# Patient Record
Sex: FEMALE | Race: WHITE | Employment: OTHER | URBAN - METROPOLITAN AREA
[De-identification: names, ages, dates, MRNs, and addresses within clinical notes are randomized per-mention and may not be internally consistent; named-entity substitution may affect disease eponyms.]

---

## 2017-03-02 ENCOUNTER — HOSPITAL ENCOUNTER (OUTPATIENT)
Dept: GENERAL RADIOLOGY | Age: 77
Discharge: HOME | End: 2017-03-02

## 2017-03-02 DIAGNOSIS — R10.9 ABDOMINAL PAIN, UNSPECIFIED SITE: ICD-10-CM

## 2017-08-11 ENCOUNTER — HOSPITAL ENCOUNTER (OUTPATIENT)
Dept: GENERAL RADIOLOGY | Age: 77
Discharge: HOME | End: 2017-08-11

## 2017-08-11 DIAGNOSIS — M54.5 LOW BACK PAIN, UNSPECIFIED BACK PAIN LATERALITY, UNSPECIFIED CHRONICITY, WITH SCIATICA PRESENCE UNSPECIFIED: ICD-10-CM

## 2018-05-21 ENCOUNTER — HOSPITAL ENCOUNTER (OUTPATIENT)
Dept: MAMMOGRAPHY | Age: 78
Discharge: HOME | End: 2018-05-21
Admitting: INTERNAL MEDICINE

## 2018-05-21 DIAGNOSIS — Z12.31 ENCOUNTER FOR SCREENING MAMMOGRAM FOR BREAST CANCER: ICD-10-CM

## 2018-06-13 ENCOUNTER — HOSPITAL ENCOUNTER (OUTPATIENT)
Dept: NON INVASIVE DIAGNOSTICS | Age: 78
Discharge: HOME | End: 2018-06-13
Attending: INTERNAL MEDICINE | Admitting: INTERNAL MEDICINE

## 2018-06-13 DIAGNOSIS — I35.0 NONRHEUMATIC AORTIC VALVE STENOSIS: ICD-10-CM

## 2018-06-13 LAB
LV EF: 60 %
LVEF MODALITY: NORMAL

## 2019-04-15 ENCOUNTER — HOSPITAL ENCOUNTER (OUTPATIENT)
Dept: GENERAL RADIOLOGY | Age: 79
Discharge: HOME | End: 2019-04-15
Payer: MEDICARE

## 2019-04-15 DIAGNOSIS — M54.32 BILATERAL SCIATICA: ICD-10-CM

## 2019-04-15 DIAGNOSIS — M54.31 BILATERAL SCIATICA: ICD-10-CM

## 2019-04-15 PROCEDURE — 72100 X-RAY EXAM L-S SPINE 2/3 VWS: CPT

## 2019-05-09 ENCOUNTER — HOSPITAL ENCOUNTER (OUTPATIENT)
Age: 79
Discharge: HOME | End: 2019-05-09
Payer: MEDICARE

## 2019-05-09 ENCOUNTER — HOSPITAL ENCOUNTER (OUTPATIENT)
Dept: CT IMAGING | Age: 79
Discharge: HOME | End: 2019-05-09
Payer: MEDICARE

## 2019-05-09 DIAGNOSIS — R68.89 ABNORMAL WEIGHT: ICD-10-CM

## 2019-05-09 DIAGNOSIS — M54.30 CHRONIC SCIATICA, UNSPECIFIED LATERALITY: ICD-10-CM

## 2019-05-09 DIAGNOSIS — M15.9 GENERALIZED OSTEOARTHRITIS: ICD-10-CM

## 2019-05-09 DIAGNOSIS — I10 ESSENTIAL HYPERTENSION, BENIGN: ICD-10-CM

## 2019-05-09 PROCEDURE — 74176 CT ABD & PELVIS W/O CONTRAST: CPT

## 2019-05-09 PROCEDURE — 6360000004 HC RX CONTRAST MEDICATION: Performed by: INTERNAL MEDICINE

## 2019-05-09 RX ADMIN — IOHEXOL 50 ML: 240 INJECTION, SOLUTION INTRATHECAL; INTRAVASCULAR; INTRAVENOUS; ORAL at 06:36

## 2019-06-12 ENCOUNTER — HOSPITAL ENCOUNTER (OUTPATIENT)
Dept: NON INVASIVE DIAGNOSTICS | Age: 79
Discharge: HOME | End: 2019-06-12
Payer: MEDICARE

## 2019-06-12 LAB
LV EF: 58 %
LVEF MODALITY: NORMAL

## 2019-06-12 PROCEDURE — 93306 TTE W/DOPPLER COMPLETE: CPT

## 2019-06-13 ENCOUNTER — TELEPHONE (OUTPATIENT)
Dept: CARDIOLOGY CLINIC | Age: 79
End: 2019-06-13

## 2019-06-18 LAB
ALBUMIN SERPL-MCNC: 3.6 G/DL
ALP BLD-CCNC: 78 U/L
ALT SERPL-CCNC: 9 U/L
ANION GAP SERPL CALCULATED.3IONS-SCNC: 1.3 MMOL/L
AST SERPL-CCNC: 15 U/L
BASOPHILS ABSOLUTE: 0.1 /ΜL
BASOPHILS RELATIVE PERCENT: 1 %
BILIRUB SERPL-MCNC: 0.2 MG/DL (ref 0.1–1.4)
BUN BLDV-MCNC: 11 MG/DL
CALCIUM SERPL-MCNC: 9.1 MG/DL
CHLORIDE BLD-SCNC: 105 MMOL/L
CO2: 23 MMOL/L
CREAT SERPL-MCNC: 1.16 MG/DL
EOSINOPHILS ABSOLUTE: 0.3 /ΜL
EOSINOPHILS RELATIVE PERCENT: 5 %
GFR CALCULATED: 45
GLUCOSE BLD-MCNC: 102 MG/DL
HCT VFR BLD CALC: 32.7 % (ref 36–46)
HEMOGLOBIN: 10.3 G/DL (ref 12–16)
LYMPHOCYTES ABSOLUTE: 1.1 /ΜL
LYMPHOCYTES RELATIVE PERCENT: 19 %
MCH RBC QN AUTO: 29.2 PG
MCHC RBC AUTO-ENTMCNC: 31.5 G/DL
MCV RBC AUTO: 93 FL
MONOCYTES ABSOLUTE: 0.6 /ΜL
MONOCYTES RELATIVE PERCENT: 11 %
NEUTROPHILS ABSOLUTE: 4 /ΜL
NEUTROPHILS RELATIVE PERCENT: 64 %
PLATELET # BLD: 390 K/ΜL
PMV BLD AUTO: ABNORMAL FL
POTASSIUM SERPL-SCNC: 4.5 MMOL/L
RBC # BLD: 3.53 10^6/ΜL
SODIUM BLD-SCNC: 141 MMOL/L
TOTAL PROTEIN: 6.3
WBC # BLD: 6.1 10^3/ML

## 2019-06-21 ENCOUNTER — TELEPHONE (OUTPATIENT)
Dept: CARDIOLOGY CLINIC | Age: 79
End: 2019-06-21

## 2019-06-25 ENCOUNTER — OFFICE VISIT (OUTPATIENT)
Dept: CARDIOLOGY CLINIC | Age: 79
End: 2019-06-25
Payer: MEDICARE

## 2019-06-25 VITALS
DIASTOLIC BLOOD PRESSURE: 76 MMHG | WEIGHT: 165 LBS | SYSTOLIC BLOOD PRESSURE: 138 MMHG | OXYGEN SATURATION: 97 % | BODY MASS INDEX: 29.23 KG/M2 | HEART RATE: 70 BPM | HEIGHT: 63 IN

## 2019-06-25 DIAGNOSIS — I10 ESSENTIAL HYPERTENSION: ICD-10-CM

## 2019-06-25 DIAGNOSIS — E78.49 OTHER HYPERLIPIDEMIA: ICD-10-CM

## 2019-06-25 DIAGNOSIS — I35.0 NONRHEUMATIC AORTIC VALVE STENOSIS: ICD-10-CM

## 2019-06-25 PROCEDURE — 93000 ELECTROCARDIOGRAM COMPLETE: CPT | Performed by: INTERNAL MEDICINE

## 2019-06-25 PROCEDURE — 99204 OFFICE O/P NEW MOD 45 MIN: CPT | Performed by: INTERNAL MEDICINE

## 2019-06-25 NOTE — PATIENT INSTRUCTIONS
[facility] Office Note  6/25/2019     Subjective:  Ms. Gena Portillo is a new patient to me here in referral for aortic stenosis by Dr. Tiff Alexander:   Today she reports SOB with exertion. She feels tired.  says patient sometimes asks for nitro patches from him and with that she feels improved. There is intermittent swelling of legs. Patient is accompanied by daughter and . Patient seems to minimize her symptoms. No syncope. PMH   Aortic stenosis, HLD, HTN       Review of Systems:         12 point ROS negative in all areas as listed below except as in [place]  Eating poorly. Constitutional, EENT, Cardiovascular, pulmonary, GI, , Musculoskeletal, skin, neurological, hematological, endocrine, Psychiatric    Reviewed past medical history, social, and family history. Lives in [place], , 4 children 3 living. 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   Quit smoking after 25 years of smoking       Past Medical History:   Diagnosis Date    Arthritis     Diabetes mellitus ([address]     Hyperlipidemia     Hypertension     Thyroid disease      Past Surgical History:   Procedure Laterality Date    EYE SURGERY Left 12/11/15    PHACO EMULSIFICATION OF CATARACT WITH LENS IMPLANT LEFT EYE    KNEE ARTHROSCOPY      OTHER SURGICAL HISTORY  10/09/2015    phacoemulsification of cataract with intraocular implant right eye    THYROIDECTOMY, PARTIAL      TONSILLECTOMY      TUBAL LIGATION         Objective:   /76   Pulse 70   Ht 5' 3\" (1.6 m)   Wt 165 lb (74.8 kg)   SpO2 97%   Breastfeeding? No   BMI 29.23 kg/m²      Wt Readings from Last 3 Encounters:   06/25/19 165 lb (74.8 kg)   10/11/16 211 lb (95.7 kg)   12/11/15 214 lb (97.1 kg)       Physical Exam:  General: No Respiratory distress, appears well developed and well nourished.    Eyes:  Sclera nonicteric  Nose/Sinuses:  negative findings: nose shows no deformity, asymmetry, or inflammation, nasal mucosa normal, septum midline with 25 MG tablet Take 25 mg by mouth as needed for Itching.  [DISCONTINUED] Potassium Chloride Karo CR (KLOR-CON M10 PO) Take 10 mEq by mouth.  [DISCONTINUED] metFORMIN (GLUCOPHAGE) 850 MG tablet Take 850 mg by mouth 3 times daily        No facility-administered encounter medications on file as of 6/25/2019. Lab Data:  CBC: No results for input(s): WBC, HGB, HCT, MCV, PLT in the last 72 hours. BMP: No results for input(s): NA, K, CL, CO2, PHOS, BUN, CREATININE in the last 72 hours. Invalid input(s): CA  LIVER PROFILE: No results for input(s): AST, ALT, LIPASE, BILIDIR, BILITOT, ALKPHOS in the last 72 hours. Invalid input(s): AMYLASE,  ALB  LIPID: No results found for: CHOL  No results found for: TRIG  No results found for: HDL  No results found for: LDLCHOLESTEROL, LDLCALC  No results found for: LABVLDL, VLDL  No results found for: CHOLHDLRATIO  PT/INR: No results for input(s): PROTIME, INR in the last 72 hours. A1C: No results found for: LABA1C  BNP:  No results for input(s): BNP in the last 72 hours. IMAGING:   EKG 6/25/19  normal sinus 69 bpm poor R wave progression    CT Chest, abdomen, pelvis 5/9/19  Multilevel degenerative disc disease of the thoracolumbar spine. Bilateral L5 pars defects. Indeterminate sclerotic lesion is seen at T11 vertebral body, potentially bone island. If there is strong clinical suspicion for metastatic disease, MR could be considered for further assessment. A generalized pattern of skeletal sclerosis is not otherwise seen however. A few small 2-3 mm pulmonary nodules are seen, for which follow-up recommendations are below.      ECHO 6/12/19  Summary   Normal left ventricular systolic function with an estimated ejection   fraction of 55-60%.   No regional wall motion abnormalities are seen.   There is mild concentric left ventricular hypertrophy.   Grade I diastolic dysfunction with normal filling pressure.   The left atrium is mildly dilated.  [place] posterior mitral annular calcification is present.   Mild anterior mitral annular calcification is present.   Mild mitral regurgitation.   Moderate to severe aortic stenosis is present.   Moderate tricuspid regurgitation.   Systolic pulmonary artery pressure (SPAP) estimated at 50 mmHg (RA pressure   3 mmHg), consistent with moderate pulmonary hypertension.   Moderate pulmonic regurgitation present. ECHO 6/13/18  Summary   Normal LV EF with an estimated ejection fraction of 60%.   No regional wall motion abnormalities are seen.   Mild concentric left ventricular hypertrophy.   Grade I diastolic dysfunction with normal filling pressure.   The aortic valve is thickened/calcified with decreased leaflet mobility.   Moderate aortic stenosis.   Mild tricuspid and pulmonic regurgitation.   Systolic pulmonary artery pressure (SPAP) is normal and estimated at 30mmHg   (RA pressure of 3 mmHg). ECHO 4/16/15  Summary   Normal left ventricular global systolic function with an estimated ejection   fraction of 60-65%.   Left ventricle size is normal.   Left ventricular wall thickness is normal.   Grade I left ventricular diastolic dysfunction pattern noted, valsalva   maneuver performed.   Mild mitral valve annular calcification is present, both anterior and   inferolateral/posterior annuluses.   Trace mitral valve regurgitation noted.   The aortic valve is thickened/calcified with decreased leaflet mobility   consistent with aortic stenosis. It is trileaflet in nature.   The aortic valve area is calculated at 1.0 cm2 with a maximum pressure   gradient of 50 mmHg and a mean pressure gradient of 27 mmHg.  This is c/w   moderate aortic stenosis.   The tricuspid valve is structurally and functionally normal.   There is trace tricuspid valve regurgitation noted with an estimated SPAP of   36 mmHg.   The pulmonic valve is not well visualized.   Mild pulmonic valve regurgitation is present.  Ra Cerise is a small localized near left ventricle pericardial effusion noted.  IVC size is normal (<2.1 cm) and collapses < 50% with respiration consistent   with elevated RA pressure (10 mmHg).   The left atrium is mildly dilated. Assessment:  1. Aortic valve stenosis, etiology of cardiac valve disease unspecified    2. Other hyperlipidemia    3. Hypertension, unspecified type         Plan:  1. Meds reviewed Patient is symptomatic from aortic stenosis. She has SOB and fatigue  2. Check our current med list when you go home. Call my office with any discrepancies. 3. Referral to Dr. Alison Cordero / Dr Irma Kaye for possible aortic valve replacement   She will obviously need to assess coronary arteries   I explained every thing about aortic stenosis on a heart model to patient all questions answered. This note was scribed in the presence of Abelardo Bey MD by Caroline Dowell RN      QUALITY MEASURES  1. Tobacco Cessation Counseling: NA  2. Retake of BP if >140/90:   NA  3. Documentation to PCP/referring for new patient:  Sent to PCP at close of office visit  4. CAD patient on anti-platelet: NA   5. CAD patient on STATIN therapy:  HLD- lipitor   6. Patient with CHF and aFib on anticoagulation:  NA     I, Dr. Galina Quiroz, personally performed the services described in this documentation, as scribed by the above signed scribe in my presence. It is both accurate and complete to my knowledge. I agree with the details independently gathered by the clinical support staff, while the remaining scribed note accurately describes my personal service to the patient.       [address] [place], MD 6/25/2019 2:44 PM

## 2019-06-25 NOTE — PROGRESS NOTES
perforation or bleeding  Back:  no pain to palpation  Joint:  no active joint inflammation  Musculoskeletal:  negative  Skin:  Warm and dry  Neck:  Negative for JVD and Carotid Bruits. Chest:  Clear to auscultation, respiration easy  Cardiovascular:  RRR, S1S2 normal, Loud systolic aortic stenosis  murmur, Did not hear any diastolic murmur,. no rub or thrill. Abdomen:  Soft normal liver and spleen  Extremities:   1+ BLE edema, clubbing, cyanosis,  Pulses: pedal pulses are normal.  Neuro: intact    Medications:   Outpatient Encounter Medications as of 6/25/2019   Medication Sig Dispense Refill    lisinopril (PRINIVIL;ZESTRIL) 5 MG tablet Take 5 mg by mouth daily      HYDROcodone-acetaminophen (NORCO) 5-325 MG per tablet Take 1 tablet by mouth every 6 hours as needed for Pain      metaxalone (SKELAXIN) 800 MG tablet Take 800 mg by mouth 3 times daily      metoprolol tartrate (LOPRESSOR) 25 MG tablet Take 25 mg by mouth 2 times daily      Butalbital-Acetaminophen  MG TABS Take 1 tablet by mouth      bisoprolol-hydrochlorothiazide (ZIAC) 5-6.25 MG per tablet Take 1 tablet by mouth daily.  atorvastatin (LIPITOR) 40 MG tablet Take 40 mg by mouth daily.  spironolactone-hydrochlorothiazide (ALDACTAZIDE) 25-25 MG per tablet Take 1 tablet by mouth daily.  hydrocodone-acetaminophen (VICODIN) 5-500 MG per tablet Take 1 tablet by mouth every 6 hours as needed.  Levothyroxine Sodium (SYNTHROID PO) Take 175 mcg by mouth daily.  GARLIC by Does not apply route.  FISH OIL by Does not apply route.  Flaxseed, Linseed, (FLAX SEED OIL PO) Take  by mouth.         [DISCONTINUED] diazepam (VALIUM) 5 MG tablet Take 5 mg by mouth every 6 hours as needed for Anxiety      [DISCONTINUED] baclofen (LIORESAL) 10 MG tablet Take 10 mg by mouth 3 times daily      [DISCONTINUED] methocarbamol (ROBAXIN) 750 MG tablet Take 750 mg by mouth 4 times daily      [DISCONTINUED] hydrOXYzine (ATARAX) 25 MG tablet Take 25 mg by mouth as needed for Itching.  [DISCONTINUED] Potassium Chloride Karo CR (KLOR-CON M10 PO) Take 10 mEq by mouth.  [DISCONTINUED] metFORMIN (GLUCOPHAGE) 850 MG tablet Take 850 mg by mouth 3 times daily        No facility-administered encounter medications on file as of 6/25/2019. Lab Data:  CBC: No results for input(s): WBC, HGB, HCT, MCV, PLT in the last 72 hours. BMP: No results for input(s): NA, K, CL, CO2, PHOS, BUN, CREATININE in the last 72 hours. Invalid input(s): CA  LIVER PROFILE: No results for input(s): AST, ALT, LIPASE, BILIDIR, BILITOT, ALKPHOS in the last 72 hours. Invalid input(s): AMYLASE,  ALB  LIPID: No results found for: CHOL  No results found for: TRIG  No results found for: HDL  No results found for: LDLCHOLESTEROL, LDLCALC  No results found for: LABVLDL, VLDL  No results found for: CHOLHDLRATIO  PT/INR: No results for input(s): PROTIME, INR in the last 72 hours. A1C: No results found for: LABA1C  BNP:  No results for input(s): BNP in the last 72 hours. IMAGING:   EKG 6/25/19  normal sinus 69 bpm poor R wave progression    CT Chest, abdomen, pelvis 5/9/19  Multilevel degenerative disc disease of the thoracolumbar spine. Bilateral L5 pars defects. Indeterminate sclerotic lesion is seen at T11 vertebral body, potentially bone island. If there is strong clinical suspicion for metastatic disease, MR could be considered for further assessment. A generalized pattern of skeletal sclerosis is not otherwise seen however. A few small 2-3 mm pulmonary nodules are seen, for which follow-up recommendations are below.      ECHO 6/12/19  Summary   Normal left ventricular systolic function with an estimated ejection   fraction of 55-60%.   No regional wall motion abnormalities are seen.   There is mild concentric left ventricular hypertrophy.   Grade I diastolic dysfunction with normal filling pressure.   The left atrium is mildly dilated.  Jennifer Shultz ventricle pericardial effusion noted.  IVC size is normal (<2.1 cm) and collapses < 50% with respiration consistent   with elevated RA pressure (10 mmHg).   The left atrium is mildly dilated. Assessment:  1. Aortic valve stenosis, etiology of cardiac valve disease unspecified    2. Other hyperlipidemia    3. Hypertension, unspecified type         Plan:  1. Meds reviewed Patient is symptomatic from aortic stenosis. She has SOB and fatigue  2. Check our current med list when you go home. Call my office with any discrepancies. 3. Referral to Dr. Melvi Griffin / Dr Moreno Chiang for possible aortic valve replacement   She will obviously need to assess coronary arteries   I explained every thing about aortic stenosis on a heart model to patient all questions answered. This note was scribed in the presence of Brooks Olivares MD by Crystal Govea RN      QUALITY MEASURES  1. Tobacco Cessation Counseling: NA  2. Retake of BP if >140/90:   NA  3. Documentation to PCP/referring for new patient:  Sent to PCP at close of office visit  4. CAD patient on anti-platelet: NA   5. CAD patient on STATIN therapy:  HLD- lipitor   6. Patient with CHF and aFib on anticoagulation:  NA     I, Dr. Marieta Brittle, personally performed the services described in this documentation, as scribed by the above signed scribe in my presence. It is both accurate and complete to my knowledge. I agree with the details independently gathered by the clinical support staff, while the remaining scribed note accurately describes my personal service to the patient.       [address] [place], MD 6/25/2019 2:44 PM

## 2019-07-09 ENCOUNTER — OFFICE VISIT (OUTPATIENT)
Dept: CARDIOLOGY CLINIC | Age: 79
End: 2019-07-09
Payer: MEDICARE

## 2019-07-09 VITALS
SYSTOLIC BLOOD PRESSURE: 130 MMHG | WEIGHT: 165 LBS | DIASTOLIC BLOOD PRESSURE: 60 MMHG | OXYGEN SATURATION: 99 % | BODY MASS INDEX: 29.23 KG/M2 | HEART RATE: 51 BPM | HEIGHT: 63 IN

## 2019-07-09 DIAGNOSIS — I35.0 NONRHEUMATIC AORTIC VALVE STENOSIS: Primary | ICD-10-CM

## 2019-07-09 DIAGNOSIS — R06.09 DOE (DYSPNEA ON EXERTION): ICD-10-CM

## 2019-07-09 DIAGNOSIS — I10 ESSENTIAL HYPERTENSION: ICD-10-CM

## 2019-07-09 DIAGNOSIS — E78.49 OTHER HYPERLIPIDEMIA: ICD-10-CM

## 2019-07-09 PROCEDURE — 99214 OFFICE O/P EST MOD 30 MIN: CPT | Performed by: INTERNAL MEDICINE

## 2019-07-09 NOTE — PROGRESS NOTES
muscular weakness/soreness. Neurological: No dizziness or headaches. No numbness/tingling, speech problems or weakness. No history of a stroke or TIA. Psychological: No anxiety or depression  Hematological and Lymphatic: No abnormal bleeding or bruising, blood clots, jaundice. Endocrine: No malaise/lethargy, palpitations, polydipsia/polyuria, temperature intolerance or unexpected weight changes. Skin: No rashes or non-healing ulcers. PE:  Blood pressure 130/60, pulse 51, height 5' 3\" (1.6 m), weight 165 lb (74.8 kg), SpO2 99 %, not currently breastfeeding. General (appearance): Well devel. No distress. Eyes: Anicteric. EOMI. Neck: Supple. No JVD  Ears/Nose/Mouth/Thorat: No cyanosis  CV: RRR. 2/6 CHRISTO. + (?diminished) S2.  Respiratory:  Clear B, normal respiratory effort  GI: Abd s/nt/nd. No peritoneal signs  Skin: Warm, dry. No rashes  Neuro/Psych: Alert and oriented x 3. Appropriate behavior  Ext:  No c/c. No pitting edema  Pulses:  2+ carotid    Lab Results   Component Value Date    WBC 6.1 06/18/2019    HGB 10.3 (A) 06/18/2019    HCT 32.7 (A) 06/18/2019    MCV 93 06/18/2019     06/18/2019     Lab Results   Component Value Date     06/18/2019    K 4.5 06/18/2019     06/18/2019    CO2 23 06/18/2019    BUN 11 06/18/2019    CREATININE 1.16 06/18/2019    GLUCOSE 102 06/18/2019    CALCIUM 9.1 06/18/2019    PROT 7.0 03/18/2014    LABALBU 3.6 06/18/2019    BILITOT 0.2 06/18/2019    ALKPHOS 78 06/18/2019    AST 15 06/18/2019    ALT 9 06/18/2019    LABGLOM 45 06/18/2019    GFRAA >60 03/18/2014    AGRATIO 1.5 03/18/2014    GLOB 2.8 03/18/2014     Lab Results   Component Value Date    INR 0.93 03/18/2014    PROTIME 10.4 03/18/2014     No results found for: CHOL  No results found for: TRIG  No results found for: HDL  No results found for: LDLCHOLESTEROL, LDLCALC  No results found for: LABVLDL, VLDL  No results found for: CHOLHDLRATIO    6/12/2019 TTE: EF normal, PV 4.14 m/s.  Mild MR, moderate

## 2019-07-10 DIAGNOSIS — I35.0 NONRHEUMATIC AORTIC VALVE STENOSIS: Primary | ICD-10-CM

## 2019-07-10 DIAGNOSIS — R06.09 DOE (DYSPNEA ON EXERTION): ICD-10-CM

## 2019-07-10 DIAGNOSIS — R09.89 OTHER SPECIFIED SYMPTOMS AND SIGNS INVOLVING THE CIRCULATORY AND RESPIRATORY SYSTEMS: ICD-10-CM

## 2019-07-10 RX ORDER — DIPHENHYDRAMINE HCL 50 MG
CAPSULE ORAL
Qty: 1 CAPSULE | Refills: 0 | Status: ON HOLD | OUTPATIENT
Start: 2019-07-10 | End: 2019-10-16 | Stop reason: HOSPADM

## 2019-07-10 RX ORDER — PREDNISONE 50 MG/1
TABLET ORAL
Qty: 3 TABLET | Refills: 0 | Status: ON HOLD | OUTPATIENT
Start: 2019-07-10 | End: 2019-10-16 | Stop reason: HOSPADM

## 2019-07-16 ENCOUNTER — TELEPHONE (OUTPATIENT)
Dept: CARDIOLOGY CLINIC | Age: 79
End: 2019-07-16

## 2019-07-17 NOTE — TELEPHONE ENCOUNTER
Spoke to pts daughter, [facility], to let her know about pre medication for CTA. She will get it at the local pharmacy. Assured her that pt will not need meds for anxiety prior to CTA as she will not be in a \"tube\". Let her know that she will get light sedation for her cath. Instructed to call me with any questions/concerns.  Maritza BLOUNT

## 2019-08-02 ENCOUNTER — INITIAL CONSULT (OUTPATIENT)
Dept: GASTROENTEROLOGY | Age: 79
End: 2019-08-02
Payer: MEDICARE

## 2019-08-02 VITALS
DIASTOLIC BLOOD PRESSURE: 62 MMHG | HEIGHT: 63 IN | WEIGHT: 161 LBS | SYSTOLIC BLOOD PRESSURE: 120 MMHG | BODY MASS INDEX: 28.53 KG/M2

## 2019-08-02 DIAGNOSIS — R63.4 WEIGHT LOSS: Primary | ICD-10-CM

## 2019-08-02 DIAGNOSIS — R10.13 DYSPEPSIA: ICD-10-CM

## 2019-08-02 DIAGNOSIS — D63.8 ANEMIA, CHRONIC DISEASE: ICD-10-CM

## 2019-08-02 DIAGNOSIS — R19.5 POSITIVE COLORECTAL CANCER SCREENING USING COLOGUARD TEST: ICD-10-CM

## 2019-08-02 DIAGNOSIS — Z80.0 FAMILY HISTORY OF COLON CANCER IN MOTHER: ICD-10-CM

## 2019-08-02 PROCEDURE — 99204 OFFICE O/P NEW MOD 45 MIN: CPT | Performed by: INTERNAL MEDICINE

## 2019-08-02 RX ORDER — OMEPRAZOLE 20 MG/1
20 CAPSULE, DELAYED RELEASE ORAL DAILY
Status: ON HOLD | COMMUNITY
End: 2019-10-16 | Stop reason: HOSPADM

## 2019-08-02 RX ORDER — POLYETHYLENE GLYCOL 3350 17 G/17G
238 POWDER ORAL DAILY
Qty: 255 G | Refills: 0 | Status: ON HOLD | OUTPATIENT
Start: 2019-08-02 | End: 2019-09-25

## 2019-08-02 RX ORDER — FOLIC ACID 1 MG/1
1 TABLET ORAL
COMMUNITY
End: 2019-11-14

## 2019-08-02 NOTE — PROGRESS NOTES
History Narrative    Not on file     SURGICAL HISTORY     Past Surgical History:   Procedure Laterality Date    EYE SURGERY Left 12/11/15    PHACO EMULSIFICATION OF CATARACT WITH LENS IMPLANT LEFT EYE    KNEE ARTHROSCOPY      OTHER SURGICAL HISTORY  10/09/2015    phacoemulsification of cataract with intraocular implant right eye    THYROIDECTOMY, PARTIAL      TONSILLECTOMY      TUBAL LIGATION       CURRENT MEDICATIONS   (This list may include medications prescribed during this encounter as epic can not insert only the list prior to this encounter.)  Current Outpatient Rx   Medication Sig Dispense Refill    omeprazole (PRILOSEC) 20 MG delayed release capsule Take 20 mg by mouth daily      folic acid (FOLVITE) 1 MG tablet Take 1 tablet by mouth      bisacodyl (DULCOLAX) 5 MG EC tablet Take 4 tablets by mouth once for 1 dose Take as directed for colonoscopy. 4 tablet 0    polyethylene glycol (MIRALAX) POWD powder Take 238 g by mouth daily Take as directed for colonoscopy 255 g 0    predniSONE (DELTASONE) 50 MG tablet Take 1 tablet 13 hours prior to CTA scan, 1 tablet 7 hours prior to CTA scan and 1 tablet 1 hour prior to CTA scan 3 tablet 0    diphenhydrAMINE (BENADRYL) 50 MG capsule Take 1 capsule 1 hour prior to CTA scan 1 capsule 0    lisinopril (PRINIVIL;ZESTRIL) 5 MG tablet Take 5 mg by mouth daily      HYDROcodone-acetaminophen (NORCO) 5-325 MG per tablet Take 1 tablet by mouth every 6 hours as needed for Pain      metaxalone (SKELAXIN) 800 MG tablet Take 800 mg by mouth 3 times daily      metoprolol tartrate (LOPRESSOR) 25 MG tablet Take 25 mg by mouth 2 times daily      Butalbital-Acetaminophen  MG TABS Take 1 tablet by mouth      bisoprolol-hydrochlorothiazide (ZIAC) 5-6.25 MG per tablet Take 1 tablet by mouth daily.  atorvastatin (LIPITOR) 40 MG tablet Take 40 mg by mouth daily.       spironolactone-hydrochlorothiazide (ALDACTAZIDE) 25-25 MG per tablet Take 1 tablet by mouth this visit:    Weight loss  -     EGD  -     COLONOSCOPY W/ OR W/O BIOPSY  -     bisacodyl (DULCOLAX) 5 MG EC tablet; Take 4 tablets by mouth once for 1 dose Take as directed for colonoscopy. -     polyethylene glycol (MIRALAX) POWD powder; Take 238 g by mouth daily Take as directed for colonoscopy    Dyspepsia  -     EGD    Positive colorectal cancer screening using Cologuard test  -     COLONOSCOPY W/ OR W/O BIOPSY  -     bisacodyl (DULCOLAX) 5 MG EC tablet; Take 4 tablets by mouth once for 1 dose Take as directed for colonoscopy. -     polyethylene glycol (MIRALAX) POWD powder; Take 238 g by mouth daily Take as directed for colonoscopy    Anemia, chronic disease  -     EGD  -     COLONOSCOPY W/ OR W/O BIOPSY  -     bisacodyl (DULCOLAX) 5 MG EC tablet; Take 4 tablets by mouth once for 1 dose Take as directed for colonoscopy. -     polyethylene glycol (MIRALAX) POWD powder; Take 238 g by mouth daily Take as directed for colonoscopy    Family history of colon cancer in mother  -     COLONOSCOPY W/ OR W/O BIOPSY  -     bisacodyl (DULCOLAX) 5 MG EC tablet; Take 4 tablets by mouth once for 1 dose Take as directed for colonoscopy. -     polyethylene glycol (MIRALAX) POWD powder; Take 238 g by mouth daily Take as directed for colonoscopy        ORDERED FUTURE/PENDING TESTS     Lab Frequency Next Occurrence   CTA CHEST W CONTRAST Once 07/17/2019   CTA ABDOMEN PELVIS W CONTRAST Once 07/17/2019   Full PFT Study With Bronchodilator Once 07/17/2019   VL DUP CAROTID BILATERAL Once 07/17/2019   CORONARY ANGIOGRAPHY Once 09/10/2019   BUN Once 10/10/2019   CREATININE, SERUM Once 10/10/2019       FOLLOWUP   Return for EGD & Colonoscopy.           Rigoberto 40 8/2/19 2:47 PM    CC:  Norberto Meier MD

## 2019-08-02 NOTE — LETTER
[address] ,  [address] [facility]  Phone: [phone] [facility],  [address] [facility], [address] [place] [address]  Phone: [phone]   DINORA:[phone]    08/02/19    Patient:Shanita Jose  MR NUDVHW:[id number]  YOB: 1940  Date of Visit:8/2/19    Dear Dr. Ny Miller MD    Thank you for the request for consultation for Alyce Ness to me for the evaluation of   Chief Complaint   Patient presents with   [address]     NP- positive FIT   . Below are the relevant portions of my assessment and plan of care. FINAL DIAGNOSIS/Assessment   Diagnosis Orders   1. Weight loss  EGD    COLONOSCOPY W/ OR W/O BIOPSY    bisacodyl (DULCOLAX) 5 MG EC tablet    polyethylene glycol (MIRALAX) POWD powder   2. Dyspepsia  EGD   3. Positive colorectal cancer screening using Cologuard test  COLONOSCOPY W/ OR W/O BIOPSY    bisacodyl (DULCOLAX) 5 MG EC tablet    polyethylene glycol (MIRALAX) POWD powder   4. Anemia, chronic disease  EGD    COLONOSCOPY W/ OR W/O BIOPSY    bisacodyl (DULCOLAX) 5 MG EC tablet    polyethylene glycol (MIRALAX) POWD powder   5. Family history of colon cancer in mother  COLONOSCOPY W/ OR W/O BIOPSY    bisacodyl (DULCOLAX) 5 MG EC tablet    polyethylene glycol (MIRALAX) POWD powder       VISIT ORDERS/Plan  Orders Placed This Encounter   Procedures    COLONOSCOPY W/ OR W/O BIOPSY     Scheduling Instructions:      Schedule with anesthesia provided diprivan. Please provide prep of choice instructions and prescription. General guidelines for holding blood thinners/anticoagulants around endoscopic procedure are but patients are encouraged to check with their prescribing physician. The patient may hold Plavix, Effient, Brilinta 5 days prior to the procedure unless:       A drug eluting stent has been placed within past 12 months. A nondrug eluting stent has been placed within past 1 month. Coumadin may be held 4 days prior to the procedure unless:        Mechanical mitral valve replacement (requires heparin bridge while Coumadin held and is managed by pharmacy)      Pradaxa, Xarelto, Eliquis may be held 2-3 days prior to procedure. According to pharmacokinetics of the drug, package insert, cardiology practice patterns, and T1/2 of theses drugs (12 hrs), Eliquis and Xarelto are held 48hrs prior to any procedure, including major surgical procedures w/o       increased bleeding.  That is usually the standard of care, as coagulation would/should be normalized at 48hrs. Every attempt should be made to maintain ASA 81mg per day throughout the catracho-operative period in patients with diagnosis of ASHD. These recommendations may need to be modified by the provider/ based on risk /benefit analysis of the procedure and the patients history. If anticoagulation can not be held because recent cardiac stent, elective endoscopic procedures should be delayed until they have received the minimum duration of recommended antiplatlet therapy and it can safely be held. Again if unsure, patient should discuss with prescribing physician/service. If anticoagulation can not be stopped, endoscopic procedures can still be performed either diagnostically at a somewhat higher risk. Understand that any therapeutic procedure where anything beyond looking is performed, carries higher risks. For this reason without overt bleeding other testing       such as cologuard may be more appropriate.               High risk endoscopic procedures that require stopping antiplatelet and anticoagulation therapy include polypectomy, biliary or pancreatic sphincterotomy, pneumatic or bougie dilation, PEG placement, therapeutic balloon-assisted enteroscopy, EUS and FNA, tumor ablation by any technique,

## 2019-08-02 NOTE — PATIENT INSTRUCTIONS
small intestine)  The physician who performs the procedures, known as an endoscopist, has special training in using an endoscope to examine the upper GI system, looking for inflammation (redness, irritation), bleeding, ulcers, or tumors. REASONS FOR UPPER ENDOSCOPY  The most common reasons for upper endoscopy include:  Unexplained discomfort in the upper abdomen   GERD or gastroesophageal reflux disease, (often called heartburn)   Persistent nausea and vomiting   Upper GI bleeding (vomiting blood or blood found in the stool that originated from the upper part of the gastrointestinal tract). Bleeding can be treated during the endoscopy. Difficulty swallowing; food/liquids getting stuck in the esophagus during swallowing. This may be caused by a narrowing (stricture) or tumor. The stricture may be dilated with special balloons or dilation tubes during the endoscopy. Abnormal or unclear findings on an upper GI x-ray, CT scan or MRI. Removal of a foreign body (a swallowed object). To check healing or progress on previously found polyps (growths), tumors, or ulcers. ENDOSCOPY PREPARATION  You will be given specific instructions regarding how to prepare for the examination before the procedure. These instructions are designed to maximize your safety during and after the examination and to minimize possible complications. It is important to read the instructions ahead of time and follow them carefully. Do not hesitate to call the physician's office or the endoscopy unit if there are questions. Nothing to eat after midnight the day before the test. You may be asked not to eat or drink anything for up to eight hours before the test. It is important for your stomach to be empty to allow the endoscopist to visualize the entire area and to decrease the possibility of food or fluid being vomited into the lungs while under sedation (called aspiration).   You may be asked to adjust the dose of your medications or to doctor when it is safe to restart aspirin and other blood-thinning medications. COLONOSCOPY COMPLICATIONS  Colonoscopy is a safe procedure, and complications are rare but can occur:        Bleeding can occur from biopsies or the removal of polyps, but it is usually minimal and can be controlled. The colonoscope can cause a tear or hole (perforation) in the colon. This is a serious problem, but it does not happen commonly. It is possible to have side effects from the sedative medicines. Although colonoscopy is the best test to examine the colon, it is possible for even the most skilled doctors to miss or overlook an abnormal area in the colon. You should call your doctor immediately if you have any of the following:        Severe abdominal pain (not just gas cramps)      A firm, bloated abdomen      Vomiting      Fever      Rectal bleeding (greater than a few tablespoons)    AFTER COLONOSCOPY  Although many people worry about being uncomfortable during a colonoscopy, most people tolerate it very well and feel fine afterward. It is normal to feel tired afterward. Plan to take it easy and relax the rest of the day. Your doctor can describe the results of the colonoscopy as soon as it is over. If s/he took biopsies or polyps, you should call for results within one to two weeks. We will make every attempt to get you your results by phone, mychart or sometimes a follow up visit, however, It is your responsibility to obtain your test results. If you do not get your results within 2 weeks, please call the office. Not all test results are available during your visit. If your test results are not back during the visit and you are still having symptoms, make an appointment with your caregiver to find out the results and any next steps. Do not assume everything is normal if you have not heard from your caregiver or the medical facility. It is important for you to follow up on all of your test results.

## 2019-08-09 ENCOUNTER — TELEPHONE (OUTPATIENT)
Dept: GASTROENTEROLOGY | Age: 79
End: 2019-08-09

## 2019-08-09 NOTE — TELEPHONE ENCOUNTER
LUPE with Dee Savage to call back - pt ok'd, had cancellation for 8/13 for colonoscopy, pt wanted in earlier than 8/27

## 2019-08-12 ENCOUNTER — TELEPHONE (OUTPATIENT)
Dept: CARDIOLOGY CLINIC | Age: 79
End: 2019-08-12

## 2019-08-13 ENCOUNTER — ANESTHESIA (OUTPATIENT)
Dept: ENDOSCOPY | Age: 79
End: 2019-08-13
Payer: MEDICARE

## 2019-08-13 ENCOUNTER — HOSPITAL ENCOUNTER (OUTPATIENT)
Age: 79
Setting detail: OUTPATIENT SURGERY
Discharge: HOME | End: 2019-08-13
Attending: INTERNAL MEDICINE | Admitting: INTERNAL MEDICINE
Payer: MEDICARE

## 2019-08-13 ENCOUNTER — ANESTHESIA EVENT (OUTPATIENT)
Dept: ENDOSCOPY | Age: 79
End: 2019-08-13
Payer: MEDICARE

## 2019-08-13 VITALS
OXYGEN SATURATION: 98 % | DIASTOLIC BLOOD PRESSURE: 70 MMHG | SYSTOLIC BLOOD PRESSURE: 164 MMHG | RESPIRATION RATE: 22 BRPM

## 2019-08-13 VITALS
TEMPERATURE: 97 F | HEIGHT: 64 IN | SYSTOLIC BLOOD PRESSURE: 137 MMHG | BODY MASS INDEX: 28.34 KG/M2 | HEART RATE: 70 BPM | OXYGEN SATURATION: 96 % | WEIGHT: 166 LBS | DIASTOLIC BLOOD PRESSURE: 67 MMHG | RESPIRATION RATE: 16 BRPM

## 2019-08-13 LAB
GLUCOSE BLD-MCNC: 107 MG/DL (ref 70–99)
PERFORMED ON: ABNORMAL

## 2019-08-13 PROCEDURE — 43450 DILATE ESOPHAGUS 1/MULT PASS: CPT | Performed by: INTERNAL MEDICINE

## 2019-08-13 PROCEDURE — 3700000001 HC ADD 15 MINUTES (ANESTHESIA): Performed by: INTERNAL MEDICINE

## 2019-08-13 PROCEDURE — 6360000002 HC RX W HCPCS: Performed by: NURSE ANESTHETIST, CERTIFIED REGISTERED

## 2019-08-13 PROCEDURE — 3609017100 HC EGD: Performed by: INTERNAL MEDICINE

## 2019-08-13 PROCEDURE — 3609027000 HC COLONOSCOPY: Performed by: INTERNAL MEDICINE

## 2019-08-13 PROCEDURE — 3609015300 HC ESOPHAGEAL DILATION MALONEY: Performed by: INTERNAL MEDICINE

## 2019-08-13 PROCEDURE — 7100000010 HC PHASE II RECOVERY - FIRST 15 MIN: Performed by: INTERNAL MEDICINE

## 2019-08-13 PROCEDURE — 45378 DIAGNOSTIC COLONOSCOPY: CPT | Performed by: INTERNAL MEDICINE

## 2019-08-13 PROCEDURE — 43235 EGD DIAGNOSTIC BRUSH WASH: CPT | Performed by: INTERNAL MEDICINE

## 2019-08-13 PROCEDURE — 3700000000 HC ANESTHESIA ATTENDED CARE: Performed by: INTERNAL MEDICINE

## 2019-08-13 PROCEDURE — 7100000011 HC PHASE II RECOVERY - ADDTL 15 MIN: Performed by: INTERNAL MEDICINE

## 2019-08-13 PROCEDURE — 2709999900 HC NON-CHARGEABLE SUPPLY: Performed by: INTERNAL MEDICINE

## 2019-08-13 RX ORDER — PROPOFOL 10 MG/ML
INJECTION, EMULSION INTRAVENOUS PRN
Status: DISCONTINUED | OUTPATIENT
Start: 2019-08-13 | End: 2019-08-13 | Stop reason: SDUPTHER

## 2019-08-13 RX ORDER — SODIUM CHLORIDE, SODIUM LACTATE, POTASSIUM CHLORIDE, CALCIUM CHLORIDE 600; 310; 30; 20 MG/100ML; MG/100ML; MG/100ML; MG/100ML
INJECTION, SOLUTION INTRAVENOUS CONTINUOUS
Status: DISCONTINUED | OUTPATIENT
Start: 2019-08-13 | End: 2019-08-13 | Stop reason: HOSPADM

## 2019-08-13 RX ADMIN — PROPOFOL 400 MG: 10 INJECTION, EMULSION INTRAVENOUS at 10:35

## 2019-08-13 ASSESSMENT — PAIN - FUNCTIONAL ASSESSMENT: PAIN_FUNCTIONAL_ASSESSMENT: 0-10

## 2019-08-13 NOTE — H&P
[address] ,  [address] [facility], [facility]  Phone: [phone]     CHIEF COMPLAINT           Chief Complaint   Patient presents with   [address]       NP- positive FIT         HPI      Thank you Adria Pryor MD for asking me to see Vinay Malloy in consultation. She is a  [2] White [1] 78 y.o. Cely Belch female seen with  (whom I recently scoped) and daughter who presents with the following GI complaints:  . Vinay Malloy  Presents with unexplained weight loss. States she quit eating and lost appetite after loss of a daughter. Had a positive Cologuard without overt bleeding. Denies constipation, diarrhea, rectal pain or swelling, aspirin or nsaid use. No pertinent GI family history including no colon polyps or colorectal cancer. Indicates she has been on prilosec for as long as she can remember but because history of ulcer 31yo not because heartburn and has not tried to stop it. She does have belching at times. Has a chronic normocytic anemia. Recent CT with a question of a lesion at T11. Indicates her mother had colon cancer.     HPI elements: location, severity, timing, modifying factors, quality, duration, context and associated signs/symptoms.     Last Encounter Reviewed:   Pertinent PMH, FH, SH is reviewed below. Last EGD: 31yo, NA  Last Colonoscopy: 31yo, NA     Review of available records reveals:       Wt Readings from Last 50 Encounters:   08/02/19 161 lb (73 kg)   07/09/19 165 lb (74.8 kg)   06/25/19 165 lb (74.8 kg)   10/11/16 211 lb (95.7 kg)   12/11/15 214 lb (97.1 kg)   10/09/15 214 lb (97.1 kg)         No components found for: HGBA1C      BP Readings from Last 3 Encounters:   08/02/19 120/62   07/09/19 130/60   06/25/19 138/76           Health Maintenance   Topic Date Due    DTaP/Tdap/Td vaccine (1 - Tdap) 01/13/1959    Shingles Vaccine (1 of 2) 01/13/1990    Annual Wellness Visit (AWV)  01/13/2003    require stopping antiplatelet and anticoagulation therapy include polypectomy, biliary or pancreatic sphincterotomy, pneumatic or bougie dilation, PEG placement, therapeutic balloon-assisted enteroscopy, EUS and FNA, tumor ablation by any technique,          cystogastrostomy,and treatment of varices.       Order Specific Question:   Screening or Diagnostic?       Answer:   Diagnostic      Roslyn Van was seen today for establish care.     Diagnoses and all orders for this visit:     Weight loss  -     EGD  -     COLONOSCOPY W/ OR W/O BIOPSY  -     bisacodyl (DULCOLAX) 5 MG EC tablet; Take 4 tablets by mouth once for 1 dose Take as directed for colonoscopy. -     polyethylene glycol (MIRALAX) POWD powder; Take 238 g by mouth daily Take as directed for colonoscopy     Dyspepsia  -     EGD     Positive colorectal cancer screening using Cologuard test  -     COLONOSCOPY W/ OR W/O BIOPSY  -     bisacodyl (DULCOLAX) 5 MG EC tablet; Take 4 tablets by mouth once for 1 dose Take as directed for colonoscopy. -     polyethylene glycol (MIRALAX) POWD powder; Take 238 g by mouth daily Take as directed for colonoscopy     Anemia, chronic disease  -     EGD  -     COLONOSCOPY W/ OR W/O BIOPSY  -     bisacodyl (DULCOLAX) 5 MG EC tablet; Take 4 tablets by mouth once for 1 dose Take as directed for colonoscopy. -     polyethylene glycol (MIRALAX) POWD powder; Take 238 g by mouth daily Take as directed for colonoscopy     Family history of colon cancer in mother  -     COLONOSCOPY W/ OR W/O BIOPSY  -     bisacodyl (DULCOLAX) 5 MG EC tablet; Take 4 tablets by mouth once for 1 dose Take as directed for colonoscopy. -     polyethylene glycol (MIRALAX) POWD powder;  Take 238 g by mouth daily Take as directed for colonoscopy           ORDERED FUTURE/PENDING TESTS      Lab Frequency Next Occurrence   CTA CHEST W CONTRAST Once 07/17/2019   CTA ABDOMEN PELVIS W CONTRAST Once 07/17/2019   Full PFT Study With Bronchodilator Once 07/17/2019   VL DUP CAROTID BILATERAL Once 07/17/2019   CORONARY ANGIOGRAPHY Once 09/10/2019   BUN Once 10/10/2019   CREATININE, SERUM Once 10/10/2019         FOLLOWUP   Return for EGD & Colonoscopy.       Rigoberto 40 8/13/19 10:15 AM

## 2019-08-14 ENCOUNTER — TELEPHONE (OUTPATIENT)
Dept: GASTROENTEROLOGY | Age: 79
End: 2019-08-14

## 2019-08-15 NOTE — TELEPHONE ENCOUNTER
Yes, drink plenty of fluid and throat lozenge/cough drop as needed. We dilated/stretched his esophagus because a narrowing.

## 2019-08-19 ENCOUNTER — TELEPHONE (OUTPATIENT)
Dept: CARDIOLOGY CLINIC | Age: 79
End: 2019-08-19

## 2019-08-19 NOTE — TELEPHONE ENCOUNTER
Patient is rescheduled for LHC (TAVR) with [facility] on 9/25/19 @ 9am, arrival of 7:30am to the Cath Lab, 7:15am to the main entrance of A.

## 2019-09-19 ENCOUNTER — HOSPITAL ENCOUNTER (OUTPATIENT)
Dept: VASCULAR LAB | Age: 79
Discharge: HOME | End: 2019-09-19
Payer: MEDICARE

## 2019-09-19 ENCOUNTER — HOSPITAL ENCOUNTER (EMERGENCY)
Age: 79
Discharge: HOME | End: 2019-09-19
Attending: EMERGENCY MEDICINE
Payer: MEDICARE

## 2019-09-19 ENCOUNTER — TELEPHONE (OUTPATIENT)
Dept: CARDIOLOGY CLINIC | Age: 79
End: 2019-09-19

## 2019-09-19 ENCOUNTER — HOSPITAL ENCOUNTER (OUTPATIENT)
Dept: PULMONOLOGY | Age: 79
Discharge: HOME | End: 2019-09-19
Payer: MEDICARE

## 2019-09-19 ENCOUNTER — HOSPITAL ENCOUNTER (OUTPATIENT)
Age: 79
Discharge: HOME | End: 2019-09-19
Payer: MEDICARE

## 2019-09-19 ENCOUNTER — HOSPITAL ENCOUNTER (OUTPATIENT)
Dept: CT IMAGING | Age: 79
Discharge: HOME | End: 2019-09-19
Payer: MEDICARE

## 2019-09-19 VITALS
HEART RATE: 67 BPM | WEIGHT: 167.19 LBS | SYSTOLIC BLOOD PRESSURE: 157 MMHG | BODY MASS INDEX: 28.54 KG/M2 | OXYGEN SATURATION: 94 % | TEMPERATURE: 98.1 F | DIASTOLIC BLOOD PRESSURE: 67 MMHG | RESPIRATION RATE: 14 BRPM | HEIGHT: 64 IN

## 2019-09-19 DIAGNOSIS — I35.0 NONRHEUMATIC AORTIC VALVE STENOSIS: ICD-10-CM

## 2019-09-19 DIAGNOSIS — R09.89 OTHER SPECIFIED SYMPTOMS AND SIGNS INVOLVING THE CIRCULATORY AND RESPIRATORY SYSTEMS: ICD-10-CM

## 2019-09-19 DIAGNOSIS — R06.09 DOE (DYSPNEA ON EXERTION): ICD-10-CM

## 2019-09-19 DIAGNOSIS — I10 HYPERTENSION, UNSPECIFIED TYPE: Primary | ICD-10-CM

## 2019-09-19 LAB
A/G RATIO: 1.2 (ref 1.1–2.2)
ALBUMIN SERPL-MCNC: 4 G/DL (ref 3.4–5)
ALP BLD-CCNC: 59 U/L (ref 40–129)
ALT SERPL-CCNC: 16 U/L (ref 10–40)
ANION GAP SERPL CALCULATED.3IONS-SCNC: 11 MMOL/L (ref 3–16)
AST SERPL-CCNC: 18 U/L (ref 15–37)
BASOPHILS ABSOLUTE: 0 K/UL (ref 0–0.2)
BASOPHILS RELATIVE PERCENT: 0.4 %
BILIRUB SERPL-MCNC: 0.3 MG/DL (ref 0–1)
BUN BLDV-MCNC: 15 MG/DL (ref 7–20)
BUN BLDV-MCNC: 16 MG/DL (ref 7–20)
CALCIUM SERPL-MCNC: 9 MG/DL (ref 8.3–10.6)
CHLORIDE BLD-SCNC: 91 MMOL/L (ref 99–110)
CO2: 28 MMOL/L (ref 21–32)
CREAT SERPL-MCNC: 1 MG/DL (ref 0.6–1.2)
CREAT SERPL-MCNC: 1.1 MG/DL (ref 0.6–1.2)
EKG ATRIAL RATE: 69 BPM
EKG DIAGNOSIS: NORMAL
EKG P AXIS: 58 DEGREES
EKG P-R INTERVAL: 184 MS
EKG Q-T INTERVAL: 412 MS
EKG QRS DURATION: 78 MS
EKG QTC CALCULATION (BAZETT): 441 MS
EKG R AXIS: -10 DEGREES
EKG T AXIS: 31 DEGREES
EKG VENTRICULAR RATE: 69 BPM
EOSINOPHILS ABSOLUTE: 0 K/UL (ref 0–0.6)
EOSINOPHILS RELATIVE PERCENT: 0 %
GFR AFRICAN AMERICAN: 58
GFR AFRICAN AMERICAN: >60
GFR NON-AFRICAN AMERICAN: 48
GFR NON-AFRICAN AMERICAN: 53
GLOBULIN: 3.3 G/DL
GLUCOSE BLD-MCNC: 159 MG/DL (ref 70–99)
HCT VFR BLD CALC: 35.3 % (ref 36–48)
HEMOGLOBIN: 11.5 G/DL (ref 12–16)
INR BLD: 0.98 (ref 0.86–1.14)
LYMPHOCYTES ABSOLUTE: 0.5 K/UL (ref 1–5.1)
LYMPHOCYTES RELATIVE PERCENT: 5.2 %
MCH RBC QN AUTO: 30.3 PG (ref 26–34)
MCHC RBC AUTO-ENTMCNC: 32.6 G/DL (ref 31–36)
MCV RBC AUTO: 92.9 FL (ref 80–100)
MONOCYTES ABSOLUTE: 0.2 K/UL (ref 0–1.3)
MONOCYTES RELATIVE PERCENT: 1.5 %
NEUTROPHILS ABSOLUTE: 9.2 K/UL (ref 1.7–7.7)
NEUTROPHILS RELATIVE PERCENT: 92.9 %
PDW BLD-RTO: 16.1 % (ref 12.4–15.4)
PLATELET # BLD: 270 K/UL (ref 135–450)
PMV BLD AUTO: 8.4 FL (ref 5–10.5)
POTASSIUM SERPL-SCNC: 4.4 MMOL/L (ref 3.5–5.1)
PROTHROMBIN TIME: 11.2 SEC (ref 9.8–13)
RBC # BLD: 3.8 M/UL (ref 4–5.2)
SODIUM BLD-SCNC: 130 MMOL/L (ref 136–145)
TOTAL PROTEIN: 7.3 G/DL (ref 6.4–8.2)
TROPONIN: <0.01 NG/ML
WBC # BLD: 9.9 K/UL (ref 4–11)

## 2019-09-19 PROCEDURE — 94726 PLETHYSMOGRAPHY LUNG VOLUMES: CPT

## 2019-09-19 PROCEDURE — 94760 N-INVAS EAR/PLS OXIMETRY 1: CPT

## 2019-09-19 PROCEDURE — 99283 EMERGENCY DEPT VISIT LOW MDM: CPT

## 2019-09-19 PROCEDURE — 94060 EVALUATION OF WHEEZING: CPT

## 2019-09-19 PROCEDURE — 93010 ELECTROCARDIOGRAM REPORT: CPT | Performed by: INTERNAL MEDICINE

## 2019-09-19 PROCEDURE — 6370000000 HC RX 637 (ALT 250 FOR IP): Performed by: INTERNAL MEDICINE

## 2019-09-19 PROCEDURE — 85025 COMPLETE CBC W/AUTO DIFF WBC: CPT

## 2019-09-19 PROCEDURE — 36415 COLL VENOUS BLD VENIPUNCTURE: CPT

## 2019-09-19 PROCEDURE — 82565 ASSAY OF CREATININE: CPT

## 2019-09-19 PROCEDURE — 84520 ASSAY OF UREA NITROGEN: CPT

## 2019-09-19 PROCEDURE — 74174 CTA ABD&PLVS W/CONTRAST: CPT

## 2019-09-19 PROCEDURE — 93005 ELECTROCARDIOGRAM TRACING: CPT | Performed by: EMERGENCY MEDICINE

## 2019-09-19 PROCEDURE — 6360000004 HC RX CONTRAST MEDICATION: Performed by: INTERNAL MEDICINE

## 2019-09-19 PROCEDURE — 80053 COMPREHEN METABOLIC PANEL: CPT

## 2019-09-19 PROCEDURE — 94729 DIFFUSING CAPACITY: CPT

## 2019-09-19 PROCEDURE — 85610 PROTHROMBIN TIME: CPT

## 2019-09-19 PROCEDURE — 84484 ASSAY OF TROPONIN QUANT: CPT

## 2019-09-19 PROCEDURE — 93880 EXTRACRANIAL BILAT STUDY: CPT

## 2019-09-19 RX ORDER — ALBUTEROL SULFATE 90 UG/1
4 AEROSOL, METERED RESPIRATORY (INHALATION) ONCE
Status: COMPLETED | OUTPATIENT
Start: 2019-09-19 | End: 2019-09-19

## 2019-09-19 RX ADMIN — Medication 4 PUFF: at 10:35

## 2019-09-19 RX ADMIN — IOPAMIDOL 150 ML: 755 INJECTION, SOLUTION INTRAVENOUS at 09:19

## 2019-09-19 NOTE — ED PROVIDER NOTES
Reassurance, discharged home. Outpatient f/u with PCP. For further details of [facility] emergency department encounter, please see Luci Aggarwal NP's documentation. This chart was generated in part by using Dragon Dictation system and may contain errors related to that system including errors in grammar, punctuation, and spelling, as well as words and phrases that may be inappropriate. If there are any questions or concerns please feel free to contact the dictating provider for clarification.           Jose Rubio MD  09/19/19 [id number]

## 2019-09-19 NOTE — ED NOTES
Pt discharged to home. Pt verbalized understanding of follow up and return precautions. Pt ambulatory out through triage with a steady gait.         Jakub Zavala RN  09/19/19 [id number]

## 2019-09-20 LAB
DLCO %PRED: 79 %
DLCO PRED: NORMAL ML/MIN/MMHG
DLCO/VA %PRED: NORMAL %
DLCO/VA PRED: NORMAL ML/MIN/MMHG
DLCO/VA: NORMAL ML/MIN/MMHG
DLCO: NORMAL ML/MIN/MMHG
EXPIRATORY TIME-POST: NORMAL SEC
EXPIRATORY TIME: NORMAL SEC
FEF 25-75% %CHNG: NORMAL
FEF 25-75% %PRED-POST: NORMAL %
FEF 25-75% %PRED-PRE: NORMAL L/SEC
FEF 25-75% PRED: NORMAL L/SEC
FEF 25-75%-POST: NORMAL L/SEC
FEF 25-75%-PRE: NORMAL L/SEC
FEV1 %PRED-POST: 64 %
FEV1 %PRED-PRE: 63 %
FEV1 PRED: NORMAL L
FEV1-POST: NORMAL L
FEV1-PRE: NORMAL L
FEV1/FVC %PRED-POST: NORMAL %
FEV1/FVC %PRED-PRE: NORMAL %
FEV1/FVC PRED: NORMAL %
FEV1/FVC-POST: 73 %
FEV1/FVC-PRE: 72 %
FVC %PRED-POST: NORMAL L
FVC %PRED-PRE: NORMAL %
FVC PRED: NORMAL L
FVC-POST: NORMAL L
FVC-PRE: NORMAL L
GAW %PRED: NORMAL %
GAW PRED: NORMAL L/S/CMH2O
GAW: NORMAL L/S/CMH2O
IC %PRED: NORMAL %
IC PRED: NORMAL L
IC: NORMAL L
MEP: NORMAL
MIP: NORMAL
MVV %PRED-PRE: NORMAL %
MVV PRED: NORMAL L/MIN
MVV-PRE: NORMAL L/MIN
PEF %PRED-POST: NORMAL %
PEF %PRED-PRE: NORMAL L/SEC
PEF PRED: NORMAL L/SEC
PEF%CHNG: NORMAL
PEF-POST: NORMAL L/SEC
PEF-PRE: NORMAL L/SEC
RAW %PRED: NORMAL %
RAW PRED: NORMAL CMH2O/L/S
RAW: NORMAL CMH2O/L/S
RV %PRED: NORMAL %
RV PRED: NORMAL L
RV: NORMAL L
SVC %PRED: NORMAL %
SVC PRED: NORMAL L
SVC: NORMAL L
TLC %PRED: 114 %
TLC PRED: NORMAL L
TLC: NORMAL L
VA %PRED: NORMAL %
VA PRED: NORMAL L
VA: NORMAL L
VTG %PRED: NORMAL %
VTG PRED: NORMAL L
VTG: NORMAL L

## 2019-09-20 ASSESSMENT — PULMONARY FUNCTION TESTS
FEV1/FVC_POST: 73
FEV1/FVC_PRE: 72
FEV1_PERCENT_PREDICTED_POST: 64
FEV1_PERCENT_PREDICTED_PRE: 63

## 2019-09-20 NOTE — PROCEDURES
[address] [address]                               PULMONARY FUNCTION    PATIENT NAME: Deja Bernard                  :        1940  MED REC NO:   [id number]                          ROOM:  ACCOUNT NO:   [de-identified]                           ADMIT DATE: 2019  PROVIDER:     Mary Ann Meier MD    DATE OF PROCEDURE:  2019    PFT INTERPRETATION    The patient is a 78-year-old male who underwent a PFT for dyspnea on  exertion. Spirometry shows FVC to be 65%, FEV1 to be 63%, FEV1 to FVC  ratio was 97%, FEF 25-75% was 56%. The patient did not have any  significant postbronchodilator improvement on the study. Lung volume  shows the total lung capacity to be normal.  The patient has air  trapping and hyperinflation. The patient also has decrease in ERV which  may be because of body habitus. The patient's diffusion capacity when  adjusted for volume was normal.  The patient's flow volume loop was  normal.  On the basis of this PFT, the patient has moderate obstructive  airway disease. Please correlate clinically.         Koki Green MD    D: 2019 15:44:10       T: 2019 2:20:14     [email or web address]  Job#: [id number]     Doc#: [id number]    CC:

## 2019-09-25 ENCOUNTER — HOSPITAL ENCOUNTER (OUTPATIENT)
Dept: CARDIAC CATH/INVASIVE PROCEDURES | Age: 79
Discharge: HOME | End: 2019-09-25
Attending: INTERNAL MEDICINE | Admitting: INTERNAL MEDICINE
Payer: MEDICARE

## 2019-09-25 VITALS
SYSTOLIC BLOOD PRESSURE: 181 MMHG | HEIGHT: 66 IN | DIASTOLIC BLOOD PRESSURE: 72 MMHG | BODY MASS INDEX: 26.84 KG/M2 | WEIGHT: 167 LBS

## 2019-09-25 LAB
ANION GAP SERPL CALCULATED.3IONS-SCNC: 11 MMOL/L (ref 3–16)
BUN BLDV-MCNC: 19 MG/DL (ref 7–20)
CALCIUM SERPL-MCNC: 9.1 MG/DL (ref 8.3–10.6)
CHLORIDE BLD-SCNC: 90 MMOL/L (ref 99–110)
CHOLESTEROL, TOTAL: 211 MG/DL (ref 0–199)
CO2: 26 MMOL/L (ref 21–32)
CREAT SERPL-MCNC: 1.2 MG/DL (ref 0.6–1.2)
GFR AFRICAN AMERICAN: 52
GFR NON-AFRICAN AMERICAN: 43
GLUCOSE BLD-MCNC: 144 MG/DL (ref 70–99)
HCT VFR BLD CALC: 35.1 % (ref 36–48)
HDLC SERPL-MCNC: 100 MG/DL (ref 40–60)
HEMOGLOBIN: 11.4 G/DL (ref 12–16)
INR BLD: 0.96 (ref 0.86–1.14)
LDL CHOLESTEROL CALCULATED: 101 MG/DL
MCH RBC QN AUTO: 30.4 PG (ref 26–34)
MCHC RBC AUTO-ENTMCNC: 32.5 G/DL (ref 31–36)
MCV RBC AUTO: 93.6 FL (ref 80–100)
PDW BLD-RTO: 15.8 % (ref 12.4–15.4)
PLATELET # BLD: 260 K/UL (ref 135–450)
PMV BLD AUTO: 7.9 FL (ref 5–10.5)
POTASSIUM SERPL-SCNC: 5 MMOL/L (ref 3.5–5.1)
PROTHROMBIN TIME: 10.9 SEC (ref 9.8–13)
RBC # BLD: 3.75 M/UL (ref 4–5.2)
SODIUM BLD-SCNC: 127 MMOL/L (ref 136–145)
TRIGL SERPL-MCNC: 52 MG/DL (ref 0–150)
VLDLC SERPL CALC-MCNC: 10 MG/DL
WBC # BLD: 5.6 K/UL (ref 4–11)

## 2019-09-25 PROCEDURE — 93454 CORONARY ARTERY ANGIO S&I: CPT | Performed by: INTERNAL MEDICINE

## 2019-09-25 PROCEDURE — 99152 MOD SED SAME PHYS/QHP 5/>YRS: CPT | Performed by: INTERNAL MEDICINE

## 2019-09-25 PROCEDURE — 93454 CORONARY ARTERY ANGIO S&I: CPT

## 2019-09-25 PROCEDURE — 6370000000 HC RX 637 (ALT 250 FOR IP): Performed by: INTERNAL MEDICINE

## 2019-09-25 PROCEDURE — 99152 MOD SED SAME PHYS/QHP 5/>YRS: CPT

## 2019-09-25 PROCEDURE — C1769 GUIDE WIRE: HCPCS

## 2019-09-25 PROCEDURE — 6360000004 HC RX CONTRAST MEDICATION

## 2019-09-25 PROCEDURE — 2580000003 HC RX 258

## 2019-09-25 PROCEDURE — 2709999900 HC NON-CHARGEABLE SUPPLY

## 2019-09-25 PROCEDURE — 85610 PROTHROMBIN TIME: CPT

## 2019-09-25 PROCEDURE — 80061 LIPID PANEL: CPT

## 2019-09-25 PROCEDURE — 85027 COMPLETE CBC AUTOMATED: CPT

## 2019-09-25 PROCEDURE — 6360000002 HC RX W HCPCS

## 2019-09-25 PROCEDURE — 80048 BASIC METABOLIC PNL TOTAL CA: CPT

## 2019-09-25 PROCEDURE — C1894 INTRO/SHEATH, NON-LASER: HCPCS

## 2019-09-25 PROCEDURE — 93005 ELECTROCARDIOGRAM TRACING: CPT | Performed by: INTERNAL MEDICINE

## 2019-09-25 PROCEDURE — 6370000000 HC RX 637 (ALT 250 FOR IP)

## 2019-09-25 PROCEDURE — 2500000003 HC RX 250 WO HCPCS

## 2019-09-25 RX ORDER — FENTANYL CITRATE 50 UG/ML
INJECTION, SOLUTION INTRAMUSCULAR; INTRAVENOUS
Status: COMPLETED | OUTPATIENT
Start: 2019-09-25 | End: 2019-09-25

## 2019-09-25 RX ORDER — ASPIRIN 81 MG/1
81 TABLET ORAL DAILY
Qty: 90 TABLET | Refills: 1 | COMMUNITY
Start: 2019-09-25 | End: 2021-10-07 | Stop reason: ALTCHOICE

## 2019-09-25 RX ORDER — ACETAMINOPHEN 325 MG/1
650 TABLET ORAL EVERY 4 HOURS PRN
Status: DISCONTINUED | OUTPATIENT
Start: 2019-09-25 | End: 2019-09-25 | Stop reason: HOSPADM

## 2019-09-25 RX ORDER — SODIUM CHLORIDE 9 MG/ML
INJECTION, SOLUTION INTRAVENOUS CONTINUOUS
Status: DISCONTINUED | OUTPATIENT
Start: 2019-09-25 | End: 2019-09-25 | Stop reason: HOSPADM

## 2019-09-25 RX ORDER — ASPIRIN 325 MG
325 TABLET ORAL ONCE
Status: COMPLETED | OUTPATIENT
Start: 2019-09-25 | End: 2019-09-25

## 2019-09-25 RX ORDER — MIDAZOLAM HYDROCHLORIDE 5 MG/ML
INJECTION INTRAMUSCULAR; INTRAVENOUS
Status: COMPLETED | OUTPATIENT
Start: 2019-09-25 | End: 2019-09-25

## 2019-09-25 RX ORDER — LISINOPRIL 10 MG/1
5 TABLET ORAL DAILY
Status: COMPLETED | OUTPATIENT
Start: 2019-09-25 | End: 2019-09-25

## 2019-09-25 RX ORDER — ONDANSETRON 2 MG/ML
4 INJECTION INTRAMUSCULAR; INTRAVENOUS EVERY 6 HOURS PRN
Status: DISCONTINUED | OUTPATIENT
Start: 2019-09-25 | End: 2019-09-25 | Stop reason: HOSPADM

## 2019-09-25 RX ORDER — SODIUM CHLORIDE 0.9 % (FLUSH) 0.9 %
10 SYRINGE (ML) INJECTION EVERY 12 HOURS SCHEDULED
Status: DISCONTINUED | OUTPATIENT
Start: 2019-09-25 | End: 2019-09-25 | Stop reason: HOSPADM

## 2019-09-25 RX ORDER — SODIUM CHLORIDE 0.9 % (FLUSH) 0.9 %
10 SYRINGE (ML) INJECTION PRN
Status: DISCONTINUED | OUTPATIENT
Start: 2019-09-25 | End: 2019-09-25 | Stop reason: HOSPADM

## 2019-09-25 RX ADMIN — MIDAZOLAM HYDROCHLORIDE 1 MG: 5 INJECTION INTRAMUSCULAR; INTRAVENOUS at 09:25

## 2019-09-25 RX ADMIN — SODIUM CHLORIDE: 9 INJECTION, SOLUTION INTRAVENOUS at 07:51

## 2019-09-25 RX ADMIN — FENTANYL CITRATE 50 MCG: 50 INJECTION, SOLUTION INTRAMUSCULAR; INTRAVENOUS at 09:24

## 2019-09-25 RX ADMIN — MIDAZOLAM HYDROCHLORIDE 1 MG: 5 INJECTION INTRAMUSCULAR; INTRAVENOUS at 09:24

## 2019-09-25 RX ADMIN — LISINOPRIL 5 MG: 10 TABLET ORAL at 09:01

## 2019-09-25 RX ADMIN — Medication 325 MG: at 07:51

## 2019-09-25 RX ADMIN — PREDNISONE 50 MG: 20 TABLET ORAL at 08:13

## 2019-09-25 NOTE — H&P
Karlee Arce MD   Physician   Interventional Cardiology   Progress Notes   Signed   Encounter Date:  2019               Signed        Expand All Collapse All      78 y.o. here for severe AS. Mrs. Jovany Cabrera was seen today as a new patient for severe aortic stenosis. She admits to SOB w 1 flight of steps. Family notices a decline in her breathing. She used a nitro patch of her husbands and noted to have relief in her symptoms of SOB. She limits her activity due to back pain as well as SOB. She denies syncope, dizziness, LH, edema, chest pain. Denies blood in urine, stool. Former smoker 2/3 PPD, quit 20 years ago.      Past Medical History        Past Medical History:   Diagnosis Date    Arthritis      Diabetes mellitus ([facility] [address]      Hyperlipidemia      Hypertension      Thyroid disease           Past Surgical History         Past Surgical History:   Procedure Laterality Date    EYE SURGERY Left 12/11/15     PHACO EMULSIFICATION OF CATARACT WITH LENS IMPLANT LEFT EYE    KNEE ARTHROSCOPY        OTHER SURGICAL HISTORY   10/09/2015     phacoemulsification of cataract with intraocular implant right eye    THYROIDECTOMY, PARTIAL        TONSILLECTOMY        TUBAL LIGATION             Social History            Tobacco Use    Smoking status: Former Smoker       Packs/day: 1.00       Years: 20.00       Pack years: 20.00       Types: Cigarettes       Last attempt to quit: 1983       Years since quittin.5    Smokeless tobacco: Never Used   Substance Use Topics    Alcohol use: No    Drug use: Never      Allergies   Allergen Reactions    Shellfish-Derived Products         Unknown what happens    Sulfa Antibiotics        Family History   History reviewed. No pertinent family history.        Review of Systems   General: No fevers, chills, fatigue, or night sweats. HEENT: No blurry or decreased vision. No changes in hearing, nasal discharge or sore throat. Cardiovascular: See HPI.  No cramping in

## 2019-09-25 NOTE — PRE SEDATION
Brief Pre-Op Note/Sedation Assessment      Tono Davis  1940  Cath Pool Rm/NONE      [id number]  9:19 AM    Planned Procedure: Cardiac Catheterization Procedure    Post Procedure Plan: Return to same level of care    Consent: I have discussed with the patient and/or the patient representative the indication, alternatives, and the possible risks and/or complications of the planned procedure and the anesthesia methods. The patient and/or patient representative appear to understand and agree to proceed. Chief Complaint: Dyspnea on Exertion      Indications for Cath Procedure: Worsening Angina, Suspected CAD, Valvular Disease - Aortic Stenosis; Stenosis Severity: Severe and Pre-Operative Evaluation - Surgery Type: Cardiac Surgery,   Anginal Classification within 2 weeks:  CCS III - Symptoms with everyday living activities, i.e., moderate limitation  NYHA Heart Failure Class within 2 weeks: No symptoms  Is Cath Lab Visit Valve-related?: Aortic Stenosis; Stenosis Severity: Severe      Anti- Anginal Meds within 2 weeks:   Yes: Beta Blockers    Stress or Imaging Studies Performed:  None     Vital Signs:  BP (!) 181/72   Ht 5' 6\" (1.676 m)   Wt 167 lb (75.8 kg)   BMI 26.95 kg/m²     Allergies: Allergies   Allergen Reactions    Iodides     Shellfish-Derived Products      Unknown what happens    Sulfa Antibiotics        Past Medical History:  Past Medical History:   Diagnosis Date    Arthritis     Diabetes mellitus ([address]     Hyperlipidemia     Hypertension     Thyroid disease          Surgical History:  Past Surgical History:   Procedure Laterality Date    COLONOSCOPY N/A 8/13/2019    COLON W/ANES.  performed by Gary Michelle MD at [address]  8/13/2019    ESOPHAGEAL DILATION Pamula Peek performed by Gary Michelle MD at [address] [facility] Left 12/11/15    PHACO EMULSIFICATION OF CATARACT WITH LENS IMPLANT LEFT EYE    KNEE

## 2019-09-26 LAB
EKG ATRIAL RATE: 71 BPM
EKG DIAGNOSIS: NORMAL
EKG P AXIS: 56 DEGREES
EKG P-R INTERVAL: 208 MS
EKG Q-T INTERVAL: 406 MS
EKG QRS DURATION: 72 MS
EKG QTC CALCULATION (BAZETT): 441 MS
EKG R AXIS: -7 DEGREES
EKG T AXIS: 40 DEGREES
EKG VENTRICULAR RATE: 71 BPM

## 2019-09-26 PROCEDURE — 93010 ELECTROCARDIOGRAM REPORT: CPT | Performed by: INTERNAL MEDICINE

## 2019-10-02 ENCOUNTER — OFFICE VISIT (OUTPATIENT)
Dept: CARDIOTHORACIC SURGERY | Age: 79
End: 2019-10-02
Payer: MEDICARE

## 2019-10-02 VITALS
SYSTOLIC BLOOD PRESSURE: 152 MMHG | BODY MASS INDEX: 30.19 KG/M2 | OXYGEN SATURATION: 93 % | DIASTOLIC BLOOD PRESSURE: 62 MMHG | WEIGHT: 170.4 LBS | HEART RATE: 58 BPM | TEMPERATURE: 97.7 F | HEIGHT: 63 IN

## 2019-10-02 DIAGNOSIS — M54.50 LOW BACK PAIN ASSOCIATED WITH A SPINAL DISORDER OTHER THAN RADICULOPATHY OR SPINAL STENOSIS: ICD-10-CM

## 2019-10-02 DIAGNOSIS — I35.0 NONRHEUMATIC AORTIC VALVE STENOSIS: Primary | ICD-10-CM

## 2019-10-02 PROCEDURE — 99204 OFFICE O/P NEW MOD 45 MIN: CPT | Performed by: THORACIC SURGERY (CARDIOTHORACIC VASCULAR SURGERY)

## 2019-10-02 RX ORDER — CHOLECALCIFEROL (VITAMIN D3) 125 MCG
5 CAPSULE ORAL NIGHTLY
COMMUNITY

## 2019-10-02 RX ORDER — GABAPENTIN 100 MG/1
100 CAPSULE ORAL 3 TIMES DAILY PRN
COMMUNITY
End: 2019-11-14

## 2019-10-02 RX ORDER — DULOXETIN HYDROCHLORIDE 20 MG/1
30 CAPSULE, DELAYED RELEASE ORAL DAILY
COMMUNITY
End: 2020-09-22

## 2019-10-02 ASSESSMENT — ENCOUNTER SYMPTOMS
ABDOMINAL DISTENTION: 0
BACK PAIN: 1
CHEST TIGHTNESS: 0
NAUSEA: 0
ABDOMINAL PAIN: 0
SHORTNESS OF BREATH: 1
EYE REDNESS: 0
EYE PAIN: 0
COUGH: 1
BLOOD IN STOOL: 0
TROUBLE SWALLOWING: 1
CONSTIPATION: 1
DIARRHEA: 0

## 2019-10-04 ENCOUNTER — TELEPHONE (OUTPATIENT)
Dept: CARDIOLOGY CLINIC | Age: 79
End: 2019-10-04

## 2019-10-07 PROBLEM — E78.00 HYPERCHOLESTEREMIA: Status: ACTIVE | Noted: 2019-06-25

## 2019-10-07 RX ORDER — PREDNISONE 20 MG/1
TABLET ORAL
Qty: 6 TABLET | Refills: 0 | Status: ON HOLD | OUTPATIENT
Start: 2019-10-07 | End: 2019-10-16 | Stop reason: HOSPADM

## 2019-10-10 ENCOUNTER — OFFICE VISIT (OUTPATIENT)
Dept: CARDIOLOGY CLINIC | Age: 79
End: 2019-10-10
Payer: MEDICARE

## 2019-10-10 ENCOUNTER — HOSPITAL ENCOUNTER (OUTPATIENT)
Dept: PREADMISSION TESTING | Age: 79
Discharge: HOME | End: 2019-10-14
Payer: MEDICARE

## 2019-10-10 ENCOUNTER — HOSPITAL ENCOUNTER (OUTPATIENT)
Dept: GENERAL RADIOLOGY | Age: 79
Discharge: HOME | End: 2019-10-10
Payer: MEDICARE

## 2019-10-10 ENCOUNTER — ANESTHESIA EVENT (OUTPATIENT)
Dept: OPERATING ROOM | Age: 79
DRG: 267 | End: 2019-10-10
Payer: MEDICARE

## 2019-10-10 VITALS
HEIGHT: 63 IN | HEART RATE: 64 BPM | SYSTOLIC BLOOD PRESSURE: 152 MMHG | BODY MASS INDEX: 29.23 KG/M2 | WEIGHT: 165 LBS | DIASTOLIC BLOOD PRESSURE: 60 MMHG

## 2019-10-10 VITALS
RESPIRATION RATE: 16 BRPM | HEART RATE: 65 BPM | OXYGEN SATURATION: 93 % | HEIGHT: 63 IN | DIASTOLIC BLOOD PRESSURE: 67 MMHG | BODY MASS INDEX: 29.23 KG/M2 | TEMPERATURE: 98 F | WEIGHT: 165 LBS | SYSTOLIC BLOOD PRESSURE: 163 MMHG

## 2019-10-10 DIAGNOSIS — E78.00 HYPERCHOLESTEREMIA: ICD-10-CM

## 2019-10-10 DIAGNOSIS — I35.0 NONRHEUMATIC AORTIC VALVE STENOSIS: Primary | ICD-10-CM

## 2019-10-10 DIAGNOSIS — I10 ESSENTIAL HYPERTENSION: ICD-10-CM

## 2019-10-10 LAB
ABO/RH: NORMAL
ALBUMIN SERPL-MCNC: 3.7 G/DL (ref 3.4–5)
ALP BLD-CCNC: 60 U/L (ref 40–129)
ALT SERPL-CCNC: 14 U/L (ref 10–40)
ANION GAP SERPL CALCULATED.3IONS-SCNC: 8 MMOL/L (ref 3–16)
ANTIBODY SCREEN: NORMAL
AST SERPL-CCNC: 17 U/L (ref 15–37)
BASOPHILS ABSOLUTE: 0.1 K/UL (ref 0–0.2)
BASOPHILS RELATIVE PERCENT: 1 %
BILIRUB SERPL-MCNC: <0.2 MG/DL (ref 0–1)
BILIRUBIN DIRECT: <0.2 MG/DL (ref 0–0.3)
BILIRUBIN, INDIRECT: NORMAL MG/DL (ref 0–1)
BUN BLDV-MCNC: 14 MG/DL (ref 7–20)
CALCIUM SERPL-MCNC: 9 MG/DL (ref 8.3–10.6)
CHLORIDE BLD-SCNC: 96 MMOL/L (ref 99–110)
CO2: 27 MMOL/L (ref 21–32)
CREAT SERPL-MCNC: 1.2 MG/DL (ref 0.6–1.2)
EOSINOPHILS ABSOLUTE: 0.2 K/UL (ref 0–0.6)
EOSINOPHILS RELATIVE PERCENT: 3.1 %
GFR AFRICAN AMERICAN: 52
GFR NON-AFRICAN AMERICAN: 43
GLUCOSE BLD-MCNC: 102 MG/DL (ref 70–99)
HCT VFR BLD CALC: 35.2 % (ref 36–48)
HEMOGLOBIN: 11.5 G/DL (ref 12–16)
INR BLD: 0.94 (ref 0.86–1.14)
LYMPHOCYTES ABSOLUTE: 0.9 K/UL (ref 1–5.1)
LYMPHOCYTES RELATIVE PERCENT: 14.6 %
MAGNESIUM: 1.8 MG/DL (ref 1.8–2.4)
MCH RBC QN AUTO: 30.7 PG (ref 26–34)
MCHC RBC AUTO-ENTMCNC: 32.7 G/DL (ref 31–36)
MCV RBC AUTO: 94 FL (ref 80–100)
MONOCYTES ABSOLUTE: 0.6 K/UL (ref 0–1.3)
MONOCYTES RELATIVE PERCENT: 9.8 %
NEUTROPHILS ABSOLUTE: 4.5 K/UL (ref 1.7–7.7)
NEUTROPHILS RELATIVE PERCENT: 71.5 %
PDW BLD-RTO: 16.1 % (ref 12.4–15.4)
PLATELET # BLD: 297 K/UL (ref 135–450)
PMV BLD AUTO: 8.6 FL (ref 5–10.5)
POTASSIUM SERPL-SCNC: 4.3 MMOL/L (ref 3.5–5.1)
PROTHROMBIN TIME: 10.7 SEC (ref 9.8–13)
RBC # BLD: 3.75 M/UL (ref 4–5.2)
SODIUM BLD-SCNC: 131 MMOL/L (ref 136–145)
TOTAL PROTEIN: 6.7 G/DL (ref 6.4–8.2)
WBC # BLD: 6.2 K/UL (ref 4–11)

## 2019-10-10 PROCEDURE — 99214 OFFICE O/P EST MOD 30 MIN: CPT | Performed by: INTERNAL MEDICINE

## 2019-10-10 PROCEDURE — 86900 BLOOD TYPING SEROLOGIC ABO: CPT

## 2019-10-10 PROCEDURE — 85610 PROTHROMBIN TIME: CPT

## 2019-10-10 PROCEDURE — 80048 BASIC METABOLIC PNL TOTAL CA: CPT

## 2019-10-10 PROCEDURE — 86901 BLOOD TYPING SEROLOGIC RH(D): CPT

## 2019-10-10 PROCEDURE — 71046 X-RAY EXAM CHEST 2 VIEWS: CPT

## 2019-10-10 PROCEDURE — 36415 COLL VENOUS BLD VENIPUNCTURE: CPT

## 2019-10-10 PROCEDURE — 80076 HEPATIC FUNCTION PANEL: CPT

## 2019-10-10 PROCEDURE — 86850 RBC ANTIBODY SCREEN: CPT

## 2019-10-10 PROCEDURE — 85025 COMPLETE CBC W/AUTO DIFF WBC: CPT

## 2019-10-10 PROCEDURE — 83735 ASSAY OF MAGNESIUM: CPT

## 2019-10-15 ENCOUNTER — HOSPITAL ENCOUNTER (OUTPATIENT)
Dept: CARDIAC CATH/INVASIVE PROCEDURES | Age: 79
Discharge: HOME | DRG: 267 | End: 2019-10-15
Payer: MEDICARE

## 2019-10-15 ENCOUNTER — ANESTHESIA (OUTPATIENT)
Dept: OPERATING ROOM | Age: 79
DRG: 267 | End: 2019-10-15
Payer: MEDICARE

## 2019-10-15 ENCOUNTER — HOSPITAL ENCOUNTER (INPATIENT)
Age: 79
LOS: 1 days | Discharge: HOME | DRG: 267 | End: 2019-10-16
Attending: INTERNAL MEDICINE | Admitting: INTERNAL MEDICINE
Payer: MEDICARE

## 2019-10-15 VITALS
OXYGEN SATURATION: 94 % | SYSTOLIC BLOOD PRESSURE: 121 MMHG | DIASTOLIC BLOOD PRESSURE: 50 MMHG | RESPIRATION RATE: 5 BRPM

## 2019-10-15 PROBLEM — I35.0 NONRHEUMATIC AORTIC VALVE STENOSIS: Status: ACTIVE | Noted: 2019-10-15

## 2019-10-15 LAB
ABO/RH: NORMAL
ANTIBODY SCREEN: NORMAL
BASE EXCESS ARTERIAL: 0 (ref -3–3)
BASE EXCESS ARTERIAL: 1 (ref -3–3)
BASE EXCESS ARTERIAL: 1 (ref -3–3)
CALCIUM IONIZED: 1.17 MMOL/L (ref 1.12–1.32)
CALCIUM IONIZED: 1.2 MMOL/L (ref 1.12–1.32)
CALCIUM IONIZED: 1.2 MMOL/L (ref 1.12–1.32)
GLUCOSE BLD-MCNC: 181 MG/DL (ref 70–99)
GLUCOSE BLD-MCNC: 189 MG/DL (ref 70–99)
GLUCOSE BLD-MCNC: 193 MG/DL (ref 70–99)
HCO3 ARTERIAL: 27.2 MMOL/L (ref 21–29)
HCO3 ARTERIAL: 27.9 MMOL/L (ref 21–29)
HCO3 ARTERIAL: 28 MMOL/L (ref 21–29)
HEMOGLOBIN: 10.7 GM/DL (ref 12–16)
HEMOGLOBIN: 10.7 GM/DL (ref 12–16)
HEMOGLOBIN: 11.5 GM/DL (ref 12–16)
LACTATE: 0.78 MMOL/L (ref 0.4–2)
LACTATE: 0.88 MMOL/L (ref 0.4–2)
LACTATE: 1.03 MMOL/L (ref 0.4–2)
O2 SAT, ARTERIAL: 100 % (ref 93–100)
PCO2 ARTERIAL: 57.8 MM HG (ref 35–45)
PCO2 ARTERIAL: 58.3 MM HG (ref 35–45)
PCO2 ARTERIAL: 63.3 MM HG (ref 35–45)
PERFORMED ON: ABNORMAL
PH ARTERIAL: 7.25 (ref 7.35–7.45)
PH ARTERIAL: 7.28 (ref 7.35–7.45)
PH ARTERIAL: 7.29 (ref 7.35–7.45)
PO2 ARTERIAL: 287.4 MM HG (ref 75–108)
PO2 ARTERIAL: 374.6 MM HG (ref 75–108)
PO2 ARTERIAL: 375 MM HG (ref 75–108)
POC HEMATOCRIT: 31 % (ref 36–48)
POC HEMATOCRIT: 31 % (ref 36–48)
POC HEMATOCRIT: 34 % (ref 36–48)
POC POTASSIUM: 3.7 MMOL/L (ref 3.5–5.1)
POC POTASSIUM: 3.8 MMOL/L (ref 3.5–5.1)
POC POTASSIUM: 3.9 MMOL/L (ref 3.5–5.1)
POC SAMPLE TYPE: ABNORMAL
POC SODIUM: 128 MMOL/L (ref 136–145)
POC SODIUM: 129 MMOL/L (ref 136–145)
POC SODIUM: 130 MMOL/L (ref 136–145)
PRO-BNP: 3992 PG/ML (ref 0–449)
TCO2 ARTERIAL: 29 MMOL/L
TCO2 ARTERIAL: 30 MMOL/L
TCO2 ARTERIAL: 30 MMOL/L
TROPONIN: <0.01 NG/ML

## 2019-10-15 PROCEDURE — 6360000002 HC RX W HCPCS

## 2019-10-15 PROCEDURE — 84132 ASSAY OF SERUM POTASSIUM: CPT

## 2019-10-15 PROCEDURE — 85347 COAGULATION TIME ACTIVATED: CPT

## 2019-10-15 PROCEDURE — 2500000003 HC RX 250 WO HCPCS

## 2019-10-15 PROCEDURE — 85014 HEMATOCRIT: CPT

## 2019-10-15 PROCEDURE — 36415 COLL VENOUS BLD VENIPUNCTURE: CPT

## 2019-10-15 PROCEDURE — 2780000006 HC MISC HEART VALVE

## 2019-10-15 PROCEDURE — 2580000003 HC RX 258: Performed by: INTERNAL MEDICINE

## 2019-10-15 PROCEDURE — 86923 COMPATIBILITY TEST ELECTRIC: CPT

## 2019-10-15 PROCEDURE — 6370000000 HC RX 637 (ALT 250 FOR IP): Performed by: INTERNAL MEDICINE

## 2019-10-15 PROCEDURE — 3700000000 HC ANESTHESIA ATTENDED CARE: Performed by: THORACIC SURGERY (CARDIOTHORACIC VASCULAR SURGERY)

## 2019-10-15 PROCEDURE — 82803 BLOOD GASES ANY COMBINATION: CPT

## 2019-10-15 PROCEDURE — 84484 ASSAY OF TROPONIN QUANT: CPT

## 2019-10-15 PROCEDURE — 6360000002 HC RX W HCPCS: Performed by: THORACIC SURGERY (CARDIOTHORACIC VASCULAR SURGERY)

## 2019-10-15 PROCEDURE — 86850 RBC ANTIBODY SCREEN: CPT

## 2019-10-15 PROCEDURE — 83605 ASSAY OF LACTIC ACID: CPT

## 2019-10-15 PROCEDURE — 2700000000 HC OXYGEN THERAPY PER DAY

## 2019-10-15 PROCEDURE — 6360000002 HC RX W HCPCS: Performed by: ANESTHESIOLOGY

## 2019-10-15 PROCEDURE — C1769 GUIDE WIRE: HCPCS

## 2019-10-15 PROCEDURE — 33361 REPLACE AORTIC VALVE PERQ: CPT | Performed by: THORACIC SURGERY (CARDIOTHORACIC VASCULAR SURGERY)

## 2019-10-15 PROCEDURE — 2709999900 HC NON-CHARGEABLE SUPPLY

## 2019-10-15 PROCEDURE — 2500000003 HC RX 250 WO HCPCS: Performed by: ANESTHESIOLOGY

## 2019-10-15 PROCEDURE — 6360000004 HC RX CONTRAST MEDICATION

## 2019-10-15 PROCEDURE — 94761 N-INVAS EAR/PLS OXIMETRY MLT: CPT

## 2019-10-15 PROCEDURE — 82947 ASSAY GLUCOSE BLOOD QUANT: CPT

## 2019-10-15 PROCEDURE — 33361 REPLACE AORTIC VALVE PERQ: CPT | Performed by: INTERNAL MEDICINE

## 2019-10-15 PROCEDURE — 83880 ASSAY OF NATRIURETIC PEPTIDE: CPT

## 2019-10-15 PROCEDURE — 6360000004 HC RX CONTRAST MEDICATION: Performed by: INTERNAL MEDICINE

## 2019-10-15 PROCEDURE — 2709999900 HC NON-CHARGEABLE SUPPLY: Performed by: THORACIC SURGERY (CARDIOTHORACIC VASCULAR SURGERY)

## 2019-10-15 PROCEDURE — C1894 INTRO/SHEATH, NON-LASER: HCPCS

## 2019-10-15 PROCEDURE — 2500000003 HC RX 250 WO HCPCS: Performed by: THORACIC SURGERY (CARDIOTHORACIC VASCULAR SURGERY)

## 2019-10-15 PROCEDURE — 82330 ASSAY OF CALCIUM: CPT

## 2019-10-15 PROCEDURE — 2140000000 HC CCU INTERMEDIATE R&B

## 2019-10-15 PROCEDURE — 86900 BLOOD TYPING SEROLOGIC ABO: CPT

## 2019-10-15 PROCEDURE — 3700000001 HC ADD 15 MINUTES (ANESTHESIA): Performed by: THORACIC SURGERY (CARDIOTHORACIC VASCULAR SURGERY)

## 2019-10-15 PROCEDURE — C1725 CATH, TRANSLUMIN NON-LASER: HCPCS

## 2019-10-15 PROCEDURE — 2580000003 HC RX 258: Performed by: ANESTHESIOLOGY

## 2019-10-15 PROCEDURE — 86901 BLOOD TYPING SEROLOGIC RH(D): CPT

## 2019-10-15 PROCEDURE — 84295 ASSAY OF SERUM SODIUM: CPT

## 2019-10-15 PROCEDURE — 94150 VITAL CAPACITY TEST: CPT

## 2019-10-15 PROCEDURE — 2580000003 HC RX 258: Performed by: THORACIC SURGERY (CARDIOTHORACIC VASCULAR SURGERY)

## 2019-10-15 PROCEDURE — 93005 ELECTROCARDIOGRAM TRACING: CPT | Performed by: INTERNAL MEDICINE

## 2019-10-15 RX ORDER — FOLIC ACID 1 MG/1
1000 TABLET ORAL DAILY
Status: DISCONTINUED | OUTPATIENT
Start: 2019-10-15 | End: 2019-10-16 | Stop reason: HOSPADM

## 2019-10-15 RX ORDER — DIPHENHYDRAMINE HYDROCHLORIDE 50 MG/ML
INJECTION INTRAMUSCULAR; INTRAVENOUS PRN
Status: DISCONTINUED | OUTPATIENT
Start: 2019-10-15 | End: 2019-10-15 | Stop reason: SDUPTHER

## 2019-10-15 RX ORDER — HYDRALAZINE HYDROCHLORIDE 20 MG/ML
10 INJECTION INTRAMUSCULAR; INTRAVENOUS EVERY 6 HOURS PRN
Status: DISCONTINUED | OUTPATIENT
Start: 2019-10-15 | End: 2019-10-16 | Stop reason: HOSPADM

## 2019-10-15 RX ORDER — PANTOPRAZOLE SODIUM 40 MG/1
40 TABLET, DELAYED RELEASE ORAL
Status: DISCONTINUED | OUTPATIENT
Start: 2019-10-16 | End: 2019-10-16 | Stop reason: HOSPADM

## 2019-10-15 RX ORDER — ATROPINE SULFATE 0.4 MG/ML
0.5 AMPUL (ML) INJECTION
Status: ACTIVE | OUTPATIENT
Start: 2019-10-15 | End: 2019-10-15

## 2019-10-15 RX ORDER — NITROGLYCERIN 20 MG/100ML
5 INJECTION INTRAVENOUS CONTINUOUS PRN
Status: DISCONTINUED | OUTPATIENT
Start: 2019-10-15 | End: 2019-10-16 | Stop reason: HOSPADM

## 2019-10-15 RX ORDER — NITROGLYCERIN 20 MG/100ML
INJECTION INTRAVENOUS CONTINUOUS PRN
Status: DISCONTINUED | OUTPATIENT
Start: 2019-10-15 | End: 2019-10-15 | Stop reason: SDUPTHER

## 2019-10-15 RX ORDER — SODIUM CHLORIDE 0.9 % (FLUSH) 0.9 %
10 SYRINGE (ML) INJECTION PRN
Status: DISCONTINUED | OUTPATIENT
Start: 2019-10-15 | End: 2019-10-16 | Stop reason: HOSPADM

## 2019-10-15 RX ORDER — ATORVASTATIN CALCIUM 40 MG/1
40 TABLET, FILM COATED ORAL DAILY
Status: DISCONTINUED | OUTPATIENT
Start: 2019-10-15 | End: 2019-10-16 | Stop reason: HOSPADM

## 2019-10-15 RX ORDER — OMEPRAZOLE 20 MG/1
20 CAPSULE, DELAYED RELEASE ORAL DAILY
Status: DISCONTINUED | OUTPATIENT
Start: 2019-10-15 | End: 2019-10-15 | Stop reason: CLARIF

## 2019-10-15 RX ORDER — PROPOFOL 10 MG/ML
INJECTION, EMULSION INTRAVENOUS CONTINUOUS PRN
Status: DISCONTINUED | OUTPATIENT
Start: 2019-10-15 | End: 2019-10-15 | Stop reason: SDUPTHER

## 2019-10-15 RX ORDER — CEFAZOLIN SODIUM 1 G/3ML
INJECTION, POWDER, FOR SOLUTION INTRAMUSCULAR; INTRAVENOUS PRN
Status: DISCONTINUED | OUTPATIENT
Start: 2019-10-15 | End: 2019-10-15 | Stop reason: SDUPTHER

## 2019-10-15 RX ORDER — SODIUM CHLORIDE, SODIUM LACTATE, POTASSIUM CHLORIDE, CALCIUM CHLORIDE 600; 310; 30; 20 MG/100ML; MG/100ML; MG/100ML; MG/100ML
INJECTION, SOLUTION INTRAVENOUS ONCE
Status: COMPLETED | OUTPATIENT
Start: 2019-10-15 | End: 2019-10-15

## 2019-10-15 RX ORDER — 0.9 % SODIUM CHLORIDE 0.9 %
500 INTRAVENOUS SOLUTION INTRAVENOUS PRN
Status: DISCONTINUED | OUTPATIENT
Start: 2019-10-15 | End: 2019-10-16 | Stop reason: HOSPADM

## 2019-10-15 RX ORDER — PROPOFOL 10 MG/ML
INJECTION, EMULSION INTRAVENOUS PRN
Status: DISCONTINUED | OUTPATIENT
Start: 2019-10-15 | End: 2019-10-15 | Stop reason: SDUPTHER

## 2019-10-15 RX ORDER — FENTANYL CITRATE 50 UG/ML
INJECTION, SOLUTION INTRAMUSCULAR; INTRAVENOUS PRN
Status: DISCONTINUED | OUTPATIENT
Start: 2019-10-15 | End: 2019-10-15 | Stop reason: SDUPTHER

## 2019-10-15 RX ORDER — HYDROCODONE BITARTRATE AND ACETAMINOPHEN 5; 325 MG/1; MG/1
1 TABLET ORAL EVERY 6 HOURS PRN
Status: DISCONTINUED | OUTPATIENT
Start: 2019-10-15 | End: 2019-10-16 | Stop reason: HOSPADM

## 2019-10-15 RX ORDER — LIDOCAINE 4 G/G
1 PATCH TOPICAL ONCE
Status: COMPLETED | OUTPATIENT
Start: 2019-10-15 | End: 2019-10-15

## 2019-10-15 RX ORDER — LISINOPRIL 10 MG/1
5 TABLET ORAL DAILY
Status: DISCONTINUED | OUTPATIENT
Start: 2019-10-15 | End: 2019-10-16 | Stop reason: HOSPADM

## 2019-10-15 RX ORDER — CARVEDILOL 3.12 MG/1
3.12 TABLET ORAL 2 TIMES DAILY WITH MEALS
Status: DISCONTINUED | OUTPATIENT
Start: 2019-10-15 | End: 2019-10-16 | Stop reason: HOSPADM

## 2019-10-15 RX ORDER — SODIUM CHLORIDE, SODIUM LACTATE, POTASSIUM CHLORIDE, CALCIUM CHLORIDE 600; 310; 30; 20 MG/100ML; MG/100ML; MG/100ML; MG/100ML
INJECTION, SOLUTION INTRAVENOUS CONTINUOUS PRN
Status: DISCONTINUED | OUTPATIENT
Start: 2019-10-15 | End: 2019-10-15 | Stop reason: SDUPTHER

## 2019-10-15 RX ORDER — SODIUM CHLORIDE 0.9 % (FLUSH) 0.9 %
10 SYRINGE (ML) INJECTION EVERY 12 HOURS SCHEDULED
Status: DISCONTINUED | OUTPATIENT
Start: 2019-10-15 | End: 2019-10-16 | Stop reason: HOSPADM

## 2019-10-15 RX ORDER — CLOPIDOGREL BISULFATE 75 MG/1
75 TABLET ORAL DAILY
Status: DISCONTINUED | OUTPATIENT
Start: 2019-10-15 | End: 2019-10-16 | Stop reason: HOSPADM

## 2019-10-15 RX ORDER — ONDANSETRON 2 MG/ML
INJECTION INTRAMUSCULAR; INTRAVENOUS PRN
Status: DISCONTINUED | OUTPATIENT
Start: 2019-10-15 | End: 2019-10-15 | Stop reason: SDUPTHER

## 2019-10-15 RX ORDER — GABAPENTIN 100 MG/1
100 CAPSULE ORAL 3 TIMES DAILY PRN
Status: DISCONTINUED | OUTPATIENT
Start: 2019-10-15 | End: 2019-10-16 | Stop reason: HOSPADM

## 2019-10-15 RX ORDER — NITROGLYCERIN 20 MG/100ML
5 INJECTION INTRAVENOUS CONTINUOUS
Status: DISCONTINUED | OUTPATIENT
Start: 2019-10-15 | End: 2019-10-15

## 2019-10-15 RX ORDER — LIDOCAINE HYDROCHLORIDE 20 MG/ML
INJECTION, SOLUTION INFILTRATION; PERINEURAL PRN
Status: DISCONTINUED | OUTPATIENT
Start: 2019-10-15 | End: 2019-10-15 | Stop reason: SDUPTHER

## 2019-10-15 RX ORDER — NITROGLYCERIN 5 MG/ML
INJECTION, SOLUTION INTRAVENOUS PRN
Status: DISCONTINUED | OUTPATIENT
Start: 2019-10-15 | End: 2019-10-15 | Stop reason: SDUPTHER

## 2019-10-15 RX ORDER — CEFAZOLIN SODIUM 2 G/100ML
2 INJECTION, SOLUTION INTRAVENOUS ONCE
Status: CANCELLED | OUTPATIENT
Start: 2019-10-15

## 2019-10-15 RX ORDER — ACETAMINOPHEN 325 MG/1
650 TABLET ORAL EVERY 4 HOURS PRN
Status: DISCONTINUED | OUTPATIENT
Start: 2019-10-15 | End: 2019-10-16 | Stop reason: HOSPADM

## 2019-10-15 RX ORDER — MIDAZOLAM HYDROCHLORIDE 1 MG/ML
INJECTION INTRAMUSCULAR; INTRAVENOUS PRN
Status: DISCONTINUED | OUTPATIENT
Start: 2019-10-15 | End: 2019-10-15 | Stop reason: SDUPTHER

## 2019-10-15 RX ORDER — DULOXETIN HYDROCHLORIDE 20 MG/1
20 CAPSULE, DELAYED RELEASE ORAL DAILY
Status: DISCONTINUED | OUTPATIENT
Start: 2019-10-15 | End: 2019-10-16 | Stop reason: HOSPADM

## 2019-10-15 RX ORDER — ASPIRIN 81 MG/1
81 TABLET ORAL DAILY
Status: DISCONTINUED | OUTPATIENT
Start: 2019-10-15 | End: 2019-10-16 | Stop reason: HOSPADM

## 2019-10-15 RX ORDER — LABETALOL HYDROCHLORIDE 5 MG/ML
INJECTION, SOLUTION INTRAVENOUS PRN
Status: DISCONTINUED | OUTPATIENT
Start: 2019-10-15 | End: 2019-10-15 | Stop reason: SDUPTHER

## 2019-10-15 RX ORDER — CHOLECALCIFEROL (VITAMIN D3) 125 MCG
5 CAPSULE ORAL NIGHTLY
Status: DISCONTINUED | OUTPATIENT
Start: 2019-10-15 | End: 2019-10-15 | Stop reason: CLARIF

## 2019-10-15 RX ORDER — LANOLIN ALCOHOL/MO/W.PET/CERES
3 CREAM (GRAM) TOPICAL NIGHTLY
Status: DISCONTINUED | OUTPATIENT
Start: 2019-10-15 | End: 2019-10-16 | Stop reason: HOSPADM

## 2019-10-15 RX ORDER — ONDANSETRON 2 MG/ML
4 INJECTION INTRAMUSCULAR; INTRAVENOUS EVERY 6 HOURS PRN
Status: DISCONTINUED | OUTPATIENT
Start: 2019-10-15 | End: 2019-10-16 | Stop reason: HOSPADM

## 2019-10-15 RX ADMIN — SODIUM CHLORIDE, POTASSIUM CHLORIDE, SODIUM LACTATE AND CALCIUM CHLORIDE 50 ML/HR: 600; 310; 30; 20 INJECTION, SOLUTION INTRAVENOUS at 09:45

## 2019-10-15 RX ADMIN — MIDAZOLAM HYDROCHLORIDE 1 MG: 1 INJECTION, SOLUTION INTRAMUSCULAR; INTRAVENOUS at 11:54

## 2019-10-15 RX ADMIN — DESMOPRESSIN ACETATE 40 MG: 0.2 TABLET ORAL at 18:19

## 2019-10-15 RX ADMIN — CEFAZOLIN 2000 MG: 1 INJECTION, POWDER, FOR SOLUTION INTRAVENOUS at 11:13

## 2019-10-15 RX ADMIN — LABETALOL HYDROCHLORIDE 10 MG: 5 INJECTION, SOLUTION INTRAVENOUS at 12:22

## 2019-10-15 RX ADMIN — MELATONIN TAB 3 MG 3 MG: 3 TAB at 22:29

## 2019-10-15 RX ADMIN — SODIUM CHLORIDE, POTASSIUM CHLORIDE, SODIUM LACTATE AND CALCIUM CHLORIDE 50 ML/HR: 600; 310; 30; 20 INJECTION, SOLUTION INTRAVENOUS at 09:55

## 2019-10-15 RX ADMIN — PHENYLEPHRINE HYDROCHLORIDE 50 MCG: 10 INJECTION INTRAVENOUS at 12:32

## 2019-10-15 RX ADMIN — LABETALOL HYDROCHLORIDE 5 MG: 5 INJECTION, SOLUTION INTRAVENOUS at 12:10

## 2019-10-15 RX ADMIN — SODIUM CHLORIDE, POTASSIUM CHLORIDE, SODIUM LACTATE AND CALCIUM CHLORIDE: 600; 310; 30; 20 INJECTION, SOLUTION INTRAVENOUS at 11:13

## 2019-10-15 RX ADMIN — LABETALOL HYDROCHLORIDE 5 MG: 5 INJECTION, SOLUTION INTRAVENOUS at 11:59

## 2019-10-15 RX ADMIN — IOPAMIDOL 75 ML: 755 INJECTION, SOLUTION INTRAVENOUS at 12:51

## 2019-10-15 RX ADMIN — LABETALOL HYDROCHLORIDE 5 MG: 5 INJECTION, SOLUTION INTRAVENOUS at 11:27

## 2019-10-15 RX ADMIN — LABETALOL HYDROCHLORIDE 5 MG: 5 INJECTION, SOLUTION INTRAVENOUS at 11:30

## 2019-10-15 RX ADMIN — FENTANYL CITRATE 25 MCG: 50 INJECTION, SOLUTION INTRAMUSCULAR; INTRAVENOUS at 11:29

## 2019-10-15 RX ADMIN — ACETAMINOPHEN 650 MG: 325 TABLET, FILM COATED ORAL at 22:29

## 2019-10-15 RX ADMIN — MIDAZOLAM HYDROCHLORIDE 1 MG: 1 INJECTION, SOLUTION INTRAMUSCULAR; INTRAVENOUS at 12:02

## 2019-10-15 RX ADMIN — DIPHENHYDRAMINE HYDROCHLORIDE 25 MG: 50 INJECTION, SOLUTION INTRAMUSCULAR; INTRAVENOUS at 11:43

## 2019-10-15 RX ADMIN — FENTANYL CITRATE 50 MCG: 50 INJECTION, SOLUTION INTRAMUSCULAR; INTRAVENOUS at 12:50

## 2019-10-15 RX ADMIN — LABETALOL HYDROCHLORIDE 5 MG: 5 INJECTION, SOLUTION INTRAVENOUS at 11:32

## 2019-10-15 RX ADMIN — CLOPIDOGREL 75 MG: 75 TABLET, FILM COATED ORAL at 18:19

## 2019-10-15 RX ADMIN — PROPOFOL 20 MG: 10 INJECTION, EMULSION INTRAVENOUS at 11:43

## 2019-10-15 RX ADMIN — PROPOFOL 20 MG: 10 INJECTION, EMULSION INTRAVENOUS at 11:39

## 2019-10-15 RX ADMIN — Medication 10 ML: at 22:30

## 2019-10-15 RX ADMIN — LIDOCAINE HYDROCHLORIDE 40 MG: 20 INJECTION, SOLUTION INFILTRATION; PERINEURAL at 11:29

## 2019-10-15 RX ADMIN — PROPOFOL 20 MCG/KG/MIN: 10 INJECTION, EMULSION INTRAVENOUS at 11:29

## 2019-10-15 RX ADMIN — NITROGLYCERIN 20 MCG/MIN: 20 INJECTION INTRAVENOUS at 11:26

## 2019-10-15 RX ADMIN — NITROGLYCERIN 200 MCG: 5 INJECTION, SOLUTION INTRAVENOUS at 12:35

## 2019-10-15 RX ADMIN — PROPOFOL 20 MG: 10 INJECTION, EMULSION INTRAVENOUS at 11:31

## 2019-10-15 RX ADMIN — LABETALOL HYDROCHLORIDE 5 MG: 5 INJECTION, SOLUTION INTRAVENOUS at 11:39

## 2019-10-15 RX ADMIN — ONDANSETRON 4 MG: 2 INJECTION INTRAMUSCULAR; INTRAVENOUS at 11:43

## 2019-10-15 RX ADMIN — ASPIRIN 81 MG: 81 TABLET, COATED ORAL at 18:19

## 2019-10-15 RX ADMIN — NITROGLYCERIN 100 MCG: 5 INJECTION, SOLUTION INTRAVENOUS at 11:29

## 2019-10-15 ASSESSMENT — PULMONARY FUNCTION TESTS
PIF_VALUE: 6
PIF_VALUE: 4
PIF_VALUE: 3
PIF_VALUE: 1
PIF_VALUE: 2
PIF_VALUE: 3
PIF_VALUE: 3
PIF_VALUE: 0
PIF_VALUE: 2
PIF_VALUE: 2
PIF_VALUE: 1
PIF_VALUE: 5
PIF_VALUE: 5
PIF_VALUE: 6
PIF_VALUE: 3
PIF_VALUE: 1
PIF_VALUE: 4
PIF_VALUE: 1
PIF_VALUE: 4
PIF_VALUE: 3
PIF_VALUE: 1
PIF_VALUE: 0
PIF_VALUE: 3
PIF_VALUE: 5
PIF_VALUE: 3
PIF_VALUE: 3
PIF_VALUE: 1
PIF_VALUE: 5
PIF_VALUE: 3
PIF_VALUE: 5
PIF_VALUE: 4
PIF_VALUE: 6
PIF_VALUE: 4
PIF_VALUE: 1
PIF_VALUE: 4
PIF_VALUE: 1
PIF_VALUE: 3
PIF_VALUE: 2
PIF_VALUE: 0
PIF_VALUE: 1
PIF_VALUE: 2
PIF_VALUE: 5
PIF_VALUE: 6
PIF_VALUE: 5
PIF_VALUE: 4
PIF_VALUE: 2
PIF_VALUE: 2
PIF_VALUE: 0
PIF_VALUE: 2
PIF_VALUE: 4
PIF_VALUE: 3
PIF_VALUE: 4
PIF_VALUE: 1
PIF_VALUE: 1
PIF_VALUE: 3
PIF_VALUE: 0
PIF_VALUE: 3
PIF_VALUE: 3
PIF_VALUE: 2
PIF_VALUE: 4
PIF_VALUE: 4
PIF_VALUE: 3
PIF_VALUE: 5
PIF_VALUE: 3
PIF_VALUE: 0
PIF_VALUE: 0
PIF_VALUE: 1
PIF_VALUE: 5
PIF_VALUE: 5
PIF_VALUE: 1
PIF_VALUE: 4
PIF_VALUE: 2
PIF_VALUE: 6
PIF_VALUE: 3
PIF_VALUE: 1
PIF_VALUE: 2
PIF_VALUE: 1
PIF_VALUE: 3
PIF_VALUE: 4
PIF_VALUE: 6
PIF_VALUE: 2
PIF_VALUE: 4
PIF_VALUE: 6
PIF_VALUE: 1
PIF_VALUE: 2
PIF_VALUE: 3

## 2019-10-15 ASSESSMENT — PAIN DESCRIPTION - PAIN TYPE: TYPE: ACUTE PAIN

## 2019-10-15 ASSESSMENT — PAIN SCALES - GENERAL
PAINLEVEL_OUTOF10: 0
PAINLEVEL_OUTOF10: 0
PAINLEVEL_OUTOF10: 4

## 2019-10-15 ASSESSMENT — PAIN DESCRIPTION - LOCATION: LOCATION: HEAD

## 2019-10-15 ASSESSMENT — PAIN DESCRIPTION - DESCRIPTORS: DESCRIPTORS: HEADACHE

## 2019-10-16 VITALS
TEMPERATURE: 96.8 F | WEIGHT: 174.6 LBS | DIASTOLIC BLOOD PRESSURE: 41 MMHG | HEIGHT: 63 IN | HEART RATE: 64 BPM | RESPIRATION RATE: 18 BRPM | OXYGEN SATURATION: 94 % | SYSTOLIC BLOOD PRESSURE: 132 MMHG | BODY MASS INDEX: 30.94 KG/M2

## 2019-10-16 LAB
ACTIVATED CLOTTING TIME: 214 SEC (ref 99–130)
ACTIVATED CLOTTING TIME: 90 SEC (ref 99–130)
ACTIVATED CLOTTING TIME: 96 SEC (ref 99–130)
ANION GAP SERPL CALCULATED.3IONS-SCNC: 10 MMOL/L (ref 3–16)
BILIRUBIN URINE: NEGATIVE
BLOOD, URINE: ABNORMAL
BUN BLDV-MCNC: 14 MG/DL (ref 7–20)
CALCIUM SERPL-MCNC: 8.5 MG/DL (ref 8.3–10.6)
CHLORIDE BLD-SCNC: 90 MMOL/L (ref 99–110)
CLARITY: CLEAR
CO2: 28 MMOL/L (ref 21–32)
COLOR: YELLOW
CREAT SERPL-MCNC: 1.1 MG/DL (ref 0.6–1.2)
EKG ATRIAL RATE: 78 BPM
EKG DIAGNOSIS: NORMAL
EKG P AXIS: 73 DEGREES
EKG P-R INTERVAL: 206 MS
EKG Q-T INTERVAL: 392 MS
EKG QRS DURATION: 80 MS
EKG QTC CALCULATION (BAZETT): 446 MS
EKG R AXIS: 1 DEGREES
EKG T AXIS: 57 DEGREES
EKG VENTRICULAR RATE: 78 BPM
EPITHELIAL CELLS, UA: 0 /HPF (ref 0–5)
GFR AFRICAN AMERICAN: 58
GFR NON-AFRICAN AMERICAN: 48
GLUCOSE BLD-MCNC: 100 MG/DL (ref 70–99)
GLUCOSE URINE: NEGATIVE MG/DL
HCT VFR BLD CALC: 29.3 % (ref 36–48)
HEMOGLOBIN: 9.6 G/DL (ref 12–16)
HYALINE CASTS: 1 /LPF (ref 0–8)
KETONES, URINE: NEGATIVE MG/DL
LEFT VENTRICULAR EJECTION FRACTION MODE: NORMAL
LEUKOCYTE ESTERASE, URINE: NEGATIVE
LV EF: 65 %
LV EF: 65 %
LVEF MODALITY: NORMAL
MCH RBC QN AUTO: 30.7 PG (ref 26–34)
MCHC RBC AUTO-ENTMCNC: 32.8 G/DL (ref 31–36)
MCV RBC AUTO: 93.6 FL (ref 80–100)
MICROSCOPIC EXAMINATION: YES
NITRITE, URINE: NEGATIVE
PDW BLD-RTO: 15.9 % (ref 12.4–15.4)
PH UA: 5.5 (ref 5–8)
PLATELET # BLD: 222 K/UL (ref 135–450)
PMV BLD AUTO: 8.1 FL (ref 5–10.5)
POTASSIUM REFLEX MAGNESIUM: 4.2 MMOL/L (ref 3.5–5.1)
PROTEIN UA: NEGATIVE MG/DL
RBC # BLD: 3.13 M/UL (ref 4–5.2)
RBC UA: 13 /HPF (ref 0–4)
SODIUM BLD-SCNC: 128 MMOL/L (ref 136–145)
SPECIFIC GRAVITY UA: 1.02 (ref 1–1.03)
URINE REFLEX TO CULTURE: ABNORMAL
URINE TYPE: ABNORMAL
UROBILINOGEN, URINE: 0.2 E.U./DL
WBC # BLD: 9.2 K/UL (ref 4–11)
WBC UA: 1 /HPF (ref 0–5)

## 2019-10-16 PROCEDURE — 3600000007 HC SURGERY HYBRID BASE

## 2019-10-16 PROCEDURE — 85027 COMPLETE CBC AUTOMATED: CPT

## 2019-10-16 PROCEDURE — G0008 ADMIN INFLUENZA VIRUS VAC: HCPCS

## 2019-10-16 PROCEDURE — 6360000002 HC RX W HCPCS: Performed by: INTERNAL MEDICINE

## 2019-10-16 PROCEDURE — 90686 IIV4 VACC NO PRSV 0.5 ML IM: CPT

## 2019-10-16 PROCEDURE — 99239 HOSP IP/OBS DSCHRG MGMT >30: CPT | Performed by: INTERNAL MEDICINE

## 2019-10-16 PROCEDURE — 7100000010 HC PHASE II RECOVERY - FIRST 15 MIN

## 2019-10-16 PROCEDURE — 93010 ELECTROCARDIOGRAM REPORT: CPT | Performed by: INTERNAL MEDICINE

## 2019-10-16 PROCEDURE — 7100000011 HC PHASE II RECOVERY - ADDTL 15 MIN

## 2019-10-16 PROCEDURE — 6360000002 HC RX W HCPCS

## 2019-10-16 PROCEDURE — 80048 BASIC METABOLIC PNL TOTAL CA: CPT

## 2019-10-16 PROCEDURE — 94760 N-INVAS EAR/PLS OXIMETRY 1: CPT

## 2019-10-16 PROCEDURE — B41F1ZZ FLUOROSCOPY OF RIGHT LOWER EXTREMITY ARTERIES USING LOW OSMOLAR CONTRAST: ICD-10-PCS | Performed by: THORACIC SURGERY (CARDIOTHORACIC VASCULAR SURGERY)

## 2019-10-16 PROCEDURE — 81001 URINALYSIS AUTO W/SCOPE: CPT

## 2019-10-16 PROCEDURE — 51798 US URINE CAPACITY MEASURE: CPT

## 2019-10-16 PROCEDURE — 2580000003 HC RX 258: Performed by: INTERNAL MEDICINE

## 2019-10-16 PROCEDURE — 6370000000 HC RX 637 (ALT 250 FOR IP): Performed by: INTERNAL MEDICINE

## 2019-10-16 PROCEDURE — 02RF38Z REPLACEMENT OF AORTIC VALVE WITH ZOOPLASTIC TISSUE, PERCUTANEOUS APPROACH: ICD-10-PCS | Performed by: THORACIC SURGERY (CARDIOTHORACIC VASCULAR SURGERY)

## 2019-10-16 PROCEDURE — 3600000017 HC SURGERY HYBRID ADDL 15MIN

## 2019-10-16 PROCEDURE — 93306 TTE W/DOPPLER COMPLETE: CPT

## 2019-10-16 RX ORDER — CLOPIDOGREL BISULFATE 75 MG/1
75 TABLET ORAL DAILY
Qty: 30 TABLET | Refills: 5 | Status: SHIPPED | OUTPATIENT
Start: 2019-10-16 | End: 2019-11-01

## 2019-10-16 RX ORDER — FUROSEMIDE 10 MG/ML
40 INJECTION INTRAMUSCULAR; INTRAVENOUS ONCE
Status: COMPLETED | OUTPATIENT
Start: 2019-10-16 | End: 2019-10-16

## 2019-10-16 RX ORDER — BUTALBITAL, ACETAMINOPHEN AND CAFFEINE 50; 325; 40 MG/1; MG/1; MG/1
1 TABLET ORAL EVERY 6 HOURS PRN
Status: DISCONTINUED | OUTPATIENT
Start: 2019-10-16 | End: 2019-10-16 | Stop reason: HOSPADM

## 2019-10-16 RX ORDER — PANTOPRAZOLE SODIUM 40 MG/1
40 TABLET, DELAYED RELEASE ORAL
Qty: 30 TABLET | Refills: 3 | Status: SHIPPED | OUTPATIENT
Start: 2019-10-17 | End: 2019-10-22

## 2019-10-16 RX ORDER — FUROSEMIDE 20 MG/1
20 TABLET ORAL DAILY PRN
Qty: 180 TABLET | Refills: 1 | Status: SHIPPED | OUTPATIENT
Start: 2019-10-16 | End: 2019-10-22

## 2019-10-16 RX ADMIN — CLOPIDOGREL 75 MG: 75 TABLET, FILM COATED ORAL at 09:38

## 2019-10-16 RX ADMIN — FOLIC ACID 1000 MCG: 1 TABLET ORAL at 09:38

## 2019-10-16 RX ADMIN — LEVOTHYROXINE SODIUM 175 MCG: 150 TABLET ORAL at 06:04

## 2019-10-16 RX ADMIN — Medication 10 ML: at 09:38

## 2019-10-16 RX ADMIN — FUROSEMIDE 40 MG: 10 INJECTION, SOLUTION INTRAMUSCULAR; INTRAVENOUS at 09:38

## 2019-10-16 RX ADMIN — PANTOPRAZOLE SODIUM 40 MG: 40 TABLET, DELAYED RELEASE ORAL at 06:04

## 2019-10-16 RX ADMIN — DESMOPRESSIN ACETATE 40 MG: 0.2 TABLET ORAL at 09:38

## 2019-10-16 RX ADMIN — GABAPENTIN 100 MG: 100 CAPSULE ORAL at 06:08

## 2019-10-16 RX ADMIN — BUTALBITAL, ACETAMINOPHEN AND CAFFEINE 1 TABLET: 50; 325; 40 TABLET ORAL at 09:37

## 2019-10-16 RX ADMIN — DULOXETINE HYDROCHLORIDE 20 MG: 20 CAPSULE, DELAYED RELEASE ORAL at 09:37

## 2019-10-16 RX ADMIN — ASPIRIN 81 MG: 81 TABLET, COATED ORAL at 09:38

## 2019-10-16 RX ADMIN — HYDROCODONE BITARTRATE AND ACETAMINOPHEN 1 TABLET: 5; 325 TABLET ORAL at 00:02

## 2019-10-16 RX ADMIN — LISINOPRIL 5 MG: 10 TABLET ORAL at 09:37

## 2019-10-16 RX ADMIN — CARVEDILOL 3.12 MG: 3.12 TABLET, FILM COATED ORAL at 09:38

## 2019-10-16 RX ADMIN — INFLUENZA A VIRUS A/BRISBANE/02/2018 IVR-190 (H1N1) ANTIGEN (PROPIOLACTONE INACTIVATED), INFLUENZA A VIRUS A/KANSAS/14/2017 X-327 (H3N2) ANTIGEN (PROPIOLACTONE INACTIVATED), INFLUENZA B VIRUS B/MARYLAND/15/2016 ANTIGEN (PROPIOLACTONE INACTIVATED), INFLUENZA B VIRUS B/PHUKET/3073/2013 BVR-1B ANTIGEN (PROPIOLACTONE INACTIVATED) 0.5 ML: 15; 15; 15; 15 INJECTION, SUSPENSION INTRAMUSCULAR at 12:50

## 2019-10-16 RX ADMIN — HYDROCODONE BITARTRATE AND ACETAMINOPHEN 1 TABLET: 5; 325 TABLET ORAL at 06:04

## 2019-10-16 ASSESSMENT — PAIN DESCRIPTION - DESCRIPTORS
DESCRIPTORS: HEADACHE
DESCRIPTORS: HEADACHE

## 2019-10-16 ASSESSMENT — PAIN SCALES - GENERAL
PAINLEVEL_OUTOF10: 5
PAINLEVEL_OUTOF10: 0
PAINLEVEL_OUTOF10: 5
PAINLEVEL_OUTOF10: 0
PAINLEVEL_OUTOF10: 7
PAINLEVEL_OUTOF10: 7

## 2019-10-16 ASSESSMENT — PAIN DESCRIPTION - PAIN TYPE
TYPE: ACUTE PAIN
TYPE: CHRONIC PAIN

## 2019-10-16 ASSESSMENT — PAIN DESCRIPTION - LOCATION
LOCATION: HEAD
LOCATION: NECK

## 2019-10-19 LAB
BLOOD BANK DISPENSE STATUS: NORMAL
BLOOD BANK PRODUCT CODE: NORMAL
BPU ID: NORMAL
DESCRIPTION BLOOD BANK: NORMAL

## 2019-10-22 ENCOUNTER — OFFICE VISIT (OUTPATIENT)
Dept: CARDIOLOGY CLINIC | Age: 79
End: 2019-10-22
Payer: MEDICARE

## 2019-10-22 VITALS
SYSTOLIC BLOOD PRESSURE: 114 MMHG | BODY MASS INDEX: 29.59 KG/M2 | WEIGHT: 167 LBS | HEIGHT: 63 IN | DIASTOLIC BLOOD PRESSURE: 60 MMHG | HEART RATE: 62 BPM

## 2019-10-22 DIAGNOSIS — I35.0 NONRHEUMATIC AORTIC VALVE STENOSIS: ICD-10-CM

## 2019-10-22 DIAGNOSIS — R06.09 DOE (DYSPNEA ON EXERTION): ICD-10-CM

## 2019-10-22 DIAGNOSIS — E78.00 HYPERCHOLESTEREMIA: ICD-10-CM

## 2019-10-22 DIAGNOSIS — I10 ESSENTIAL HYPERTENSION: ICD-10-CM

## 2019-10-22 DIAGNOSIS — Z95.2 S/P TAVR (TRANSCATHETER AORTIC VALVE REPLACEMENT): Primary | ICD-10-CM

## 2019-10-22 PROCEDURE — 93000 ELECTROCARDIOGRAM COMPLETE: CPT | Performed by: INTERNAL MEDICINE

## 2019-10-22 PROCEDURE — 99214 OFFICE O/P EST MOD 30 MIN: CPT | Performed by: INTERNAL MEDICINE

## 2019-10-22 RX ORDER — FUROSEMIDE 20 MG/1
20 TABLET ORAL DAILY
Qty: 180 TABLET | Refills: 1 | Status: ON HOLD | OUTPATIENT
Start: 2019-10-22 | End: 2019-11-05 | Stop reason: HOSPADM

## 2019-10-28 ENCOUNTER — OFFICE VISIT (OUTPATIENT)
Dept: CARDIOLOGY CLINIC | Age: 79
End: 2019-10-28
Payer: MEDICARE

## 2019-10-28 VITALS
SYSTOLIC BLOOD PRESSURE: 100 MMHG | WEIGHT: 163.4 LBS | HEIGHT: 63 IN | DIASTOLIC BLOOD PRESSURE: 58 MMHG | HEART RATE: 100 BPM | BODY MASS INDEX: 28.95 KG/M2

## 2019-10-28 DIAGNOSIS — I10 ESSENTIAL HYPERTENSION: ICD-10-CM

## 2019-10-28 DIAGNOSIS — I48.0 PAF (PAROXYSMAL ATRIAL FIBRILLATION) (HCC): ICD-10-CM

## 2019-10-28 DIAGNOSIS — Z95.2 S/P TAVR (TRANSCATHETER AORTIC VALVE REPLACEMENT): Primary | ICD-10-CM

## 2019-10-28 DIAGNOSIS — Z95.2 S/P TAVR (TRANSCATHETER AORTIC VALVE REPLACEMENT): ICD-10-CM

## 2019-10-28 DIAGNOSIS — E78.49 OTHER HYPERLIPIDEMIA: ICD-10-CM

## 2019-10-28 LAB
ANION GAP SERPL CALCULATED.3IONS-SCNC: 19 MMOL/L (ref 3–16)
BUN BLDV-MCNC: 16 MG/DL (ref 7–20)
CALCIUM SERPL-MCNC: 9.1 MG/DL (ref 8.3–10.6)
CHLORIDE BLD-SCNC: 89 MMOL/L (ref 99–110)
CO2: 25 MMOL/L (ref 21–32)
CREAT SERPL-MCNC: 1.4 MG/DL (ref 0.6–1.2)
GFR AFRICAN AMERICAN: 44
GFR NON-AFRICAN AMERICAN: 36
GLUCOSE BLD-MCNC: 124 MG/DL (ref 70–99)
POTASSIUM SERPL-SCNC: 3.4 MMOL/L (ref 3.5–5.1)
PRO-BNP: 3578 PG/ML (ref 0–449)
SODIUM BLD-SCNC: 133 MMOL/L (ref 136–145)

## 2019-10-28 PROCEDURE — 93000 ELECTROCARDIOGRAM COMPLETE: CPT | Performed by: NURSE PRACTITIONER

## 2019-10-28 PROCEDURE — 99214 OFFICE O/P EST MOD 30 MIN: CPT | Performed by: NURSE PRACTITIONER

## 2019-10-28 RX ORDER — DIGOXIN 125 MCG
125 TABLET ORAL DAILY
Qty: 90 TABLET | Refills: 3 | Status: SHIPPED | OUTPATIENT
Start: 2019-10-28 | End: 2019-11-01

## 2019-10-28 RX ORDER — PANTOPRAZOLE SODIUM 20 MG/1
20 TABLET, DELAYED RELEASE ORAL DAILY
COMMUNITY
Start: 2019-10-28 | End: 2021-10-07 | Stop reason: ALTCHOICE

## 2019-10-28 RX ORDER — FERROUS SULFATE 325(65) MG
325 TABLET ORAL
COMMUNITY
Start: 2019-10-28 | End: 2020-02-24 | Stop reason: SDUPTHER

## 2019-11-01 ENCOUNTER — OFFICE VISIT (OUTPATIENT)
Dept: CARDIOLOGY CLINIC | Age: 79
End: 2019-11-01
Payer: MEDICARE

## 2019-11-01 ENCOUNTER — HOSPITAL ENCOUNTER (INPATIENT)
Age: 79
LOS: 3 days | Discharge: HOME | DRG: 918 | End: 2019-11-05
Attending: EMERGENCY MEDICINE | Admitting: INTERNAL MEDICINE
Payer: MEDICARE

## 2019-11-01 VITALS
BODY MASS INDEX: 28.56 KG/M2 | DIASTOLIC BLOOD PRESSURE: 70 MMHG | HEART RATE: 60 BPM | SYSTOLIC BLOOD PRESSURE: 138 MMHG | WEIGHT: 161.2 LBS

## 2019-11-01 DIAGNOSIS — E78.00 HYPERCHOLESTEREMIA: ICD-10-CM

## 2019-11-01 DIAGNOSIS — Z95.2 S/P TAVR (TRANSCATHETER AORTIC VALVE REPLACEMENT): Primary | ICD-10-CM

## 2019-11-01 DIAGNOSIS — I10 ESSENTIAL HYPERTENSION: ICD-10-CM

## 2019-11-01 DIAGNOSIS — I48.0 PAF (PAROXYSMAL ATRIAL FIBRILLATION) (HCC): ICD-10-CM

## 2019-11-01 DIAGNOSIS — T46.0X1A: Primary | ICD-10-CM

## 2019-11-01 DIAGNOSIS — R06.09 DOE (DYSPNEA ON EXERTION): ICD-10-CM

## 2019-11-01 LAB
ALBUMIN SERPL-MCNC: 3.5 G/DL (ref 3.4–5)
ALP BLD-CCNC: 81 U/L (ref 40–129)
ALT SERPL-CCNC: 18 U/L (ref 10–40)
ANION GAP SERPL CALCULATED.3IONS-SCNC: 10 MMOL/L (ref 3–16)
ANION GAP SERPL CALCULATED.3IONS-SCNC: 10 MMOL/L (ref 3–16)
ANION GAP SERPL CALCULATED.3IONS-SCNC: 9 MMOL/L (ref 3–16)
AST SERPL-CCNC: 34 U/L (ref 15–37)
BASOPHILS ABSOLUTE: 0 K/UL (ref 0–0.2)
BASOPHILS RELATIVE PERCENT: 0.4 %
BILIRUB SERPL-MCNC: <0.2 MG/DL (ref 0–1)
BILIRUBIN DIRECT: <0.2 MG/DL (ref 0–0.3)
BILIRUBIN, INDIRECT: NORMAL MG/DL (ref 0–1)
BUN BLDV-MCNC: 19 MG/DL (ref 7–20)
CALCIUM SERPL-MCNC: 8.8 MG/DL (ref 8.3–10.6)
CALCIUM SERPL-MCNC: 9.1 MG/DL (ref 8.3–10.6)
CALCIUM SERPL-MCNC: 9.1 MG/DL (ref 8.3–10.6)
CHLORIDE BLD-SCNC: 93 MMOL/L (ref 99–110)
CHLORIDE BLD-SCNC: 94 MMOL/L (ref 99–110)
CHLORIDE BLD-SCNC: 95 MMOL/L (ref 99–110)
CO2: 25 MMOL/L (ref 21–32)
CO2: 26 MMOL/L (ref 21–32)
CO2: 30 MMOL/L (ref 21–32)
CREAT SERPL-MCNC: 1 MG/DL (ref 0.6–1.2)
CREAT SERPL-MCNC: 1.1 MG/DL (ref 0.6–1.2)
CREAT SERPL-MCNC: 1.1 MG/DL (ref 0.6–1.2)
DIGOXIN LEVEL: 3.4 NG/ML (ref 0.8–2)
DIGOXIN LEVEL: 3.5 NG/ML (ref 0.8–2)
EOSINOPHILS ABSOLUTE: 0.2 K/UL (ref 0–0.6)
EOSINOPHILS RELATIVE PERCENT: 3.1 %
GFR AFRICAN AMERICAN: 58
GFR AFRICAN AMERICAN: 58
GFR AFRICAN AMERICAN: >60
GFR NON-AFRICAN AMERICAN: 48
GFR NON-AFRICAN AMERICAN: 48
GFR NON-AFRICAN AMERICAN: 53
GLUCOSE BLD-MCNC: 113 MG/DL (ref 70–99)
GLUCOSE BLD-MCNC: 118 MG/DL (ref 70–99)
GLUCOSE BLD-MCNC: 133 MG/DL (ref 70–99)
HCT VFR BLD CALC: 34.7 % (ref 36–48)
HEMOGLOBIN: 11.2 G/DL (ref 12–16)
LYMPHOCYTES ABSOLUTE: 0.7 K/UL (ref 1–5.1)
LYMPHOCYTES RELATIVE PERCENT: 12.2 %
MCH RBC QN AUTO: 30.9 PG (ref 26–34)
MCHC RBC AUTO-ENTMCNC: 32.3 G/DL (ref 31–36)
MCV RBC AUTO: 95.5 FL (ref 80–100)
MONOCYTES ABSOLUTE: 0.6 K/UL (ref 0–1.3)
MONOCYTES RELATIVE PERCENT: 9.9 %
NEUTROPHILS ABSOLUTE: 4.5 K/UL (ref 1.7–7.7)
NEUTROPHILS RELATIVE PERCENT: 74.4 %
PDW BLD-RTO: 15.1 % (ref 12.4–15.4)
PLATELET # BLD: 322 K/UL (ref 135–450)
PMV BLD AUTO: 7.9 FL (ref 5–10.5)
POTASSIUM REFLEX MAGNESIUM: 5.7 MMOL/L (ref 3.5–5.1)
POTASSIUM SERPL-SCNC: 5.1 MMOL/L (ref 3.5–5.1)
POTASSIUM SERPL-SCNC: 7.5 MMOL/L (ref 3.5–5.1)
RBC # BLD: 3.64 M/UL (ref 4–5.2)
SODIUM BLD-SCNC: 128 MMOL/L (ref 136–145)
SODIUM BLD-SCNC: 130 MMOL/L (ref 136–145)
SODIUM BLD-SCNC: 134 MMOL/L (ref 136–145)
TOTAL PROTEIN: 7 G/DL (ref 6.4–8.2)
WBC # BLD: 6.1 K/UL (ref 4–11)

## 2019-11-01 PROCEDURE — 93005 ELECTROCARDIOGRAM TRACING: CPT | Performed by: INTERNAL MEDICINE

## 2019-11-01 PROCEDURE — 6370000000 HC RX 637 (ALT 250 FOR IP): Performed by: INTERNAL MEDICINE

## 2019-11-01 PROCEDURE — 80048 BASIC METABOLIC PNL TOTAL CA: CPT

## 2019-11-01 PROCEDURE — G0378 HOSPITAL OBSERVATION PER HR: HCPCS

## 2019-11-01 PROCEDURE — 96360 HYDRATION IV INFUSION INIT: CPT

## 2019-11-01 PROCEDURE — 80076 HEPATIC FUNCTION PANEL: CPT

## 2019-11-01 PROCEDURE — 83036 HEMOGLOBIN GLYCOSYLATED A1C: CPT

## 2019-11-01 PROCEDURE — 99284 EMERGENCY DEPT VISIT MOD MDM: CPT

## 2019-11-01 PROCEDURE — 85025 COMPLETE CBC W/AUTO DIFF WBC: CPT

## 2019-11-01 PROCEDURE — 80162 ASSAY OF DIGOXIN TOTAL: CPT

## 2019-11-01 PROCEDURE — 93005 ELECTROCARDIOGRAM TRACING: CPT | Performed by: PHYSICIAN ASSISTANT

## 2019-11-01 PROCEDURE — 2580000003 HC RX 258: Performed by: INTERNAL MEDICINE

## 2019-11-01 PROCEDURE — 99214 OFFICE O/P EST MOD 30 MIN: CPT | Performed by: INTERNAL MEDICINE

## 2019-11-01 PROCEDURE — 36415 COLL VENOUS BLD VENIPUNCTURE: CPT

## 2019-11-01 RX ORDER — ASPIRIN 81 MG/1
81 TABLET ORAL DAILY
Status: DISCONTINUED | OUTPATIENT
Start: 2019-11-02 | End: 2019-11-05 | Stop reason: HOSPADM

## 2019-11-01 RX ORDER — DIGOXIN 0.25 MG/ML
125 INJECTION INTRAMUSCULAR; INTRAVENOUS ONCE
Status: DISCONTINUED | OUTPATIENT
Start: 2019-11-01 | End: 2019-11-01

## 2019-11-01 RX ORDER — ONDANSETRON 2 MG/ML
4 INJECTION INTRAMUSCULAR; INTRAVENOUS EVERY 6 HOURS PRN
Status: DISCONTINUED | OUTPATIENT
Start: 2019-11-01 | End: 2019-11-05 | Stop reason: HOSPADM

## 2019-11-01 RX ORDER — ATORVASTATIN CALCIUM 40 MG/1
40 TABLET, FILM COATED ORAL DAILY
Status: DISCONTINUED | OUTPATIENT
Start: 2019-11-02 | End: 2019-11-05 | Stop reason: HOSPADM

## 2019-11-01 RX ORDER — 0.9 % SODIUM CHLORIDE 0.9 %
500 INTRAVENOUS SOLUTION INTRAVENOUS ONCE
Status: COMPLETED | OUTPATIENT
Start: 2019-11-01 | End: 2019-11-01

## 2019-11-01 RX ORDER — DEXTROSE MONOHYDRATE 50 MG/ML
100 INJECTION, SOLUTION INTRAVENOUS PRN
Status: DISCONTINUED | OUTPATIENT
Start: 2019-11-01 | End: 2019-11-05 | Stop reason: HOSPADM

## 2019-11-01 RX ORDER — FUROSEMIDE 40 MG/1
20 TABLET ORAL DAILY
Status: DISCONTINUED | OUTPATIENT
Start: 2019-11-02 | End: 2019-11-04

## 2019-11-01 RX ORDER — PANTOPRAZOLE SODIUM 20 MG/1
20 TABLET, DELAYED RELEASE ORAL DAILY
Status: DISCONTINUED | OUTPATIENT
Start: 2019-11-02 | End: 2019-11-05 | Stop reason: HOSPADM

## 2019-11-01 RX ORDER — DEXTROSE MONOHYDRATE 25 G/50ML
12.5 INJECTION, SOLUTION INTRAVENOUS PRN
Status: DISCONTINUED | OUTPATIENT
Start: 2019-11-01 | End: 2019-11-05 | Stop reason: HOSPADM

## 2019-11-01 RX ORDER — SODIUM CHLORIDE 0.9 % (FLUSH) 0.9 %
10 SYRINGE (ML) INJECTION PRN
Status: DISCONTINUED | OUTPATIENT
Start: 2019-11-01 | End: 2019-11-05 | Stop reason: HOSPADM

## 2019-11-01 RX ORDER — LEVOTHYROXINE SODIUM 175 UG/1
175 TABLET ORAL
Status: DISCONTINUED | OUTPATIENT
Start: 2019-11-02 | End: 2019-11-05 | Stop reason: HOSPADM

## 2019-11-01 RX ORDER — FERROUS SULFATE 325(65) MG
325 TABLET ORAL
Status: DISCONTINUED | OUTPATIENT
Start: 2019-11-02 | End: 2019-11-05 | Stop reason: HOSPADM

## 2019-11-01 RX ORDER — SODIUM CHLORIDE 0.9 % (FLUSH) 0.9 %
10 SYRINGE (ML) INJECTION EVERY 12 HOURS SCHEDULED
Status: DISCONTINUED | OUTPATIENT
Start: 2019-11-01 | End: 2019-11-05 | Stop reason: HOSPADM

## 2019-11-01 RX ORDER — LISINOPRIL 10 MG/1
5 TABLET ORAL DAILY
Status: DISCONTINUED | OUTPATIENT
Start: 2019-11-02 | End: 2019-11-04

## 2019-11-01 RX ORDER — INSULIN LISPRO 100 [IU]/ML
0-6 INJECTION, SOLUTION INTRAVENOUS; SUBCUTANEOUS
Status: DISCONTINUED | OUTPATIENT
Start: 2019-11-02 | End: 2019-11-05 | Stop reason: HOSPADM

## 2019-11-01 RX ORDER — ACETAMINOPHEN 325 MG/1
650 TABLET ORAL EVERY 4 HOURS PRN
Status: DISCONTINUED | OUTPATIENT
Start: 2019-11-01 | End: 2019-11-05 | Stop reason: HOSPADM

## 2019-11-01 RX ORDER — NICOTINE POLACRILEX 4 MG
15 LOZENGE BUCCAL PRN
Status: DISCONTINUED | OUTPATIENT
Start: 2019-11-01 | End: 2019-11-05 | Stop reason: HOSPADM

## 2019-11-01 RX ORDER — INSULIN LISPRO 100 [IU]/ML
0-3 INJECTION, SOLUTION INTRAVENOUS; SUBCUTANEOUS NIGHTLY
Status: DISCONTINUED | OUTPATIENT
Start: 2019-11-01 | End: 2019-11-05 | Stop reason: HOSPADM

## 2019-11-01 RX ORDER — UREA 10 %
5 LOTION (ML) TOPICAL NIGHTLY
Status: DISCONTINUED | OUTPATIENT
Start: 2019-11-01 | End: 2019-11-05 | Stop reason: HOSPADM

## 2019-11-01 RX ADMIN — ACETAMINOPHEN 650 MG: 325 TABLET ORAL at 22:44

## 2019-11-01 RX ADMIN — Medication 5 MG: at 22:43

## 2019-11-01 RX ADMIN — SODIUM CHLORIDE 500 ML: 9 INJECTION, SOLUTION INTRAVENOUS at 22:37

## 2019-11-01 RX ADMIN — Medication 10 ML: at 22:37

## 2019-11-01 ASSESSMENT — ENCOUNTER SYMPTOMS
ABDOMINAL PAIN: 0
SHORTNESS OF BREATH: 0
NAUSEA: 0
CHEST TIGHTNESS: 0
VOMITING: 0

## 2019-11-01 ASSESSMENT — PAIN SCALES - GENERAL
PAINLEVEL_OUTOF10: 6
PAINLEVEL_OUTOF10: 5

## 2019-11-01 ASSESSMENT — PAIN - FUNCTIONAL ASSESSMENT: PAIN_FUNCTIONAL_ASSESSMENT: ACTIVITIES ARE NOT PREVENTED

## 2019-11-01 ASSESSMENT — PAIN DESCRIPTION - DESCRIPTORS: DESCRIPTORS: ACHING

## 2019-11-01 ASSESSMENT — PAIN DESCRIPTION - PAIN TYPE: TYPE: CHRONIC PAIN

## 2019-11-01 ASSESSMENT — PAIN DESCRIPTION - ONSET: ONSET: ON-GOING

## 2019-11-01 ASSESSMENT — PAIN DESCRIPTION - LOCATION: LOCATION: BACK

## 2019-11-01 ASSESSMENT — PAIN DESCRIPTION - FREQUENCY: FREQUENCY: CONTINUOUS

## 2019-11-01 ASSESSMENT — PAIN DESCRIPTION - ORIENTATION: ORIENTATION: LOWER

## 2019-11-01 ASSESSMENT — PAIN DESCRIPTION - PROGRESSION: CLINICAL_PROGRESSION: NOT CHANGED

## 2019-11-02 PROBLEM — Z95.2 S/P TAVR (TRANSCATHETER AORTIC VALVE REPLACEMENT): Status: ACTIVE | Noted: 2019-11-02

## 2019-11-02 PROBLEM — I48.0 PAROXYSMAL ATRIAL FIBRILLATION (HCC): Status: ACTIVE | Noted: 2019-11-02

## 2019-11-02 LAB
ANION GAP SERPL CALCULATED.3IONS-SCNC: 12 MMOL/L (ref 3–16)
BUN BLDV-MCNC: 16 MG/DL (ref 7–20)
CALCIUM SERPL-MCNC: 8.9 MG/DL (ref 8.3–10.6)
CHLORIDE BLD-SCNC: 97 MMOL/L (ref 99–110)
CO2: 24 MMOL/L (ref 21–32)
CREAT SERPL-MCNC: 0.9 MG/DL (ref 0.6–1.2)
DIGOXIN LEVEL: 2.6 NG/ML (ref 0.8–2)
DIGOXIN LEVEL: 3 NG/ML (ref 0.8–2)
DIGOXIN LEVEL: 3.3 NG/ML (ref 0.8–2)
EKG ATRIAL RATE: 53 BPM
EKG DIAGNOSIS: NORMAL
EKG P AXIS: 45 DEGREES
EKG P-R INTERVAL: 182 MS
EKG Q-T INTERVAL: 394 MS
EKG QRS DURATION: 70 MS
EKG QTC CALCULATION (BAZETT): 369 MS
EKG R AXIS: -19 DEGREES
EKG T AXIS: -12 DEGREES
EKG VENTRICULAR RATE: 53 BPM
ESTIMATED AVERAGE GLUCOSE: 134.1 MG/DL
GFR AFRICAN AMERICAN: >60
GFR NON-AFRICAN AMERICAN: >60
GLUCOSE BLD-MCNC: 110 MG/DL (ref 70–99)
GLUCOSE BLD-MCNC: 110 MG/DL (ref 70–99)
GLUCOSE BLD-MCNC: 120 MG/DL (ref 70–99)
GLUCOSE BLD-MCNC: 140 MG/DL (ref 70–99)
GLUCOSE BLD-MCNC: 147 MG/DL (ref 70–99)
GLUCOSE BLD-MCNC: 152 MG/DL (ref 70–99)
HBA1C MFR BLD: 6.3 %
PERFORMED ON: ABNORMAL
POTASSIUM REFLEX MAGNESIUM: 4.2 MMOL/L (ref 3.5–5.1)
SODIUM BLD-SCNC: 133 MMOL/L (ref 136–145)

## 2019-11-02 PROCEDURE — 93005 ELECTROCARDIOGRAM TRACING: CPT | Performed by: INTERNAL MEDICINE

## 2019-11-02 PROCEDURE — 80162 ASSAY OF DIGOXIN TOTAL: CPT

## 2019-11-02 PROCEDURE — 6360000002 HC RX W HCPCS: Performed by: INTERNAL MEDICINE

## 2019-11-02 PROCEDURE — 2060000000 HC ICU INTERMEDIATE R&B

## 2019-11-02 PROCEDURE — 6370000000 HC RX 637 (ALT 250 FOR IP): Performed by: INTERNAL MEDICINE

## 2019-11-02 PROCEDURE — 36415 COLL VENOUS BLD VENIPUNCTURE: CPT

## 2019-11-02 PROCEDURE — 2580000003 HC RX 258: Performed by: INTERNAL MEDICINE

## 2019-11-02 PROCEDURE — 80048 BASIC METABOLIC PNL TOTAL CA: CPT

## 2019-11-02 PROCEDURE — 99223 1ST HOSP IP/OBS HIGH 75: CPT | Performed by: INTERNAL MEDICINE

## 2019-11-02 RX ORDER — LISINOPRIL 5 MG/1
5 TABLET ORAL ONCE
Status: COMPLETED | OUTPATIENT
Start: 2019-11-02 | End: 2019-11-02

## 2019-11-02 RX ORDER — HYDROCODONE BITARTRATE AND ACETAMINOPHEN 5; 325 MG/1; MG/1
1 TABLET ORAL EVERY 6 HOURS PRN
Status: DISCONTINUED | OUTPATIENT
Start: 2019-11-02 | End: 2019-11-05 | Stop reason: HOSPADM

## 2019-11-02 RX ORDER — HYDRALAZINE HYDROCHLORIDE 20 MG/ML
5 INJECTION INTRAMUSCULAR; INTRAVENOUS EVERY 6 HOURS PRN
Status: DISCONTINUED | OUTPATIENT
Start: 2019-11-02 | End: 2019-11-05 | Stop reason: HOSPADM

## 2019-11-02 RX ADMIN — ATORVASTATIN CALCIUM 40 MG: 40 TABLET, FILM COATED ORAL at 09:13

## 2019-11-02 RX ADMIN — LISINOPRIL 5 MG: 10 TABLET ORAL at 09:14

## 2019-11-02 RX ADMIN — FUROSEMIDE 20 MG: 40 TABLET ORAL at 09:13

## 2019-11-02 RX ADMIN — HYDROCODONE BITARTRATE AND ACETAMINOPHEN 1 TABLET: 5; 325 TABLET ORAL at 00:16

## 2019-11-02 RX ADMIN — PANTOPRAZOLE SODIUM 20 MG: 20 TABLET, DELAYED RELEASE ORAL at 09:13

## 2019-11-02 RX ADMIN — ASPIRIN 81 MG: 81 TABLET, COATED ORAL at 09:13

## 2019-11-02 RX ADMIN — Medication 10 ML: at 09:14

## 2019-11-02 RX ADMIN — FERROUS SULFATE TAB 325 MG (65 MG ELEMENTAL FE) 325 MG: 325 (65 FE) TAB at 09:13

## 2019-11-02 RX ADMIN — Medication 10 ML: at 21:40

## 2019-11-02 RX ADMIN — HYDRALAZINE HYDROCHLORIDE 5 MG: 20 INJECTION INTRAMUSCULAR; INTRAVENOUS at 21:41

## 2019-11-02 RX ADMIN — RIVAROXABAN 15 MG: 15 TABLET, FILM COATED ORAL at 17:20

## 2019-11-02 RX ADMIN — LISINOPRIL 5 MG: 5 TABLET ORAL at 17:20

## 2019-11-02 RX ADMIN — LEVOTHYROXINE SODIUM 175 MCG: 175 TABLET ORAL at 05:49

## 2019-11-02 RX ADMIN — Medication 5 MG: at 21:38

## 2019-11-02 ASSESSMENT — PAIN DESCRIPTION - ONSET
ONSET: ON-GOING
ONSET: ON-GOING

## 2019-11-02 ASSESSMENT — PAIN DESCRIPTION - ORIENTATION
ORIENTATION: LOWER
ORIENTATION: LOWER

## 2019-11-02 ASSESSMENT — PAIN DESCRIPTION - PAIN TYPE
TYPE: CHRONIC PAIN
TYPE: CHRONIC PAIN

## 2019-11-02 ASSESSMENT — PAIN DESCRIPTION - LOCATION
LOCATION: BACK
LOCATION: BACK

## 2019-11-02 ASSESSMENT — PAIN DESCRIPTION - DESCRIPTORS
DESCRIPTORS: ACHING
DESCRIPTORS: ACHING

## 2019-11-02 ASSESSMENT — PAIN SCALES - GENERAL
PAINLEVEL_OUTOF10: 0
PAINLEVEL_OUTOF10: 3
PAINLEVEL_OUTOF10: 0
PAINLEVEL_OUTOF10: 4

## 2019-11-02 ASSESSMENT — PAIN DESCRIPTION - PROGRESSION
CLINICAL_PROGRESSION: RESOLVED
CLINICAL_PROGRESSION: NOT CHANGED

## 2019-11-02 ASSESSMENT — PAIN DESCRIPTION - FREQUENCY
FREQUENCY: CONTINUOUS
FREQUENCY: CONTINUOUS

## 2019-11-03 LAB
ALBUMIN SERPL-MCNC: 3.7 G/DL (ref 3.4–5)
ANION GAP SERPL CALCULATED.3IONS-SCNC: 15 MMOL/L (ref 3–16)
BUN BLDV-MCNC: 13 MG/DL (ref 7–20)
CALCIUM SERPL-MCNC: 9.6 MG/DL (ref 8.3–10.6)
CHLORIDE BLD-SCNC: 93 MMOL/L (ref 99–110)
CO2: 25 MMOL/L (ref 21–32)
CREAT SERPL-MCNC: 0.9 MG/DL (ref 0.6–1.2)
DIGOXIN LEVEL: 2.1 NG/ML (ref 0.8–2)
DIGOXIN LEVEL: 2.5 NG/ML (ref 0.8–2)
DIGOXIN LEVEL: 2.9 NG/ML (ref 0.8–2)
EKG ATRIAL RATE: 114 BPM
EKG ATRIAL RATE: 74 BPM
EKG DIAGNOSIS: NORMAL
EKG DIAGNOSIS: NORMAL
EKG P AXIS: 50 DEGREES
EKG P AXIS: 66 DEGREES
EKG P-R INTERVAL: 176 MS
EKG P-R INTERVAL: 180 MS
EKG Q-T INTERVAL: 312 MS
EKG Q-T INTERVAL: 354 MS
EKG QRS DURATION: 162 MS
EKG QRS DURATION: 72 MS
EKG QTC CALCULATION (BAZETT): 392 MS
EKG QTC CALCULATION (BAZETT): 430 MS
EKG R AXIS: -17 DEGREES
EKG R AXIS: -28 DEGREES
EKG T AXIS: 11 DEGREES
EKG T AXIS: 49 DEGREES
EKG VENTRICULAR RATE: 114 BPM
EKG VENTRICULAR RATE: 74 BPM
GFR AFRICAN AMERICAN: >60
GFR NON-AFRICAN AMERICAN: >60
GLUCOSE BLD-MCNC: 130 MG/DL (ref 70–99)
GLUCOSE BLD-MCNC: 141 MG/DL (ref 70–99)
GLUCOSE BLD-MCNC: 144 MG/DL (ref 70–99)
GLUCOSE BLD-MCNC: 146 MG/DL (ref 70–99)
GLUCOSE BLD-MCNC: 155 MG/DL (ref 70–99)
GLUCOSE BLD-MCNC: 182 MG/DL (ref 70–99)
GLUCOSE BLD-MCNC: 187 MG/DL (ref 70–99)
MAGNESIUM: 1.2 MG/DL (ref 1.8–2.4)
MAGNESIUM: 2.8 MG/DL (ref 1.8–2.4)
PERFORMED ON: ABNORMAL
PHOSPHORUS: 3.6 MG/DL (ref 2.5–4.9)
POTASSIUM SERPL-SCNC: 3.6 MMOL/L (ref 3.5–5.1)
REASON FOR REJECTION: NORMAL
REJECTED TEST: NORMAL
SODIUM BLD-SCNC: 133 MMOL/L (ref 136–145)
TSH REFLEX: 1.28 UIU/ML (ref 0.27–4.2)

## 2019-11-03 PROCEDURE — 94761 N-INVAS EAR/PLS OXIMETRY MLT: CPT

## 2019-11-03 PROCEDURE — 36415 COLL VENOUS BLD VENIPUNCTURE: CPT

## 2019-11-03 PROCEDURE — 2700000000 HC OXYGEN THERAPY PER DAY

## 2019-11-03 PROCEDURE — 2580000003 HC RX 258: Performed by: INTERNAL MEDICINE

## 2019-11-03 PROCEDURE — 6370000000 HC RX 637 (ALT 250 FOR IP): Performed by: INTERNAL MEDICINE

## 2019-11-03 PROCEDURE — 80069 RENAL FUNCTION PANEL: CPT

## 2019-11-03 PROCEDURE — 93010 ELECTROCARDIOGRAM REPORT: CPT | Performed by: INTERNAL MEDICINE

## 2019-11-03 PROCEDURE — 6360000002 HC RX W HCPCS: Performed by: INTERNAL MEDICINE

## 2019-11-03 PROCEDURE — 80162 ASSAY OF DIGOXIN TOTAL: CPT

## 2019-11-03 PROCEDURE — 83735 ASSAY OF MAGNESIUM: CPT

## 2019-11-03 PROCEDURE — 99233 SBSQ HOSP IP/OBS HIGH 50: CPT | Performed by: INTERNAL MEDICINE

## 2019-11-03 PROCEDURE — 2060000000 HC ICU INTERMEDIATE R&B

## 2019-11-03 PROCEDURE — 93005 ELECTROCARDIOGRAM TRACING: CPT | Performed by: INTERNAL MEDICINE

## 2019-11-03 PROCEDURE — 84443 ASSAY THYROID STIM HORMONE: CPT

## 2019-11-03 PROCEDURE — 2500000003 HC RX 250 WO HCPCS: Performed by: INTERNAL MEDICINE

## 2019-11-03 RX ORDER — DILTIAZEM HYDROCHLORIDE 5 MG/ML
10 INJECTION INTRAVENOUS ONCE
Status: COMPLETED | OUTPATIENT
Start: 2019-11-03 | End: 2019-11-03

## 2019-11-03 RX ORDER — POTASSIUM CHLORIDE 20 MEQ/1
40 TABLET, EXTENDED RELEASE ORAL ONCE
Status: COMPLETED | OUTPATIENT
Start: 2019-11-03 | End: 2019-11-03

## 2019-11-03 RX ORDER — LORAZEPAM 0.5 MG/1
0.25 TABLET ORAL ONCE
Status: COMPLETED | OUTPATIENT
Start: 2019-11-03 | End: 2019-11-03

## 2019-11-03 RX ORDER — DILTIAZEM HYDROCHLORIDE 180 MG/1
180 CAPSULE, COATED, EXTENDED RELEASE ORAL DAILY
Status: DISCONTINUED | OUTPATIENT
Start: 2019-11-03 | End: 2019-11-05 | Stop reason: HOSPADM

## 2019-11-03 RX ORDER — LORAZEPAM 0.5 MG/1
0.25 TABLET ORAL
Status: COMPLETED | OUTPATIENT
Start: 2019-11-03 | End: 2019-11-03

## 2019-11-03 RX ORDER — MAGNESIUM SULFATE IN WATER 40 MG/ML
4 INJECTION, SOLUTION INTRAVENOUS ONCE
Status: COMPLETED | OUTPATIENT
Start: 2019-11-03 | End: 2019-11-03

## 2019-11-03 RX ADMIN — Medication 10 ML: at 20:55

## 2019-11-03 RX ADMIN — ASPIRIN 81 MG: 81 TABLET, COATED ORAL at 08:53

## 2019-11-03 RX ADMIN — PANTOPRAZOLE SODIUM 20 MG: 20 TABLET, DELAYED RELEASE ORAL at 07:10

## 2019-11-03 RX ADMIN — Medication 5 MG: at 20:54

## 2019-11-03 RX ADMIN — INSULIN LISPRO 1 UNITS: 100 INJECTION, SOLUTION INTRAVENOUS; SUBCUTANEOUS at 20:54

## 2019-11-03 RX ADMIN — LORAZEPAM 0.25 MG: 0.5 TABLET ORAL at 01:34

## 2019-11-03 RX ADMIN — LEVOTHYROXINE SODIUM 175 MCG: 175 TABLET ORAL at 07:13

## 2019-11-03 RX ADMIN — POTASSIUM CHLORIDE 40 MEQ: 20 TABLET, EXTENDED RELEASE ORAL at 10:32

## 2019-11-03 RX ADMIN — Medication 10 ML: at 10:39

## 2019-11-03 RX ADMIN — INSULIN LISPRO 1 UNITS: 100 INJECTION, SOLUTION INTRAVENOUS; SUBCUTANEOUS at 18:40

## 2019-11-03 RX ADMIN — ATORVASTATIN CALCIUM 40 MG: 40 TABLET, FILM COATED ORAL at 08:53

## 2019-11-03 RX ADMIN — LISINOPRIL 5 MG: 10 TABLET ORAL at 08:54

## 2019-11-03 RX ADMIN — RIVAROXABAN 15 MG: 15 TABLET, FILM COATED ORAL at 18:38

## 2019-11-03 RX ADMIN — DILTIAZEM HYDROCHLORIDE 10 MG: 5 INJECTION INTRAVENOUS at 11:56

## 2019-11-03 RX ADMIN — DILTIAZEM HYDROCHLORIDE 30 MG: 30 TABLET, FILM COATED ORAL at 19:22

## 2019-11-03 RX ADMIN — MAGNESIUM SULFATE HEPTAHYDRATE 4 G: 40 INJECTION, SOLUTION INTRAVENOUS at 10:32

## 2019-11-03 RX ADMIN — HYDRALAZINE HYDROCHLORIDE 5 MG: 20 INJECTION INTRAMUSCULAR; INTRAVENOUS at 03:49

## 2019-11-03 RX ADMIN — FUROSEMIDE 20 MG: 40 TABLET ORAL at 08:53

## 2019-11-03 RX ADMIN — ONDANSETRON 4 MG: 2 INJECTION INTRAMUSCULAR; INTRAVENOUS at 07:30

## 2019-11-03 RX ADMIN — DILTIAZEM HYDROCHLORIDE 5 MG/HR: 5 INJECTION INTRAVENOUS at 11:59

## 2019-11-03 RX ADMIN — LORAZEPAM 0.25 MG: 0.5 TABLET ORAL at 10:32

## 2019-11-03 RX ADMIN — FERROUS SULFATE TAB 325 MG (65 MG ELEMENTAL FE) 325 MG: 325 (65 FE) TAB at 08:54

## 2019-11-03 RX ADMIN — DILTIAZEM HYDROCHLORIDE 180 MG: 180 CAPSULE, COATED, EXTENDED RELEASE ORAL at 08:55

## 2019-11-03 ASSESSMENT — PAIN SCALES - GENERAL
PAINLEVEL_OUTOF10: 0

## 2019-11-04 LAB
ANION GAP SERPL CALCULATED.3IONS-SCNC: 14 MMOL/L (ref 3–16)
BUN BLDV-MCNC: 19 MG/DL (ref 7–20)
CALCIUM SERPL-MCNC: 9 MG/DL (ref 8.3–10.6)
CHLORIDE BLD-SCNC: 94 MMOL/L (ref 99–110)
CO2: 24 MMOL/L (ref 21–32)
CREAT SERPL-MCNC: 1.5 MG/DL (ref 0.6–1.2)
DIGOXIN LEVEL: 2.5 NG/ML (ref 0.8–2)
DIGOXIN LEVEL: 2.7 NG/ML (ref 0.8–2)
DIGOXIN LEVEL: 3.1 NG/ML (ref 0.8–2)
EKG ATRIAL RATE: 53 BPM
EKG DIAGNOSIS: NORMAL
EKG P AXIS: 45 DEGREES
EKG P-R INTERVAL: 182 MS
EKG Q-T INTERVAL: 394 MS
EKG QRS DURATION: 70 MS
EKG QTC CALCULATION (BAZETT): 369 MS
EKG R AXIS: -19 DEGREES
EKG T AXIS: -12 DEGREES
EKG VENTRICULAR RATE: 53 BPM
GFR AFRICAN AMERICAN: 40
GFR NON-AFRICAN AMERICAN: 33
GLUCOSE BLD-MCNC: 122 MG/DL (ref 70–99)
GLUCOSE BLD-MCNC: 131 MG/DL (ref 70–99)
GLUCOSE BLD-MCNC: 138 MG/DL (ref 70–99)
GLUCOSE BLD-MCNC: 142 MG/DL (ref 70–99)
GLUCOSE BLD-MCNC: 149 MG/DL (ref 70–99)
MAGNESIUM: 2.3 MG/DL (ref 1.8–2.4)
MAGNESIUM: 2.5 MG/DL (ref 1.8–2.4)
MAGNESIUM: 2.6 MG/DL (ref 1.8–2.4)
PERFORMED ON: ABNORMAL
POTASSIUM SERPL-SCNC: 4.7 MMOL/L (ref 3.5–5.1)
SODIUM BLD-SCNC: 132 MMOL/L (ref 136–145)

## 2019-11-04 PROCEDURE — 6370000000 HC RX 637 (ALT 250 FOR IP): Performed by: INTERNAL MEDICINE

## 2019-11-04 PROCEDURE — 80162 ASSAY OF DIGOXIN TOTAL: CPT

## 2019-11-04 PROCEDURE — 99232 SBSQ HOSP IP/OBS MODERATE 35: CPT | Performed by: INTERNAL MEDICINE

## 2019-11-04 PROCEDURE — 2580000003 HC RX 258: Performed by: INTERNAL MEDICINE

## 2019-11-04 PROCEDURE — 6360000002 HC RX W HCPCS: Performed by: INTERNAL MEDICINE

## 2019-11-04 PROCEDURE — 36415 COLL VENOUS BLD VENIPUNCTURE: CPT

## 2019-11-04 PROCEDURE — 94150 VITAL CAPACITY TEST: CPT

## 2019-11-04 PROCEDURE — 80048 BASIC METABOLIC PNL TOTAL CA: CPT

## 2019-11-04 PROCEDURE — 2060000000 HC ICU INTERMEDIATE R&B

## 2019-11-04 PROCEDURE — 83735 ASSAY OF MAGNESIUM: CPT

## 2019-11-04 RX ORDER — SODIUM CHLORIDE 9 MG/ML
INJECTION, SOLUTION INTRAVENOUS ONCE
Status: COMPLETED | OUTPATIENT
Start: 2019-11-04 | End: 2019-11-04

## 2019-11-04 RX ORDER — SODIUM CHLORIDE 9 MG/ML
INJECTION, SOLUTION INTRAVENOUS CONTINUOUS
Status: DISCONTINUED | OUTPATIENT
Start: 2019-11-04 | End: 2019-11-05 | Stop reason: HOSPADM

## 2019-11-04 RX ADMIN — SODIUM CHLORIDE: 9 INJECTION, SOLUTION INTRAVENOUS at 01:10

## 2019-11-04 RX ADMIN — ONDANSETRON 4 MG: 2 INJECTION INTRAMUSCULAR; INTRAVENOUS at 23:09

## 2019-11-04 RX ADMIN — ATORVASTATIN CALCIUM 40 MG: 40 TABLET, FILM COATED ORAL at 10:21

## 2019-11-04 RX ADMIN — PANTOPRAZOLE SODIUM 20 MG: 20 TABLET, DELAYED RELEASE ORAL at 10:21

## 2019-11-04 RX ADMIN — INSULIN LISPRO 1 UNITS: 100 INJECTION, SOLUTION INTRAVENOUS; SUBCUTANEOUS at 21:08

## 2019-11-04 RX ADMIN — ACETAMINOPHEN 650 MG: 325 TABLET ORAL at 10:22

## 2019-11-04 RX ADMIN — LEVOTHYROXINE SODIUM 175 MCG: 175 TABLET ORAL at 08:41

## 2019-11-04 RX ADMIN — DILTIAZEM HYDROCHLORIDE 180 MG: 180 CAPSULE, COATED, EXTENDED RELEASE ORAL at 10:21

## 2019-11-04 RX ADMIN — FERROUS SULFATE TAB 325 MG (65 MG ELEMENTAL FE) 325 MG: 325 (65 FE) TAB at 08:41

## 2019-11-04 RX ADMIN — HYDROCODONE BITARTRATE AND ACETAMINOPHEN 1 TABLET: 5; 325 TABLET ORAL at 14:02

## 2019-11-04 RX ADMIN — SODIUM CHLORIDE: 9 INJECTION, SOLUTION INTRAVENOUS at 10:20

## 2019-11-04 RX ADMIN — ASPIRIN 81 MG: 81 TABLET, COATED ORAL at 10:21

## 2019-11-04 RX ADMIN — Medication 5 MG: at 21:08

## 2019-11-04 RX ADMIN — INSULIN LISPRO 1 UNITS: 100 INJECTION, SOLUTION INTRAVENOUS; SUBCUTANEOUS at 17:30

## 2019-11-04 RX ADMIN — RIVAROXABAN 15 MG: 15 TABLET, FILM COATED ORAL at 17:21

## 2019-11-04 RX ADMIN — HYDROCODONE BITARTRATE AND ACETAMINOPHEN 1 TABLET: 5; 325 TABLET ORAL at 05:24

## 2019-11-04 RX ADMIN — ACETAMINOPHEN 650 MG: 325 TABLET ORAL at 04:40

## 2019-11-04 RX ADMIN — ONDANSETRON 4 MG: 2 INJECTION INTRAMUSCULAR; INTRAVENOUS at 17:21

## 2019-11-04 RX ADMIN — Medication 10 ML: at 10:29

## 2019-11-04 ASSESSMENT — PAIN DESCRIPTION - LOCATION: LOCATION: HEAD

## 2019-11-04 ASSESSMENT — PAIN SCALES - GENERAL
PAINLEVEL_OUTOF10: 8
PAINLEVEL_OUTOF10: 8
PAINLEVEL_OUTOF10: 0
PAINLEVEL_OUTOF10: 4
PAINLEVEL_OUTOF10: 0
PAINLEVEL_OUTOF10: 5

## 2019-11-04 ASSESSMENT — PAIN DESCRIPTION - DESCRIPTORS: DESCRIPTORS: ACHING;HEADACHE

## 2019-11-04 ASSESSMENT — PAIN DESCRIPTION - FREQUENCY: FREQUENCY: CONTINUOUS

## 2019-11-04 ASSESSMENT — PAIN DESCRIPTION - ONSET: ONSET: AWAKENED FROM SLEEP

## 2019-11-04 ASSESSMENT — PAIN DESCRIPTION - PAIN TYPE: TYPE: ACUTE PAIN

## 2019-11-05 VITALS
SYSTOLIC BLOOD PRESSURE: 142 MMHG | OXYGEN SATURATION: 92 % | HEIGHT: 63 IN | WEIGHT: 158.1 LBS | BODY MASS INDEX: 28.01 KG/M2 | TEMPERATURE: 98.2 F | RESPIRATION RATE: 18 BRPM | HEART RATE: 70 BPM | DIASTOLIC BLOOD PRESSURE: 57 MMHG

## 2019-11-05 PROBLEM — N17.9 AKI (ACUTE KIDNEY INJURY) (HCC): Status: ACTIVE | Noted: 2019-11-05

## 2019-11-05 LAB
ANION GAP SERPL CALCULATED.3IONS-SCNC: 13 MMOL/L (ref 3–16)
BUN BLDV-MCNC: 18 MG/DL (ref 7–20)
CALCIUM SERPL-MCNC: 9 MG/DL (ref 8.3–10.6)
CHLORIDE BLD-SCNC: 95 MMOL/L (ref 99–110)
CO2: 24 MMOL/L (ref 21–32)
CREAT SERPL-MCNC: 1.3 MG/DL (ref 0.6–1.2)
DIGOXIN LEVEL: 1.9 NG/ML (ref 0.8–2)
DIGOXIN LEVEL: 2.1 NG/ML (ref 0.8–2)
GFR AFRICAN AMERICAN: 48
GFR NON-AFRICAN AMERICAN: 39
GLUCOSE BLD-MCNC: 108 MG/DL (ref 70–99)
GLUCOSE BLD-MCNC: 114 MG/DL (ref 70–99)
MAGNESIUM: 2.1 MG/DL (ref 1.8–2.4)
MAGNESIUM: 2.1 MG/DL (ref 1.8–2.4)
PERFORMED ON: ABNORMAL
POTASSIUM SERPL-SCNC: 4.4 MMOL/L (ref 3.5–5.1)
SODIUM BLD-SCNC: 132 MMOL/L (ref 136–145)

## 2019-11-05 PROCEDURE — 99232 SBSQ HOSP IP/OBS MODERATE 35: CPT | Performed by: NURSE PRACTITIONER

## 2019-11-05 PROCEDURE — 6370000000 HC RX 637 (ALT 250 FOR IP): Performed by: INTERNAL MEDICINE

## 2019-11-05 PROCEDURE — 97535 SELF CARE MNGMENT TRAINING: CPT

## 2019-11-05 PROCEDURE — 97165 OT EVAL LOW COMPLEX 30 MIN: CPT

## 2019-11-05 PROCEDURE — 2580000003 HC RX 258: Performed by: INTERNAL MEDICINE

## 2019-11-05 PROCEDURE — 83735 ASSAY OF MAGNESIUM: CPT

## 2019-11-05 PROCEDURE — 80048 BASIC METABOLIC PNL TOTAL CA: CPT

## 2019-11-05 PROCEDURE — 97530 THERAPEUTIC ACTIVITIES: CPT

## 2019-11-05 PROCEDURE — 36415 COLL VENOUS BLD VENIPUNCTURE: CPT

## 2019-11-05 PROCEDURE — 80162 ASSAY OF DIGOXIN TOTAL: CPT

## 2019-11-05 RX ORDER — METAXALONE 800 MG/1
800 TABLET ORAL 3 TIMES DAILY PRN
Status: DISCONTINUED | OUTPATIENT
Start: 2019-11-05 | End: 2019-11-05 | Stop reason: HOSPADM

## 2019-11-05 RX ORDER — AMLODIPINE BESYLATE 2.5 MG/1
2.5 TABLET ORAL DAILY
Qty: 30 TABLET | Refills: 0 | Status: SHIPPED | OUTPATIENT
Start: 2019-11-05 | End: 2019-11-14 | Stop reason: SDUPTHER

## 2019-11-05 RX ORDER — DILTIAZEM HYDROCHLORIDE 180 MG/1
180 CAPSULE, COATED, EXTENDED RELEASE ORAL DAILY
Qty: 30 CAPSULE | Refills: 0 | Status: SHIPPED | OUTPATIENT
Start: 2019-11-06 | End: 2019-11-26

## 2019-11-05 RX ADMIN — PANTOPRAZOLE SODIUM 20 MG: 20 TABLET, DELAYED RELEASE ORAL at 08:37

## 2019-11-05 RX ADMIN — SODIUM CHLORIDE: 9 INJECTION, SOLUTION INTRAVENOUS at 05:13

## 2019-11-05 RX ADMIN — DILTIAZEM HYDROCHLORIDE 180 MG: 180 CAPSULE, COATED, EXTENDED RELEASE ORAL at 08:37

## 2019-11-05 RX ADMIN — ASPIRIN 81 MG: 81 TABLET, COATED ORAL at 08:37

## 2019-11-05 RX ADMIN — METAXALONE 800 MG: 800 TABLET ORAL at 10:33

## 2019-11-05 RX ADMIN — HYDROCODONE BITARTRATE AND ACETAMINOPHEN 1 TABLET: 5; 325 TABLET ORAL at 08:38

## 2019-11-05 RX ADMIN — FERROUS SULFATE TAB 325 MG (65 MG ELEMENTAL FE) 325 MG: 325 (65 FE) TAB at 08:37

## 2019-11-05 RX ADMIN — ATORVASTATIN CALCIUM 40 MG: 40 TABLET, FILM COATED ORAL at 08:37

## 2019-11-05 RX ADMIN — LEVOTHYROXINE SODIUM 175 MCG: 175 TABLET ORAL at 05:19

## 2019-11-05 ASSESSMENT — PAIN DESCRIPTION - ONSET: ONSET: ON-GOING

## 2019-11-05 ASSESSMENT — PAIN DESCRIPTION - LOCATION: LOCATION: BACK;NECK

## 2019-11-05 ASSESSMENT — PAIN SCALES - GENERAL
PAINLEVEL_OUTOF10: 0
PAINLEVEL_OUTOF10: 0
PAINLEVEL_OUTOF10: 8
PAINLEVEL_OUTOF10: 4

## 2019-11-05 ASSESSMENT — PAIN DESCRIPTION - PAIN TYPE: TYPE: CHRONIC PAIN

## 2019-11-05 ASSESSMENT — PAIN DESCRIPTION - FREQUENCY: FREQUENCY: CONTINUOUS

## 2019-11-05 ASSESSMENT — PAIN - FUNCTIONAL ASSESSMENT: PAIN_FUNCTIONAL_ASSESSMENT: PREVENTS OR INTERFERES SOME ACTIVE ACTIVITIES AND ADLS

## 2019-11-05 ASSESSMENT — PAIN DESCRIPTION - DESCRIPTORS: DESCRIPTORS: ACHING;DISCOMFORT

## 2019-11-05 ASSESSMENT — PAIN DESCRIPTION - PROGRESSION: CLINICAL_PROGRESSION: NOT CHANGED

## 2019-11-14 ENCOUNTER — OFFICE VISIT (OUTPATIENT)
Dept: CARDIOLOGY CLINIC | Age: 79
End: 2019-11-14
Payer: MEDICARE

## 2019-11-14 VITALS
HEART RATE: 51 BPM | WEIGHT: 163.2 LBS | SYSTOLIC BLOOD PRESSURE: 132 MMHG | DIASTOLIC BLOOD PRESSURE: 56 MMHG | BODY MASS INDEX: 28.91 KG/M2 | OXYGEN SATURATION: 96 %

## 2019-11-14 DIAGNOSIS — I10 ESSENTIAL HYPERTENSION: ICD-10-CM

## 2019-11-14 DIAGNOSIS — Z95.2 S/P TAVR (TRANSCATHETER AORTIC VALVE REPLACEMENT): Primary | ICD-10-CM

## 2019-11-14 DIAGNOSIS — I48.0 PAF (PAROXYSMAL ATRIAL FIBRILLATION) (HCC): ICD-10-CM

## 2019-11-14 DIAGNOSIS — E78.00 HYPERCHOLESTEREMIA: ICD-10-CM

## 2019-11-14 PROCEDURE — 99214 OFFICE O/P EST MOD 30 MIN: CPT | Performed by: INTERNAL MEDICINE

## 2019-11-14 RX ORDER — FUROSEMIDE 20 MG/1
20 TABLET ORAL DAILY
Qty: 30 TABLET | Refills: 5 | Status: SHIPPED | OUTPATIENT
Start: 2019-11-14 | End: 2019-11-26

## 2019-11-14 RX ORDER — AMLODIPINE BESYLATE 5 MG/1
5 TABLET ORAL DAILY
Qty: 60 TABLET | Refills: 3 | Status: SHIPPED | OUTPATIENT
Start: 2019-11-14 | End: 2019-11-22 | Stop reason: SINTOL

## 2019-11-14 RX ORDER — CEPHALEXIN 500 MG/1
500 CAPSULE ORAL 3 TIMES DAILY
COMMUNITY
End: 2019-11-22

## 2019-11-22 ENCOUNTER — OFFICE VISIT (OUTPATIENT)
Dept: CARDIOLOGY CLINIC | Age: 79
End: 2019-11-22
Payer: MEDICARE

## 2019-11-22 ENCOUNTER — HOSPITAL ENCOUNTER (OUTPATIENT)
Dept: CARDIOLOGY | Age: 79
Discharge: HOME | End: 2019-11-22
Payer: MEDICARE

## 2019-11-22 ENCOUNTER — TELEPHONE (OUTPATIENT)
Dept: CARDIOLOGY CLINIC | Age: 79
End: 2019-11-22

## 2019-11-22 VITALS
BODY MASS INDEX: 28.88 KG/M2 | WEIGHT: 163 LBS | HEART RATE: 73 BPM | HEIGHT: 63 IN | SYSTOLIC BLOOD PRESSURE: 116 MMHG | DIASTOLIC BLOOD PRESSURE: 58 MMHG | OXYGEN SATURATION: 96 %

## 2019-11-22 DIAGNOSIS — I35.0 NONRHEUMATIC AORTIC VALVE STENOSIS: Primary | ICD-10-CM

## 2019-11-22 DIAGNOSIS — Z95.2 S/P TAVR (TRANSCATHETER AORTIC VALVE REPLACEMENT): ICD-10-CM

## 2019-11-22 DIAGNOSIS — I48.0 PAF (PAROXYSMAL ATRIAL FIBRILLATION) (HCC): ICD-10-CM

## 2019-11-22 DIAGNOSIS — I10 ESSENTIAL HYPERTENSION: ICD-10-CM

## 2019-11-22 DIAGNOSIS — E78.00 HYPERCHOLESTEREMIA: ICD-10-CM

## 2019-11-22 LAB
LV EF: 63 %
LVEF MODALITY: NORMAL

## 2019-11-22 PROCEDURE — 99214 OFFICE O/P EST MOD 30 MIN: CPT | Performed by: INTERNAL MEDICINE

## 2019-11-22 PROCEDURE — 93306 TTE W/DOPPLER COMPLETE: CPT

## 2019-11-22 RX ORDER — ISOSORBIDE MONONITRATE 60 MG/1
60 TABLET, EXTENDED RELEASE ORAL DAILY
Qty: 30 TABLET | Refills: 3 | Status: SHIPPED | OUTPATIENT
Start: 2019-11-22 | End: 2019-11-26 | Stop reason: ALTCHOICE

## 2019-11-26 ENCOUNTER — TELEPHONE (OUTPATIENT)
Dept: CARDIOLOGY CLINIC | Age: 79
End: 2019-11-26

## 2019-11-26 RX ORDER — DILTIAZEM HYDROCHLORIDE 240 MG/1
240 CAPSULE, COATED, EXTENDED RELEASE ORAL DAILY
Qty: 90 CAPSULE | Refills: 3 | Status: SHIPPED | OUTPATIENT
Start: 2019-11-26 | End: 2020-01-02

## 2019-11-26 RX ORDER — FUROSEMIDE 20 MG/1
40 TABLET ORAL DAILY
Qty: 60 TABLET | Refills: 5 | Status: SHIPPED | OUTPATIENT
Start: 2019-11-26 | End: 2020-01-10 | Stop reason: SDUPTHER

## 2019-11-27 ENCOUNTER — HOSPITAL ENCOUNTER (OUTPATIENT)
Dept: CARDIAC REHAB | Age: 79
Setting detail: THERAPIES SERIES
Discharge: HOME | End: 2019-11-27
Payer: MEDICARE

## 2019-12-04 ENCOUNTER — HOSPITAL ENCOUNTER (OUTPATIENT)
Dept: CARDIAC REHAB | Age: 79
Setting detail: THERAPIES SERIES
Discharge: HOME | End: 2019-12-04
Payer: MEDICARE

## 2019-12-04 PROCEDURE — 93798 PHYS/QHP OP CAR RHAB W/ECG: CPT

## 2019-12-06 ENCOUNTER — HOSPITAL ENCOUNTER (OUTPATIENT)
Dept: CARDIAC REHAB | Age: 79
Setting detail: THERAPIES SERIES
Discharge: HOME | End: 2019-12-06
Payer: MEDICARE

## 2019-12-06 PROCEDURE — 93798 PHYS/QHP OP CAR RHAB W/ECG: CPT

## 2019-12-09 ENCOUNTER — HOSPITAL ENCOUNTER (OUTPATIENT)
Dept: CARDIAC REHAB | Age: 79
Setting detail: THERAPIES SERIES
Discharge: HOME | End: 2019-12-09
Payer: MEDICARE

## 2019-12-09 PROCEDURE — 93798 PHYS/QHP OP CAR RHAB W/ECG: CPT

## 2019-12-11 ENCOUNTER — HOSPITAL ENCOUNTER (OUTPATIENT)
Dept: CARDIAC REHAB | Age: 79
Setting detail: THERAPIES SERIES
Discharge: HOME | End: 2019-12-11
Payer: MEDICARE

## 2019-12-11 PROCEDURE — 93798 PHYS/QHP OP CAR RHAB W/ECG: CPT

## 2019-12-13 ENCOUNTER — HOSPITAL ENCOUNTER (OUTPATIENT)
Dept: CARDIAC REHAB | Age: 79
Setting detail: THERAPIES SERIES
Discharge: HOME | End: 2019-12-13
Payer: MEDICARE

## 2019-12-13 PROCEDURE — 93798 PHYS/QHP OP CAR RHAB W/ECG: CPT

## 2019-12-16 ENCOUNTER — HOSPITAL ENCOUNTER (OUTPATIENT)
Dept: CARDIAC REHAB | Age: 79
Setting detail: THERAPIES SERIES
Discharge: HOME | End: 2019-12-16
Payer: MEDICARE

## 2019-12-16 PROCEDURE — 93798 PHYS/QHP OP CAR RHAB W/ECG: CPT

## 2019-12-18 ENCOUNTER — HOSPITAL ENCOUNTER (OUTPATIENT)
Dept: CARDIAC REHAB | Age: 79
Setting detail: THERAPIES SERIES
Discharge: HOME | End: 2019-12-18
Payer: MEDICARE

## 2019-12-18 PROCEDURE — 93798 PHYS/QHP OP CAR RHAB W/ECG: CPT

## 2019-12-20 ENCOUNTER — HOSPITAL ENCOUNTER (OUTPATIENT)
Dept: CARDIAC REHAB | Age: 79
Setting detail: THERAPIES SERIES
Discharge: HOME | End: 2019-12-20
Payer: MEDICARE

## 2019-12-20 PROCEDURE — 93798 PHYS/QHP OP CAR RHAB W/ECG: CPT

## 2019-12-23 ENCOUNTER — HOSPITAL ENCOUNTER (OUTPATIENT)
Dept: CARDIAC REHAB | Age: 79
Setting detail: THERAPIES SERIES
Discharge: HOME | End: 2019-12-23
Payer: MEDICARE

## 2019-12-23 PROCEDURE — 93798 PHYS/QHP OP CAR RHAB W/ECG: CPT

## 2019-12-27 ENCOUNTER — HOSPITAL ENCOUNTER (OUTPATIENT)
Dept: CARDIAC REHAB | Age: 79
Setting detail: THERAPIES SERIES
Discharge: HOME | End: 2019-12-27
Payer: MEDICARE

## 2019-12-27 PROCEDURE — 93798 PHYS/QHP OP CAR RHAB W/ECG: CPT

## 2019-12-30 DIAGNOSIS — R30.0 DYSURIA: Primary | ICD-10-CM

## 2019-12-30 LAB
BUN BLDV-MCNC: 13 MG/DL
CALCIUM SERPL-MCNC: 28 MG/DL
CHLORIDE BLD-SCNC: 97 MMOL/L
CO2: 28 MMOL/L
CREAT SERPL-MCNC: 1.16 MG/DL
GFR CALCULATED: 45
GLUCOSE BLD-MCNC: 139 MG/DL
HCT VFR BLD CALC: 3.1 % (ref 36–46)
HEMOGLOBIN: 9.2 G/DL (ref 12–16)
PLATELET # BLD: 283 K/ΜL
POTASSIUM SERPL-SCNC: 3.7 MMOL/L
SODIUM BLD-SCNC: 141 MMOL/L
WBC # BLD: 5.8 10^3/ML

## 2019-12-31 ENCOUNTER — TELEPHONE (OUTPATIENT)
Dept: CARDIOLOGY CLINIC | Age: 79
End: 2019-12-31

## 2019-12-31 DIAGNOSIS — I48.0 PAF (PAROXYSMAL ATRIAL FIBRILLATION) (HCC): ICD-10-CM

## 2019-12-31 PROCEDURE — 93228 REMOTE 30 DAY ECG REV/REPORT: CPT | Performed by: INTERNAL MEDICINE

## 2020-01-02 ENCOUNTER — OFFICE VISIT (OUTPATIENT)
Dept: CARDIOLOGY CLINIC | Age: 80
End: 2020-01-02
Payer: MEDICARE

## 2020-01-02 VITALS
OXYGEN SATURATION: 100 % | DIASTOLIC BLOOD PRESSURE: 54 MMHG | HEART RATE: 63 BPM | BODY MASS INDEX: 30.37 KG/M2 | HEIGHT: 63 IN | SYSTOLIC BLOOD PRESSURE: 124 MMHG | WEIGHT: 171.4 LBS

## 2020-01-02 PROCEDURE — 1090F PRES/ABSN URINE INCON ASSESS: CPT | Performed by: NURSE PRACTITIONER

## 2020-01-02 PROCEDURE — 99214 OFFICE O/P EST MOD 30 MIN: CPT | Performed by: NURSE PRACTITIONER

## 2020-01-02 PROCEDURE — 4040F PNEUMOC VAC/ADMIN/RCVD: CPT | Performed by: NURSE PRACTITIONER

## 2020-01-02 PROCEDURE — G8417 CALC BMI ABV UP PARAM F/U: HCPCS | Performed by: NURSE PRACTITIONER

## 2020-01-02 PROCEDURE — 1123F ACP DISCUSS/DSCN MKR DOCD: CPT | Performed by: NURSE PRACTITIONER

## 2020-01-02 PROCEDURE — G8399 PT W/DXA RESULTS DOCUMENT: HCPCS | Performed by: NURSE PRACTITIONER

## 2020-01-02 PROCEDURE — G8427 DOCREV CUR MEDS BY ELIG CLIN: HCPCS | Performed by: NURSE PRACTITIONER

## 2020-01-02 PROCEDURE — 1036F TOBACCO NON-USER: CPT | Performed by: NURSE PRACTITIONER

## 2020-01-02 PROCEDURE — G8482 FLU IMMUNIZE ORDER/ADMIN: HCPCS | Performed by: NURSE PRACTITIONER

## 2020-01-02 RX ORDER — DILTIAZEM HYDROCHLORIDE 180 MG/1
180 CAPSULE, COATED, EXTENDED RELEASE ORAL DAILY
Qty: 30 CAPSULE | Refills: 3 | Status: SHIPPED | OUTPATIENT
Start: 2020-01-02 | End: 2020-01-21 | Stop reason: SDUPTHER

## 2020-01-02 RX ORDER — METOPROLOL SUCCINATE 50 MG/1
50 TABLET, EXTENDED RELEASE ORAL DAILY
Qty: 90 TABLET | Refills: 1 | Status: SHIPPED | OUTPATIENT
Start: 2020-01-02 | End: 2020-01-21 | Stop reason: SDUPTHER

## 2020-01-02 NOTE — LETTER
· Exhibits normal gait balance and coordination  · No abnormalities of mood, affect, memory, mentation, or behavior are noted    Lab Data:  Most recent lab results below reviewed in office    CBC:   Lab Results   Component Value Date    WBC 6.1 11/01/2019    WBC 9.2 10/16/2019    WBC 6.2 10/10/2019    RBC 3.64 11/01/2019    RBC 3.13 10/16/2019    RBC 3.75 10/10/2019    HGB 11.2 11/01/2019    HGB 9.6 10/16/2019    HGB 10.7 10/15/2019    HCT 34.7 11/01/2019    HCT 29.3 10/16/2019    HCT 35.2 10/10/2019    MCV 95.5 11/01/2019    MCV 93.6 10/16/2019    MCV 94.0 10/10/2019    RDW 15.1 11/01/2019    RDW 15.9 10/16/2019    RDW 16.1 10/10/2019     11/01/2019     10/16/2019     10/10/2019     BMP:  Lab Results   Component Value Date     11/05/2019     11/04/2019     11/03/2019    K 4.4 11/05/2019    K 4.7 11/04/2019    K 3.6 11/03/2019    K 4.2 11/02/2019    K 5.7 11/01/2019    K 4.2 10/16/2019    CL 95 11/05/2019    CL 94 11/04/2019    CL 93 11/03/2019    CO2 24 11/05/2019    CO2 24 11/04/2019    CO2 25 11/03/2019    PHOS 3.6 11/03/2019    BUN 18 11/05/2019    BUN 19 11/04/2019    BUN 13 11/03/2019    CREATININE 1.3 11/05/2019    CREATININE 1.5 11/04/2019    CREATININE 0.9 11/03/2019     BNP:   Lab Results   Component Value Date    PROBNP 3,578 10/28/2019    PROBNP 3,992 10/15/2019     LIPID:   Lab Results   Component Value Date    TRIG 52 09/25/2019     09/25/2019    LDLCALC 101 09/25/2019       Cardiac Imaging:  TTE 11/22/19   Atrial fibrillation throughout the study. Left ventricular systolic function is normal with ejection fraction estimated at 60-65 %. No regional wall motion abnormalities are noted. There is mild concentric left ventricular hypertrophy. Left ventricle size is normal.   Grade II diastolic dysfunction with elevated filling pressure. The left atrium is moderately dilated.    A TAVR aortic valve appears well seated with a maximum velocity of 2.94m/s and a mean gradient of 20mmHg. Trace catracho-valvular aortic valve regurgitation. Mitral annular calcification is present. Mitral valve leaflets appear moderately thickened. Mild mitral regurgitation. Moderate tricuspid regurgitation. Systolic pulmonary artery pressure (SPAP) is normal and estimated at 55 mmHg (right atrial pressure 3 mmHg). This is suggestive of moderate pulmonary hypertension. Mild 40-49 Mod 50-59 Severe >60. IVC is normal in size (< 2.1 cm) and collapses > 50% with respiration consistent with normal RA pressure (3 mmHg). Previous echo done 10/16/2019 - EF 65, mac lae      TTE 10/15/19:   Left ventricle - moderate concentric LVH, normal size and function with EF of 65%   Mitral valve - thickened and calcified, annular calcification, mitral area 1.5cm2, mean gradient 5mmHg, trivial regurgitation   Aortic valve - well seated TAVR valve with mean gradient of 14mmHg, no regurgitation   Tricuspid valve - mild regurgitation with PASP of 48mmHg   Pulmonic valve - mild regurgitation   Left atrium - dilated    TAVR   DATE OF PROCEDURE:  10/15/2019   PREOPERATIVE DIAGNOSIS:  Severe aortic valve stenosis. POSTOPERATIVE DIAGNOSIS:  Severe aortic valve stenosis. OPERATIONS PERFORMED:  Transcatheter replacement of aortic valve with 23 mm RIKKI 3 valve; placement of temporary transvenous pacemaker; peripheral angiography. CARDIAC CATH 9/25/19:   Procedures: Cor angio, sedation   Mild nonobstructive CAD  No LVG due to AS   OK for TAVR  ASA, statin    ECHO 6/12/19  Summary   Normal left ventricular systolic function with an estimated ejection   fraction of 55-60%. No regional wall motion abnormalities are seen. There is mild concentric left ventricular hypertrophy. Grade I diastolic dysfunction with normal filling pressure. The left atrium is mildly dilated. Heavy posterior mitral annular calcification is present. Mild anterior mitral annular calcification is present. Mild mitral regurgitation. Moderate to severe aortic stenosis is present. Moderate tricuspid regurgitation. Systolic pulmonary artery pressure (SPAP) estimated at 50 mmHg (RA pressure   3 mmHg), consistent with moderate pulmonary hypertension. Moderate pulmonic regurgitation present. ECHO 6/13/18  Summary   Normal LV EF with an estimated ejection fraction of 60%. No regional wall motion abnormalities are seen. Mild concentric left ventricular hypertrophy. Grade I diastolic dysfunction with normal filling pressure. The aortic valve is thickened/calcified with decreased leaflet mobility. Moderate aortic stenosis. Mild tricuspid and pulmonic regurgitation. Systolic pulmonary artery pressure (SPAP) is normal and estimated at 30mmHg   (RA pressure of 3 mmHg). Assessment:    1. Nonrheumatic aortic valve stenosis    2. S/P TAVR (transcatheter aortic valve replacement)    3. Essential hypertension    4. PAF (paroxysmal atrial fibrillation) ([address]    5. Hypercholesteremia          Plan:   1. Decrease the diltiazem (Cardizem) back to 180 mg (edema, HR fair control)  2. Stay on the furosemide (Lasix) 40 mg twice daily (still with edema, increased weight)  3. Add back metoprolol succinate 50 mg once daily (stopped November 2019, tolerated well previously)  4. Repeat blood work in 2 weeks   5. Keep appointment with Dr. Landa Bumpers in ~ 3 weeks      I appreciate the opportunity of cooperating in the care of this individual.    RABIA Alvarez - CNP, 1/2/2020, 11:24 AM       QUALITY MEASURES  1. Tobacco Cessation Counseling: NA  2. Retake of BP if >140/90:   NA  3. Documentation to PCP/referring for new patient:  Sent to PCP at close of office visit  4. CAD patient on anti-platelet: NA  5. CAD patient on STATIN therapy:  NA  6.  Patient with CHF and aFib on anticoagulation:  Yes

## 2020-01-02 NOTE — PROGRESS NOTES
has a past surgical history that includes Thyroidectomy, partial; Tonsillectomy; Tubal ligation; Knee arthroscopy (Left); other surgical history (10/09/2015); eye surgery (Left, 12/11/15); Upper gastrointestinal endoscopy (N/A, 8/13/2019); Colonoscopy (N/A, 8/13/2019); Esophagus dilation (8/13/2019); Cardiac catheterization (09/2019); and Aortic valve replacement (N/A, 10/15/2019). Social History:   reports that she quit smoking about 37 years ago. Her smoking use included cigarettes. She has a 20.00 pack-year smoking history. She has never used smokeless tobacco. She reports that she does not drink alcohol or use drugs. Family History:   Family History   Problem Relation Age of Onset    Colon Cancer Mother     Heart Disease Father     Heart Disease Brother        Home Medications:  Prior to Admission medications    Medication Sig Start Date End Date Taking?  Authorizing Provider   diltiazem (CARDIZEM CD) 240 MG extended release capsule Take 1 capsule by mouth daily 11/26/19  Yes Johana Flores MD   furosemide (LASIX) 20 MG tablet Take 2 tablets by mouth daily 11/26/19  Yes Johana Flores MD   rivaroxaban (XARELTO) 15 MG TABS tablet Take 1 tablet by mouth Daily with supper 11/26/19  Yes Johana Flores MD   pantoprazole (PROTONIX) 20 MG tablet Take 20 mg by mouth daily 10/28/19  Yes Historical Provider, MD   ferrous sulfate 325 (65 Fe) MG tablet Take 325 mg by mouth daily (with breakfast) 10/28/19  Yes Historical Provider, MD   DULoxetine (CYMBALTA) 20 MG extended release capsule Take 30 mg by mouth daily    Yes Historical Provider, MD   melatonin 5 MG TABS tablet Take 5 mg by mouth nightly   Yes Historical Provider, MD   aspirin EC 81 MG EC tablet Take 1 tablet by mouth daily 9/25/19  Yes Hunter Disla MD   HYDROcodone-acetaminophen (NORCO) 5-325 MG per tablet Take 1 tablet by mouth every 6 hours as needed for Pain   Yes Historical Provider, MD   metaxalone (SKELAXIN) 800 MG tablet Take 800 mg by mouth as needed    Yes Historical Provider, MD   Butalbital-Acetaminophen  MG TABS Take 1 tablet by mouth as needed    Yes Historical Provider, MD   atorvastatin (LIPITOR) 40 MG tablet Take 40 mg by mouth nightly    Yes Historical Provider, MD   levothyroxine (SYNTHROID) 175 MCG tablet Take 137 mcg by mouth daily    Yes Historical Provider, MD        Allergies: Iodides; Shellfish-derived products; and Sulfa antibiotics     Review of Systems:   Review of Systems   Constitutional: Positive for activity change, appetite change and fatigue. HENT: Positive for congestion. Eyes: Positive for visual disturbance. Respiratory: Positive for cough and shortness of breath. Cardiovascular: Positive for leg swelling. Negative for chest pain and palpitations. Musculoskeletal: Positive for arthralgias. Neurological: Positive for weakness. Negative for dizziness and syncope. Psychiatric/Behavioral: Positive for sleep disturbance. Physical Examination:    Vitals:    01/02/20 1112 01/02/20 1115   BP: (!) 124/50 (!) 124/54   Pulse: 63    SpO2: 100%    Weight: 171 lb 6.4 oz (77.7 kg)    Height: 5' 3\" (1.6 m)         Constitutional and General Appearance: Warm and dry, no apparent distress, normal coloration  HEENT:  Normocephalic, atraumatic  Respiratory:  · Normal excursion and expansion without use of accessory muscles  · Resp Auscultation: Bibasilar rales R>L  Cardiovascular:  · The apical impulses not displaced  · Heart tones are crisp and normal  · JVP 12 cm H2O, + HJR  · Irregular rate and rhythm, nromal U9Y9, + systolic murmur, no g/r  · Peripheral pulses are symmetrical and full  · There is no clubbing, cyanosis of the extremities.   · 2+ BLE edema  · Pedal Pulses: 2+ and equal   Abdomen:  · No masses or tenderness  · Liver/Spleen: No Abnormalities Noted  Neurological/Psychiatric:  · Alert and oriented in all spheres  · Moves all extremities well  · Exhibits normal gait balance and Trace catracho-valvular aortic valve regurgitation. Mitral annular calcification is present. Mitral valve leaflets appear moderately thickened. Mild mitral regurgitation. Moderate tricuspid regurgitation. Systolic pulmonary artery pressure (SPAP) is normal and estimated at 55 mmHg (right atrial pressure 3 mmHg). This is suggestive of moderate pulmonary hypertension. Mild 40-49 Mod 50-59 Severe >60. IVC is normal in size (< 2.1 cm) and collapses > 50% with respiration consistent with normal RA pressure (3 mmHg). Previous echo done 10/16/2019 - EF 65, mac lae      TTE 10/15/19:   Left ventricle - moderate concentric LVH, normal size and function with EF of 65%   Mitral valve - thickened and calcified, annular calcification, mitral area 1.5cm2, mean gradient 5mmHg, trivial regurgitation   Aortic valve - well seated TAVR valve with mean gradient of 14mmHg, no regurgitation   Tricuspid valve - mild regurgitation with PASP of 48mmHg   Pulmonic valve - mild regurgitation   Left atrium - dilated    TAVR   DATE OF PROCEDURE:  10/15/2019   PREOPERATIVE DIAGNOSIS:  Severe aortic valve stenosis. POSTOPERATIVE DIAGNOSIS:  Severe aortic valve stenosis. OPERATIONS PERFORMED:  Transcatheter replacement of aortic valve with 23 mm RIKKI 3 valve; placement of temporary transvenous pacemaker; peripheral angiography. CARDIAC CATH 9/25/19:   Procedures: Cor angio, sedation   Mild nonobstructive CAD  No LVG due to AS   OK for TAVR  ASA, statin    ECHO 6/12/19  Summary   Normal left ventricular systolic function with an estimated ejection   fraction of 55-60%. No regional wall motion abnormalities are seen. There is mild concentric left ventricular hypertrophy. Grade I diastolic dysfunction with normal filling pressure. The left atrium is mildly dilated. Heavy posterior mitral annular calcification is present. Mild anterior mitral annular calcification is present. Mild mitral regurgitation.    Moderate

## 2020-01-03 ENCOUNTER — TELEPHONE (OUTPATIENT)
Dept: CARDIOLOGY CLINIC | Age: 80
End: 2020-01-03

## 2020-01-03 RX ORDER — POTASSIUM CHLORIDE 20 MEQ/1
40 TABLET, EXTENDED RELEASE ORAL DAILY
Qty: 60 TABLET | Refills: 5 | Status: SHIPPED | OUTPATIENT
Start: 2020-01-03 | End: 2020-01-21 | Stop reason: SDUPTHER

## 2020-01-03 ASSESSMENT — ENCOUNTER SYMPTOMS
COUGH: 1
SHORTNESS OF BREATH: 1

## 2020-01-03 NOTE — TELEPHONE ENCOUNTER
Damon Peña, pts niece, called stating that pts BP today is low 104/58 increased to 114/70 after performing half of her rehab. States that she feels tired today, couldn't finish rehab. Discussed with Dr. Gabriella Perez. Instructed to decrease Metoprolol to 25mg daily. Also pt is to start KCL 40mEq daily (escribed to [address] [facility] on file). Damon Peña is to call if BP remains low and we will likely decrease the Cardizem to 120mg daily.  Maritza BLOUNT

## 2020-01-06 ENCOUNTER — HOSPITAL ENCOUNTER (OUTPATIENT)
Dept: CARDIAC REHAB | Age: 80
Setting detail: THERAPIES SERIES
Discharge: HOME | End: 2020-01-06
Payer: MEDICARE

## 2020-01-06 ENCOUNTER — TELEPHONE (OUTPATIENT)
Dept: CARDIOLOGY CLINIC | Age: 80
End: 2020-01-06

## 2020-01-06 PROCEDURE — 93798 PHYS/QHP OP CAR RHAB W/ECG: CPT

## 2020-01-08 ENCOUNTER — HOSPITAL ENCOUNTER (OUTPATIENT)
Dept: CARDIAC REHAB | Age: 80
Setting detail: THERAPIES SERIES
Discharge: HOME | End: 2020-01-08
Payer: MEDICARE

## 2020-01-08 PROCEDURE — 93798 PHYS/QHP OP CAR RHAB W/ECG: CPT

## 2020-01-10 ENCOUNTER — HOSPITAL ENCOUNTER (OUTPATIENT)
Dept: CARDIAC REHAB | Age: 80
Setting detail: THERAPIES SERIES
Discharge: HOME | End: 2020-01-10
Payer: MEDICARE

## 2020-01-10 PROCEDURE — 93798 PHYS/QHP OP CAR RHAB W/ECG: CPT

## 2020-01-10 RX ORDER — FUROSEMIDE 20 MG/1
40 TABLET ORAL 2 TIMES DAILY
Qty: 60 TABLET | Refills: 5 | Status: SHIPPED | OUTPATIENT
Start: 2020-01-10 | End: 2020-01-21 | Stop reason: SDUPTHER

## 2020-01-13 ENCOUNTER — HOSPITAL ENCOUNTER (OUTPATIENT)
Dept: CARDIAC REHAB | Age: 80
Setting detail: THERAPIES SERIES
Discharge: HOME | End: 2020-01-13
Payer: MEDICARE

## 2020-01-13 PROCEDURE — 93798 PHYS/QHP OP CAR RHAB W/ECG: CPT

## 2020-01-15 ENCOUNTER — HOSPITAL ENCOUNTER (OUTPATIENT)
Dept: CARDIAC REHAB | Age: 80
Setting detail: THERAPIES SERIES
Discharge: HOME | End: 2020-01-15
Payer: MEDICARE

## 2020-01-15 PROCEDURE — 93798 PHYS/QHP OP CAR RHAB W/ECG: CPT

## 2020-01-17 ENCOUNTER — HOSPITAL ENCOUNTER (OUTPATIENT)
Dept: CARDIAC REHAB | Age: 80
Setting detail: THERAPIES SERIES
Discharge: HOME | End: 2020-01-17
Payer: MEDICARE

## 2020-01-17 PROCEDURE — 93798 PHYS/QHP OP CAR RHAB W/ECG: CPT

## 2020-01-17 NOTE — PROGRESS NOTES
[address]     H+P // CONSULT // OUTPATIENT VISIT // Rafaela Sun     Referring Doctor Josué Bang MD   Encounter Type Followup     CHIEF COMPLAINT     Visit Type Chronic   Symptoms None   Problems AS s/p TAVR, pAFIB, HTN, CHOL     HISTORY OF PRESENT ILLNESS      GEN - Doing well. No new concerns.  AS - s/p TAVR and doing well. Denies cp, sob. Edema improved with med changes last visit.  AFIB/NSVT - episode after TAVR and noted on postop monitor - asymptomatic. Tolerating xarelto without bleeding. Saw EP today.  HTN - Ambulatory BP readings in good range. No HA or dizziness.  CHOL - Last cholesterol reviewed and in good range. Tolerating statin without side effects.  MED - Compliant with CV meds listed below without notable side effects. HISTORY/ALLERGIES/ROS     MedHx:  has a past medical history of Aortic stenosis, Arthritis, Diabetes mellitus ([facility] [address] Hyperlipidemia, Hypertension, Paroxysmal atrial fibrillation ([address] and Thyroid disease. SurgHx:  has a past surgical history that includes Thyroidectomy, partial; Tonsillectomy; Tubal ligation; Knee arthroscopy (Left); other surgical history (10/09/2015); eye surgery (Left, 12/11/15); Upper gastrointestinal endoscopy (N/A, 8/13/2019); Colonoscopy (N/A, 8/13/2019); Esophagus dilation (8/13/2019); Cardiac catheterization (09/2019); and Aortic valve replacement (N/A, 10/15/2019). SocHx:  reports that she quit smoking about 37 years ago. Her smoking use included cigarettes. She has a 20.00 pack-year smoking history. She has never used smokeless tobacco. She reports that she does not drink alcohol or use drugs. FamHx: family history includes Colon Cancer in her mother; Heart Disease in her brother and father. Allerg: Iodides;  Shellfish-derived products; and Sulfa antibiotics   ROS: [x]Full ROS obtained and negative except as mentioned in HPI     MEDICATIONS      Current Outpatient Medications   Medication Sig Dispense Refill    furosemide (LASIX) 20 MG tablet Take 2 tablets by mouth 2 times daily 60 tablet 5    potassium chloride (KLOR-CON M) 20 MEQ extended release tablet Take 2 tablets by mouth daily 60 tablet 5    diltiazem (CARDIZEM CD) 180 MG extended release capsule Take 1 capsule by mouth daily 30 capsule 3    metoprolol succinate (TOPROL XL) 50 MG extended release tablet Take 1 tablet by mouth daily 90 tablet 1    rivaroxaban (XARELTO) 15 MG TABS tablet Take 1 tablet by mouth Daily with supper 90 tablet 3    pantoprazole (PROTONIX) 20 MG tablet Take 20 mg by mouth daily      ferrous sulfate 325 (65 Fe) MG tablet Take 325 mg by mouth daily (with breakfast)      DULoxetine (CYMBALTA) 20 MG extended release capsule Take 30 mg by mouth daily       melatonin 5 MG TABS tablet Take 5 mg by mouth nightly      aspirin EC 81 MG EC tablet Take 1 tablet by mouth daily 90 tablet 1    HYDROcodone-acetaminophen (NORCO) 5-325 MG per tablet Take 1 tablet by mouth every 6 hours as needed for Pain      metaxalone (SKELAXIN) 800 MG tablet Take 800 mg by mouth as needed       Butalbital-Acetaminophen  MG TABS Take 1 tablet by mouth as needed       atorvastatin (LIPITOR) 40 MG tablet Take 40 mg by mouth nightly       levothyroxine (SYNTHROID) 175 MCG tablet Take 137 mcg by mouth daily        No current facility-administered medications for this visit.       Reviewed with patient and will remain unchanged except as mentioned in A/P  PHYSICAL EXAM     Vitals:    01/21/20 0905   BP: 138/70   Pulse: 64   SpO2: 94%      Gen Alert, coop, no distress Heart  Rrr, 1/6   Head NC, AT, no abnorm Abd  Soft, NT, +BS, no mass, no OM   Eyes PER, conj/corn clear Ext  Ext nl, AT, no C/C, mild edema   Nose Nares nl, no drain, NT Pulse 2+ and symmetric   Throat Lips, mucosa, tongue nl Skin Col/text/turg nl, no vis rash/les   Neck S/S, TM, NT, no bruit/JVD Psych Nl mood and affect   Lung CTA-B, unlabored, no DTP Lymph   No cervical or

## 2020-01-20 ENCOUNTER — HOSPITAL ENCOUNTER (OUTPATIENT)
Dept: CARDIAC REHAB | Age: 80
Setting detail: THERAPIES SERIES
Discharge: HOME | End: 2020-01-20
Payer: MEDICARE

## 2020-01-20 PROCEDURE — 93798 PHYS/QHP OP CAR RHAB W/ECG: CPT

## 2020-01-20 NOTE — PROGRESS NOTES
[facility]   Electrophysiology Consult Note              Date:  January 21, 2020  Patient name: Gaye Enrique  YOB: 1940    Reason for Consult:  New Patient and Atrial Fibrillation    Requesting [facility] [place] [facility] Galindo MD    HISTORY OF PRESENT ILLNESS: Gaye Enrique is a [de-identified] y.o. female who presents for evaluation of atrial fibrillation and NSVT. He has a PMH of PAF, on Xarelto, aortic stenosis s/p TAVR 10/2019, HTN and HLD. At her follow up visit on 10/28/2019, she had complained of fatigue and dizziness. Her EKG at that time showed atrial fibrillation rate 113. She was started on Digoxin at that time. She was then hospitalized 11/1/19 with Dig toxicity. Her echocardiogram from 11/22/19 showed an EF of 60-65%. Her EKG from today shows sinus rhythm rate 72. She presents today with her  and cousin. She reports that she was diagnosed with afib in November. She has been taking her medications as prescribed and tolerating them well. She is unsure if she is symptomatic with her afib though she does had occasional shortness of breath with activity. These symptoms have been improving since starting Metoprolol and Diltiazem. She also has had occasional lower extremity edema. This has also greatly improved since being taken off of Amlodipine. Patient currently denies any weight gain, edema, palpitations, chest pain, dizziness, and syncope. Past Medical History:   has a past medical history of Aortic stenosis, Arthritis, Diabetes mellitus ([address] Hyperlipidemia, Hypertension, Paroxysmal atrial fibrillation ([address] and Thyroid disease. Past Surgical History:   has a past surgical history that includes Thyroidectomy, partial; Tonsillectomy; Tubal ligation; Knee arthroscopy (Left); other surgical history (10/09/2015); eye surgery (Left, 12/11/15); Upper gastrointestinal endoscopy (N/A, 8/13/2019); Colonoscopy (N/A, 8/13/2019); Esophagus dilation (8/13/2019);  Cardiac Grade II diastolic dysfunction with elevated filling pressure. The left atrium is moderately dilated. A TAVR aortic valve appears well seated with a maximum velocity of 2.94m/s   and a mean gradient of 20mmHg. Trace catracho-valvular aortic valve regurgitation. Mitral annular calcification is present. Mitral valve leaflets appear moderately thickened. Mild mitral regurgitation. Moderate tricuspid regurgitation. Systolic pulmonary artery pressure (SPAP) is normal and estimated at 55 mmHg   (right atrial pressure 3 mmHg). This is suggestive of moderate pulmonary hypertension. Mild 40-49 Mod 50-59   Severe >60. IVC is normal in size (< 2.1 cm) and collapses > 50% with respiration   consistent with normal RA pressure (3 mmHg). Previous echo done 10/16/2019 - EF 65, mac lae      IMPRESSION:    1. PAF (paroxysmal atrial fibrillation) (HCC)      [id number] Score 4 , HTN, Age, gender. RECOMMENDATIONS:  1. Discussed the need for anticoagulation due to elevated risk for stroke. 2. Your goal heart rate while walking around lightly, is <110. At rest, your goal heart rate is <90.   3. Discussed medical therapy, Amiodarone, Tikosyn or Dronedarone (Multaq). 4. Also discussed afib ablation therapy as well as pace and ablate treatment. 5. For now, we will stay the course with your current treatment. 6. In 4 weeks, wear a 1 week CAM monitor to evaluate your atrial fibrillation. 7. Follow up with me in 2 months. QUALITY MEASURES  1. Tobacco Cessation Counseling: NA  2. Retake of BP if >140/90:   NA  3. Documentation to PCP/referring for new patient:  Sent to PCP at close of office visit  4. CAD patient on anti-platelet: NA  5. CAD patient on STATIN therapy:  NA  6. Patient with aFib on anticoagulation:  Yes, Xarelto      All questions and concerns were addressed to the patient/family. Alternatives to my treatment were discussed.     Dr. Kayla Mclean MD  Electrophysiology  [facility] [place]. [address] [place] [address]. [address]. [place] [address]  Phone: [phone]  Fax: [phone]     This note was scribed in the presence of Dr. Isaias Frye MD by Mirella Pimentel RN. NOTE: This report was transcribed using voice recognition software. Every effort was made to ensure accuracy, however, inadvertent computerized transcription errors may be present.

## 2020-01-21 ENCOUNTER — OFFICE VISIT (OUTPATIENT)
Dept: CARDIOLOGY CLINIC | Age: 80
End: 2020-01-21
Payer: MEDICARE

## 2020-01-21 VITALS
SYSTOLIC BLOOD PRESSURE: 138 MMHG | HEIGHT: 64 IN | HEART RATE: 64 BPM | DIASTOLIC BLOOD PRESSURE: 70 MMHG | BODY MASS INDEX: 26.29 KG/M2 | WEIGHT: 154 LBS

## 2020-01-21 VITALS
SYSTOLIC BLOOD PRESSURE: 138 MMHG | OXYGEN SATURATION: 94 % | BODY MASS INDEX: 27.37 KG/M2 | WEIGHT: 154.5 LBS | HEART RATE: 64 BPM | DIASTOLIC BLOOD PRESSURE: 70 MMHG

## 2020-01-21 LAB
BASOPHILS ABSOLUTE: 0 /ΜL
BASOPHILS RELATIVE PERCENT: 0 %
BUN BLDV-MCNC: 22 MG/DL
CALCIUM SERPL-MCNC: 9.4 MG/DL
CHLORIDE BLD-SCNC: 97 MMOL/L
CO2: 26 MMOL/L
CREAT SERPL-MCNC: 1.35 MG/DL
EOSINOPHILS ABSOLUTE: 0.2 /ΜL
EOSINOPHILS RELATIVE PERCENT: 3 %
GFR CALCULATED: 37
GLUCOSE BLD-MCNC: 114 MG/DL
HCT VFR BLD CALC: 34.6 % (ref 36–46)
HEMOGLOBIN: 10.3 G/DL (ref 12–16)
LYMPHOCYTES ABSOLUTE: 0.9 /ΜL
LYMPHOCYTES RELATIVE PERCENT: 14 %
MCH RBC QN AUTO: 25.6 PG
MCHC RBC AUTO-ENTMCNC: 29.8 G/DL
MCV RBC AUTO: 86 FL
MONOCYTES ABSOLUTE: 0.7 /ΜL
MONOCYTES RELATIVE PERCENT: 11 %
NEUTROPHILS ABSOLUTE: 5 /ΜL
NEUTROPHILS RELATIVE PERCENT: 72 %
PLATELET # BLD: 326 K/ΜL
PMV BLD AUTO: ABNORMAL FL
POTASSIUM SERPL-SCNC: 4.8 MMOL/L
RBC # BLD: 4.03 10^6/ΜL
SODIUM BLD-SCNC: 139 MMOL/L
WBC # BLD: 6.8 10^3/ML

## 2020-01-21 PROCEDURE — 99214 OFFICE O/P EST MOD 30 MIN: CPT | Performed by: INTERNAL MEDICINE

## 2020-01-21 PROCEDURE — 93000 ELECTROCARDIOGRAM COMPLETE: CPT | Performed by: INTERNAL MEDICINE

## 2020-01-21 RX ORDER — METOPROLOL SUCCINATE 50 MG/1
50 TABLET, EXTENDED RELEASE ORAL DAILY
Qty: 90 TABLET | Refills: 3 | Status: SHIPPED | OUTPATIENT
Start: 2020-01-21 | End: 2020-05-05

## 2020-01-21 RX ORDER — DILTIAZEM HYDROCHLORIDE 180 MG/1
180 CAPSULE, COATED, EXTENDED RELEASE ORAL DAILY
Qty: 90 CAPSULE | Refills: 3 | Status: SHIPPED | OUTPATIENT
Start: 2020-01-21 | End: 2021-01-19

## 2020-01-21 RX ORDER — POTASSIUM CHLORIDE 20 MEQ/1
40 TABLET, EXTENDED RELEASE ORAL DAILY
Qty: 180 TABLET | Refills: 3 | Status: ON HOLD | OUTPATIENT
Start: 2020-01-21 | End: 2020-08-19

## 2020-01-21 RX ORDER — FUROSEMIDE 20 MG/1
40 TABLET ORAL 2 TIMES DAILY
Qty: 180 TABLET | Refills: 3 | Status: SHIPPED | OUTPATIENT
Start: 2020-01-21 | End: 2020-08-04

## 2020-01-21 RX ORDER — ATORVASTATIN CALCIUM 40 MG/1
40 TABLET, FILM COATED ORAL NIGHTLY
Qty: 90 TABLET | Refills: 3 | Status: SHIPPED | OUTPATIENT
Start: 2020-01-21 | End: 2021-01-19

## 2020-01-21 NOTE — PATIENT INSTRUCTIONS
RECOMMENDATIONS:  1. Discussed the need for anticoagulation due to elevated risk for stroke. 2. Your goal heart rate while walking around lightly, is <110. At rest, your goal heart rate is <90.   3. Discussed medical therapy, Amiodarone, Tikosyn or Dronedarone (Multaq). 4. Also discussed afib ablation therapy as well as pace and ablate treatment. 5. For now, we will stay the course with your current treatment. 6. In 4 weeks, wear a 1 week CAM monitor to evaluate your atrial fibrillation. 7. Follow up with me in 2 months. Patient Education        Atrial Fibrillation: Care Instructions  Your Care Instructions    Atrial fibrillation is an irregular and often fast heartbeat. Treating this condition is important for several reasons. It can cause blood clots, which can travel from your heart to your brain and cause a stroke. If you have a fast heartbeat, you may feel lightheaded, dizzy, and weak. An irregular heartbeat can also increase your risk for heart failure. Atrial fibrillation is often the result of another heart condition, such as high blood pressure or coronary artery disease. Making changes to improve your heart condition will help you stay healthy and active. Follow-up care is a key part of your treatment and safety. Be sure to make and go to all appointments, and call your doctor if you are having problems. It's also a good idea to know your test results and keep a list of the medicines you take. How can you care for yourself at home? Medicines    · Take your medicines exactly as prescribed. Call your doctor if you think you are having a problem with your medicine. You will get more details on the specific medicines your doctor prescribes.     · If your doctor has given you a blood thinner to prevent a stroke, be sure you get instructions about how to take your medicine safely.  Blood thinners can cause serious bleeding problems.     · Do not take any vitamins, over-the-counter drugs, or herbal products without talking to your doctor first.    Lifestyle changes    · Do not smoke. Smoking can increase your chance of a stroke and heart attack. If you need help quitting, talk to your doctor about stop-smoking programs and medicines. These can increase your chances of quitting for good.     · Eat a heart-healthy diet.     · Stay at a healthy weight. Lose weight if you need to.     · Limit alcohol to 2 drinks a day for men and 1 drink a day for women. Too much alcohol can cause health problems.     · Avoid colds and flu. Get a pneumococcal vaccine shot. If you have had one before, ask your doctor whether you need another dose. Get a flu shot every year. If you must be around people with colds or flu, wash your hands often. Activity    · If your doctor recommends it, get more exercise. Walking is a good choice. Bit by bit, increase the amount you walk every day. Try for at least 30 minutes on most days of the week. You also may want to swim, bike, or do other activities. Your doctor may suggest that you join a cardiac rehabilitation program so that you can have help increasing your physical activity safely.     · Start light exercise if your doctor says it is okay. Even a small amount will help you get stronger, have more energy, and manage stress. Walking is an easy way to get exercise. Start out by walking a little more than you did in the hospital. Gradually increase the amount you walk.     · When you exercise, watch for signs that your heart is working too hard. You are pushing too hard if you cannot talk while you are exercising. If you become short of breath or dizzy or have chest pain, sit down and rest immediately.     · Check your pulse regularly. Place two fingers on the artery at the palm side of your wrist, in line with your thumb. If your heartbeat seems uneven or fast, talk to your doctor. When should you call for help? Call 911 anytime you think you may need emergency care.  For example, call if:    · You have symptoms of a heart attack. These may include:  ? Chest pain or pressure, or a strange feeling in the chest.  ? Sweating. ? Shortness of breath. ? Nausea or vomiting. ? Pain, pressure, or a strange feeling in the back, neck, jaw, or upper belly or in one or both shoulders or arms. ? Lightheadedness or sudden weakness. ? A fast or irregular heartbeat. After you call  911, the  may tell you to chew 1 adult-strength or 2 to 4 low-dose aspirin. Wait for an ambulance. Do not try to drive yourself.     · You have symptoms of a stroke. These may include:  ? Sudden numbness, tingling, weakness, or loss of movement in your face, arm, or leg, especially on only one side of your body. ? Sudden vision changes. ? Sudden trouble speaking. ? Sudden confusion or trouble understanding simple statements. ? Sudden problems with walking or balance. ? A sudden, severe headache that is different from past headaches.     · You passed out (lost consciousness).    Call your doctor now or seek immediate medical care if:    · You have new or increased shortness of breath.     · You feel dizzy or lightheaded, or you feel like you may faint.     · Your heart rate becomes irregular.     · You can feel your heart flutter in your chest or skip heartbeats. Tell your doctor if these symptoms are new or worse.    Watch closely for changes in your health, and be sure to contact your doctor if you have any problems. Where can you learn more? Go to https://[employer]. org and sign in to your [employer] account. Enter U020 in the Freeppie box to learn more about \"Atrial Fibrillation: Care Instructions. \"     If you do not have an account, please click on the \"Sign Up Now\" link. Current as of: April 9, 2019  Content Version: 12.3  © 5235-9593 [employer]wise, [employer]. Care instructions adapted under license by [facility] ([facility]).  If you have questions about a medical condition or

## 2020-01-21 NOTE — LETTER
[facility] - [address]  Phone: [phone]  Fax: [phone]    Anselmo Landau, MD        January 21, 2020     Charis Russell [address] [place] [address]    Patient: Jens Dietz  MR Number: [id number]  YOB: 1940  Date of Visit: 1/21/2020    Dear Dr. Silverio Clancy:      [address]     H+P // CONSULT // OUTPATIENT VISIT // Pricila Alvarez     Referring Doctor Silverio Clancy MD   Encounter Type Followup     CHIEF COMPLAINT     Visit Type Chronic   Symptoms None   Problems AS s/p TAVR, pAFIB, HTN, CHOL     HISTORY OF PRESENT ILLNESS     ? GEN - Doing well. No new concerns. ? AS - s/p TAVR and doing well. Denies cp, sob. Edema improved with med changes last visit. ? AFIB/NSVT - episode after TAVR and noted on postop monitor - asymptomatic. Tolerating xarelto without bleeding. Saw EP today. ? HTN - Ambulatory BP readings in good range. No HA or dizziness. ? CHOL - Last cholesterol reviewed and in good range. Tolerating statin without side effects. ? MED - Compliant with CV meds listed below without notable side effects. HISTORY/ALLERGIES/ROS     MedHx:  has a past medical history of Aortic stenosis, Arthritis, Diabetes mellitus ([address] Hyperlipidemia, Hypertension, Paroxysmal atrial fibrillation ([address] and Thyroid disease. SurgHx:  has a past surgical history that includes Thyroidectomy, partial; Tonsillectomy; Tubal ligation; Knee arthroscopy (Left); other surgical history (10/09/2015); eye surgery (Left, 12/11/15); Upper gastrointestinal endoscopy (N/A, 8/13/2019); Colonoscopy (N/A, 8/13/2019); Esophagus dilation (8/13/2019); Cardiac catheterization (09/2019); and Aortic valve replacement (N/A, 10/15/2019). SocHx:  reports that she quit smoking about 37 years ago. Her smoking use included cigarettes. She has a 20.00 pack-year smoking history.  She has Gen Alert, coop, no distress Heart  Rrr, 1/6   Head NC, AT, no abnorm Abd  Soft, NT, +BS, no mass, no OM   Eyes PER, conj/corn clear Ext  Ext nl, AT, no C/C, mild edema   Nose Nares nl, no drain, NT Pulse 2+ and symmetric   Throat Lips, mucosa, tongue nl Skin Col/text/turg nl, no vis rash/les   Neck S/S, TM, NT, no bruit/JVD Psych Nl mood and affect   Lung CTA-B, unlabored, no DTP Lymph   No cervical or axillary LA   Ch wall NT, no deform Neuro  Nl gross M/S exam     ASSESSMENT AND PLAN     ~AS   Date EF Detail   Sx   No concerning   Hx 10/19  TAVR 23 mm Garcia S3   NYHA   []I         [x]II           []III          []IV   TTE 6/19  10/19  11/19 60%  65%  65% Severe AS MG 34 and , mild MR, mod TR, mod pulm HTN, mod PI  TAVR MG 14  TAVR MG 20, triv catracho AI, mild MR, mod TR   LHC 9/19  Mild nonobstructive CAD ([place])   Plan   Continued observation   ~Afib/NSVT   Date Detail   Type  parox   R/R  regular   R/RM  Diltiazem , toprol 50   CVS  >1   AC/AP  Xarelto/ASA   MCOT 12/19 Asymptomatic AF, Asymptomatic NSVT   Plan  EP evaluated today and recommended continued observation, xarelto, bb, ccb   ~HTN  Today BP Controlled   Counseling Counseled on diet/salt, exercise and ideal body weight   Plan Continue current meds   ~Hyperchol  Goal LDL <70   Counseling Counseled on diet, exercise and ideal body weight   Plan PCP liver/lipid surveillance, continue current meds at doses above   ~Compliance  Discussed importance of compliance with meds, diet, salt, exercise  Discussed importance of avoidance of tobacco, alcohol and drugs  Plan Patient expressed understanding  ~Followup  Interval:  3 months    Only want to follow with me in the future, discussed more convenient options    Scribe Attestation  I, Irma Machuca, am scribing for and in the presence of Remberto Peraza MD.   Signed, Irma Machuca 01/17/20 10:14 AM   Provider Dhruv Pena is working as a scribe for and in the presence of me

## 2020-01-22 ENCOUNTER — HOSPITAL ENCOUNTER (OUTPATIENT)
Dept: CARDIAC REHAB | Age: 80
Setting detail: THERAPIES SERIES
Discharge: HOME | End: 2020-01-22
Payer: MEDICARE

## 2020-01-22 ENCOUNTER — TELEPHONE (OUTPATIENT)
Dept: CARDIOLOGY CLINIC | Age: 80
End: 2020-01-22

## 2020-01-22 PROCEDURE — 93798 PHYS/QHP OP CAR RHAB W/ECG: CPT

## 2020-01-22 NOTE — TELEPHONE ENCOUNTER
----- Message from RABIA Ray CNP sent at 1/21/2020  4:50 PM EST -----  Lab results reviewed, stable. Reviewed notes from visits today. Continue current treatment plan.     Thank you, Cyndi Linda

## 2020-01-24 ENCOUNTER — HOSPITAL ENCOUNTER (OUTPATIENT)
Dept: CARDIAC REHAB | Age: 80
Setting detail: THERAPIES SERIES
Discharge: HOME | End: 2020-01-24
Payer: MEDICARE

## 2020-01-24 PROCEDURE — 93798 PHYS/QHP OP CAR RHAB W/ECG: CPT

## 2020-01-27 ENCOUNTER — HOSPITAL ENCOUNTER (OUTPATIENT)
Dept: CARDIAC REHAB | Age: 80
Setting detail: THERAPIES SERIES
Discharge: HOME | End: 2020-01-27
Payer: MEDICARE

## 2020-01-27 PROCEDURE — 93798 PHYS/QHP OP CAR RHAB W/ECG: CPT

## 2020-01-28 ENCOUNTER — TELEPHONE (OUTPATIENT)
Dept: CARDIOLOGY CLINIC | Age: 80
End: 2020-01-28

## 2020-01-29 ENCOUNTER — HOSPITAL ENCOUNTER (OUTPATIENT)
Dept: CARDIAC REHAB | Age: 80
Setting detail: THERAPIES SERIES
Discharge: HOME | End: 2020-01-29
Payer: MEDICARE

## 2020-01-29 PROCEDURE — 93798 PHYS/QHP OP CAR RHAB W/ECG: CPT

## 2020-01-31 ENCOUNTER — HOSPITAL ENCOUNTER (OUTPATIENT)
Dept: CARDIAC REHAB | Age: 80
Setting detail: THERAPIES SERIES
Discharge: HOME | End: 2020-01-31
Payer: MEDICARE

## 2020-01-31 PROCEDURE — 93798 PHYS/QHP OP CAR RHAB W/ECG: CPT

## 2020-02-03 ENCOUNTER — HOSPITAL ENCOUNTER (OUTPATIENT)
Dept: CARDIAC REHAB | Age: 80
Setting detail: THERAPIES SERIES
Discharge: HOME | End: 2020-02-03
Payer: MEDICARE

## 2020-02-03 PROCEDURE — 93798 PHYS/QHP OP CAR RHAB W/ECG: CPT

## 2020-02-05 ENCOUNTER — HOSPITAL ENCOUNTER (OUTPATIENT)
Dept: CARDIAC REHAB | Age: 80
Setting detail: THERAPIES SERIES
Discharge: HOME | End: 2020-02-05
Payer: MEDICARE

## 2020-02-05 PROCEDURE — 93798 PHYS/QHP OP CAR RHAB W/ECG: CPT

## 2020-02-10 ENCOUNTER — HOSPITAL ENCOUNTER (OUTPATIENT)
Dept: CARDIAC REHAB | Age: 80
Setting detail: THERAPIES SERIES
Discharge: HOME | End: 2020-02-10
Payer: MEDICARE

## 2020-02-10 PROCEDURE — 93798 PHYS/QHP OP CAR RHAB W/ECG: CPT

## 2020-02-14 ENCOUNTER — HOSPITAL ENCOUNTER (OUTPATIENT)
Dept: CARDIAC REHAB | Age: 80
Setting detail: THERAPIES SERIES
Discharge: HOME | End: 2020-02-14
Payer: MEDICARE

## 2020-02-14 ENCOUNTER — TELEPHONE (OUTPATIENT)
Dept: CARDIOLOGY CLINIC | Age: 80
End: 2020-02-14

## 2020-02-14 PROCEDURE — 93798 PHYS/QHP OP CAR RHAB W/ECG: CPT

## 2020-02-14 NOTE — TELEPHONE ENCOUNTER
Daughter calling to ask if dce thinks a cortisone shot that this patient got two days ago could make her b/p go up ? Her b/p is 168/70 and is usually not that high.  She feels fine and is actually at cardiac rehab. pls call daughter

## 2020-02-17 ENCOUNTER — HOSPITAL ENCOUNTER (OUTPATIENT)
Dept: CARDIAC REHAB | Age: 80
Setting detail: THERAPIES SERIES
Discharge: HOME | End: 2020-02-17
Payer: MEDICARE

## 2020-02-17 PROCEDURE — 93798 PHYS/QHP OP CAR RHAB W/ECG: CPT

## 2020-02-18 ENCOUNTER — NURSE ONLY (OUTPATIENT)
Dept: CARDIOLOGY CLINIC | Age: 80
End: 2020-02-18

## 2020-02-18 PROCEDURE — 0296T PR EXT ECG > 48HR TO 21 DAY RCRD W/CONECT INTL RCRD: CPT | Performed by: INTERNAL MEDICINE

## 2020-02-18 NOTE — PROGRESS NOTES
Monitor placed by Karl Bowman RN  Monitor company CAM [facility] Ambulatory Monitor)  Length of monitor 7 days  Monitor ordered by Christa Morrison MD  [place] ID: [id number]  Start time 11:07AM    Activation successful prior to pt leaving office? Yes    CAM monitor placed on the patient. Instructions given to patient and daughters. All verbalized understanding. All questions answered to the best of my ability.

## 2020-02-19 ENCOUNTER — HOSPITAL ENCOUNTER (OUTPATIENT)
Dept: CARDIAC REHAB | Age: 80
Setting detail: THERAPIES SERIES
Discharge: HOME | End: 2020-02-19
Payer: MEDICARE

## 2020-02-19 PROCEDURE — 93798 PHYS/QHP OP CAR RHAB W/ECG: CPT

## 2020-02-21 ENCOUNTER — HOSPITAL ENCOUNTER (OUTPATIENT)
Dept: CARDIAC REHAB | Age: 80
Setting detail: THERAPIES SERIES
Discharge: HOME | End: 2020-02-21
Payer: MEDICARE

## 2020-02-21 PROCEDURE — 93798 PHYS/QHP OP CAR RHAB W/ECG: CPT

## 2020-02-24 RX ORDER — FERROUS SULFATE 325(65) MG
325 TABLET ORAL
Qty: 30 TABLET | Refills: 1 | Status: SHIPPED | OUTPATIENT
Start: 2020-02-24 | End: 2021-02-04

## 2020-02-26 ENCOUNTER — HOSPITAL ENCOUNTER (OUTPATIENT)
Dept: CARDIAC REHAB | Age: 80
Setting detail: THERAPIES SERIES
Discharge: HOME | End: 2020-02-26
Payer: MEDICARE

## 2020-02-26 PROCEDURE — 93798 PHYS/QHP OP CAR RHAB W/ECG: CPT

## 2020-02-28 ENCOUNTER — HOSPITAL ENCOUNTER (OUTPATIENT)
Dept: CARDIAC REHAB | Age: 80
Setting detail: THERAPIES SERIES
Discharge: HOME | End: 2020-02-28
Payer: MEDICARE

## 2020-02-28 PROCEDURE — 93798 PHYS/QHP OP CAR RHAB W/ECG: CPT

## 2020-03-04 ENCOUNTER — HOSPITAL ENCOUNTER (OUTPATIENT)
Dept: CARDIAC REHAB | Age: 80
Setting detail: THERAPIES SERIES
Discharge: HOME | End: 2020-03-04
Payer: MEDICARE

## 2020-03-04 PROCEDURE — 0298T PR EXT ECG > 48HR TO 21 DAY REVIEW AND INTERPRETATN: CPT | Performed by: INTERNAL MEDICINE

## 2020-03-04 PROCEDURE — 93798 PHYS/QHP OP CAR RHAB W/ECG: CPT

## 2020-03-06 ENCOUNTER — HOSPITAL ENCOUNTER (OUTPATIENT)
Dept: CARDIAC REHAB | Age: 80
Setting detail: THERAPIES SERIES
Discharge: HOME | End: 2020-03-06
Payer: MEDICARE

## 2020-03-06 PROCEDURE — 93798 PHYS/QHP OP CAR RHAB W/ECG: CPT

## 2020-03-09 ENCOUNTER — HOSPITAL ENCOUNTER (OUTPATIENT)
Dept: CARDIAC REHAB | Age: 80
Setting detail: THERAPIES SERIES
Discharge: HOME | End: 2020-03-09
Payer: MEDICARE

## 2020-03-09 ENCOUNTER — TELEPHONE (OUTPATIENT)
Dept: CARDIOLOGY CLINIC | Age: 80
End: 2020-03-09

## 2020-03-09 PROCEDURE — 93798 PHYS/QHP OP CAR RHAB W/ECG: CPT

## 2020-03-09 NOTE — TELEPHONE ENCOUNTER
You saw her 1/21/20    1. Discussed the need for anticoagulation due to elevated risk for stroke. 2. Your goal heart rate while walking around lightly, is <110. At rest, your goal heart rate is <90.   3. Discussed medical therapy, Amiodarone, Tikosyn or Dronedarone ([place]). 4. Also discussed afib ablation therapy as well as pace and ablate treatment. 5. For now, we will stay the course with your current treatment. 6. In 4 weeks, wear a 1 week CAM monitor to evaluate your atrial fibrillation. 7. Follow up with me in 2 months. She has upcoming apt with you on 4/2/20.   Can she wait till then to review event monitor?

## 2020-03-11 ENCOUNTER — HOSPITAL ENCOUNTER (OUTPATIENT)
Dept: CARDIAC REHAB | Age: 80
Setting detail: THERAPIES SERIES
Discharge: HOME | End: 2020-03-11
Payer: MEDICARE

## 2020-03-11 PROCEDURE — 93798 PHYS/QHP OP CAR RHAB W/ECG: CPT

## 2020-03-18 ENCOUNTER — APPOINTMENT (OUTPATIENT)
Dept: CARDIAC REHAB | Age: 80
End: 2020-03-18
Payer: MEDICARE

## 2020-03-18 NOTE — TELEPHONE ENCOUNTER
Pt son called and is wanting to know if it is necessary to bring her into the office due to the health crisis? Wondering if what needs to be discussed can be done over the phone?  Please advise and contact Misael Menjivar @ [phone]

## 2020-03-20 ENCOUNTER — APPOINTMENT (OUTPATIENT)
Dept: CARDIAC REHAB | Age: 80
End: 2020-03-20
Payer: MEDICARE

## 2020-03-23 ENCOUNTER — APPOINTMENT (OUTPATIENT)
Dept: CARDIAC REHAB | Age: 80
End: 2020-03-23
Payer: MEDICARE

## 2020-03-24 ENCOUNTER — TELEPHONE (OUTPATIENT)
Dept: CARDIOLOGY CLINIC | Age: 80
End: 2020-03-24

## 2020-03-24 NOTE — TELEPHONE ENCOUNTER
Patients  is calling and is upset about a situation with a prescription overdose that was given to Javier on 11-1-19    She was in the hospital for 4 days because of this    His bill after insurance is $945.00    He wants Dr. Juice Lin to take care of his bill and states it was his fault and Vickie's fault that she was in the hospital due to this prescription that was given to her  and that she had the most miserable experience while she was there    I tried to give him the billing number but he wanted to speak directly to Sharon Yost    His #[phone]

## 2020-03-25 ENCOUNTER — APPOINTMENT (OUTPATIENT)
Dept: CARDIAC REHAB | Age: 80
End: 2020-03-25
Payer: MEDICARE

## 2020-03-27 ENCOUNTER — APPOINTMENT (OUTPATIENT)
Dept: CARDIAC REHAB | Age: 80
End: 2020-03-27
Payer: MEDICARE

## 2020-03-30 ENCOUNTER — APPOINTMENT (OUTPATIENT)
Dept: CARDIAC REHAB | Age: 80
End: 2020-03-30
Payer: MEDICARE

## 2020-05-04 NOTE — PATIENT INSTRUCTIONS
[facility] SYSTEM for blood pressure to be around 140-145 (top number). We are decreasing your amount of Toprol to 25mg daily which will help with your lightheadedness/dizziness. Compression socks are important to help with your circulation.

## 2020-05-05 ENCOUNTER — OFFICE VISIT (OUTPATIENT)
Dept: CARDIOLOGY CLINIC | Age: 80
End: 2020-05-05
Payer: MEDICARE

## 2020-05-05 VITALS
BODY MASS INDEX: 25.95 KG/M2 | DIASTOLIC BLOOD PRESSURE: 78 MMHG | SYSTOLIC BLOOD PRESSURE: 136 MMHG | HEIGHT: 64 IN | WEIGHT: 152 LBS | HEART RATE: 60 BPM

## 2020-05-05 PROCEDURE — 99214 OFFICE O/P EST MOD 30 MIN: CPT | Performed by: INTERNAL MEDICINE

## 2020-05-05 RX ORDER — METOPROLOL SUCCINATE 50 MG/1
25 TABLET, EXTENDED RELEASE ORAL DAILY
Qty: 90 TABLET | Refills: 3 | Status: SHIPPED | OUTPATIENT
Start: 2020-05-05 | End: 2020-09-22

## 2020-05-05 NOTE — LETTER
Head NC, AT, no abnorm Abd  Soft, NT, +BS, no mass, no OM   Eyes PER, conj/corn clear Ext  Ext nl, AT, no C/C/E   Nose Nares nl, no drain, NT Pulse 2+ and symmetric   Throat Lips, mucosa, tongue nl Skin Col/text/turg nl, no vis rash/les   Neck S/S, TM, NT, no bruit/JVD Psych Nl mood and affect   Lung CTA-B, unlabored, no DTP Lymph   No cervical or axillary LA   Ch wall NT, no deform Neuro  Nl gross M/S exam   \  ASSESSMENT AND PLAN     ~AS   Date EF Detail   Sx   No concerning   Hx 10/19  TAVR 23 mm Garcia S3   NYHA   []I         [x]II           []III          []IV   TTE 6/19  10/19  11/19 60%  65%  65% Severe AS MG 34 and , mild MR, mod TR, mod pulm HTN, mod PI  TAVR MG 14  TAVR MG 20, triv catracho AI, mild MR, mod TR   LHC 9/19  Mild nonobstructive CAD ([place])   Plan   Continued observation  Echo yearly   ~Afib/NSVT   Date Detail   Type  parox   R/R  regular   R/RM  Diltiazem , toprol 50   CVS  >1   AC/AP  Xarelto/ASA   MCOT 12/19 Asymptomatic AF, Asymptomatic NSVT   Plan  Continue current management  FU with EP   ~HTN  Status Controlled  Plan Counseled on diet/salt/exercise/weight   Low HR today (50-60) and reports daily dizziness, reduce metoprolol to 25  ~CHOL  Status  Uncontrolled with last LDL of 101 on 9/19 (goal <70)  Plan Counseled on diet/exercise/weight, continue statin, lipid/liver surveillance per PCP  ~Compliance  Discussed importance of compliance with meds, diet, salt, exercise  Discussed importance of avoidance of tobacco, alcohol and drugs  Plan Patient expressed understanding  ~FOLLOWUP  3-4 months with Chen Toth, am scribing for and in the presence of Veronica Dotson MD.   SignedChen 05/04/20 10:43 AM   Provider Hilarie Bloch is working as a scribe for and in the presence of me (Veronica Dotson MD).   Working as a scribe, Chen Carpenter may have prepopulated components of this note with my historical  intellectual property under my direct supervision. Any additions to this intellectual property were performed in my presence and at my direction. Furthermore, the content and accuracy of this note have been reviewed by me Leticia Jeans, MD).  5/5/2020 10:48 AM      If you have questions, please do not hesitate to call me. I look forward to following Vonda Carrasco along with you.     Sincerely,        Wenceslao Lara MD

## 2020-05-20 ENCOUNTER — TELEPHONE (OUTPATIENT)
Dept: CARDIOLOGY CLINIC | Age: 80
End: 2020-05-20

## 2020-05-20 NOTE — TELEPHONE ENCOUNTER
Spoke to Lisa Garrett, pts niece, who states that for the past month, pt has had hematuria. States urine is \"cherry red\", has not noted clots. States had \"normal\" UA recently at [facility] office. States that pt feels tired. /66, pulse 60. States that she is bruising easily. States had bloodwork at PCP's office this month. Attempted to get labs but PCP office closed. Discussed with Dr. Corry Kessler. Instructed pt to stop Xarelto and see urology. Will attempt to get labs from PCP tomorrow. Sade verbalized understanding of all.  Maritza BLOUNT

## 2020-05-21 ENCOUNTER — TELEPHONE (OUTPATIENT)
Dept: CARDIOLOGY CLINIC | Age: 80
End: 2020-05-21

## 2020-05-27 ENCOUNTER — TELEPHONE (OUTPATIENT)
Dept: CARDIOLOGY CLINIC | Age: 80
End: 2020-05-27

## 2020-06-01 ENCOUNTER — TELEPHONE (OUTPATIENT)
Dept: CARDIOLOGY CLINIC | Age: 80
End: 2020-06-01

## 2020-06-01 NOTE — TELEPHONE ENCOUNTER
Relayed message to Breonna Suarez, pts family member that per Dr. Colonel Rubio, proceed with cysto as suggested by urology. Also OK to decrease Lasix to 20mg daily.  Maritza BLOUNT

## 2020-08-04 ENCOUNTER — TELEPHONE (OUTPATIENT)
Dept: CARDIOLOGY CLINIC | Age: 80
End: 2020-08-04

## 2020-08-04 RX ORDER — FUROSEMIDE 20 MG/1
20 TABLET ORAL PRN
Qty: 180 TABLET | Refills: 3 | Status: ON HOLD
Start: 2020-08-04 | End: 2020-08-22 | Stop reason: SDUPTHER

## 2020-08-14 ENCOUNTER — TELEPHONE (OUTPATIENT)
Dept: CARDIOLOGY CLINIC | Age: 80
End: 2020-08-14

## 2020-08-19 ENCOUNTER — OFFICE VISIT (OUTPATIENT)
Dept: CARDIOLOGY CLINIC | Age: 80
End: 2020-08-19
Payer: MEDICARE

## 2020-08-19 ENCOUNTER — HOSPITAL ENCOUNTER (INPATIENT)
Age: 80
LOS: 3 days | Discharge: HOME HEALTH | DRG: 291 | End: 2020-08-22
Attending: EMERGENCY MEDICINE | Admitting: INTERNAL MEDICINE
Payer: MEDICARE

## 2020-08-19 ENCOUNTER — APPOINTMENT (OUTPATIENT)
Dept: GENERAL RADIOLOGY | Age: 80
DRG: 291 | End: 2020-08-19
Payer: MEDICARE

## 2020-08-19 VITALS
SYSTOLIC BLOOD PRESSURE: 140 MMHG | BODY MASS INDEX: 26.8 KG/M2 | HEART RATE: 70 BPM | DIASTOLIC BLOOD PRESSURE: 58 MMHG | WEIGHT: 157 LBS | HEIGHT: 64 IN | OXYGEN SATURATION: 88 %

## 2020-08-19 PROBLEM — I50.33 ACUTE ON CHRONIC DIASTOLIC (CONGESTIVE) HEART FAILURE (HCC): Status: ACTIVE | Noted: 2020-08-19

## 2020-08-19 LAB
ANION GAP SERPL CALCULATED.3IONS-SCNC: 6 MMOL/L (ref 3–16)
APTT: 30.6 SEC (ref 24.2–36.2)
BASE EXCESS VENOUS: 2.2 MMOL/L (ref -3–3)
BASOPHILS ABSOLUTE: 0.1 K/UL (ref 0–0.2)
BASOPHILS RELATIVE PERCENT: 1.5 %
BUN BLDV-MCNC: 19 MG/DL (ref 7–20)
CALCIUM SERPL-MCNC: 8.9 MG/DL (ref 8.3–10.6)
CARBOXYHEMOGLOBIN: 2.1 % (ref 0–1.5)
CHLORIDE BLD-SCNC: 98 MMOL/L (ref 99–110)
CO2: 30 MMOL/L (ref 21–32)
CREAT SERPL-MCNC: 1.2 MG/DL (ref 0.6–1.2)
EOSINOPHILS ABSOLUTE: 0.3 K/UL (ref 0–0.6)
EOSINOPHILS RELATIVE PERCENT: 4.6 %
GFR AFRICAN AMERICAN: 52
GFR NON-AFRICAN AMERICAN: 43
GLUCOSE BLD-MCNC: 121 MG/DL (ref 70–99)
HCO3 VENOUS: 28.6 MMOL/L (ref 23–29)
HCT VFR BLD CALC: 33.2 % (ref 36–48)
HEMOGLOBIN: 10.3 G/DL (ref 12–16)
INR BLD: 0.97 (ref 0.86–1.14)
LYMPHOCYTES ABSOLUTE: 1 K/UL (ref 1–5.1)
LYMPHOCYTES RELATIVE PERCENT: 15 %
MCH RBC QN AUTO: 28.5 PG (ref 26–34)
MCHC RBC AUTO-ENTMCNC: 31.1 G/DL (ref 31–36)
MCV RBC AUTO: 91.8 FL (ref 80–100)
METHEMOGLOBIN VENOUS: 0.3 %
MONOCYTES ABSOLUTE: 0.7 K/UL (ref 0–1.3)
MONOCYTES RELATIVE PERCENT: 10.5 %
NEUTROPHILS ABSOLUTE: 4.7 K/UL (ref 1.7–7.7)
NEUTROPHILS RELATIVE PERCENT: 68.4 %
O2 CONTENT, VEN: 15 VOL %
O2 SAT, VEN: 97 %
O2 THERAPY: ABNORMAL
PCO2, VEN: 53.2 MMHG (ref 40–50)
PDW BLD-RTO: 16.7 % (ref 12.4–15.4)
PH VENOUS: 7.35 (ref 7.35–7.45)
PLATELET # BLD: 285 K/UL (ref 135–450)
PMV BLD AUTO: 8.3 FL (ref 5–10.5)
PO2, VEN: 94.5 MMHG (ref 25–40)
POTASSIUM REFLEX MAGNESIUM: 4.6 MMOL/L (ref 3.5–5.1)
PRO-BNP: 1858 PG/ML (ref 0–449)
PROTHROMBIN TIME: 11.2 SEC (ref 10–13.2)
RBC # BLD: 3.62 M/UL (ref 4–5.2)
SODIUM BLD-SCNC: 134 MMOL/L (ref 136–145)
TCO2 CALC VENOUS: 30 MMOL/L
TROPONIN: <0.01 NG/ML
WBC # BLD: 6.9 K/UL (ref 4–11)

## 2020-08-19 PROCEDURE — 80048 BASIC METABOLIC PNL TOTAL CA: CPT

## 2020-08-19 PROCEDURE — 2700000000 HC OXYGEN THERAPY PER DAY

## 2020-08-19 PROCEDURE — 82803 BLOOD GASES ANY COMBINATION: CPT

## 2020-08-19 PROCEDURE — 6360000002 HC RX W HCPCS: Performed by: EMERGENCY MEDICINE

## 2020-08-19 PROCEDURE — 94761 N-INVAS EAR/PLS OXIMETRY MLT: CPT

## 2020-08-19 PROCEDURE — 84484 ASSAY OF TROPONIN QUANT: CPT

## 2020-08-19 PROCEDURE — 99214 OFFICE O/P EST MOD 30 MIN: CPT | Performed by: INTERNAL MEDICINE

## 2020-08-19 PROCEDURE — 83880 ASSAY OF NATRIURETIC PEPTIDE: CPT

## 2020-08-19 PROCEDURE — 2580000003 HC RX 258: Performed by: INTERNAL MEDICINE

## 2020-08-19 PROCEDURE — 71045 X-RAY EXAM CHEST 1 VIEW: CPT

## 2020-08-19 PROCEDURE — 99285 EMERGENCY DEPT VISIT HI MDM: CPT

## 2020-08-19 PROCEDURE — 2060000000 HC ICU INTERMEDIATE R&B

## 2020-08-19 PROCEDURE — 85610 PROTHROMBIN TIME: CPT

## 2020-08-19 PROCEDURE — 93005 ELECTROCARDIOGRAM TRACING: CPT | Performed by: EMERGENCY MEDICINE

## 2020-08-19 PROCEDURE — 85730 THROMBOPLASTIN TIME PARTIAL: CPT

## 2020-08-19 PROCEDURE — 85025 COMPLETE CBC W/AUTO DIFF WBC: CPT

## 2020-08-19 PROCEDURE — 6370000000 HC RX 637 (ALT 250 FOR IP): Performed by: INTERNAL MEDICINE

## 2020-08-19 RX ORDER — PANTOPRAZOLE SODIUM 20 MG/1
20 TABLET, DELAYED RELEASE ORAL DAILY
Status: DISCONTINUED | OUTPATIENT
Start: 2020-08-20 | End: 2020-08-22 | Stop reason: HOSPADM

## 2020-08-19 RX ORDER — FUROSEMIDE 20 MG/1
20 TABLET ORAL
Status: ON HOLD | COMMUNITY
End: 2020-08-22 | Stop reason: HOSPADM

## 2020-08-19 RX ORDER — MAGNESIUM SULFATE IN WATER 40 MG/ML
2 INJECTION, SOLUTION INTRAVENOUS PRN
Status: DISCONTINUED | OUTPATIENT
Start: 2020-08-19 | End: 2020-08-22 | Stop reason: HOSPADM

## 2020-08-19 RX ORDER — SODIUM CHLORIDE 0.9 % (FLUSH) 0.9 %
10 SYRINGE (ML) INJECTION PRN
Status: DISCONTINUED | OUTPATIENT
Start: 2020-08-19 | End: 2020-08-22 | Stop reason: HOSPADM

## 2020-08-19 RX ORDER — ONDANSETRON 2 MG/ML
4 INJECTION INTRAMUSCULAR; INTRAVENOUS EVERY 6 HOURS PRN
Status: DISCONTINUED | OUTPATIENT
Start: 2020-08-19 | End: 2020-08-22 | Stop reason: HOSPADM

## 2020-08-19 RX ORDER — CHOLECALCIFEROL (VITAMIN D3) 125 MCG
5 CAPSULE ORAL NIGHTLY
Status: DISCONTINUED | OUTPATIENT
Start: 2020-08-19 | End: 2020-08-22 | Stop reason: HOSPADM

## 2020-08-19 RX ORDER — ASPIRIN 81 MG/1
81 TABLET ORAL DAILY
Status: DISCONTINUED | OUTPATIENT
Start: 2020-08-20 | End: 2020-08-22 | Stop reason: HOSPADM

## 2020-08-19 RX ORDER — ATORVASTATIN CALCIUM 40 MG/1
40 TABLET, FILM COATED ORAL NIGHTLY
Status: DISCONTINUED | OUTPATIENT
Start: 2020-08-19 | End: 2020-08-22 | Stop reason: HOSPADM

## 2020-08-19 RX ORDER — SODIUM CHLORIDE 0.9 % (FLUSH) 0.9 %
10 SYRINGE (ML) INJECTION EVERY 12 HOURS SCHEDULED
Status: DISCONTINUED | OUTPATIENT
Start: 2020-08-19 | End: 2020-08-22 | Stop reason: HOSPADM

## 2020-08-19 RX ORDER — METOPROLOL SUCCINATE 25 MG/1
25 TABLET, EXTENDED RELEASE ORAL DAILY
Status: DISCONTINUED | OUTPATIENT
Start: 2020-08-20 | End: 2020-08-20

## 2020-08-19 RX ORDER — FUROSEMIDE 10 MG/ML
60 INJECTION INTRAMUSCULAR; INTRAVENOUS ONCE
Status: COMPLETED | OUTPATIENT
Start: 2020-08-19 | End: 2020-08-19

## 2020-08-19 RX ORDER — GABAPENTIN 300 MG/1
300 CAPSULE ORAL 2 TIMES DAILY
Status: DISCONTINUED | OUTPATIENT
Start: 2020-08-20 | End: 2020-08-20

## 2020-08-19 RX ORDER — GABAPENTIN 600 MG/1
600 TABLET ORAL 4 TIMES DAILY
Status: ON HOLD | COMMUNITY
End: 2021-09-05

## 2020-08-19 RX ORDER — HYDROCODONE BITARTRATE AND ACETAMINOPHEN 5; 325 MG/1; MG/1
1 TABLET ORAL 2 TIMES DAILY
Status: DISCONTINUED | OUTPATIENT
Start: 2020-08-20 | End: 2020-08-20

## 2020-08-19 RX ORDER — PROMETHAZINE HYDROCHLORIDE 25 MG/1
12.5 TABLET ORAL EVERY 6 HOURS PRN
Status: DISCONTINUED | OUTPATIENT
Start: 2020-08-19 | End: 2020-08-22 | Stop reason: HOSPADM

## 2020-08-19 RX ORDER — POTASSIUM CHLORIDE 7.45 MG/ML
10 INJECTION INTRAVENOUS PRN
Status: DISCONTINUED | OUTPATIENT
Start: 2020-08-19 | End: 2020-08-22 | Stop reason: HOSPADM

## 2020-08-19 RX ORDER — DULOXETIN HYDROCHLORIDE 30 MG/1
30 CAPSULE, DELAYED RELEASE ORAL DAILY
Status: DISCONTINUED | OUTPATIENT
Start: 2020-08-20 | End: 2020-08-22 | Stop reason: HOSPADM

## 2020-08-19 RX ORDER — POTASSIUM CHLORIDE 750 MG/1
10 CAPSULE, EXTENDED RELEASE ORAL DAILY
COMMUNITY
End: 2020-09-03 | Stop reason: ALTCHOICE

## 2020-08-19 RX ORDER — CHOLECALCIFEROL (VITAMIN D3) 25 MCG
2500 TABLET,CHEWABLE ORAL WEEKLY
COMMUNITY

## 2020-08-19 RX ORDER — HYDROCODONE BITARTRATE AND ACETAMINOPHEN 5; 325 MG/1; MG/1
2 TABLET ORAL 2 TIMES DAILY
Status: DISCONTINUED | OUTPATIENT
Start: 2020-08-20 | End: 2020-08-22 | Stop reason: HOSPADM

## 2020-08-19 RX ORDER — FUROSEMIDE 10 MG/ML
40 INJECTION INTRAMUSCULAR; INTRAVENOUS 2 TIMES DAILY
Status: DISCONTINUED | OUTPATIENT
Start: 2020-08-20 | End: 2020-08-21

## 2020-08-19 RX ORDER — GABAPENTIN 300 MG/1
600 CAPSULE ORAL 3 TIMES DAILY
Status: DISCONTINUED | OUTPATIENT
Start: 2020-08-19 | End: 2020-08-19

## 2020-08-19 RX ORDER — GABAPENTIN 300 MG/1
600 CAPSULE ORAL 2 TIMES DAILY
Status: DISCONTINUED | OUTPATIENT
Start: 2020-08-20 | End: 2020-08-22 | Stop reason: HOSPADM

## 2020-08-19 RX ADMIN — FUROSEMIDE 60 MG: 10 INJECTION, SOLUTION INTRAMUSCULAR; INTRAVENOUS at 21:02

## 2020-08-19 RX ADMIN — Medication 10 ML: at 23:53

## 2020-08-19 RX ADMIN — ATORVASTATIN CALCIUM 40 MG: 40 TABLET, FILM COATED ORAL at 23:53

## 2020-08-19 NOTE — PROGRESS NOTES
[address]     H+P // CONSULT // OUTPATIENT VISIT // Thien Rodriguez     Referring Doctor Marialuisa No MD   Encounter Type Followup     CHIEF COMPLAINT     Visit Type Chronic   Symptoms None   Problems AS s/p TAVR, pAFIB, HTN, CHOL     HISTORY OF PRESENT ILLNESS      GEN - Doing poorly. Increasing sob and le edema last 4 days. Lasix stopped 2 weeks ago for \"dehydration. \". Denies cp.  AS - s/p TAVR and subsequent echos with normal function.  AFIB/NSVT - no palpitations. Following with EP. Off xarelto due to hematuria.  HTN - Ambulatory BP readings in good range. No HA or dizziness.  CHOL - Last cholesterol reviewed and in good range. Tolerating statin without side effects.  MED - Compliant with CV meds listed below without notable side effects. HISTORY/ALLERGIES/ROS     MedHx:  has a past medical history of Aortic stenosis, Arthritis, Diabetes mellitus ([facility] Hyperlipidemia, Hypertension, Paroxysmal atrial fibrillation ([facility] and Thyroid disease. SurgHx:  has a past surgical history that includes Thyroidectomy, partial; Tonsillectomy; Tubal ligation; Knee arthroscopy (Left); other surgical history (10/09/2015); eye surgery (Left, 12/11/15); Upper gastrointestinal endoscopy (N/A, 8/13/2019); Colonoscopy (N/A, 8/13/2019); Esophagus dilation (8/13/2019); Cardiac catheterization (09/2019); and Aortic valve replacement (N/A, 10/15/2019). SocHx:  reports that she quit smoking about 37 years ago. Her smoking use included cigarettes. She has a 20.00 pack-year smoking history. She has never used smokeless tobacco. She reports that she does not drink alcohol or use drugs. FamHx: family history includes Colon Cancer in her mother; Heart Disease in her brother and father. Allerg: Iodides;  Shellfish-derived products; and Sulfa antibiotics   ROS: [x]Full ROS obtained and negative except as mentioned in HPI     MEDICATIONS      Current Outpatient Medications   Medication Psych Nl mood and affect   Lung CTA-B, unlabored, no DTP Lymph   No cervical or axillary LA   Ch wall NT, no deform Neuro  Nl gross M/S exam     ASSESSMENT AND PLAN     *AS   Date EF Detail   Sx   SOB, LE edema   Hx 10/19  TAVR 23 mm Garcia S3   TTE 6/19  10/19  11/19 60%  65%  65% Severe AS MG 34 and , mild MR, mod TR, mod pulm HTN, mod PI  TAVR MG 14  TAVR MG 20, triv catracho AI, mild MR, mod TR   LHC 9/19  Mild nonobstructive CAD ([place])   Plan   Refer to ER, patient prefers [facility]  Discussed with Dr. Tami Gonzalez for decompensated CHF and hematuria  Historically refused cystoscopy   *AFIB/NSVT  Hx:  Parox, 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, NSVT  Status No AC due to hematuria  Plan [facility] due to hematuria and patient wishes  *HTN  Status Controlled  Plan Counseled on diet/salt/exercise/weight  *CHOL  Status  Uncontrolled with last LDL of 101 on 9/19 (goal <70)  Plan Counseled on diet/exercise/weight, continue statin, lipid/liver surveillance per PCP  *COMPLIANCE  Status Compliant  Plan Discussed importance of compliance with meds/diet/salt/exercise; avoid tob/alc/drugs; patient verbalized understanding  *FOLLOWUP  Pending hospital course    [address] aMrino Fajardo, am scribing for and in the presence of Ghanshyam Harmon MD.   Marino Cooper 08/19/20 1:25 PM   Provider Norm Borden is working as a scribe for and in the presence of me (Ghanshyam Harmon MD). Working as a scribeMarino may have prepopulated components of this note with my historical  intellectual property under my direct supervision. Any additions to this intellectual property were performed in my presence and at my direction.   Furthermore, the content and accuracy of this note have been reviewed by me Ghanshyam Harmon MD).  8/19/2020 3:19 PM

## 2020-08-19 NOTE — ED PROVIDER NOTES
Procedure Laterality Date    AORTIC VALVE REPLACEMENT N/A 10/15/2019    TRANSCATHETER AORTIC VALVE REPLACEMENT FEMORAL APPROACH performed by Lara Paredes MD at [address]  09/2019    COLONOSCOPY N/A 8/13/2019    COLON W/ANES. performed by Damon Goncalves MD at [address]  8/13/2019    ESOPHAGEAL DILATION Marian Guaman performed by Damon Goncalves MD at [address] Left 12/11/15    PHACO EMULSIFICATION OF CATARACT WITH LENS IMPLANT LEFT EYE    KNEE ARTHROSCOPY Left     Torn meniscus    OTHER SURGICAL HISTORY  10/09/2015    phacoemulsification of cataract with intraocular implant right eye    THYROIDECTOMY, PARTIAL      TONSILLECTOMY      TUBAL LIGATION      UPPER GASTROINTESTINAL ENDOSCOPY N/A 8/13/2019    EGD W/ANES. (9:30) performed by Damon Goncalves MD at [address] [place]       Previous Medications    ASPIRIN EC 81 MG EC TABLET    Take 1 tablet by mouth daily    ATORVASTATIN (LIPITOR) 40 MG TABLET    Take 1 tablet by mouth nightly    BUTALBITAL-ACETAMINOPHEN  MG TABS    Take 1 tablet by mouth as needed     DILTIAZEM (CARDIZEM CD) 180 MG EXTENDED RELEASE CAPSULE    Take 1 capsule by mouth daily    DULOXETINE (CYMBALTA) 20 MG EXTENDED RELEASE CAPSULE    Take 30 mg by mouth daily     FERROUS SULFATE 325 (65 FE) MG TABLET    Take 1 tablet by mouth daily (with breakfast)    FUROSEMIDE (LASIX) 20 MG TABLET    Take 1 tablet by mouth as needed (edema)    GABAPENTIN (NEURONTIN) 600 MG TABLET    Take 600 mg by mouth 3 times daily.     HYDROCODONE-ACETAMINOPHEN (NORCO) 5-325 MG PER TABLET    Take 1 tablet by mouth every 6 hours as needed for Pain    LEVOTHYROXINE (SYNTHROID) 175 MCG TABLET    Take 137 mcg by mouth daily     MELATONIN 5 MG TABS TABLET    Take 5 mg by mouth nightly    METAXALONE (SKELAXIN) 800 MG TABLET    Take 800 mg by mouth as needed     METOPROLOL SUCCINATE (TOPROL XL) 50 MG EXTENDED RELEASE TABLET    Take 0.5 tablets by mouth daily    PANTOPRAZOLE (PROTONIX) 20 MG TABLET    Take 20 mg by mouth daily    POTASSIUM CHLORIDE (KLOR-CON M) 20 MEQ EXTENDED RELEASE TABLET    Take 2 tablets by mouth daily    RIVAROXABAN (XARELTO) 15 MG TABS TABLET    Take 1 tablet by mouth Daily with supper       ALLERGIES     Iodides;  Shellfish-derived products; and Sulfa antibiotics    FAMILY HISTORY       Family History   Problem Relation Age of Onset    Colon Cancer Mother     Heart Disease Father     Heart Disease Brother           SOCIAL HISTORY       Social History     Socioeconomic History    Marital status:      Spouse name: None    Number of children: None    Years of education: None    Highest education level: None   Occupational History    None   Social Needs    Financial resource strain: None    Food insecurity     Worry: None     Inability: None    Transportation needs     Medical: None     Non-medical: None   Tobacco Use    Smoking status: Former Smoker     Packs/day: 1.00     Years: 20.00     Pack years: 20.00     Types: Cigarettes     Last attempt to quit: 1983     Years since quittin.6    Smokeless tobacco: Never Used   Substance and Sexual Activity    Alcohol use: No    Drug use: Never    Sexual activity: Not Currently     Partners: Male   Lifestyle    Physical activity     Days per week: None     Minutes per session: None    Stress: None   Relationships    Social connections     Talks on phone: None     Gets together: None     Attends [employer] service: None     Active member of club or organization: None     Attends meetings of clubs or organizations: None     Relationship status: None    Intimate partner violence     Fear of current or ex partner: None     Emotionally abused: None     Physically abused: None     Forced sexual activity: None   Other Topics Concern    None   Social History Narrative    None       SCREENINGS    Amanuel Coma Scale  Eye Opening: Spontaneous  Best Verbal Response: Oriented  Best Motor Response: Obeys commands  Amanuel Coma Scale Score: 15          PHYSICAL EXAM    (up to 7 for level 4, 8 or more for level 5)     ED Triage Vitals [08/19/20 1845]   BP Temp Temp Source Pulse Resp SpO2 Height Weight   133/60 98.2 °F (36.8 °C) Oral 61 20 (!) 86 % 5' 4\" (1.626 m) 154 lb 4.8 oz (70 kg)       Physical Exam    General appearance:  Cooperative. No acute distress. Skin:  Warm. Dry. Eye:  Extraocular movements intact. Ears, nose, mouth and throat:  Oral mucosa moist,  Neck:  Trachea midline. Heart:  Regular rate and rhythm  Perfusion:  intact  Respiratory: Crackles in the bilateral bases without increased work of breathing  Abdominal:   Non distended. Nontender  Neurological:  Alert and oriented x 3. Moves all extremities spontaneously  Musculoskeletal:   Normal ROM, no deformities.   Bilateral lower extremity 2+ pitting edema          Psychiatric:  Normal mood      DIAGNOSTIC RESULTS       Labs Reviewed   CBC WITH AUTO DIFFERENTIAL - Abnormal; Notable for the following components:       Result Value    RBC 3.62 (*)     Hemoglobin 10.3 (*)     Hematocrit 33.2 (*)     RDW 16.7 (*)     All other components within normal limits    Narrative:     Performed at:  [facility],  [place]   Phone [phone]   BASIC METABOLIC PANEL W/ REFLEX TO MG FOR LOW K - Abnormal; Notable for the following components:    Sodium 134 (*)     Chloride 98 (*)     Glucose 121 (*)     GFR Non- 43 (*)     GFR  52 (*)     All other components within normal limits    Narrative:     Performed at:  [facility] - [facility],  [place], [place]   Phone [phone]   BRAIN NATRIURETIC PEPTIDE - Abnormal; Notable for the following components:    Pro-BNP 1,858 (*)     All other components within normal limits Work-up reveals a chest x-ray with some cardiomegaly questionable signs of fluid overload. BNP elevated. Troponin negative. Patient without chest pain. Signs of peripheral edema and history consistent with stopping her diuretics make me suspicious of fluid overload and CHF exacerbation as underlying etiology. Patient was given Lasix here and will be admitted to the hospital for hypoxic respiratory failure and continue diuresis    MDM    CONSULTS     IP CONSULT TO HOSPITALIST    Critical Care:     CRITICAL CARE TIME   Total Critical Care time was 30 minutes, excluding separately reportable procedures. There was a high probability of clinically significant/life threatening deterioration in the patient's condition which required my urgent intervention. This time was spent reviewing the patient chart, interpreting diagnostic/laboratory data, ministration of supplemental oxygen for hypoxic respiratory failure      REASSESSMENT          PROCEDURE     Unless otherwise noted below, none     Procedures      FINAL IMPRESSION      1. Acute on chronic congestive heart failure, unspecified heart failure type ([facility]    2. Acute respiratory failure with hypoxia [facility])            DISPOSITION/PLAN   DISPOSITION Decision To Admit 08/19/2020 08:12:17 PM        PATIENT REFERRED TO:  No follow-up provider specified. DISCHARGE MEDICATIONS:  New Prescriptions    No medications on file     Controlled Substances Monitoring:     No flowsheet data found.     (Please note that portions of this note were completed with a voice recognition program.  Efforts were made to edit the dictations but occasionally words are mis-transcribed.)    Santa Schmid MD (electronically signed)  Attending Emergency Physician            Aris Car MD  08/19/20 2013

## 2020-08-19 NOTE — LETTER
[address] Cardiology [place] [address] [address] [address]  Phone: [phone]  Fax: [phone]    Sharita Johnson MD        August 19, 2020     Ricardo Scott MD  [address]    Patient: Yamilet De Jesus  MR Number: [id number]  YOB: 1940  Date of Visit: 8/19/2020    Dear Dr. Ricardo Scott:      [address]     H+P // CONSULT // OUTPATIENT VISIT // [facility]     Referring Doctor Ricarod Scott MD   Encounter Type Followup     CHIEF COMPLAINT     Visit Type Chronic   Symptoms None   Problems AS s/p TAVR, pAFIB, HTN, CHOL     HISTORY OF PRESENT ILLNESS     ? GEN - Doing poorly. Increasing sob and le edema last 4 days. Lasix stopped 2 weeks ago for \"dehydration. \". Denies cp. ? AS - s/p TAVR and subsequent echos with normal function. ? AFIB/NSVT - no palpitations. Following with EP. Off xarelto due to hematuria. ? HTN - Ambulatory BP readings in good range. No HA or dizziness. ? CHOL - Last cholesterol reviewed and in good range. Tolerating statin without side effects. ? MED - Compliant with CV meds listed below without notable side effects. HISTORY/ALLERGIES/ROS     MedHx:  has a past medical history of Aortic stenosis, Arthritis, Diabetes mellitus ([address] Hyperlipidemia, Hypertension, Paroxysmal atrial fibrillation ([address] and Thyroid disease. SurgHx:  has a past surgical history that includes Thyroidectomy, partial; Tonsillectomy; Tubal ligation; Knee arthroscopy (Left); other surgical history (10/09/2015); eye surgery (Left, 12/11/15); Upper gastrointestinal endoscopy (N/A, 8/13/2019); Colonoscopy (N/A, 8/13/2019); Esophagus dilation (8/13/2019); Cardiac catheterization (09/2019); and Aortic valve replacement (N/A, 10/15/2019). SocHx:  reports that she quit smoking about 37 years ago.  Her smoking use Reviewed with patient and will remain unchanged except as mentioned in A/P  PHYSICAL EXAM     Vitals:    08/19/20 1508   BP: (!) 140/58   Pulse: 70   SpO2: (!) 88%      Gen Alert, coop, no distress Heart  Rrr, 1/6   Head NC, AT, no abnorm Abd  Soft, NT, +BS, no mass, no OM   Eyes PER, conj/corn clear Ext  Ext nl, AT, no C/C/E   Nose Nares nl, no drain, NT Pulse 2+ and symmetric   Throat Lips, mucosa, tongue nl Skin Col/text/turg nl, no vis rash/les   Neck S/S, TM, NT, no bruit/JVD Psych Nl mood and affect   Lung CTA-B, unlabored, no DTP Lymph   No cervical or axillary LA   Ch wall NT, no deform Neuro  Nl gross M/S exam     ASSESSMENT AND PLAN     *AS   Date EF Detail   Sx   SOB, LE edema   Hx 10/19  TAVR 23 mm Garcia S3   TTE 6/19  10/19  11/19 60%  65%  65% Severe AS MG 34 and , mild MR, mod TR, mod pulm HTN, mod PI  TAVR MG 14  TAVR MG 20, triv catracho AI, mild MR, mod TR   LHC 9/19  Mild nonobstructive CAD ([place])   Plan   Refer to ER, patient prefers [facility]  Discussed with Dr. Anabell Byrd for decompensated CHF and hematuria  Historically refused cystoscopy   *AFIB/NSVT  Hx:  Parox, 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, NSVT  Status No AC due to hematuria  Plan [facility] due to hematuria and patient wishes  *HTN  Status Controlled  Plan Counseled on diet/salt/exercise/weight  *CHOL  Status  Uncontrolled with last LDL of 101 on 9/19 (goal <70)  Plan Counseled on diet/exercise/weight, continue statin, lipid/liver surveillance per PCP  *COMPLIANCE  Status Compliant  Plan Discussed importance of compliance with meds/diet/salt/exercise; avoid tob/alc/drugs; patient verbalized understanding  *FOLLOWUP  Pending hospital course    [address] Willian Fajardo, am scribing for and in the presence of Ada Doshi MD.   Willian Cooper 08/19/20 1:25 PM   Provider Rob Hernandez is working as a scribe for and in the presence of me (Ada Doshi MD).   Working as a scribe, Willian Nathan may have prepopulated components of this note with my historical  intellectual property under my direct supervision. Any additions to this intellectual property were performed in my presence and at my direction. Furthermore, the content and accuracy of this note have been reviewed by me Tyree Box MD).  8/19/2020 3:19 PM          If you have questions, please do not hesitate to call me. I look forward to following Jose Alejandro Clark along with you.     Sincerely,        Du Romero MD

## 2020-08-20 LAB
AMORPHOUS: ABNORMAL /HPF
ANION GAP SERPL CALCULATED.3IONS-SCNC: 5 MMOL/L (ref 3–16)
BILIRUBIN URINE: NEGATIVE
BLOOD, URINE: ABNORMAL
BUN BLDV-MCNC: 15 MG/DL (ref 7–20)
CALCIUM SERPL-MCNC: 9.2 MG/DL (ref 8.3–10.6)
CHLORIDE BLD-SCNC: 96 MMOL/L (ref 99–110)
CLARITY: CLEAR
CO2: 35 MMOL/L (ref 21–32)
COLOR: YELLOW
CREAT SERPL-MCNC: 1 MG/DL (ref 0.6–1.2)
EKG ATRIAL RATE: 64 BPM
EKG DIAGNOSIS: NORMAL
EKG P AXIS: 48 DEGREES
EKG P-R INTERVAL: 184 MS
EKG Q-T INTERVAL: 408 MS
EKG QRS DURATION: 70 MS
EKG QTC CALCULATION (BAZETT): 420 MS
EKG R AXIS: -24 DEGREES
EKG T AXIS: 20 DEGREES
EKG VENTRICULAR RATE: 64 BPM
EPITHELIAL CELLS, UA: ABNORMAL /HPF (ref 0–5)
GFR AFRICAN AMERICAN: >60
GFR NON-AFRICAN AMERICAN: 53
GLUCOSE BLD-MCNC: 113 MG/DL (ref 70–99)
GLUCOSE URINE: NEGATIVE MG/DL
HYALINE CASTS: ABNORMAL /LPF (ref 0–2)
KETONES, URINE: NEGATIVE MG/DL
LEUKOCYTE ESTERASE, URINE: ABNORMAL
LV EF: 65 %
LVEF MODALITY: NORMAL
MAGNESIUM: 1.7 MG/DL (ref 1.8–2.4)
MICROSCOPIC EXAMINATION: YES
MUCUS: ABNORMAL /LPF
NITRITE, URINE: NEGATIVE
PH UA: 5.5 (ref 5–8)
POTASSIUM SERPL-SCNC: 4 MMOL/L (ref 3.5–5.1)
PROTEIN UA: NEGATIVE MG/DL
RBC UA: ABNORMAL /HPF (ref 0–4)
SODIUM BLD-SCNC: 136 MMOL/L (ref 136–145)
SPECIFIC GRAVITY UA: 1.02 (ref 1–1.03)
URINE REFLEX TO CULTURE: ABNORMAL
URINE TYPE: ABNORMAL
UROBILINOGEN, URINE: 0.2 E.U./DL
WBC UA: ABNORMAL /HPF (ref 0–5)

## 2020-08-20 PROCEDURE — 2580000003 HC RX 258: Performed by: INTERNAL MEDICINE

## 2020-08-20 PROCEDURE — 6370000000 HC RX 637 (ALT 250 FOR IP): Performed by: INTERNAL MEDICINE

## 2020-08-20 PROCEDURE — 93010 ELECTROCARDIOGRAM REPORT: CPT | Performed by: INTERNAL MEDICINE

## 2020-08-20 PROCEDURE — 81001 URINALYSIS AUTO W/SCOPE: CPT

## 2020-08-20 PROCEDURE — 36415 COLL VENOUS BLD VENIPUNCTURE: CPT

## 2020-08-20 PROCEDURE — 83735 ASSAY OF MAGNESIUM: CPT

## 2020-08-20 PROCEDURE — 80048 BASIC METABOLIC PNL TOTAL CA: CPT

## 2020-08-20 PROCEDURE — 99232 SBSQ HOSP IP/OBS MODERATE 35: CPT | Performed by: INTERNAL MEDICINE

## 2020-08-20 PROCEDURE — 93306 TTE W/DOPPLER COMPLETE: CPT

## 2020-08-20 PROCEDURE — 99223 1ST HOSP IP/OBS HIGH 75: CPT | Performed by: INTERNAL MEDICINE

## 2020-08-20 PROCEDURE — 6360000002 HC RX W HCPCS: Performed by: INTERNAL MEDICINE

## 2020-08-20 PROCEDURE — 2060000000 HC ICU INTERMEDIATE R&B

## 2020-08-20 RX ORDER — DILTIAZEM HYDROCHLORIDE 180 MG/1
180 CAPSULE, COATED, EXTENDED RELEASE ORAL DAILY
Status: DISCONTINUED | OUTPATIENT
Start: 2020-08-20 | End: 2020-08-22 | Stop reason: HOSPADM

## 2020-08-20 RX ORDER — ERYTHROMYCIN 5 MG/G
OINTMENT OPHTHALMIC 3 TIMES DAILY
Status: DISCONTINUED | OUTPATIENT
Start: 2020-08-20 | End: 2020-08-22 | Stop reason: HOSPADM

## 2020-08-20 RX ORDER — METOPROLOL SUCCINATE 50 MG/1
50 TABLET, EXTENDED RELEASE ORAL DAILY
Status: DISCONTINUED | OUTPATIENT
Start: 2020-08-20 | End: 2020-08-22 | Stop reason: HOSPADM

## 2020-08-20 RX ORDER — HYDROCODONE BITARTRATE AND ACETAMINOPHEN 5; 325 MG/1; MG/1
1 TABLET ORAL 2 TIMES DAILY
Status: DISCONTINUED | OUTPATIENT
Start: 2020-08-20 | End: 2020-08-22 | Stop reason: HOSPADM

## 2020-08-20 RX ORDER — METAXALONE 800 MG/1
800 TABLET ORAL 2 TIMES DAILY PRN
Status: DISCONTINUED | OUTPATIENT
Start: 2020-08-20 | End: 2020-08-22 | Stop reason: HOSPADM

## 2020-08-20 RX ORDER — POTASSIUM CHLORIDE 20 MEQ/1
20 TABLET, EXTENDED RELEASE ORAL 2 TIMES DAILY WITH MEALS
Status: DISCONTINUED | OUTPATIENT
Start: 2020-08-20 | End: 2020-08-22

## 2020-08-20 RX ORDER — ERYTHROMYCIN 5 MG/G
OINTMENT OPHTHALMIC
Status: DISCONTINUED | OUTPATIENT
Start: 2020-08-20 | End: 2020-08-20

## 2020-08-20 RX ORDER — GABAPENTIN 300 MG/1
300 CAPSULE ORAL 2 TIMES DAILY
Status: DISCONTINUED | OUTPATIENT
Start: 2020-08-20 | End: 2020-08-22 | Stop reason: HOSPADM

## 2020-08-20 RX ADMIN — POTASSIUM CHLORIDE 20 MEQ: 1500 TABLET, EXTENDED RELEASE ORAL at 17:59

## 2020-08-20 RX ADMIN — HYDROCODONE BITARTRATE AND ACETAMINOPHEN 2 TABLET: 5; 325 TABLET ORAL at 17:58

## 2020-08-20 RX ADMIN — FUROSEMIDE 40 MG: 10 INJECTION, SOLUTION INTRAMUSCULAR; INTRAVENOUS at 17:59

## 2020-08-20 RX ADMIN — GABAPENTIN 300 MG: 300 CAPSULE ORAL at 13:08

## 2020-08-20 RX ADMIN — ERYTHROMYCIN: 5 OINTMENT OPHTHALMIC at 10:08

## 2020-08-20 RX ADMIN — GABAPENTIN 600 MG: 300 CAPSULE ORAL at 17:59

## 2020-08-20 RX ADMIN — HYDROCODONE BITARTRATE AND ACETAMINOPHEN 1 TABLET: 5; 325 TABLET ORAL at 13:07

## 2020-08-20 RX ADMIN — HYDROCODONE BITARTRATE AND ACETAMINOPHEN 1 TABLET: 5; 325 TABLET ORAL at 09:58

## 2020-08-20 RX ADMIN — Medication 10 ML: at 10:03

## 2020-08-20 RX ADMIN — PANTOPRAZOLE SODIUM 20 MG: 20 TABLET, DELAYED RELEASE ORAL at 10:03

## 2020-08-20 RX ADMIN — DULOXETINE HYDROCHLORIDE 30 MG: 30 CAPSULE, DELAYED RELEASE ORAL at 09:59

## 2020-08-20 RX ADMIN — ATORVASTATIN CALCIUM 40 MG: 40 TABLET, FILM COATED ORAL at 22:08

## 2020-08-20 RX ADMIN — FUROSEMIDE 40 MG: 10 INJECTION, SOLUTION INTRAMUSCULAR; INTRAVENOUS at 09:59

## 2020-08-20 RX ADMIN — ASPIRIN 81 MG: 81 TABLET, COATED ORAL at 09:59

## 2020-08-20 RX ADMIN — METOPROLOL SUCCINATE 50 MG: 50 TABLET, EXTENDED RELEASE ORAL at 09:58

## 2020-08-20 RX ADMIN — GABAPENTIN 600 MG: 300 CAPSULE ORAL at 22:08

## 2020-08-20 RX ADMIN — DILTIAZEM HYDROCHLORIDE 180 MG: 180 CAPSULE, COATED, EXTENDED RELEASE ORAL at 10:03

## 2020-08-20 RX ADMIN — GABAPENTIN 300 MG: 300 CAPSULE ORAL at 05:46

## 2020-08-20 RX ADMIN — GABAPENTIN 300 MG: 300 CAPSULE ORAL at 09:59

## 2020-08-20 RX ADMIN — LEVOTHYROXINE SODIUM 137 MCG: 25 TABLET ORAL at 08:29

## 2020-08-20 RX ADMIN — ERYTHROMYCIN: 5 OINTMENT OPHTHALMIC at 22:08

## 2020-08-20 RX ADMIN — Medication 5 MG: at 22:08

## 2020-08-20 RX ADMIN — HYDROCODONE BITARTRATE AND ACETAMINOPHEN 1 TABLET: 5; 325 TABLET ORAL at 05:47

## 2020-08-20 RX ADMIN — HYDROCODONE BITARTRATE AND ACETAMINOPHEN 2 TABLET: 5; 325 TABLET ORAL at 22:08

## 2020-08-20 RX ADMIN — POTASSIUM CHLORIDE 20 MEQ: 1500 TABLET, EXTENDED RELEASE ORAL at 11:11

## 2020-08-20 RX ADMIN — MAGNESIUM GLUCONATE 500 MG ORAL TABLET 400 MG: 500 TABLET ORAL at 17:59

## 2020-08-20 RX ADMIN — ERYTHROMYCIN: 5 OINTMENT OPHTHALMIC at 15:19

## 2020-08-20 RX ADMIN — Medication 10 ML: at 22:09

## 2020-08-20 ASSESSMENT — PAIN SCALES - GENERAL
PAINLEVEL_OUTOF10: 4
PAINLEVEL_OUTOF10: 5
PAINLEVEL_OUTOF10: 8
PAINLEVEL_OUTOF10: 2

## 2020-08-20 NOTE — PROGRESS NOTES
Progress Note - PCU    Admit Date:  8/19/2020    Subjective:  Ms. Gershon Holstein is doing better   now on 2 L of O2    Objective:   BP (!) 170/74   Pulse 73   Temp 98.3 °F (36.8 °C) (Oral)   Resp 20   Ht 5' 4\" (1.626 m)   Wt 151 lb 3.2 oz (68.6 kg)   SpO2 94%   BMI 25.95 kg/m²       Intake/Output Summary (Last 24 hours) at 8/20/2020 1426  Last data filed at 8/20/2020 1123  Gross per 24 hour   Intake 180 ml   Output 3050 ml   Net -2870 ml       Physical Exam:    Gen: No distress. Alert. Eyes: PERRL. No sclera icterus. No conjunctival injection. ENT: No discharge. Pharynx clear. Neck: No JVD. Trachea midline. Resp: No accessory muscle use. No crackles. No wheezes. No rhonchi. CV: Regular rate. Regular rhythm. 3/6 systolic  murmur. No rub. No edema. Capillary Refill: Brisk,< 3 seconds   Peripheral Pulses: +2 palpable, equal bilaterally   GI: Non-tender. Non-distended. Normal bowel sounds. Skin: Warm and dry. No nodule on exposed extremities. No rash on exposed extremities. M/S: No cyanosis. No joint deformity. No clubbing. Neuro: Awake. Grossly nonfocal    Psych: Oriented x 3. No anxiety or agitation.       Scheduled Meds:   [START ON 8/21/2020] levothyroxine  137 mcg Oral Daily    erythromycin   Right Eye TID    metoprolol succinate  50 mg Oral Daily    dilTIAZem  180 mg Oral Daily    potassium chloride  20 mEq Oral BID WC    gabapentin  300 mg Oral BID    HYDROcodone 5 mg - acetaminophen  1 tablet Oral BID    aspirin EC  81 mg Oral Daily    rivaroxaban  15 mg Oral Dinner    atorvastatin  40 mg Oral Nightly    DULoxetine  30 mg Oral Daily    melatonin  5 mg Oral Nightly    pantoprazole  20 mg Oral Daily    sodium chloride flush  10 mL Intravenous 2 times per day    furosemide  40 mg Intravenous BID    gabapentin  600 mg Oral BID    HYDROcodone 5 mg - acetaminophen  2 tablet Oral BID     PRN Meds:  sodium chloride flush, promethazine **OR** ondansetron, potassium chloride, magnesium sulfate    Data:  CBC:   Recent Labs     08/19/20 1918   WBC 6.9   HGB 10.3*   HCT 33.2*   MCV 91.8        BMP:   Recent Labs     08/19/20 1918 08/20/20  0548   * 136   K 4.6 4.0   CL 98* 96*   CO2 30 35*   BUN 19 15   CREATININE 1.2 1.0     PT/INR:   Recent Labs     08/19/20 1918   PROTIME 11.2   INR 0.97     CULTURES  N/A     RADIOLOGY    XR CHEST PORTABLE 8/19/2020   Final Result   Cardiomegaly and chronic pulmonary change without acute pulmonary process. TTE 8/20/2020  Summary   No significant change since 11.22.19   Normal left ventricular size and normal to hyperdynamic systolic function   with an estimated ejection fraction of 60-70%. No regional wall motion   abnormalities are seen. There is moderate concentric left ventricular hypertrophy. Grade I diastolic dysfunction with normal filling pressure. Moderate posterior mitral annular calcification is present. Mild mitral valve stenosis. Mean gradient 5 mm of Hg. No evidence of mitral regurgitation. A TAVR 23mmSapien S3 aortic valve appears well seated with a maximum   velocity of 3.12m/s and a mean gradient of 20mmHg. Mild aortic regurgitation is present.     Assessment/Plan:    Acute Hypoxic Respiratory Failure  - w/ O2 sats at 86% on RA, started on 3L O2, no home O2  - 2/2 above  - mgmt as below, supp O2, wean as tolerated  - currently on 2L NC    Acute on Chronic Diastolic CHF  - CXR: cardiomegaly and chronic pulm process, BNP 1858  - Admitted to PCU, tele  - low Na diet, I&Os, daily weights and BMP  - IV lasix 40 mg BID, on Toprol XL  - cardiology consult  - echo - normal EF, grade I DD    PAF  - NSR on admission  - cont Diltiazem, Toprol XL; AC on Xarelto    HTN  - uncontrolled  - cont Dilitazem, Toprol XL  - monitor    Aortic Stenosis  - s/p TAVR    Hypothyroidism  - home dose of synthroid 137 mcg    Chronic Pain with Opioid Dependence    - cont Gabapentin, Cymbalta  - on [facility],muscle relaxant    HLD  - cont Lipitor    GERD  - cont Protonix    DVT Prophylaxis: Xarelto  Diet: DIET LOW SODIUM 2 GM;  Code Status: Full Code    Dispo: cont care    LORRAINE Blevins.

## 2020-08-20 NOTE — PROGRESS NOTES
Called and spoke with Michelle at [facility] to clarify when patient could restart her Xarelto. She paged Dr. Tamiko Payne team to get clarification for me. Cady Collins Simpler

## 2020-08-20 NOTE — PROGRESS NOTES
Patient had eye surgery today was hypoxic during procedure, encouraged to go to ER. Patient states she went home to get supplies prior to coming to hospital, normally on room air, on 4 liters when picked up from ER, patient ambulatory, mild SOB with exertion, 99% SaO2 on 4 liters upon arrival to unit, reduced to 2 liters NC. Patient states she has been off her diuretic for the surgery as well as her anticoagulation. Unsure when she is to start the anticoagulation back. History of afib. Cardiology consulted to see patient, +2 pitting edema to BLE, lung sounds clear but diminished.
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
## 2020-08-20 NOTE — PROGRESS NOTES
Sitting up at sided of bed after going to restroom, patient given pain medication, aware not to get up without assist, call light in reach.

## 2020-08-20 NOTE — FLOWSHEET NOTE
08/19/20 2145   Vital Signs   Temp 98.1 °F (36.7 °C)   Temp Source Oral   Pulse 60   Heart Rate Source Monitor   Resp 20   /61   BP Location Left upper arm   BP Upper/Lower Upper   Level of Consciousness 0   MEWS Score 1   Height and Weight   Height 5' 4\" (1.626 m)   Weight 154 lb 9.6 oz (70.1 kg)   BSA (Calculated - sq m) 1.78 sq meters   BMI (Calculated) 26.6   Oxygen Therapy   SpO2 99 %   O2 Device Nasal cannula   O2 Flow Rate (L/min) 4 L/min   Patient admitted to room 317 from Er 19. Patient oriented to room, call light, bed rails, phone, lights and bathroom. Patient instructed about the schedule of the day including: vital sign frequency, lab draws, possible tests, frequency of MD and staff rounds, daily weights, I &O's and prescribed diet. Activebed alarm in place, patient aware of placement and reason. ActiveTelemetry box in place, patient aware of placement and reason. Bed locked, in lowest position, side rails up 2/4, call light within reach. Recliner Assessment  Patient is able to demonstrated the ability to move from a reclining position to an upright position within the recliner. 4 Eyes Skin Assessment     The patient is being assess for   Admission    I agree that 2 RN's have performed a thorough Head to Toe Skin Assessment on the patient. ALL assessment sites listed below have been assessed. Areas assessed by both nurses:   [x]   Head, Face, and Ears   [x]   Shoulders, Back, and Chest, Abdomen  [x]   Arms, Elbows, and Hands   [x]   Coccyx, Sacrum, and Ischium  [x]   Legs, Feet, and Heels        Eye patch over right eye from recent surgery today. **SHARE this note so that the co-signing nurse is able to place an eSignature**    Co-signer eSignature: Electronically signed by Flo Darby RN on 8/20/20 at 12:56 AM EDT    Does the Patient have Skin Breakdown?   No          Travis Prevention initiated:  NA   Wound Care Orders initiated:  NA      WOC nurse consulted for Pressure Injury (Stage 3,4, Unstageable, DTI, NWPT, Complex wounds)and New or Established Ostomies:  NA      Primary Nurse eSignature: Electronically signed by Aundria Sacks, RN on 8/20/20 at 12:55 AM EDT

## 2020-08-20 NOTE — PROGRESS NOTES
Pt assessed. Clear and diminished to auscultation in bilateral lobes. Scheduled medications given at this time. Pts  is here and is very demanding. Pt denies any needs. Call light and bedside table with-in easy reach. Cady Delong

## 2020-08-20 NOTE — PLAN OF CARE
Problem: Falls - Risk of:  Goal: Will remain free from falls  Description: Will remain free from falls  8/20/2020 0057 by Ivan Palomo RN  Outcome: Ongoing  8/20/2020 0056 by Ivan Palomo RN  Outcome: Ongoing  Goal: Absence of physical injury  Description: Absence of physical injury  8/20/2020 0057 by Ivan Palomo RN  Outcome: Ongoing  8/20/2020 0056 by Ivan Palomo RN  Outcome: Ongoing     Problem: OXYGENATION/RESPIRATORY FUNCTION  Goal: Patient will maintain patent airway  Outcome: Ongoing  Goal: Patient will achieve/maintain normal respiratory rate/effort  Description: Respiratory rate and effort will be within normal limits for the patient  Outcome: Ongoing     Problem: HEMODYNAMIC STATUS  Goal: Patient has stable vital signs and fluid balance  Outcome: Ongoing     Problem: FLUID AND ELECTROLYTE IMBALANCE  Goal: Fluid and electrolyte balance are achieved/maintained  Outcome: Ongoing     Problem: ACTIVITY INTOLERANCE/IMPAIRED MOBILITY  Goal: Mobility/activity is maintained at optimum level for patient  Outcome: Ongoing

## 2020-08-20 NOTE — PROGRESS NOTES
Cardiology Consult has been called to answering service on 8/20/20 . 6:32 AM    Yuniel Argueta  8/20/2020

## 2020-08-20 NOTE — ED NOTES
inquired about giving wife her evening pain medication.  was advised that he is not allowed to give home medications in the hospital and that we will need to talk to the dr to get an order for the medication.  stated that he thought that was stupid and he was going to go ahead and give pain medication anyway.      Shaun Sanchez RN  08/19/20 1060

## 2020-08-20 NOTE — ED NOTES
2030 Perfect served hospitalist per Dr. Iman Kim  2040 Admit orders placed without call back     Gaye Blackmon  08/19/20 2031       [employer]  08/19/20 2113

## 2020-08-20 NOTE — FLOWSHEET NOTE
08/20/20 0320   Vital Signs   Temp 98.3 °F (36.8 °C)   Temp Source Oral   Pulse 73   Heart Rate Source Monitor   Resp 20   BP (!) 170/74   BP Location Left upper arm   BP Upper/Lower Upper   Level of Consciousness 0   MEWS Score 1   Oxygen Therapy   SpO2 94 %   O2 Device Nasal cannula   O2 Flow Rate (L/min) 2 L/min   Patient in bed, oxygen pulled off, SaO2 78% on room air, oxygen replaced, up to restroom, SaO2 recovers to 94% on 2 liters.

## 2020-08-20 NOTE — CONSULTS
1 [facility] 1940    History:  Past Medical History:   Diagnosis Date    Acute on chronic diastolic (congestive) heart failure ([address] 8/19/2020    Aortic stenosis     Arthritis     Diabetes mellitus (HCC)     Hyperlipidemia     Hypertension     Paroxysmal atrial fibrillation (HCC)     Thyroid disease      Comorbidities Reviewed Yes    ECHO/EF%:  Updated echo is ordered/results pending    ACE/ARB/ARNI: none    BB:  Toprol XL    Diuretic therapy: lasix    Discharge plans: home, lives with spouse, children live nearby and assist     Family Present: no    Advanced Directives/Goals of Medical Care Discussion: patient does not have advance directives and requests assistance completing them at this time - consult made to spiritual care    Patient's stated goal of care: go home prior to discharge. Long term goal: discussed eventual use of palliative care and hospice with daughter Fanta De La Cruz goal discussed: call at earliest signs of worsening CHF    Progressive Mobility Assessment: staff to assist/evaluate, daughter states pt is basically homebound and very sedentary at home. Patient Vitals for the past 96 hrs (Last 3 readings):   Weight   08/20/20 0500 151 lb 3.2 oz (68.6 kg)   08/19/20 2145 154 lb 9.6 oz (70.1 kg)   08/19/20 1845 154 lb 4.8 oz (70 kg)     Noted OV wt of 152 lb on 5/5/2020 at Dr Karyna Call cardiology visit    Intake/Output Summary (Last 24 hours) at 8/20/2020 1054  Last data filed at 8/20/2020 0547  Gross per 24 hour   Intake 180 ml   Output 2850 ml   Net -2670 ml       Pt/Caregiver subjective information:  Pt is on her way down to echo lab for test. Daughter Georges Yuan called. Daughter is listed contact. Wagabriele Sher states she lives next door to her mother and father. Mentioned her mothers diet largely consists of eating the \"meals I prepare and take over to her\". She is cooking low sodium and does review food labels.

## 2020-08-20 NOTE — ED NOTES
Report given to [facility]. Elodia Nolan transporting pt to PCU.      Bess Cruz RN  08/19/20 6324

## 2020-08-20 NOTE — PROGRESS NOTES
H/p dictation id [id number]. Date of service 08/19/2020. dchf exacerbation. Tavr. Paf. Hypothyroidism. Htn.

## 2020-08-20 NOTE — CONSULTS
[facility]  [phone]        Reason for Consultation/Chief Complaint: \"I have been having SOB, swelling Dr Amy Heath sent me to ED. \"    History of Present Illness:  Tan Loera is a [de-identified] y.o. patient who Follows with DR Amy Heath for cardiology care s/p TAVR 10/19/19, PAF on Xarelto, HTN, HLD seen in office yesterday c/o increasing SOB, BLE edema reported her Lasix was stopped 2 weeks ago D/Tdehydration sent to ER for decompensated CHF, history of hematuria holding AC for eye surgery. She underwent removal of skin cancer from right eye lid on 8.19.20  Denies chest pain, dizziness, palpitations and syncope. I have been asked to provide consultation regarding further management and testing. Past Medical History:   has a past medical history of Acute on chronic diastolic (congestive) heart failure ([address] Aortic stenosis, Arthritis, Diabetes mellitus ([address] Hyperlipidemia, Hypertension, Paroxysmal atrial fibrillation ([address] and Thyroid disease. Surgical History:   has a past surgical history that includes Thyroidectomy, partial; Tonsillectomy; Tubal ligation; Knee arthroscopy (Left); other surgical history (10/09/2015); Upper gastrointestinal endoscopy (N/A, 8/13/2019); Colonoscopy (N/A, 8/13/2019); Esophagus dilation (8/13/2019); Cardiac catheterization (09/2019); Aortic valve replacement (N/A, 10/15/2019); eye surgery; and eye surgery (Right, 08/19/2020). Social History:  Lives in [place], , 4 children 3 living. Retired from [address] [place] [address] smoking after 25 years of smoking        Family History: reviewed non contributory  family history includes Colon Cancer in her mother; Heart Disease in her brother and father. Home Medications:  Were reviewed and are listed in nursing record. and/or listed below  Prior to Admission medications    Medication Sig Start Date End Date Taking?  Authorizing Provider   gabapentin (NEURONTIN) 600 MG tablet Take 600 mg by mouth 4 times daily. Takes one 300mg pill in early am, one with breakfast, two with dinner, and two at bedtime   Yes Historical Provider, MD   furosemide (LASIX) 20 MG tablet Take 20 mg by mouth Daily with supper   Yes Historical Provider, MD   potassium chloride (MICRO-K) 10 MEQ extended release capsule Take 10 mEq by mouth daily   Yes Historical Provider, MD   Cyanocobalamin (B-12) 2500 MCG TABS Take 1 tablet by mouth daily   Yes Historical Provider, MD   furosemide (LASIX) 20 MG tablet Take 1 tablet by mouth as needed (edema)  Patient taking differently: Take 40 mg by mouth every morning  8/4/20  Yes Satish Hanson MD   metoprolol succinate (TOPROL XL) 50 MG extended release tablet Take 0.5 tablets by mouth daily 5/5/20  Yes Satish Hanson MD   ferrous sulfate 325 (65 Fe) MG tablet Take 1 tablet by mouth daily (with breakfast) 2/24/20  Yes Satish Hanson MD   diltiazem (CARDIZEM CD) 180 MG extended release capsule Take 1 capsule by mouth daily 1/21/20  Yes Satish Hanson MD   rivaroxaban Mindy Rumps) 15 MG TABS tablet Take 1 tablet by mouth Daily with supper 1/21/20  Yes Satish Hanson MD   atorvastatin (LIPITOR) 40 MG tablet Take 1 tablet by mouth nightly 1/21/20  Yes Satish Hanson MD   DULoxetine (CYMBALTA) 20 MG extended release capsule Take 30 mg by mouth daily    Yes Historical Provider, MD   melatonin 5 MG TABS tablet Take 5 mg by mouth nightly   Yes Historical Provider, MD   aspirin EC 81 MG EC tablet Take 1 tablet by mouth daily 9/25/19  Yes Mukund Garcia MD   HYDROcodone-acetaminophen (NORCO) 5-325 MG per tablet Take 2 tablets by mouth every 6 hours as needed for Pain. Takes one pill early am, one pill at breakfast, two pills with dinner, and two pills at bedtime. Yes Historical Provider, MD   Butalbital-Acetaminophen  MG TABS Take 1 tablet by mouth as needed (as needed for headaches) Vary rarely.    Yes Historical Provider, MD   levothyroxine (SYNTHROID) 175 MCG tablet Take 125 mcg by mouth daily    Yes Historical Provider, MD   pantoprazole (PROTONIX) 20 MG tablet Take 20 mg by mouth daily 10/28/19   Historical Provider, MD   metaxalone (SKELAXIN) 800 MG tablet Take 800 mg by mouth as needed (as needed for spasms) Do not take with vicodin. Historical Provider, MD        Allergies: Iodides;  Shellfish-derived products; and Sulfa antibiotics     Review of Systems:   12 point ROS negative in all areas as listed below except as in [place]  Constitutional, EENT, Cardiovascular, pulmonary, GI, , Musculoskeletal, skin, neurological, hematological, endocrine, Psychiatric    Physical Examination:    Vitals:    08/20/20 0320   BP: (!) 170/74   Pulse: 73   Resp: 20   Temp: 98.3 °F (36.8 °C)   SpO2: 94%    Weight: 151 lb 3.2 oz (68.6 kg)         General Appearance:  Alert, cooperative, no distress, appears stated age   Head:  Normocephalic, without obvious abnormality, atraumatic   Eyes:  PERRL, conjunctiva/corneas clear       Nose: Nares normal, no drainage or sinus tenderness   Throat: Lips, mucosa, and tongue normal   Neck: Supple, symmetrical, trachea midline, no adenopathy, thyroid: not enlarged, symmetric, no tenderness/mass/nodules, no carotid bruit, 2+ JVD       Lungs:   Crackles  to auscultation bilaterally, respirations unlabored   Chest Wall:  No tenderness or deformity   Heart:  Regular rate and rhythm, S1, S2 normal, 2/6 systolic  Murmur at base of heart, no diastolic murmur  No  rub or gallop   Abdomen:   Soft, non-tender, bowel sounds active all four quadrants,  no masses, no organomegaly           Extremities: Extremities normal, atraumatic, no cyanosis, 2+ edema   Pulses: 2+ and symmetric   Skin: Skin color, texture, turgor normal, no rashes or lesions   Pysch: Normal mood and affect   Neurologic: Normal gross motor and sensory exam.         Labs  CBC:   Lab Results   Component Value Date    WBC 6.9 08/19/2020    RBC 3.62 08/19/2020    HGB 10.3 2020    HCT 33.2 2020    MCV 91.8 2020    RDW 16.7 2020     2020     CMP:    Lab Results   Component Value Date     2020    K 4.6 2020    CL 98 2020    CO2 30 2020    BUN 19 2020    CREATININE 1.2 2020    GFRAA 52 2020    AGRATIO 1.2 2019    LABGLOM 43 2020    GLUCOSE 121 2020    PROT 7.0 2019    CALCIUM 8.9 2020    BILITOT <0.2 2019    ALKPHOS 81 2019    AST 34 2019    ALT 18 2019     PT/INR:  No results found for: PTINR  Lab Results   Component Value Date    TROPONINI <0.01 2020     I have reviewed the following tests and documented in this encounter as follows:     EK20 I have reviewed EKG with the following interpretation:  Impression: Normal sinus rhythmMinimal voltage criteria for LVH, may be normal variantPossible Anterior infarct , age undeterminedAbnormal ECGWhen compared with ECG of 2019 05:04,Significant changes have occurredConfirmed by Radha Huertas MD, [address] () on 2020 4:48:14 AM     CXR 20  FINDINGS: There is chronic pulmonary change. There is no acute consolidation or effusion. There is no pneumothorax. The heart is enlarged. The upper abdomen is unremarkable. The extrathoracic soft tissues are unremarkable. Event monitor 20-20  Predominant SR 4.9% AFIB, AT, PSVT, PAC's PVC's    TTE 19   Atrial fibrillation throughout the study.   Left ventricular systolic function is normal with ejection fraction estimated at 60-65 %.   No regional wall motion abnormalities are noted. mild concentric left ventricular hypertrophy. Left ventricle size is normal.   Grade II diastolic dysfunction with elevated filling pressure.   The left atrium is moderately dilated.   A TAVR aortic valve appears well seated with a maximum velocity of 2.94m/s and a mean gradient of 20mmHg.   Trace catracho-valvular aortic valve regurgitation.  Mitral annular calcification is present.  Mitral valve leaflets appear moderately thickened. Mild mitral regurgitation. Moderate tricuspid regurgitation. Systolic pulmonary artery pressure (SPAP) is normal and estimated at 55 mmHg (right atrial pressure 3 mmHg). This is suggestive of moderate pulmonary hypertension. Mild 40-49 Mod 50-59 Severe >60. IVC is normal in size (< 2.1 cm) and collapses > 50% with respiration consistent with normal RA pressure (3 mmHg).  Previous echo done 10/16/2019 - EF 65, mac lae    Brief op 10/25/19  Transcutaneous aortic valve replacement (23 mm Joni S3 Valve)  Peripheral angiography    TTE 10/15/19:   Left ventricle - moderate concentric LVH, normal size and function with EF of 65%   Mitral valve - thickened and calcified, annular calcification, mitral area 1.5cm2, mean gradient 5mmHg, trivial regurgitation   Aortic valve - well seated TAVR valve with mean gradient of 14mmHg, no regurgitation Tricuspid valve - mild regurgitation with PASP of 48mmHg   Pulmonic valve - mild regurgitation   Left atrium - dilated    CATH 9/25/19  Mild nonobstructive CAD  No LVG due to AS    ECHO 6/12/19  Summary   Normal left ventricular systolic function with an estimated ejection fraction of 55-60%. No regional wall motion abnormalities are seen. mild concentric left ventricular hypertrophy. Grade I diastolic dysfunction with normal filling pressure. left atrium is mildly dilated. Heavy posterior mitral annular calcification is present.   Mild anterior mitral annular calcification is present. Mild mitral regurgitation.   Moderate to severe aortic stenosis is present.  Moderate tricuspid regurgitation.   Systolic pulmonary artery pressure (SPAP) estimated at 50 mmHg (RA pressure 3 mmHg), consistent with moderate pulmonary hypertension.   Moderate pulmonic regurgitation present.     ECHO 6/13/18  Summary   Normal LV EF with an estimated ejection fraction of 60%. No regional wall motion abnormalities are seen.  Mild concentric left ventricular hypertrophy.   Grade I diastolic dysfunction with normal filling pressure. aortic valve is thickened/calcified with decreased leaflet mobility. Moderate aortic stenosis.   Mild tricuspid and pulmonic regurgitation.   Systolic pulmonary artery pressure (SPAP) is normal and estimated at 30mmHg (RA pressure of 3 mmHg).     ECHO 4/16/15  Summary   Normal left ventricular global systolic function with an estimated ejection   fraction of 60-65%.   Left ventricle size is normal.   Left ventricular wall thickness is normal.   Grade I left ventricular diastolic dysfunction pattern noted, valsalva   maneuver performed.   Mild mitral valve annular calcification is present, both anterior and   inferolateral/posterior annuluses.   Trace mitral valve regurgitation noted.   The aortic valve is thickened/calcified with decreased leaflet mobility   consistent with aortic stenosis. It is trileaflet in nature.   The aortic valve area is calculated at 1.0 cm2 with a maximum pressure   gradient of 50 mmHg and a mean pressure gradient of 27 mmHg. This is c/w   moderate aortic stenosis.   The tricuspid valve is structurally and functionally normal.   There is trace tricuspid valve regurgitation noted with an estimated SPAP of   36 mmHg.   The pulmonic valve is not well visualized.   Mild pulmonic valve regurgitation is present.  Alfaro Nickie is a small localized near left ventricle pericardial effusion noted.  IVC size is normal (<2.1 cm) and collapses < 50% with respiration consistent   with elevated RA pressure (10 mmHg).   The left atrium is mildly dilated.   Assessment  Acute diastolic congestive heart failure  Paroxysmal atrial fibrillations  S/p TAVR  Eye lid surgery 8.19.20  Patient Active Problem List   Diagnosis    Lumbar radiculopathy    Lumbar spine pain    Hip pain, left    Aortic stenosis    Hypercholesteremia    Essential hypertension    Loss of appetite    Weight loss    Anemia    Positive colorectal cancer screening using Cologuard test    Schatzki's ring    Nonrheumatic aortic valve stenosis    Digoxin toxicity, accidental or unintentional, initial encounter    S/P TAVR (transcatheter aortic valve replacement)    Paroxysmal atrial fibrillation (HCC)    FLOR[place] (acute kidney injury) [facility]    Digoxin overdose, accidental or unintentional, initial encounter    Acute on chronic diastolic (congestive) heart failure (HCC)         Plan:  Diuresis will benefit from long term diuretics  Recent article in [facility] patient who continue diuretics for heart failure reduces the incidence of recurrent heart failure admissions. BP control  Resume anticoagulants when safe by eye doctor     Thank you for allowing to us to participate in the care or [facility]. Further evaluation will be based upon the patient's clinical course and testing results.     Donnell Sanchez MD.

## 2020-08-21 LAB
ANION GAP SERPL CALCULATED.3IONS-SCNC: 8 MMOL/L (ref 3–16)
BUN BLDV-MCNC: 18 MG/DL (ref 7–20)
CALCIUM SERPL-MCNC: 8.7 MG/DL (ref 8.3–10.6)
CHLORIDE BLD-SCNC: 95 MMOL/L (ref 99–110)
CO2: 30 MMOL/L (ref 21–32)
CREAT SERPL-MCNC: 1.1 MG/DL (ref 0.6–1.2)
GFR AFRICAN AMERICAN: 58
GFR NON-AFRICAN AMERICAN: 48
GLUCOSE BLD-MCNC: 109 MG/DL (ref 70–99)
MAGNESIUM: 1.8 MG/DL (ref 1.8–2.4)
POTASSIUM SERPL-SCNC: 4.2 MMOL/L (ref 3.5–5.1)
SODIUM BLD-SCNC: 133 MMOL/L (ref 136–145)

## 2020-08-21 PROCEDURE — 94761 N-INVAS EAR/PLS OXIMETRY MLT: CPT

## 2020-08-21 PROCEDURE — 6360000002 HC RX W HCPCS: Performed by: INTERNAL MEDICINE

## 2020-08-21 PROCEDURE — 36415 COLL VENOUS BLD VENIPUNCTURE: CPT

## 2020-08-21 PROCEDURE — 99232 SBSQ HOSP IP/OBS MODERATE 35: CPT | Performed by: INTERNAL MEDICINE

## 2020-08-21 PROCEDURE — 2060000000 HC ICU INTERMEDIATE R&B

## 2020-08-21 PROCEDURE — 6370000000 HC RX 637 (ALT 250 FOR IP): Performed by: INTERNAL MEDICINE

## 2020-08-21 PROCEDURE — 83735 ASSAY OF MAGNESIUM: CPT

## 2020-08-21 PROCEDURE — 2580000003 HC RX 258: Performed by: INTERNAL MEDICINE

## 2020-08-21 PROCEDURE — 99233 SBSQ HOSP IP/OBS HIGH 50: CPT | Performed by: INTERNAL MEDICINE

## 2020-08-21 PROCEDURE — 80048 BASIC METABOLIC PNL TOTAL CA: CPT

## 2020-08-21 PROCEDURE — 2700000000 HC OXYGEN THERAPY PER DAY

## 2020-08-21 RX ORDER — LIDOCAINE 4 G/G
1 PATCH TOPICAL DAILY
Status: DISCONTINUED | OUTPATIENT
Start: 2020-08-21 | End: 2020-08-22 | Stop reason: HOSPADM

## 2020-08-21 RX ORDER — LIDOCAINE 4 G/G
1 PATCH TOPICAL DAILY
Status: DISCONTINUED | OUTPATIENT
Start: 2020-08-21 | End: 2020-08-22

## 2020-08-21 RX ORDER — FUROSEMIDE 10 MG/ML
40 INJECTION INTRAMUSCULAR; INTRAVENOUS ONCE
Status: COMPLETED | OUTPATIENT
Start: 2020-08-21 | End: 2020-08-21

## 2020-08-21 RX ORDER — FUROSEMIDE 10 MG/ML
80 INJECTION INTRAMUSCULAR; INTRAVENOUS 2 TIMES DAILY
Status: DISCONTINUED | OUTPATIENT
Start: 2020-08-21 | End: 2020-08-22

## 2020-08-21 RX ADMIN — PANTOPRAZOLE SODIUM 20 MG: 20 TABLET, DELAYED RELEASE ORAL at 09:42

## 2020-08-21 RX ADMIN — HYDROCODONE BITARTRATE AND ACETAMINOPHEN 1 TABLET: 5; 325 TABLET ORAL at 12:18

## 2020-08-21 RX ADMIN — FUROSEMIDE 80 MG: 10 INJECTION, SOLUTION INTRAMUSCULAR; INTRAVENOUS at 17:48

## 2020-08-21 RX ADMIN — GABAPENTIN 600 MG: 300 CAPSULE ORAL at 17:48

## 2020-08-21 RX ADMIN — DULOXETINE HYDROCHLORIDE 30 MG: 30 CAPSULE, DELAYED RELEASE ORAL at 09:42

## 2020-08-21 RX ADMIN — POTASSIUM CHLORIDE 20 MEQ: 1500 TABLET, EXTENDED RELEASE ORAL at 17:48

## 2020-08-21 RX ADMIN — LEVOTHYROXINE SODIUM 137 MCG: 25 TABLET ORAL at 06:52

## 2020-08-21 RX ADMIN — ERYTHROMYCIN: 5 OINTMENT OPHTHALMIC at 21:10

## 2020-08-21 RX ADMIN — Medication 10 ML: at 09:42

## 2020-08-21 RX ADMIN — GABAPENTIN 600 MG: 300 CAPSULE ORAL at 21:09

## 2020-08-21 RX ADMIN — ATORVASTATIN CALCIUM 40 MG: 40 TABLET, FILM COATED ORAL at 21:09

## 2020-08-21 RX ADMIN — FUROSEMIDE 40 MG: 10 INJECTION, SOLUTION INTRAMUSCULAR; INTRAVENOUS at 09:42

## 2020-08-21 RX ADMIN — HYDROCODONE BITARTRATE AND ACETAMINOPHEN 2 TABLET: 5; 325 TABLET ORAL at 17:42

## 2020-08-21 RX ADMIN — HYDROCODONE BITARTRATE AND ACETAMINOPHEN 1 TABLET: 5; 325 TABLET ORAL at 09:12

## 2020-08-21 RX ADMIN — METOPROLOL SUCCINATE 50 MG: 50 TABLET, EXTENDED RELEASE ORAL at 09:42

## 2020-08-21 RX ADMIN — FUROSEMIDE 40 MG: 10 INJECTION, SOLUTION INTRAMUSCULAR; INTRAVENOUS at 12:19

## 2020-08-21 RX ADMIN — HYDROCODONE BITARTRATE AND ACETAMINOPHEN 2 TABLET: 5; 325 TABLET ORAL at 21:10

## 2020-08-21 RX ADMIN — ERYTHROMYCIN: 5 OINTMENT OPHTHALMIC at 15:25

## 2020-08-21 RX ADMIN — MAGNESIUM GLUCONATE 500 MG ORAL TABLET 400 MG: 500 TABLET ORAL at 09:42

## 2020-08-21 RX ADMIN — POTASSIUM CHLORIDE 20 MEQ: 1500 TABLET, EXTENDED RELEASE ORAL at 09:48

## 2020-08-21 RX ADMIN — ERYTHROMYCIN: 5 OINTMENT OPHTHALMIC at 09:51

## 2020-08-21 RX ADMIN — GABAPENTIN 300 MG: 300 CAPSULE ORAL at 09:13

## 2020-08-21 RX ADMIN — GABAPENTIN 300 MG: 300 CAPSULE ORAL at 12:19

## 2020-08-21 RX ADMIN — DILTIAZEM HYDROCHLORIDE 180 MG: 180 CAPSULE, COATED, EXTENDED RELEASE ORAL at 09:42

## 2020-08-21 RX ADMIN — Medication 5 MG: at 21:10

## 2020-08-21 RX ADMIN — ASPIRIN 81 MG: 81 TABLET, COATED ORAL at 09:42

## 2020-08-21 RX ADMIN — Medication 10 ML: at 21:10

## 2020-08-21 ASSESSMENT — PAIN SCALES - GENERAL
PAINLEVEL_OUTOF10: 6
PAINLEVEL_OUTOF10: 5
PAINLEVEL_OUTOF10: 5
PAINLEVEL_OUTOF10: 8

## 2020-08-21 NOTE — PROGRESS NOTES
Received call from [facility] stating that Xarelto could be resumed 8/22. Talked to Dr. Kalie Lin and changed the start date for xarelto to tomorrow 8/22 per his verbal orders. Cady Osman

## 2020-08-21 NOTE — PROGRESS NOTES
Patient's  requested the rest of her skelaxin as he is going home and will be back later. I gave it to him and made sure he got on the elevator. Katie L. Mamie Bamberger

## 2020-08-21 NOTE — PLAN OF CARE
Problem: Falls - Risk of:  Goal: Will remain free from falls  Description: Will remain free from falls  Outcome: Ongoing  Goal: Absence of physical injury  Description: Absence of physical injury  Outcome: Ongoing     Problem: OXYGENATION/RESPIRATORY FUNCTION  Goal: Patient will maintain patent airway  Outcome: Ongoing  Note:   Patient's EF (Ejection Fraction) is greater than 40%       . .................................................................................................................................................. Patient's weights and intake/output reviewed: Yes    Patient's Last Weight: 149 lbs obtained by standing scale. Difference of 2 lbs less than last documented weight. Intake/Output Summary (Last 24 hours) at 8/21/2020 0729  Last data filed at 8/21/2020 0630  Gross per 24 hour   Intake 240 ml   Output 1000 ml   Net -760 ml       Comorbidities Reviewed Yes    Patient has a past medical history of Acute on chronic diastolic (congestive) heart failure ([facility] [address] [address] Aortic stenosis, Arthritis, Diabetes mellitus ([facility] Hyperlipidemia, Hypertension, Paroxysmal atrial fibrillation ([facility] [address] and Thyroid disease. >>For CHF and Comorbidity documentation on Education Time and Topics, please see Education Tab                    Pt resting in bed at this time on  2 L O2. Pt denies shortness of breath. Pt with nonpitting lower extremity edema.      Patient and/or Family's stated Goal of Care this Admission: increase activity tolerance, be more comfortable, and reduce lower extremity edema prior to discharge        :   Goal: Patient will achieve/maintain normal respiratory rate/effort  Description: Respiratory rate and effort will be within normal limits for the patient  Outcome: Ongoing     Problem: HEMODYNAMIC STATUS  Goal: Patient has stable vital signs and fluid balance  Outcome: Ongoing     Problem: FLUID AND ELECTROLYTE IMBALANCE  Goal: Fluid and electrolyte balance are achieved/maintained  Outcome: Ongoing     Problem: ACTIVITY INTOLERANCE/IMPAIRED MOBILITY  Goal: Mobility/activity is maintained at optimum level for patient  Outcome: Ongoing

## 2020-08-21 NOTE — PROGRESS NOTES
Pt assessed. Crackles auscultated in bilateral lower lobes. Diminished throughout right middle and bilateral upper lobes. Trace edema noted in BLE. Pt denies having any problems having a BM.  at bedside condescendingly asking if she has gotten all of her medications and her needs met. Call light and bedside table with-in easy reach. Cady Ellison Murders

## 2020-08-21 NOTE — PROGRESS NOTES
Progress Note - PCU    Admit Date:  8/19/2020    Subjective:  Ms. Melburn Severance continues to require 2 L of oxygen. She is lost 5 pounds since admission. Objective:   BP (!) 141/54   Pulse 82   Temp 99.3 °F (37.4 °C) (Oral)   Resp 16   Ht 5' 4\" (1.626 m)   Wt 149 lb 12.8 oz (67.9 kg)   SpO2 93%   BMI 25.71 kg/m²       Intake/Output Summary (Last 24 hours) at 8/21/2020 1255  Last data filed at 8/21/2020 1221  Gross per 24 hour   Intake 930 ml   Output 800 ml   Net 130 ml       Physical Exam:    Gen: No distress. Alert. Eyes: right eye patch  ENT: No discharge. Pharynx clear. Neck: No JVD. Trachea midline. Resp: No accessory muscle use. No crackles. No wheezes. No rhonchi. CV: Regular rate. Regular rhythm. 3/6 systolic  murmur. No rub. No edema. Capillary Refill: Brisk,< 3 seconds   Peripheral Pulses: +2 palpable, equal bilaterally   GI: Non-tender. Non-distended. Normal bowel sounds. Skin: Warm and dry. No nodule on exposed extremities. No rash on exposed extremities. M/S: No cyanosis. No joint deformity. No clubbing. Neuro: Awake. Grossly nonfocal    Psych: Oriented x 3. No anxiety or agitation.       Scheduled Meds:   furosemide  80 mg Intravenous BID    lidocaine  1 patch Transdermal Daily    lidocaine  1 patch Transdermal Daily    levothyroxine  137 mcg Oral Daily    erythromycin   Right Eye TID    metoprolol succinate  50 mg Oral Daily    dilTIAZem  180 mg Oral Daily    potassium chloride  20 mEq Oral BID WC    gabapentin  300 mg Oral BID    HYDROcodone 5 mg - acetaminophen  1 tablet Oral BID    magnesium oxide  400 mg Oral Daily    aspirin EC  81 mg Oral Daily    rivaroxaban  15 mg Oral Dinner    atorvastatin  40 mg Oral Nightly    DULoxetine  30 mg Oral Daily    melatonin  5 mg Oral Nightly    pantoprazole  20 mg Oral Daily    sodium chloride flush  10 mL Intravenous 2 times per day    gabapentin  600 mg Oral BID    HYDROcodone 5 mg - acetaminophen  2 tablet Oral BID PRN Meds:  metaxalone, sodium chloride flush, promethazine **OR** ondansetron, potassium chloride, magnesium sulfate    Data:  CBC:   Recent Labs     08/19/20 1918   WBC 6.9   HGB 10.3*   HCT 33.2*   MCV 91.8        BMP:   Recent Labs     08/19/20 1918 08/20/20  0548 08/21/20  0426   * 136 133*   K 4.6 4.0 4.2   CL 98* 96* 95*   CO2 30 35* 30   BUN 19 15 18   CREATININE 1.2 1.0 1.1     PT/INR:   Recent Labs     08/19/20 1918   PROTIME 11.2   INR 0.97     CULTURES  N/A     RADIOLOGY    XR CHEST PORTABLE 8/19/2020   Final Result   Cardiomegaly and chronic pulmonary change without acute pulmonary process. TTE 8/20/2020  Summary   No significant change since 11.22.19   Normal left ventricular size and normal to hyperdynamic systolic function   with an estimated ejection fraction of 60-70%. No regional wall motion   abnormalities are seen. There is moderate concentric left ventricular hypertrophy. Grade I diastolic dysfunction with normal filling pressure. Moderate posterior mitral annular calcification is present. Mild mitral valve stenosis. Mean gradient 5 mm of Hg. No evidence of mitral regurgitation. A TAVR 23mmSapien S3 aortic valve appears well seated with a maximum   velocity of 3.12m/s and a mean gradient of 20mmHg. Mild aortic regurgitation is present.     Assessment/Plan:    Acute Hypoxic Respiratory Failure  - w/ O2 sats at 86% on RA, started on 3L O2, no home O2  - 2/2 above  - mgmt as below, supp O2, wean as tolerated  - currently on 2L NC    Acute on Chronic Diastolic CHF  - CXR: cardiomegaly and chronic pulm process, BNP 1858  - Admitted to PCU, tele  - low Na diet, I&Os, daily weights and BMP  - IV lasix 40 mg BID, on Toprol XL  - cardiology consult  - echo - normal EF, grade I DD  Lasix dose increased to 80 twice daily by cardiology    PAF  - NSR on admission  - cont Diltiazem, Toprol XL; AC on Xarelto    HTN  - uncontrolled  - cont Dilitazem, Toprol XL  - monitor    Aortic Stenosis  - s/p TAVR    Hypothyroidism  - home dose of synthroid 137 mcg    Chronic Pain with Opioid Dependence    - cont Gabapentin, Cymbalta  - on [facility],muscle relaxant    HLD  - cont Lipitor    GERD  - cont Protonix    DVT Prophylaxis: Xarelto  Diet: DIET LOW SODIUM 2 GM; 1500 ml  Code Status: Full Code    Dispo: cont care    LORRAINE Blevins.

## 2020-08-21 NOTE — PROGRESS NOTES
I left the room to get applesauce to give the patient to put her potassium in. When I came back the pills that I had scanned, put in a cup and left sitting on my workstation were still in the cup but in her husbands hand. I said \"Sir I told you yesterday that it is my job to give her all her medications. Please do not touch my workstation. \"  He said well I'm just used to giving her all her medications. He handed the cup to me and no medications were missing and there were no additional meds that had been added. So I administered the medications. Patient's  is extremely pushy and demanding. He said he was going to go home and get her \"salon patches\". I again reminded him that I need a doctor's order in order for her to have those on while she is here and that I would ask the hospitalist as soon as I see him. We had already had this discussion originally 20 minutes prior when he asked if we had \"salon patches\" here. He was attempting to bully Dr. Harlen Oppenheim into discharging the patient because she wants to go home. He demanded that I put a new eye patch on right now even though she was the one that removed it and threw it away. Cady Yates

## 2020-08-21 NOTE — PROGRESS NOTES
Patient's  came up to the desk to give me her skelaxin that is ordered PRN. He said I can't have the whole pill bottle because the medication is too expensive. So I told him I would meet him in the room in a few minutes to get the medications and I would bring something to put them in. When I went into the room he handed me the pill bottle. He said now she can only get one more pill for today because I just gave her one. I clarified that he did in fact give her medication despite the fact that I have had three conversations with him about him not being able to give her any medications while she is in the hospital, that all medications needed to be administered by a nurse here and we had to have an order to do so. I kept the bottle and walked out of the room so I wasn't alone with him in the room. I came to the nurses station and called security. We continued to discuss the matter at the desk where I told him he would not be getting the medication back at this time. That he either needs to leave the hospital now and I would escort him down to the front and then give him the medication to take home with him or that I would lock it up in the  the room and he could take it when he was ready to leave later. He gave me the lid for the container and watched me lock it up in the  the room along with security. Patient's  stated he was totally pissed off. I again reminded him that if he couldn't follow the rules and continued to berate staff we could make him leave. I then left the room. Cady Davila

## 2020-08-21 NOTE — PROGRESS NOTES
Per Dr. Laureano Queen office at [facility]. Patient can remove the patch and just make sure to apply the erythromycin ointment three times a day. Cady Bowman

## 2020-08-22 VITALS
DIASTOLIC BLOOD PRESSURE: 65 MMHG | WEIGHT: 147.49 LBS | OXYGEN SATURATION: 96 % | SYSTOLIC BLOOD PRESSURE: 148 MMHG | HEIGHT: 64 IN | BODY MASS INDEX: 25.18 KG/M2 | TEMPERATURE: 97.3 F | HEART RATE: 78 BPM | RESPIRATION RATE: 16 BRPM

## 2020-08-22 LAB
ANION GAP SERPL CALCULATED.3IONS-SCNC: 9 MMOL/L (ref 3–16)
BUN BLDV-MCNC: 22 MG/DL (ref 7–20)
CALCIUM SERPL-MCNC: 8.7 MG/DL (ref 8.3–10.6)
CHLORIDE BLD-SCNC: 95 MMOL/L (ref 99–110)
CO2: 30 MMOL/L (ref 21–32)
CREAT SERPL-MCNC: 1.4 MG/DL (ref 0.6–1.2)
GFR AFRICAN AMERICAN: 44
GFR NON-AFRICAN AMERICAN: 36
GLUCOSE BLD-MCNC: 101 MG/DL (ref 70–99)
MAGNESIUM: 2 MG/DL (ref 1.8–2.4)
POTASSIUM SERPL-SCNC: 4.2 MMOL/L (ref 3.5–5.1)
SODIUM BLD-SCNC: 134 MMOL/L (ref 136–145)

## 2020-08-22 PROCEDURE — 2580000003 HC RX 258: Performed by: INTERNAL MEDICINE

## 2020-08-22 PROCEDURE — 6370000000 HC RX 637 (ALT 250 FOR IP): Performed by: INTERNAL MEDICINE

## 2020-08-22 PROCEDURE — 2700000000 HC OXYGEN THERAPY PER DAY

## 2020-08-22 PROCEDURE — 36600 WITHDRAWAL OF ARTERIAL BLOOD: CPT

## 2020-08-22 PROCEDURE — 94761 N-INVAS EAR/PLS OXIMETRY MLT: CPT

## 2020-08-22 PROCEDURE — 36415 COLL VENOUS BLD VENIPUNCTURE: CPT

## 2020-08-22 PROCEDURE — 51798 US URINE CAPACITY MEASURE: CPT

## 2020-08-22 PROCEDURE — 80048 BASIC METABOLIC PNL TOTAL CA: CPT

## 2020-08-22 PROCEDURE — 83735 ASSAY OF MAGNESIUM: CPT

## 2020-08-22 PROCEDURE — 99233 SBSQ HOSP IP/OBS HIGH 50: CPT | Performed by: INTERNAL MEDICINE

## 2020-08-22 RX ORDER — FUROSEMIDE 20 MG/1
20 TABLET ORAL DAILY
Status: DISCONTINUED | OUTPATIENT
Start: 2020-08-22 | End: 2020-08-22 | Stop reason: HOSPADM

## 2020-08-22 RX ORDER — FUROSEMIDE 20 MG/1
40 TABLET ORAL EVERY MORNING
Qty: 30 TABLET | Refills: 0 | Status: SHIPPED | OUTPATIENT
Start: 2020-08-22 | End: 2021-02-04 | Stop reason: SDUPTHER

## 2020-08-22 RX ORDER — POTASSIUM CHLORIDE 750 MG/1
10 TABLET, EXTENDED RELEASE ORAL DAILY
Status: DISCONTINUED | OUTPATIENT
Start: 2020-08-22 | End: 2020-08-22 | Stop reason: HOSPADM

## 2020-08-22 RX ADMIN — ERYTHROMYCIN: 5 OINTMENT OPHTHALMIC at 07:59

## 2020-08-22 RX ADMIN — PANTOPRAZOLE SODIUM 20 MG: 20 TABLET, DELAYED RELEASE ORAL at 07:57

## 2020-08-22 RX ADMIN — POTASSIUM CHLORIDE 10 MEQ: 750 TABLET, EXTENDED RELEASE ORAL at 11:34

## 2020-08-22 RX ADMIN — DULOXETINE HYDROCHLORIDE 30 MG: 30 CAPSULE, DELAYED RELEASE ORAL at 07:58

## 2020-08-22 RX ADMIN — Medication 10 ML: at 07:58

## 2020-08-22 RX ADMIN — METOPROLOL SUCCINATE 50 MG: 50 TABLET, EXTENDED RELEASE ORAL at 07:58

## 2020-08-22 RX ADMIN — HYDROCODONE BITARTRATE AND ACETAMINOPHEN 1 TABLET: 5; 325 TABLET ORAL at 07:57

## 2020-08-22 RX ADMIN — METAXALONE 800 MG: 800 TABLET ORAL at 14:38

## 2020-08-22 RX ADMIN — ASPIRIN 81 MG: 81 TABLET, COATED ORAL at 07:58

## 2020-08-22 RX ADMIN — LEVOTHYROXINE SODIUM 137 MCG: 25 TABLET ORAL at 05:28

## 2020-08-22 RX ADMIN — DILTIAZEM HYDROCHLORIDE 180 MG: 180 CAPSULE, COATED, EXTENDED RELEASE ORAL at 07:58

## 2020-08-22 RX ADMIN — FUROSEMIDE 20 MG: 20 TABLET ORAL at 11:34

## 2020-08-22 RX ADMIN — GABAPENTIN 300 MG: 300 CAPSULE ORAL at 07:57

## 2020-08-22 RX ADMIN — MAGNESIUM GLUCONATE 500 MG ORAL TABLET 400 MG: 500 TABLET ORAL at 07:57

## 2020-08-22 RX ADMIN — ERYTHROMYCIN: 5 OINTMENT OPHTHALMIC at 14:36

## 2020-08-22 RX ADMIN — HYDROCODONE BITARTRATE AND ACETAMINOPHEN 1 TABLET: 5; 325 TABLET ORAL at 11:34

## 2020-08-22 RX ADMIN — GABAPENTIN 300 MG: 300 CAPSULE ORAL at 11:34

## 2020-08-22 RX ADMIN — POTASSIUM CHLORIDE 20 MEQ: 1500 TABLET, EXTENDED RELEASE ORAL at 07:58

## 2020-08-22 ASSESSMENT — PAIN SCALES - GENERAL
PAINLEVEL_OUTOF10: 2
PAINLEVEL_OUTOF10: 6

## 2020-08-22 NOTE — PROGRESS NOTES
[address] Daily Progress Note      Admit Date:  8/19/2020    Subjective:  Ms. Arlin Reyes is admitted with CHF  She is still on nasal O2 new since in hospital   Patient is on PO narcotics chronically that may be affecting her Oxygenation. No chest pain cough palpitations or dizziness. She had urinary retention last evening. Normally has no voiding difficulty. History of Present Illness:  Beckie Porter is a [de-identified] y.o. patient who Follows with DR Lorraine Sheffield for cardiology care s/p TAVR 10/19/19, PAF on Xarelto, HTN, HLD seen in office yesterday c/o increasing SOB, BLE edema reported her Lasix was stopped 2 weeks ago D/Tdehydration sent to ER for decompensated CHF, history of hematuria holding AC for eye surgery. She underwent removal of skin cancer from right eye lid on 8.19.20    ROS:  12 point ROS negative in all areas as listed below except as in [place]  Constitutional, EENT, Cardiovascular, pulmonary, GI, , Musculoskeletal, skin, neurological, hematological, endocrine, Psychiatric    Past Medical History:   Diagnosis Date    Acute on chronic diastolic (congestive) heart failure (HCC) 8/19/2020    Aortic stenosis     Arthritis     Diabetes mellitus ([address]     Hyperlipidemia     Hypertension     Paroxysmal atrial fibrillation ([address]     Thyroid disease      Past Surgical History:   Procedure Laterality Date    AORTIC VALVE REPLACEMENT N/A 10/15/2019    TRANSCATHETER AORTIC VALVE REPLACEMENT FEMORAL APPROACH performed by Trish Stephens MD at [facility]  09/2019    COLONOSCOPY N/A 8/13/2019    COLON W/ANES.  performed by Yoanna Jara MD at [address]  8/13/2019    ESOPHAGEAL DILATION [place] performed by Yoanna Jara MD at [address] Right 08/19/2020    KNEE ARTHROSCOPY Left     Torn meniscus    OTHER SURGICAL HISTORY  10/09/2015    phacoemulsification of cataract with intraocular implant right eye    THYROIDECTOMY, PARTIAL      TONSILLECTOMY      TUBAL LIGATION      UPPER GASTROINTESTINAL ENDOSCOPY N/A 8/13/2019    EGD W/ANES. (9:30) performed by Meredith Mancini MD at [facility]       Objective:   /62   Pulse 67   Temp 97.3 °F (36.3 °C) (Oral)   Resp 16   Ht 5' 4\" (1.626 m)   Wt 147 lb 7.8 oz (66.9 kg)   SpO2 94%   BMI 25.32 kg/m²       Intake/Output Summary (Last 24 hours) at 8/22/2020 0956  Last data filed at 8/22/2020 2137  Gross per 24 hour   Intake 948 ml   Output 2400 ml   Net -1452 ml       TELEMETRY:NSR PAC,s  Physical Exam:  General: No Respiratory distress, appears well developed and well nourished. Appears to be in no pain. Eyes:  Sclera nonicteric  Nose/Sinuses:  negative findings: nose shows no deformity, asymmetry, or inflammation, nasal mucosa normal, septum midline with no perforation or bleeding  Back:  no pain to palpation  Joint:  no active joint inflammation  Musculoskeletal:  negative  Skin:  Warm and dry  Neck: negJVD and  Neg Carotid Bruits. Chest:  Minimal crackles  to auscultation, respiration easy improved since yesterday  Cardiovascular:  RRR, S1S2 normal, 2/6 CHRISTO loudest at base expected for AVR no diastolic murmur, no rub or thrill.   Abdomen:  Soft normal liver and spleen  Extremities:   No edema, clubbing, cyanosis,  Pulses: pedal pulses are normal.  Neuro: intact    Medications:    furosemide  80 mg Intravenous BID    lidocaine  1 patch Transdermal Daily    rivaroxaban  15 mg Oral Dinner    levothyroxine  137 mcg Oral Daily    erythromycin   Right Eye TID    metoprolol succinate  50 mg Oral Daily    dilTIAZem  180 mg Oral Daily    potassium chloride  20 mEq Oral BID WC    gabapentin  300 mg Oral BID    HYDROcodone 5 mg - acetaminophen  1 tablet Oral BID    magnesium oxide  400 mg Oral Daily    aspirin EC  81 mg Oral Daily    atorvastatin  40 mg Oral Nightly    DULoxetine  30 mg Oral Daily    melatonin  5 mg Oral Nightly    pantoprazole  20 mg Oral Daily    sodium chloride flush  10 mL Intravenous 2 times per day    gabapentin  600 mg Oral BID    HYDROcodone 5 mg - acetaminophen  2 tablet Oral BID       metaxalone, sodium chloride flush, promethazine **OR** ondansetron, potassium chloride, magnesium sulfate    Lab Data:  CBC:   Recent Labs     20   WBC 6.9   HGB 10.3*   HCT 33.2*   MCV 91.8        BMP:   Recent Labs     20  0548 20  0426 20  0409    133* 134*   K 4.0 4.2 4.2   CL 96* 95* 95*   CO2 35* 30 30   BUN 15 18 22*   CREATININE 1.0 1.1 1.4*     LIVER PROFILE: No results for input(s): AST, ALT, LIPASE, BILIDIR, BILITOT, ALKPHOS in the last 72 hours. Invalid input(s): AMYLASE,  ALB  PT/INR:   Recent Labs     20   PROTIME 11.2   INR 0.97     APTT:   Recent Labs     20   APTT 30.6     BNP:  No results for input(s): BNP in the last 72 hours. IMAGING:     ECHO 20  Summary   No significant change since 19   Normal left ventricular size and normal to hyperdynamic systolic function   with an estimated ejection fraction of 60-70%. No regional wall motion   abnormalities are seen. There is moderate concentric left ventricular hypertrophy. Grade I diastolic dysfunction with normal filling pressure. Moderate posterior mitral annular calcification is present. Mild mitral valve stenosis. Mean gradient 5 mm of Hg. No evidence of mitral regurgitation. A TAVR 23mmSapien S3 aortic valve appears well seated with a maximum   velocity of 3.12m/s and a mean gradient of 20mmHg. Mild aortic regurgitation is present.     EK20 I have reviewed EKG with the following interpretation:  Impression: Normal sinus rhythmMinimal voltage criteria for LVH, may be normal variantPossible Anterior infarct , age undeterminedAbnormal ECGWhen compared with ECG of 2019 05:04,Significant changes have occurredConfirmed by Yodit Mata MD, [address] ([id number]) on 8/20/2020 4:48:14 AM      CXR 08/19/20  FINDINGS: There is chronic pulmonary change. Zeina Carota is no acute consolidation or effusion. Zeina Carota is no pneumothorax.  The heart is enlarged.  The upper abdomen is unremarkable.  The extrathoracic soft tissues are unremarkable.      Event monitor 2/18/20-2/25/20  Predominant SR 4.9% AFIB, AT, PSVT, PAC's PVC's     TTE 11/22/19   Atrial fibrillation throughout the study.   Left ventricular systolic function is normal with ejection fraction estimated at 60-65 %.   No regional wall motion abnormalities are noted. mild concentric left ventricular hypertrophy. Left ventricle size is normal.   Grade II diastolic dysfunction with elevated filling pressure.   The left atrium is moderately dilated.   A TAVR aortic valve appears well seated with a maximum velocity of 2.94m/s and a mean gradient of 20mmHg.   Trace catracho-valvular aortic valve regurgitation. Mitral annular calcification is present.  Mitral valve leaflets appear moderately thickened. Mild mitral regurgitation. Moderate tricuspid regurgitation. Systolic pulmonary artery pressure (SPAP) is normal and estimated at 55 mmHg (right atrial pressure 3 mmHg). This is suggestive of moderate pulmonary hypertension. Mild 40-49 Mod 50-59 Severe >60. IVC is normal in size (< 2.1 cm) and collapses > 50% with respiration consistent with normal RA pressure (3 mmHg).   Previous echo done 10/16/2019 - EF 65, mac lae     Brief op 10/25/19  Transcutaneous aortic valve replacement (23 mm Joni S3 Valve)  Peripheral angiography     TTE 10/15/19:   Left ventricle - moderate concentric LVH, normal size and function with EF of 65%   Mitral valve - thickened and calcified, annular calcification, mitral area 1.5cm2, mean gradient 5mmHg, trivial regurgitation   Aortic valve - well seated TAVR valve with mean gradient of 14mmHg, no regurgitation Tricuspid valve - mild regurgitation with PASP of 48mmHg   Pulmonic valve - mild pressure   gradient of 50 mmHg and a mean pressure gradient of 27 mmHg. This is c/w   moderate aortic stenosis.   The tricuspid valve is structurally and functionally normal.   There is trace tricuspid valve regurgitation noted with an estimated SPAP of   36 mmHg.   The pulmonic valve is not well visualized.   Mild pulmonic valve regurgitation is present.  Lisa Cool is a small localized near left ventricle pericardial effusion noted.  IVC size is normal (<2.1 cm) and collapses < 50% with respiration consistent   with elevated RA pressure (10 mmHg).   The left atrium is mildly dilated. Assessment:  Acute on chronic diastolic CHF  Paroxysmal atrial fibrillations  S/p TAVR  Eye lid surgery 8.19.20  Chronic pain syndrome  Patient Active Problem List    Diagnosis Date Noted    Aortic valve disease     Acute on chronic congestive heart failure ([address]     Acute respiratory failure with hypoxia ([address]     Acute on chronic diastolic (congestive) heart failure ([address] 08/19/2020    FLORENCE (acute kidney injury) ([address] 11/05/2019    Digoxin overdose, accidental or unintentional, initial encounter     S/P TAVR (transcatheter aortic valve replacement) 11/02/2019    PAF (paroxysmal atrial fibrillation) ([address] 11/02/2019    Digoxin toxicity, accidental or unintentional, initial encounter 11/01/2019    Nonrheumatic aortic valve stenosis 10/15/2019    Loss of appetite     Weight loss     Anemia     Positive colorectal cancer screening using Cologuard test     Schatzki's ring     Aortic stenosis 06/25/2019    Hypercholesteremia 06/25/2019    Essential hypertension 06/25/2019    Lumbar radiculopathy 10/11/2016    Lumbar spine pain 10/11/2016    Hip pain, left 10/11/2016       1. OK TO DISCHARGE TODAY  2. Will switch to PO lasix   3. Resume anticoagulation when ok by surgeon who performed lid surgery  Nurse Adele King had called them yesterday to check on it but has not heard back  4.  Salt and fluid restriction  5. BP is better

## 2020-08-22 NOTE — PROGRESS NOTES
Per PCA, patient ambulated to restroom and voided 700 ml of urine at this time. Patient stated that she just has trouble \"relaxing and going sometimes. \"  Dr. Анна Gomez updated.

## 2020-08-22 NOTE — DISCHARGE SUMMARY
Hospital Medicine Discharge Summary    Patient ID: Erin Boyd      Patient's PCP: Nancy Cardenas MD    Admit Date: 8/19/2020     Discharge Date:   8/22/20    Admitting Physician: Katia Lester MD     Discharge Physician: Gigi Connolly MD     Discharge Diagnoses: Active Hospital Problems    Diagnosis    Aortic valve disease [I35.9]    Acute on chronic congestive heart failure (HCC) [I50.9]    Acute respiratory failure with hypoxia (HCC) [J96.01]    Acute on chronic diastolic (congestive) heart failure (HCC) [I50.33]    PAF ([facility]oxysmal [facility] fibrillation) [facility] [I48.0]       The patient was seen and examined on day of discharge and this discharge summary is in conjunction with any daily progress note from day of discharge. Hospital Course: An 80-year-old  female who  Presented with sob edema     she missed lasix for 1 week   status post TAVR done by Dr. Raul Land     The patient notes that a week ago, she  stopped taking her Lasix and stopped taking her rivaroxaban for an eye  procedure that she was supposed to have and for a week, she was diuresed 5 lit \  Summary   No significant change since 11.22.19   Normal left ventricular size and normal to hyperdynamic systolic function   with an estimated ejection fraction of 60-70%. No regional wall motion   abnormalities are seen.   There is moderate concentric left ventricular hypertrophy.   Grade I diastolic dysfunction with normal filling pressure.   Moderate posterior mitral annular calcification is present.   Mild mitral valve stenosis. Mean gradient 5 mm of Hg.   No evidence of mitral regurgitation.   A TAVR 23mmSapien S3 aortic valve appears well seated with a maximum   velocity of 3.12m/s and a mean gradient of 20mmHg.   Mild aortic regurgitation is present.       Acute Hypoxic Respiratory Failure  - w/ O2 sats at 86% on RA, started on 3L O2, no home O2  - pt was arranged home o3 will eventually wean off since pt was feeling better  And  persistant about going home        Acute on Chronic Diastolic CHF  - CXR: cardiomegaly and chronic pulm process, BNP 1858  - IV lasix 40 mg BID, on Toprol XL    - echo - normal EF, grade I DD  Lasix dose increased to 80 twice daily by cardiology     PAF  - NSR on admission  - cont Diltiazem, Toprol XL; AC on Xarelto     HTN  - uncontrolled  - cont Dilitazem, Toprol XL  - monitor     Aortic Stenosis  - s/p TAVR     Hypothyroidism  - home dose of synthroid 137 mcg     Chronic Pain with Opioid Dependence    - cont Gabapentin, Cymbalta  - on [facility] Bryce,muscle relaxant       Physical Exam Performed:     BP (!) 148/65   Pulse 78   Temp 97.3 °F (36.3 °C) (Oral)   Resp 16   Ht 5' 4\" (1.626 m)   Wt 147 lb 7.8 oz (66.9 kg)   SpO2 96%   BMI 25.32 kg/m²       General appearance:  No apparent distress, appears stated age and cooperative. HEENT:  Normal cephalic, atraumatic   Respiratory:  Normal respiratory effort. Clear to auscultation, bilaterally without Rales/Wheezes/Rhonchi. Cardiovascular:  Regular rate and rhythm with normal S1/S2 without murmurs, rubs or gallops. Abdomen: Soft, non-tender, non-distended with normal bowel sounds. Musculoskeletal:  No clubbing, cyanosis or edema bilaterally. Full range of motion without deformity. Skin: Skin color, texture, turgor normal.  No rashes or lesions. Neurologic:  Neurovascularly intact without any focal sensory/motor deficits. Cranial nerves: II-XII intact, grossly non-focal.  Psychiatric:  Alert and oriented,      Labs:  For convenience and continuity at follow-up the following most recent labs are provided:      CBC:    Lab Results   Component Value Date    WBC 6.9 08/19/2020    HGB 10.3 08/19/2020    HCT 33.2 08/19/2020     08/19/2020       Renal:    Lab Results   Component Value Date     08/22/2020    K 4.2 08/22/2020    K 4.6 08/19/2020    CL 95 08/22/2020    CO2 30 08/22/2020    BUN 22 08/22/2020    CREATININE 1.4 08/22/2020 CALCIUM 8.7 08/22/2020    PHOS 3.6 11/03/2019         Significant Diagnostic Studies    Radiology:   XR CHEST PORTABLE   Final Result   Cardiomegaly and chronic pulmonary change without acute pulmonary process. Consults:     IP CONSULT TO HOSPITALIST  IP CONSULT TO CARDIOLOGY  IP CONSULT TO SPIRITUAL SERVICES    Disposition: home    Condition at Discharge: Stable    Discharge Instructions/Follow-up:  pcp    Code Status:  Full Code     Activity: activity as tolerated    Diet: cardiac diet      Discharge Medications:     Current Discharge Medication List           Details   rivaroxaban (XARELTO) 15 MG TABS tablet Take 1 tablet by mouth Daily with supper Start when ok with  surgeon  Qty: 90 tablet, Refills: 3      furosemide (LASIX) 20 MG tablet Take 2 tablets by mouth every morning  Qty: 30 tablet, Refills: 0              Details   gabapentin (NEURONTIN) 600 MG tablet Take 600 mg by mouth 4 times daily.  Takes one 300mg pill in early am, one with breakfast, two with dinner, and two at bedtime      potassium chloride (MICRO-K) 10 MEQ extended release capsule Take 10 mEq by mouth daily      Cyanocobalamin (B-12) 2500 MCG TABS Take 1 tablet by mouth daily      metoprolol succinate (TOPROL XL) 50 MG extended release tablet Take 0.5 tablets by mouth daily  Qty: 90 tablet, Refills: 3      ferrous sulfate 325 (65 Fe) MG tablet Take 1 tablet by mouth daily (with breakfast)  Qty: 30 tablet, Refills: 1      diltiazem (CARDIZEM CD) 180 MG extended release capsule Take 1 capsule by mouth daily  Qty: 90 capsule, Refills: 3      atorvastatin (LIPITOR) 40 MG tablet Take 1 tablet by mouth nightly  Qty: 90 tablet, Refills: 3      DULoxetine (CYMBALTA) 20 MG extended release capsule Take 30 mg by mouth daily       melatonin 5 MG TABS tablet Take 5 mg by mouth nightly      aspirin EC 81 MG EC tablet Take 1 tablet by mouth daily  Qty: 90 tablet, Refills: 1      HYDROcodone-acetaminophen (NORCO) 5-325 MG per tablet Take 2 tablets by mouth every 6 hours as needed for Pain. Takes one pill early am, one pill at breakfast, two pills with dinner, and two pills at bedtime. Butalbital-Acetaminophen  MG TABS Take 1 tablet by mouth as needed (as needed for headaches) Vary rarely. levothyroxine (SYNTHROID) 175 MCG tablet Take 125 mcg by mouth daily       pantoprazole (PROTONIX) 20 MG tablet Take 20 mg by mouth daily      metaxalone (SKELAXIN) 800 MG tablet Take 800 mg by mouth 2 times daily as needed (as needed for spasms) Do not take with vicodin. Time Spent on discharge is more than 45 minutes in the examination, evaluation, counseling and review of medications and discharge plan. Signed:    Caleb Watson MD   8/22/2020      Thank you Marialuisa No MD for the opportunity to be involved in this patient's care. If you have any questions or concerns please feel free to contact me at [phone].

## 2020-08-22 NOTE — PROGRESS NOTES
Pt is lying in bed with their eyes closed. Respirations are easy and even. Call light within reach bed in lowest position with the wheels locked. Will continue to monitor.  Pasquale Smith

## 2020-08-22 NOTE — PROGRESS NOTES
Lab called to report that ABG collected was rejected due to clotting. VM left with Dr. Emiliano Fowler to update on ABG and on additional ambulation testing completed with RT.

## 2020-08-22 NOTE — PROGRESS NOTES
Discharge instructions reviewed with patient, spouse, and caregiver Yanique Donnelly). Prescriptions escribed to patient's home pharmacy for Xarelto and Lasix. Peripheral IV removed per protocol without complication. Telemetry removed and patient dressed. Oxygen tank via [facility] provided and patient discharged in stable condition via wheelchair on 3 L home with spouse.

## 2020-08-22 NOTE — PROGRESS NOTES
Abg drawn from right radial x 1 attempt following positive allens test sign. Sample obtained on 3 l/m. Tolerated well. pressure held per policy/hemostasis observed. Following ABG draw, re-checked resting SPO2 on 3 l/m. This was 97%. Ambulated patient on 3 l/m for approximately 100' and slow, steady pace. No dyspnea noted. Lowest ambulatory SPO2 on 3 l/m was 95%. Returned patient to room and re-checked resting SPO2 on 3 l/m. It was 97%. It appears previous desaturations were artifact induced.

## 2020-08-22 NOTE — PROGRESS NOTES
Pt assisted up to bathroom and back to bed. Warm blanket provided per patient request.  No other needs at this time. Call light within reach.

## 2020-08-22 NOTE — PROGRESS NOTES
Patient at rest on RA 83%, heart rate 65. Patient ambulated to restroom on RA, desaturated to 78%. Patient 92% on 2 L at rest.  Patient on 2 L ambulating 84%. Patient asymptomatic with pulse oximetry reading. Returned patient to bed with arms elevated for maximum lung expansion. Patient 77-78% on 3 L at rest, took patient 14 minutes to recover to 94% with the assistance of respiratory. Notified Dr. Judie Rocha of readings and orders placed.

## 2020-08-22 NOTE — DISCHARGE INSTR - COC
Continuity of Care Form    Patient Name: Naeem Machuca   :  1940  MRN:  [id number]    Admit date:  2020  Discharge date:  ***    Code Status Order: Full Code   Advance Directives:   [facility] Documentation     Date/Time Healthcare Directive Type of Healthcare Directive Copy in [address] Agent's Name Healthcare Agent's Phone Number    [phone]  Yes, patient has an advance directive for healthcare treatment  Health care treatment directive; Living will;Durable power of  for health care  --  Healthcare power of   Massiel Schroeder  --          Admitting Physician:  Yuni Rice MD  PCP: Ced Cedillo MD    Discharging Nurse: [facility] Unit/Room#: /7310-66  Discharging Unit Phone Number: ***    Emergency Contact:   Extended Emergency Contact Information  Primary Emergency Contact: [facility] Phone: [phone]  Mobile Phone: [phone]  Relation: Other  Secondary Emergency Contact: Efraín Charlton  Address: [address] [place] [address] Phone: [phone]  Mobile Phone: [phone]  Relation: Spouse    Past Surgical History:  Past Surgical History:   Procedure Laterality Date    AORTIC VALVE REPLACEMENT N/A 10/15/2019    TRANSCATHETER AORTIC VALVE REPLACEMENT FEMORAL APPROACH performed by Mario Dorado MD at [address]  2019    COLONOSCOPY N/A 2019    COLON W/ANES.  performed by Jose Cruz MD at [address]  2019    ESOPHAGEAL DILATION Janna Sell performed by Jose Cruz MD at [address] Right 2020    KNEE ARTHROSCOPY Left     Torn meniscus    OTHER SURGICAL HISTORY  10/09/2015    phacoemulsification of cataract with intraocular implant right eye    THYROIDECTOMY, PARTIAL      TONSILLECTOMY      TUBAL LIGATION      UPPER GASTROINTESTINAL ENDOSCOPY N/A 8/13/2019    EGD W/ANES.  (9:30) performed by Meredith Deluca MD at [address] [facility]       Immunization History:   Immunization History   Administered Date(s) Administered    Influenza, Quadv, IM, PF (6 mo and older Fluzone, Flulaval, Fluarix, and 3 yrs and older Afluria) 10/16/2019       Active Problems:  Patient Active Problem List   Diagnosis Code    Lumbar radiculopathy M54.16    Lumbar spine pain M54.5    Hip pain, left M25.552    Aortic stenosis I35.0    Hypercholesteremia E78.00    Essential hypertension I10    Loss of appetite R63.0    Weight loss R63.4    Anemia D64.9    Positive colorectal cancer screening using Cologuard test R19.5    Schatzki's ring K22.2    Nonrheumatic aortic valve stenosis I35.0    Digoxin toxicity, accidental or unintentional, initial encounter T46.0X1A    S/P TAVR (transcatheter aortic valve replacement) Z95.2    PAF (paroxysmal atrial fibrillation) ([facility]) I48.0    FLORENCE (acute kidney injury) ([facility] N17.9    Digoxin overdose, accidental or unintentional, initial encounter T46.0X1A    Acute on chronic diastolic (congestive) heart failure ([facility] [facility] I50.33    Aortic valve disease I35.9    Acute on chronic congestive heart failure ([facility]) I50.9    Acute respiratory failure with hypoxia ([facility]) J96.01       Isolation/Infection:   Isolation          No Isolation        Patient Infection Status     None to display          Nurse Assessment:  Last Vital Signs: BP (!) 148/65   Pulse 78   Temp 97.3 °F (36.3 °C) (Oral)   Resp 16   Ht 5' 4\" (1.626 m)   Wt 147 lb 7.8 oz (66.9 kg)   SpO2 96%   BMI 25.32 kg/m²     Last documented pain score (0-10 scale): Pain Level: 6  Last Weight:   Wt Readings from Last 1 Encounters:   08/22/20 147 lb 7.8 oz (66.9 kg)     Mental Status:  {IP PT MENTAL STATUS:91791}    IV Access:  {[facility] IV ACCESS:959821010}    Nursing Mobility/ADLs:  Walking   {[facility] BYGN:133132465}  Transfer  {[facility] BDQW:560264527}  Bathing  {CHP DME QKQL:322865561}  Dressing  {CHP DME MERE:420995728}  Toileting  {CHP DME TACA:750893533}  Feeding  {CHP DME XOLK:857417769}  Med Admin  {CHP DME SIGB:483948658}  Med Delivery   { [facility] MED Delivery:321524333}    Wound Care Documentation and Therapy:        Elimination:  Continence:   · Bowel: {YES / YZ:72746}  · Bladder: {YES / KF:42506}  Urinary Catheter: {Urinary Catheter:732718550}   Colostomy/Ileostomy/Ileal Conduit: {YES / BB:95094}       Date of Last BM: ***    Intake/Output Summary (Last 24 hours) at 2020 1409  Last data filed at 2020 1316  Gross per 24 hour   Intake 678 ml   Output 2200 ml   Net -1522 ml     I/O last 3 completed shifts:   In: 56 [P.O.:890]  Out: 1600 [Urine:1600]    Safety Concerns:     [employer] Safety Concerns:032467957}    Impairments/Disabilities:      [employer] Impairments/Disabilities:213697609}    Nutrition Therapy:  Current Nutrition Therapy:   [employer] Diet List:313393970}    Routes of Feeding: {[facility] DME Other Feedings:554513936}  Liquids: {Slp liquid thickness:31385}  Daily Fluid Restriction: {CHP DME Yes amt example:726402155}  Last Modified Barium Swallow with Video (Video Swallowing Test): {Done Not Done GSAQ:331533507}    Treatments at the Time of Hospital Discharge:   Respiratory Treatments: ***  Oxygen Therapy:  {Therapy; copd oxygen:12631}  Ventilator:    { CC Vent RHKT:285793096}    Rehab Therapies: {THERAPEUTIC INTERVENTION:6212668374}  Weight Bearing Status/Restrictions: [address] [employer] Weight Bearin}  Other Medical Equipment (for information only, NOT a DME order):  {EQUIPMENT:464127287}  Other Treatments: ***    Patient's personal belongings (please select all that are sent with patient):  {[facility] DME Belongings:454130119}    RN SIGNATURE:  {Esignature:232810312}    CASE MANAGEMENT/SOCIAL WORK SECTION    Inpatient Status Date:20    Readmission Risk Assessment Score:  Readmission Risk              Risk of Unplanned Readmission:        19           Discharging to Facility/ Agency   · Name: [facility]  · Phone: [phone]  · Fax: [phone]      / signature: Electronically signed by Chery Craven RN on 8/22/20 at 2:10 PM EDT    PHYSICIAN SECTION    Prognosis: Good    Condition at Discharge: Stable    Rehab Potential (if transferring to Rehab): {Prognosis:9146889620}    Recommended Labs or Other Treatments After Discharge: ***    Physician Certification: I certify the above information and transfer of Nora Sherman  is necessary for the continuing treatment of the diagnosis listed and that she requires Home Care for less 30 days.      Update Admission H&P: No change in H&P    PHYSICIAN SIGNATURE:  Electronically signed by Dr. Nilesh Mcdermott RN on 8/22/20 at 2:11 PM EDT  [address] [place] [address]

## 2020-08-22 NOTE — PROGRESS NOTES
PT admitted with sob   chf and diuresed   And also on pain meds   He ro2 sat dropped  On admit 86 %  2 lit  92%on day of dc   pt agreed  to   [address] on o2 ,discussed with pt  Face to face   Tenisha Reyes

## 2020-08-22 NOTE — FLOWSHEET NOTE
08/21/20 2058   Vital Signs   Temp 97.2 °F (36.2 °C)   Temp Source Oral   Pulse 68   Heart Rate Source Monitor   Resp 16   /76   BP Location Left upper arm   BP Upper/Lower Upper   Level of Consciousness 0   MEWS Score 1   Oxygen Therapy   SpO2 97 %   O2 Device Nasal cannula   O2 Flow Rate (L/min) 2 L/min   Pt assessment complete. Pt lying in bed quietly. No s/s of distress noted. Lung sounds diminished. Pt on 02 2liters. Nightly medications given. Denies any needs at this time. Call light within reach.

## 2020-08-22 NOTE — CARE COORDINATION
Assessment attempted. Pt BENITA at this time. Will re attempt at a later time.
DISCHARGE ORDER  Date/Time 2020 2:27 PM  Completed by: Chery Craven, Case Management    Patient Name: Nora Sherman      : 1940  Admitting Diagnosis: Acute on chronic diastolic (congestive) heart failure ([facility] [address] [I50.33]      Admit order Date and Status: 20 inpatient  (verify MD's last order for status of admission)      Noted discharge order. Confirmed discharge plan  : Yes  with whom patient  If pt confirmed DC plan does family need to be contacted by CM Yes if yes who wife  Discharge Plan: Order for dc noted. Spoke with pt via phone re: need for home O2 and pt is agreeable to home O2 and chooses [facility]. Referral called and O2 order and info faxed. Await return call that [facility] has all need documentation. Also discussed need for home health care and pt is agreeable and chooses [facility]. Referral called to Maeve Kim at [address] who states feels can accept but wont be able to see until Tuesday D/T insurance verification. HHC order and dc info faxed to Maeve Kim. Chart reviewed and no other dc needs identified. Spoke with Luca Cotton from [facility] who states pt can leave. Reviewed chart. Role of discharge planner explained and patient verbalized understanding. Discharge order is noted. Has Home O2 in place on admit:  No  Informed of need to bring portable home O2 tank on day of discharge for nursing to connect prior to leaving:   N/A  Verbalized agreement/Understanding:   N/APt is being d/c'd to home today. Pt's O2 sats are 92 on 2L    Discharge timeout done with nsg, CM and pt. All discharge needs and concerns addressed.
INTERDISCIPLINARY PLAN OF CARE CONFERENCE    Date/Time: 8/21/2020 3:19 PM  Completed by: Catarino Wood Case Management      Patient Name:  Tan Loera  YOB: 1940  Admitting Diagnosis: Acute on chronic diastolic (congestive) heart failure ([facility] [address] [I50.33]     Admit Date/Time:  8/19/2020  6:54 PM    Chart reviewed. Interdisciplinary team contacted or reviewed plan related to patient progress and discharge plans. Disciplines included Case Management, Nursing, and Dietitian. Current Status:stable  PT/OT recommendation for discharge plan of care: NA    Expected D/C Disposition:  Home  Confirmed plan with patient and/or family Yes confirmed with: (name) pt    Discharge Plan Comments: Chart reviewed, met with pt and spouse at bedside. Pt continues with plan to return home. Following for [facility], [facility]. and possible home O2 needs.     Home O2 in place on admit: No
Indicated  Verbalized agreement/Understanding:   Not Indicated    Community Service Affiliation  Dialysis:  No    · Agency:  · Location:  · Dialysis Schedule:  · Phone:   · Fax: Other Community Services: (ex:PT/OT,Mental Health,Wound Clinic, Cardio/Pul [address])    DISCHARGE PLAN: Explained Case Management role/services. Reviewed chart. Met with the pt. Pt from home with spouse and pt's cousin,caregiver. Pt declines hhc at this time. Following for possible home O2.

## 2020-08-23 ENCOUNTER — FOLLOWUP TELEPHONE ENCOUNTER (OUTPATIENT)
Dept: ADMINISTRATIVE | Age: 80
End: 2020-08-23

## 2020-08-23 NOTE — TELEPHONE ENCOUNTER
1st Attempt; No answer; unable to leave message at this time. Care transition faxed to Donovan Mata MD. Care transition included reason for hospitalization, procedures performed during hospitalization, services provided during hospitalization, discharge medications, and follow-up treatment and services needed. Confirmed receipt was sent successfully.

## 2020-08-24 ENCOUNTER — TELEPHONE (OUTPATIENT)
Dept: CARDIOLOGY CLINIC | Age: 80
End: 2020-08-24

## 2020-08-24 ENCOUNTER — CARE COORDINATION (OUTPATIENT)
Dept: CASE MANAGEMENT | Age: 80
End: 2020-08-24

## 2020-08-24 NOTE — CARE COORDINATION
Patient contacted regarding recent discharge and COVID-19 risk. COVID-19 testing not done. Care Transition Nurse contacted the family by telephone to perform post discharge assessment. Verified name and  with family as identifiers. Patient has following COVID-19 risk factors: heart failure. CTN reviewed discharge instructions, medical action plan and red flags related to discharge diagnosis. Reviewed and educated them on any new and changed medications related to discharge diagnosis. Advised obtaining a 90-day supply of all daily and as-needed medications. Education provided regarding infection prevention, and signs and symptoms of COVID-19 and when to seek medical attention with family who verbalized understanding. Discussed exposure protocols and quarantine from [address] you at higher risk for severe illness  and given an opportunity for questions and concerns. From CDC: Are you at higher risk for severe illness?            Clean your hands often with soap and water or alcohol-based hand .          Avoid close contact with people who are sick.            Practice social distancing (remaining at least 6 feet away from others).            Wear a cloth face covering when leaving your home.            Avoid touching your face as much as possible.            Clean and disinfect frequently touched surfaces (door knobs, faucets, appliance handles/buttons, steering wheel, door handles, etc)             Avoid all cruise travel and non-essential air travel.            Call your healthcare professional or [address] [place] [address] ([phone]) if you have concerns about COVID-19 and your underlying condition(s) or if you are sick. For more information on steps you can take to protect yourself, see CDC's How to Protect Yourself. Reached spouse who asks CTN to call back when [facility] daughter/POA gets there. Marcus Strong called CTN when she arrived.  [facility]. nruse needs to get there to put a catheter in because she is not urinating enough. Yesterday's first home weight was 147# and today 149.2#. /69 P 64 SaO2 94% on 2lpm. Denies SOB, abd pain. Spouse states she was lethargic this morning and some confusion but daughter who just arrived states she is at baseline. She wears depends with some incontinence. Has only urinated a small amount since home. Daughter states cardiologist had resumed Lasix at 60mg daily which was in conflict with AVS which states 40mg daily in 2 doses of 20mg. Daughter resumed the 60mg and left message with cardiology for confirmation. Explained that [address] planned to see her tomorrow as with [employer] Medicare there is a delay in [facility] [employer] [employer] [facility] Patient has PCP appt with Dr Melly Roberts tomorrow at [address] her if fever, increased abd discomfort, SOB, pain, confusion, fever, flank pain, n/v that patient needs to return to hospital. Norberto Fernández says, \"She won't go. \" CTN will see if [facility] [employer] [facility] today to [facility].     Per [facility], insurance approved and earliest they can see her is tomorrow and they are aware of HFU with PCP appt. CTN called back to [facility]. Notified her of above. States her mother just had a large urination and she will monitor for reviewed s/s. She is aware of the risk for UTI, the need to continue daily weights and that [facility] [employer]. will call to schedule for tomorrow. Reviewed fluid restrictions. She denies additional needs at this time and has CTN contact info if needed. Plan for follow-up call in 3-5 days based on severity of symptoms and risk factors.     Ez Swanson, RN  Care Transition Nurse  [phone] mobile    Future Appointments   Date Time Provider Gilma Nichols   8/31/2020 12:30 PM [facility] ECHO ROOM 03 [facility] ECHO Rina Francois HOD

## 2020-08-24 NOTE — TELEPHONE ENCOUNTER
Christopher Nice, pts family, called in to check on meds she should be taking post DC from [facility]. Discussed discharge meds, labs with Dr. Seven Maria. Instructed her to take Lasix 40mg daily with KCL and continue to hold Xarelto (due to hematuria with taking). She is to get labs at [facility] in [place] (order faxed to them) on Friday 8/28/20. Aware of upcoming appt with DCE on 9/3/20 at 10:45am. Aware to keep a list of I/O, weights and BP. Verbalized understanding of all.  Maritza BLOUNT

## 2020-08-27 ENCOUNTER — CARE COORDINATION (OUTPATIENT)
Dept: CASE MANAGEMENT | Age: 80
End: 2020-08-27

## 2020-09-01 ENCOUNTER — HOSPITAL ENCOUNTER (OUTPATIENT)
Dept: CT IMAGING | Age: 80
Discharge: HOME | End: 2020-09-01
Payer: MEDICARE

## 2020-09-01 ENCOUNTER — HOSPITAL ENCOUNTER (OUTPATIENT)
Age: 80
Discharge: HOME | End: 2020-09-01
Payer: MEDICARE

## 2020-09-01 ENCOUNTER — HOSPITAL ENCOUNTER (OUTPATIENT)
Dept: NUCLEAR MEDICINE | Age: 80
Discharge: HOME | End: 2020-09-01
Payer: MEDICARE

## 2020-09-01 ENCOUNTER — HOSPITAL ENCOUNTER (OUTPATIENT)
Dept: GENERAL RADIOLOGY | Age: 80
Discharge: HOME | End: 2020-09-01
Payer: MEDICARE

## 2020-09-01 PROCEDURE — 78580 LUNG PERFUSION IMAGING: CPT

## 2020-09-01 PROCEDURE — 3430000000 HC RX DIAGNOSTIC RADIOPHARMACEUTICAL: Performed by: INTERNAL MEDICINE

## 2020-09-01 PROCEDURE — A9540 TC99M MAA: HCPCS | Performed by: INTERNAL MEDICINE

## 2020-09-01 PROCEDURE — 71046 X-RAY EXAM CHEST 2 VIEWS: CPT

## 2020-09-01 RX ADMIN — Medication 4.5 MILLICURIE: at 16:10

## 2020-09-02 NOTE — PROGRESS NOTES
[address]     H+P // CONSULT // OUTPATIENT VISIT // Latonya Miranda     Referring Doctor Christina Shanks MD   Encounter Type Followup     CHIEF COMPLAINT     Visit Type Chronic   Symptoms None   Problems AS s/p TAVR, pAFIB, HTN, CHOL     HISTORY OF PRESENT ILLNESS      GEN - Doing much better. Just restarted xarelto and having significant hematuria again. Due for cystoscopy soon. Patient and  do not wish to take xarelto with active bleeding.  AS - s/p TAVR. No cp, sob   AFIB/NSVT - no palpitations. Following with EP.    HTN - Ambulatory BP readings in good range. No HA or dizziness.  CHOL - Last cholesterol reviewed and in good range. Tolerating statin without side effects.  MED - Compliant with CV meds listed below without notable side effects. HISTORY/ALLERGIES/ROS     MedHx:  has a past medical history of Acute on chronic diastolic (congestive) heart failure ([address] Aortic stenosis, Arthritis, Diabetes mellitus ([address] Hyperlipidemia, Hypertension, Paroxysmal atrial fibrillation ([address] and Thyroid disease. SurgHx:  has a past surgical history that includes Thyroidectomy, partial; Tonsillectomy; Tubal ligation; Knee arthroscopy (Left); other surgical history (10/09/2015); Upper gastrointestinal endoscopy (N/A, 8/13/2019); Colonoscopy (N/A, 8/13/2019); Esophagus dilation (8/13/2019); Cardiac catheterization (09/2019); Aortic valve replacement (N/A, 10/15/2019); eye surgery; and eye surgery (Right, 08/19/2020). SocHx:  reports that she quit smoking about 37 years ago. Her smoking use included cigarettes. She has a 20.00 pack-year smoking history. She has never used smokeless tobacco. She reports that she does not drink alcohol or use drugs. FamHx: family history includes Colon Cancer in her mother; Heart Disease in her brother and father. Allerg: Iodides;  Shellfish-derived products; and Sulfa antibiotics   ROS: [x]Full ROS obtained and negative except as mentioned in HPI     MEDICATIONS      Current Outpatient Medications   Medication Sig Dispense Refill    rivaroxaban (XARELTO) 15 MG TABS tablet Take 1 tablet by mouth Daily with supper Start when ok with  surgeon 90 tablet 3    furosemide (LASIX) 20 MG tablet Take 2 tablets by mouth every morning 30 tablet 0    gabapentin (NEURONTIN) 600 MG tablet Take 600 mg by mouth 4 times daily. Takes one 300mg pill in early am, one with breakfast, two with dinner, and two at bedtime      potassium chloride (MICRO-K) 10 MEQ extended release capsule Take 10 mEq by mouth daily      Cyanocobalamin (B-12) 2500 MCG TABS Take 1 tablet by mouth daily      metoprolol succinate (TOPROL XL) 50 MG extended release tablet Take 0.5 tablets by mouth daily 90 tablet 3    ferrous sulfate 325 (65 Fe) MG tablet Take 1 tablet by mouth daily (with breakfast) 30 tablet 1    diltiazem (CARDIZEM CD) 180 MG extended release capsule Take 1 capsule by mouth daily 90 capsule 3    atorvastatin (LIPITOR) 40 MG tablet Take 1 tablet by mouth nightly 90 tablet 3    pantoprazole (PROTONIX) 20 MG tablet Take 20 mg by mouth daily      DULoxetine (CYMBALTA) 20 MG extended release capsule Take 30 mg by mouth daily       melatonin 5 MG TABS tablet Take 5 mg by mouth nightly      aspirin EC 81 MG EC tablet Take 1 tablet by mouth daily 90 tablet 1    HYDROcodone-acetaminophen (NORCO) 5-325 MG per tablet Take 2 tablets by mouth every 6 hours as needed for Pain. Takes one pill early am, one pill at breakfast, two pills with dinner, and two pills at bedtime.  metaxalone (SKELAXIN) 800 MG tablet Take 800 mg by mouth 2 times daily as needed (as needed for spasms) Do not take with vicodin.  Butalbital-Acetaminophen  MG TABS Take 1 tablet by mouth as needed (as needed for headaches) Vary rarely.  levothyroxine (SYNTHROID) 175 MCG tablet Take 125 mcg by mouth daily        No current facility-administered medications for this visit. Reviewed with patient and will remain unchanged except as mentioned in A/P  PHYSICAL EXAM     Vitals:    09/03/20 1059   BP: 128/70   Pulse: 62   SpO2: 93%      Gen Alert, coop, no distress Heart  Rrr, 1/6   Head NC, AT, no abnorm Abd  Soft, NT, +BS, no mass, no OM   Eyes PER, conj/corn clear Ext  Ext nl, AT, no C/C/E   Nose Nares nl, no drain, NT Pulse 2+ and symmetric   Throat Lips, mucosa, tongue nl Skin Col/text/turg nl, no vis rash/les   Neck S/S, TM, NT, no bruit/JVD Psych Nl mood and affect   Lung CTA-B, unlabored, no DTP Lymph   No cervical or axillary LA   Ch wall NT, no deform Neuro  Nl gross M/S exam     ASSESSMENT AND PLAN     *AS   Date EF Detail   Sx   SOB, LE edema   Hx 10/19  TAVR 23 mm Garcia S3   TTE 6/19  10/19  11/19  8/20 60%  65%  65%  70% Severe AS MG 34 and , mild MR, mod TR, mod pulm HTN, mod PI  TAVR MG 14  TAVR MG 20, triv catracho AI, mild MR, mod TR  TAVR well seated MG 20, mild AI, mild MS    LHC 9/19  Mild nonobstructive CAD ([place])   Plan   Continue asa 81   *AFIB/NSVT  Hx:  Parox, 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, NSVT  Status No AC due to hematuria  Plan Holding AC due to hematuria and patient wishes again  *HTN  Status Controlled  Plan Counseled on diet/salt/exercise/weight  *CHOL  Status  Uncontrolled with last LDL of 101 on 9/19 (goal <70)  Plan Counseled on diet/exercise/weight, continue statin, lipid/liver surveillance per PCP  *COMPLIANCE  Status Compliant  Plan Discussed importance of compliance with meds/diet/salt/exercise; avoid tob/alc/drugs; patient verbalized understanding  *HEMATURIA  Status Long discussion with patient, she does not want to take [facility] particularly with hematuria worsening again  Plan Stop xarelto per patient wishes   Upcoming cysto  *FOLLOWUP  3 months    Scribe Attestation  I, Arpit Jerez, am scribing for and in the presence of Graham Braxton MD.   Signed, Arpit Jerez 09/02/20 10:53 AM   Provider Apolinar Valentine is working as a scribe for and in the presence of me Damaris Palomino MD). Working as a scribe, Blair Patel may have prepopulated components of this note with my historical  intellectual property under my direct supervision. Any additions to this intellectual property were performed in my presence and at my direction.   Furthermore, the content and accuracy of this note have been reviewed by me Damaris Palomino MD).  9/3/2020 12:27 PM

## 2020-09-03 ENCOUNTER — OFFICE VISIT (OUTPATIENT)
Dept: CARDIOLOGY CLINIC | Age: 80
End: 2020-09-03
Payer: MEDICARE

## 2020-09-03 VITALS
DIASTOLIC BLOOD PRESSURE: 70 MMHG | SYSTOLIC BLOOD PRESSURE: 128 MMHG | BODY MASS INDEX: 25.44 KG/M2 | HEIGHT: 64 IN | WEIGHT: 149 LBS | OXYGEN SATURATION: 93 % | HEART RATE: 62 BPM

## 2020-09-03 PROCEDURE — 99214 OFFICE O/P EST MOD 30 MIN: CPT | Performed by: INTERNAL MEDICINE

## 2020-09-03 NOTE — LETTER
[address] Cardiology [address] [place] [address] [address]  Phone: [phone]  Fax: [phone]    Nisreen Nova MD        September 3, 2020     Charis Smart [address] [facility]    Patient: Tan Loera  MR Number: [id number]  YOB: 1940  Date of Visit: 9/3/2020    Dear Dr. Rigoberto Baxter:      [address] [address]     H+P // CONSULT // OUTPATIENT VISIT // Adriane Bryant     Referring Doctor Rigoberto Baxter MD   Encounter Type Followup     CHIEF COMPLAINT     Visit Type Chronic   Symptoms None   Problems AS s/p TAVR, pAFIB, HTN, CHOL     HISTORY OF PRESENT ILLNESS     ? GEN - Doing much better. Just restarted xarelto and having significant hematuria again. Due for cystoscopy soon. Patient and  do not wish to take xarelto with active bleeding. ? AS - s/p TAVR. No cp, sob  ? AFIB/NSVT - no palpitations. Following with EP. ? HTN - Ambulatory BP readings in good range. No HA or dizziness. ? CHOL - Last cholesterol reviewed and in good range. Tolerating statin without side effects. ? MED - Compliant with CV meds listed below without notable side effects. HISTORY/ALLERGIES/ROS     MedHx:  has a past medical history of Acute on chronic diastolic (congestive) heart failure ([address] Aortic stenosis, Arthritis, Diabetes mellitus ([address] Hyperlipidemia, Hypertension, Paroxysmal atrial fibrillation ([address] and Thyroid disease. SurgHx:  has a past surgical history that includes Thyroidectomy, partial; Tonsillectomy; Tubal ligation; Knee arthroscopy (Left); other surgical history (10/09/2015); Upper gastrointestinal endoscopy (N/A, 8/13/2019); Colonoscopy (N/A, 8/13/2019); Esophagus dilation (8/13/2019); Cardiac catheterization (09/2019); Aortic valve replacement (N/A, 10/15/2019); eye surgery; and eye surgery (Right, 08/19/2020). SocHx:  reports that she quit smoking about 37 years ago. Her smoking use included cigarettes. She has a 20.00 pack-year smoking history. She has never used smokeless tobacco. She reports that she does not drink alcohol or use drugs. FamHx: family history includes Colon Cancer in her mother; Heart Disease in her brother and father. Allerg: Iodides; Shellfish-derived products; and Sulfa antibiotics   ROS: [x]Full ROS obtained and negative except as mentioned in HPI     MEDICATIONS      Current Outpatient Medications   Medication Sig Dispense Refill    rivaroxaban (XARELTO) 15 MG TABS tablet Take 1 tablet by mouth Daily with supper Start when ok with  surgeon 90 tablet 3    furosemide (LASIX) 20 MG tablet Take 2 tablets by mouth every morning 30 tablet 0    gabapentin (NEURONTIN) 600 MG tablet Take 600 mg by mouth 4 times daily. Takes one 300mg pill in early am, one with breakfast, two with dinner, and two at bedtime      potassium chloride (MICRO-K) 10 MEQ extended release capsule Take 10 mEq by mouth daily      Cyanocobalamin (B-12) 2500 MCG TABS Take 1 tablet by mouth daily      metoprolol succinate (TOPROL XL) 50 MG extended release tablet Take 0.5 tablets by mouth daily 90 tablet 3    ferrous sulfate 325 (65 Fe) MG tablet Take 1 tablet by mouth daily (with breakfast) 30 tablet 1    diltiazem (CARDIZEM CD) 180 MG extended release capsule Take 1 capsule by mouth daily 90 capsule 3    atorvastatin (LIPITOR) 40 MG tablet Take 1 tablet by mouth nightly 90 tablet 3    pantoprazole (PROTONIX) 20 MG tablet Take 20 mg by mouth daily      DULoxetine (CYMBALTA) 20 MG extended release capsule Take 30 mg by mouth daily       melatonin 5 MG TABS tablet Take 5 mg by mouth nightly      aspirin EC 81 MG EC tablet Take 1 tablet by mouth daily 90 tablet 1    HYDROcodone-acetaminophen (NORCO) 5-325 MG per tablet Take 2 tablets by mouth every 6 hours as needed for Pain.  Takes one pill early am, one pill

## 2020-09-15 ENCOUNTER — HOSPITAL ENCOUNTER (OUTPATIENT)
Dept: PREADMISSION TESTING | Age: 80
Discharge: HOME | End: 2020-09-19
Payer: MEDICARE

## 2020-09-15 LAB
ANION GAP SERPL CALCULATED.3IONS-SCNC: 11 MMOL/L (ref 3–16)
BACTERIA: ABNORMAL /HPF
BASOPHILS ABSOLUTE: 0.1 K/UL (ref 0–0.2)
BASOPHILS RELATIVE PERCENT: 1.1 %
BILIRUBIN URINE: NEGATIVE
BLOOD, URINE: ABNORMAL
BUN BLDV-MCNC: 16 MG/DL (ref 7–20)
CALCIUM SERPL-MCNC: 8.9 MG/DL (ref 8.3–10.6)
CHLORIDE BLD-SCNC: 100 MMOL/L (ref 99–110)
CLARITY: ABNORMAL
CO2: 29 MMOL/L (ref 21–32)
COLOR: YELLOW
CREAT SERPL-MCNC: 1.1 MG/DL (ref 0.6–1.2)
EOSINOPHILS ABSOLUTE: 0.3 K/UL (ref 0–0.6)
EOSINOPHILS RELATIVE PERCENT: 3.9 %
EPITHELIAL CELLS, UA: ABNORMAL /HPF (ref 0–5)
GFR AFRICAN AMERICAN: 58
GFR NON-AFRICAN AMERICAN: 48
GLUCOSE BLD-MCNC: 98 MG/DL (ref 70–99)
GLUCOSE URINE: NEGATIVE MG/DL
HCT VFR BLD CALC: 32 % (ref 36–48)
HEMOGLOBIN: 10.4 G/DL (ref 12–16)
KETONES, URINE: NEGATIVE MG/DL
LEUKOCYTE ESTERASE, URINE: ABNORMAL
LYMPHOCYTES ABSOLUTE: 0.8 K/UL (ref 1–5.1)
LYMPHOCYTES RELATIVE PERCENT: 12.9 %
MCH RBC QN AUTO: 28.7 PG (ref 26–34)
MCHC RBC AUTO-ENTMCNC: 32.5 G/DL (ref 31–36)
MCV RBC AUTO: 88.3 FL (ref 80–100)
MICROSCOPIC EXAMINATION: YES
MONOCYTES ABSOLUTE: 0.7 K/UL (ref 0–1.3)
MONOCYTES RELATIVE PERCENT: 10.3 %
NEUTROPHILS ABSOLUTE: 4.7 K/UL (ref 1.7–7.7)
NEUTROPHILS RELATIVE PERCENT: 71.8 %
NITRITE, URINE: NEGATIVE
PDW BLD-RTO: 16.1 % (ref 12.4–15.4)
PH UA: 6 (ref 5–8)
PLATELET # BLD: 219 K/UL (ref 135–450)
PMV BLD AUTO: 9.4 FL (ref 5–10.5)
POTASSIUM SERPL-SCNC: 3.2 MMOL/L (ref 3.5–5.1)
PROTEIN UA: ABNORMAL MG/DL
RBC # BLD: 3.62 M/UL (ref 4–5.2)
RBC UA: >100 /HPF (ref 0–4)
SODIUM BLD-SCNC: 140 MMOL/L (ref 136–145)
SPECIFIC GRAVITY UA: 1.01 (ref 1–1.03)
URINE TYPE: ABNORMAL
UROBILINOGEN, URINE: 0.2 E.U./DL
WBC # BLD: 6.5 K/UL (ref 4–11)
WBC UA: ABNORMAL /HPF (ref 0–5)

## 2020-09-15 PROCEDURE — 85025 COMPLETE CBC W/AUTO DIFF WBC: CPT

## 2020-09-15 PROCEDURE — 80048 BASIC METABOLIC PNL TOTAL CA: CPT

## 2020-09-15 PROCEDURE — 87086 URINE CULTURE/COLONY COUNT: CPT

## 2020-09-15 PROCEDURE — 36415 COLL VENOUS BLD VENIPUNCTURE: CPT

## 2020-09-15 PROCEDURE — 81001 URINALYSIS AUTO W/SCOPE: CPT

## 2020-09-16 LAB — URINE CULTURE, ROUTINE: NORMAL

## 2020-09-17 ENCOUNTER — ANESTHESIA EVENT (OUTPATIENT)
Dept: OPERATING ROOM | Age: 80
End: 2020-09-17
Payer: MEDICARE

## 2020-09-22 RX ORDER — POTASSIUM CHLORIDE 20 MEQ/1
20 TABLET, EXTENDED RELEASE ORAL DAILY
COMMUNITY
End: 2021-10-13

## 2020-09-22 RX ORDER — METOPROLOL SUCCINATE 25 MG/1
25 TABLET, EXTENDED RELEASE ORAL DAILY
COMMUNITY
End: 2021-12-07

## 2020-09-22 RX ORDER — DULOXETIN HYDROCHLORIDE 30 MG/1
30 CAPSULE, DELAYED RELEASE ORAL DAILY
COMMUNITY

## 2020-09-22 RX ORDER — LEVOTHYROXINE SODIUM 0.12 MG/1
125 TABLET ORAL DAILY
COMMUNITY

## 2020-09-22 NOTE — PROGRESS NOTES
Obstructive Sleep Apnea (MARJ) Screening     Patient:  Akash Turner    YOB: 1940      Medical Record #:  [id number]                     Date:  9/22/2020     1. Are you a loud and/or regular snorer? [x]  Yes       [] No    2. Have you been observed to gasp or stop breathing during sleep? []  Yes       [x] No    3. Do you feel tired or groggy upon awakening or do you awaken with a headache?           []  Yes       [x] No    4. Are you often tired or fatigued during the wake time hours? []  Yes       [x] No    5. Do you fall asleep sitting, reading, watching TV or driving? []  Yes       [x] No    6. Do you often have problems with memory or concentration? []  Yes       [x] No    **If patient's score is ? 3 they are considered high risk for MARJ. An Anesthesia provider will evaluate the patient and develop a plan of care the day of surgery. Note:  If the patient's BMI is more than 35 kg m¯² , has neck circumference > 40 cm, and/or high blood pressure the risk is greater (© American Sleep Apnea Association, 2006).

## 2020-09-25 ENCOUNTER — HOSPITAL ENCOUNTER (OUTPATIENT)
Age: 80
Setting detail: OUTPATIENT SURGERY
Discharge: HOME | End: 2020-09-25
Attending: UROLOGY | Admitting: UROLOGY
Payer: MEDICARE

## 2020-09-25 ENCOUNTER — ANESTHESIA (OUTPATIENT)
Dept: OPERATING ROOM | Age: 80
End: 2020-09-25
Payer: MEDICARE

## 2020-09-25 VITALS
OXYGEN SATURATION: 94 % | WEIGHT: 151 LBS | RESPIRATION RATE: 16 BRPM | HEART RATE: 75 BPM | HEIGHT: 63 IN | BODY MASS INDEX: 26.75 KG/M2 | TEMPERATURE: 98.2 F | DIASTOLIC BLOOD PRESSURE: 63 MMHG | SYSTOLIC BLOOD PRESSURE: 158 MMHG

## 2020-09-25 VITALS — SYSTOLIC BLOOD PRESSURE: 174 MMHG | OXYGEN SATURATION: 95 % | DIASTOLIC BLOOD PRESSURE: 70 MMHG

## 2020-09-25 LAB
ANION GAP SERPL CALCULATED.3IONS-SCNC: 10 MMOL/L (ref 3–16)
BUN BLDV-MCNC: 22 MG/DL (ref 7–20)
CALCIUM SERPL-MCNC: 8.8 MG/DL (ref 8.3–10.6)
CHLORIDE BLD-SCNC: 98 MMOL/L (ref 99–110)
CO2: 30 MMOL/L (ref 21–32)
CREAT SERPL-MCNC: 1.2 MG/DL (ref 0.6–1.2)
GFR AFRICAN AMERICAN: 52
GFR NON-AFRICAN AMERICAN: 43
GLUCOSE BLD-MCNC: 107 MG/DL (ref 70–99)
POTASSIUM REFLEX MAGNESIUM: 5.3 MMOL/L (ref 3.5–5.1)
SODIUM BLD-SCNC: 138 MMOL/L (ref 136–145)

## 2020-09-25 PROCEDURE — 80048 BASIC METABOLIC PNL TOTAL CA: CPT

## 2020-09-25 PROCEDURE — 3600000004 HC SURGERY LEVEL 4 BASE: Performed by: UROLOGY

## 2020-09-25 PROCEDURE — 7100000011 HC PHASE II RECOVERY - ADDTL 15 MIN: Performed by: UROLOGY

## 2020-09-25 PROCEDURE — 2709999900 HC NON-CHARGEABLE SUPPLY: Performed by: UROLOGY

## 2020-09-25 PROCEDURE — 6360000002 HC RX W HCPCS: Performed by: NURSE ANESTHETIST, CERTIFIED REGISTERED

## 2020-09-25 PROCEDURE — 3700000000 HC ANESTHESIA ATTENDED CARE: Performed by: UROLOGY

## 2020-09-25 PROCEDURE — 2500000003 HC RX 250 WO HCPCS: Performed by: ANESTHESIOLOGY

## 2020-09-25 PROCEDURE — 3700000001 HC ADD 15 MINUTES (ANESTHESIA): Performed by: UROLOGY

## 2020-09-25 PROCEDURE — 2500000003 HC RX 250 WO HCPCS: Performed by: NURSE ANESTHETIST, CERTIFIED REGISTERED

## 2020-09-25 PROCEDURE — 2580000003 HC RX 258: Performed by: ANESTHESIOLOGY

## 2020-09-25 PROCEDURE — 3600000014 HC SURGERY LEVEL 4 ADDTL 15MIN: Performed by: UROLOGY

## 2020-09-25 PROCEDURE — 6360000002 HC RX W HCPCS: Performed by: UROLOGY

## 2020-09-25 PROCEDURE — 7100000010 HC PHASE II RECOVERY - FIRST 15 MIN: Performed by: UROLOGY

## 2020-09-25 RX ORDER — LABETALOL HYDROCHLORIDE 5 MG/ML
5 INJECTION, SOLUTION INTRAVENOUS EVERY 10 MIN PRN
Status: DISCONTINUED | OUTPATIENT
Start: 2020-09-25 | End: 2020-09-25 | Stop reason: HOSPADM

## 2020-09-25 RX ORDER — SODIUM CHLORIDE 0.9 % (FLUSH) 0.9 %
10 SYRINGE (ML) INJECTION PRN
Status: DISCONTINUED | OUTPATIENT
Start: 2020-09-25 | End: 2020-09-25 | Stop reason: CLARIF

## 2020-09-25 RX ORDER — MEPERIDINE HYDROCHLORIDE 50 MG/ML
12.5 INJECTION INTRAMUSCULAR; INTRAVENOUS; SUBCUTANEOUS EVERY 5 MIN PRN
Status: DISCONTINUED | OUTPATIENT
Start: 2020-09-25 | End: 2020-09-25 | Stop reason: HOSPADM

## 2020-09-25 RX ORDER — MORPHINE SULFATE 2 MG/ML
2 INJECTION, SOLUTION INTRAMUSCULAR; INTRAVENOUS EVERY 5 MIN PRN
Status: DISCONTINUED | OUTPATIENT
Start: 2020-09-25 | End: 2020-09-25 | Stop reason: HOSPADM

## 2020-09-25 RX ORDER — DIPHENHYDRAMINE HYDROCHLORIDE 50 MG/ML
12.5 INJECTION INTRAMUSCULAR; INTRAVENOUS
Status: DISCONTINUED | OUTPATIENT
Start: 2020-09-25 | End: 2020-09-25 | Stop reason: HOSPADM

## 2020-09-25 RX ORDER — OXYCODONE HYDROCHLORIDE AND ACETAMINOPHEN 5; 325 MG/1; MG/1
2 TABLET ORAL PRN
Status: DISCONTINUED | OUTPATIENT
Start: 2020-09-25 | End: 2020-09-25 | Stop reason: HOSPADM

## 2020-09-25 RX ORDER — SODIUM CHLORIDE, SODIUM LACTATE, POTASSIUM CHLORIDE, CALCIUM CHLORIDE 600; 310; 30; 20 MG/100ML; MG/100ML; MG/100ML; MG/100ML
INJECTION, SOLUTION INTRAVENOUS CONTINUOUS
Status: DISCONTINUED | OUTPATIENT
Start: 2020-09-25 | End: 2020-09-25 | Stop reason: HOSPADM

## 2020-09-25 RX ORDER — MORPHINE SULFATE 2 MG/ML
1 INJECTION, SOLUTION INTRAMUSCULAR; INTRAVENOUS EVERY 5 MIN PRN
Status: DISCONTINUED | OUTPATIENT
Start: 2020-09-25 | End: 2020-09-25 | Stop reason: HOSPADM

## 2020-09-25 RX ORDER — ONDANSETRON 2 MG/ML
4 INJECTION INTRAMUSCULAR; INTRAVENOUS PRN
Status: DISCONTINUED | OUTPATIENT
Start: 2020-09-25 | End: 2020-09-25 | Stop reason: HOSPADM

## 2020-09-25 RX ORDER — PROPOFOL 10 MG/ML
INJECTION, EMULSION INTRAVENOUS PRN
Status: DISCONTINUED | OUTPATIENT
Start: 2020-09-25 | End: 2020-09-25 | Stop reason: SDUPTHER

## 2020-09-25 RX ORDER — SODIUM CHLORIDE 0.9 % (FLUSH) 0.9 %
10 SYRINGE (ML) INJECTION EVERY 12 HOURS SCHEDULED
Status: DISCONTINUED | OUTPATIENT
Start: 2020-09-25 | End: 2020-09-25 | Stop reason: HOSPADM

## 2020-09-25 RX ORDER — SODIUM CHLORIDE 0.9 % (FLUSH) 0.9 %
10 SYRINGE (ML) INJECTION PRN
Status: DISCONTINUED | OUTPATIENT
Start: 2020-09-25 | End: 2020-09-25 | Stop reason: HOSPADM

## 2020-09-25 RX ORDER — HYDRALAZINE HYDROCHLORIDE 20 MG/ML
5 INJECTION INTRAMUSCULAR; INTRAVENOUS EVERY 10 MIN PRN
Status: DISCONTINUED | OUTPATIENT
Start: 2020-09-25 | End: 2020-09-25 | Stop reason: HOSPADM

## 2020-09-25 RX ORDER — LIDOCAINE HYDROCHLORIDE 20 MG/ML
INJECTION, SOLUTION INFILTRATION; PERINEURAL PRN
Status: DISCONTINUED | OUTPATIENT
Start: 2020-09-25 | End: 2020-09-25 | Stop reason: SDUPTHER

## 2020-09-25 RX ORDER — PROMETHAZINE HYDROCHLORIDE 25 MG/ML
6.25 INJECTION, SOLUTION INTRAMUSCULAR; INTRAVENOUS
Status: DISCONTINUED | OUTPATIENT
Start: 2020-09-25 | End: 2020-09-25 | Stop reason: HOSPADM

## 2020-09-25 RX ORDER — OXYCODONE HYDROCHLORIDE AND ACETAMINOPHEN 5; 325 MG/1; MG/1
1 TABLET ORAL PRN
Status: DISCONTINUED | OUTPATIENT
Start: 2020-09-25 | End: 2020-09-25 | Stop reason: HOSPADM

## 2020-09-25 RX ADMIN — CEFAZOLIN SODIUM 2 G: 10 INJECTION, POWDER, FOR SOLUTION INTRAVENOUS at 07:26

## 2020-09-25 RX ADMIN — PROPOFOL 60 MG: 10 INJECTION, EMULSION INTRAVENOUS at 07:35

## 2020-09-25 RX ADMIN — FAMOTIDINE 20 MG: 10 INJECTION INTRAVENOUS at 07:17

## 2020-09-25 RX ADMIN — PROPOFOL 40 MG: 10 INJECTION, EMULSION INTRAVENOUS at 07:30

## 2020-09-25 RX ADMIN — SODIUM CHLORIDE, SODIUM LACTATE, POTASSIUM CHLORIDE, AND CALCIUM CHLORIDE: .6; .31; .03; .02 INJECTION, SOLUTION INTRAVENOUS at 07:28

## 2020-09-25 RX ADMIN — LIDOCAINE HYDROCHLORIDE 40 MG: 20 INJECTION, SOLUTION INFILTRATION; PERINEURAL at 07:30

## 2020-09-25 ASSESSMENT — PULMONARY FUNCTION TESTS
PIF_VALUE: 1

## 2020-09-25 ASSESSMENT — PAIN - FUNCTIONAL ASSESSMENT: PAIN_FUNCTIONAL_ASSESSMENT: 0-10

## 2020-09-25 ASSESSMENT — PAIN DESCRIPTION - DESCRIPTORS: DESCRIPTORS: CONSTANT;DISCOMFORT

## 2020-09-25 NOTE — ANESTHESIA POSTPROCEDURE EVALUATION
Department of Anesthesiology  Postprocedure Note    Patient: Mariela Villa  MRN: [id number]  YOB: 1940  Date of evaluation: 9/25/2020  Time:  8:24 AM     Procedure Summary     Date:  09/25/20 [facility]    Anesthesia Start:  4380 Anesthesia Stop:  Sally Ventura    Procedure:  CYSTOSCOPY (N/A Urethra) Diagnosis:       Gross hematuria      (GROSS HEMATURIA)    Surgeon:  Therese Okeefe MD Responsible Provider:  Merari Cornejo MD    Anesthesia Type:  general ASA Status:  3          Anesthesia Type: general    Penny Phase I: Penny Score: 10    Penny Phase II: Penny Score: 10    Last vitals: Reviewed and per EMR flowsheets.        Anesthesia Post Evaluation    Patient location during evaluation: PACU  Patient participation: complete - patient participated  Level of consciousness: awake and alert  Pain score: 0  Airway patency: patent  Nausea & Vomiting: no nausea and no vomiting  Complications: no  Cardiovascular status: blood pressure returned to baseline  Respiratory status: acceptable  Hydration status: stable

## 2020-09-25 NOTE — BRIEF OP NOTE
Brief Postoperative Note      Patient: Fortunato Tran  YOB: 1940  MRN: [id number]    Date of Procedure: 9/25/2020    Pre-Op Diagnosis: GROSS HEMATURIA    Post-Op Diagnosis: Same       Procedure(s):  CYSTOSCOPY    Surgeon(s):  Kushal Hughes MD    Assistant:  Surgical Assistant: Ly Mac    Anesthesia: General    Estimated Blood Loss (mL): Minimal    Complications: None    Specimens:   * No specimens in log *    Implants:  * No implants in log *      Drains: * No LDAs found *    Findings: normal bladder, no tumors    Electronically signed by Kushal Hughes MD on 9/25/2020 at 7:59 AM

## 2020-09-25 NOTE — ANESTHESIA PRE PROCEDURE
Department of Anesthesiology  Preprocedure Note       Name:  Alvaro Aparicio   Age:  [de-identified] y.o.  :  1940                                          MRN:  [id number]         Date:  2020      Surgeon: Stacia Mcdowell):  Cynthia Wesley MD    Procedure: Procedure(s):  CYSTOSCOPY    Medications prior to admission:   Prior to Admission medications    Medication Sig Start Date End Date Taking? Authorizing Provider   DULoxetine (CYMBALTA) 30 MG extended release capsule Take 30 mg by mouth daily   Yes Historical Provider, MD   metoprolol succinate (TOPROL XL) 25 MG extended release tablet Take 25 mg by mouth daily   Yes Historical Provider, MD   levothyroxine (SYNTHROID) 125 MCG tablet Take 125 mcg by mouth Daily   Yes Historical Provider, MD   potassium chloride (KLOR-CON M) 20 MEQ extended release tablet Take 20 mEq by mouth daily   Yes Historical Provider, MD   OXYGEN Inhale 2 L into the lungs   Yes Historical Provider, MD   furosemide (LASIX) 20 MG tablet Take 2 tablets by mouth every morning 20  Yes Nereida Vargas MD   gabapentin (NEURONTIN) 600 MG tablet Take 600 mg by mouth 4 times daily. Takes one 300mg pill in early am, one with breakfast, two with dinner, and two at bedtime   Yes Historical Provider, MD   diltiazem (CARDIZEM CD) 180 MG extended release capsule Take 1 capsule by mouth daily 20  Yes Jocelyn Velarde MD   atorvastatin (LIPITOR) 40 MG tablet Take 1 tablet by mouth nightly 20  Yes Jocelyn Velarde MD   melatonin 5 MG TABS tablet Take 5 mg by mouth nightly   Yes Historical Provider, MD   HYDROcodone-acetaminophen (NORCO) 5-325 MG per tablet Take 2 tablets by mouth every 6 hours as needed for Pain. Takes one pill early am, one pill at breakfast, two pills with dinner, and two pills at bedtime. Yes Historical Provider, MD   metaxalone (SKELAXIN) 800 MG tablet Take 800 mg by mouth 2 times daily as needed (as needed for spasms) Do not take with vicodin.    Yes Historical (paroxysmal atrial fibrillation) ([facility]) I48.0    FLOR[place] (acute kidney injury) ([facility] N17.9    Digoxin overdose, accidental or unintentional, initial encounter T46.0X1A    Acute on chronic diastolic (congestive) heart failure (HCC) I50.33    Aortic valve disease I35.9    Acute on chronic congestive heart failure (HCC) I50.9    Acute respiratory failure with hypoxia ([facility]) J96.01       Past Medical History:        Diagnosis Date    Acute on chronic diastolic (congestive) heart failure (HCC) 2020    Aortic stenosis     Arthritis     Cancer (HCC)     skin basal    Hyperlipidemia     Hypertension     Paroxysmal atrial fibrillation ([facility]     Thyroid disease        Past Surgical History:        Procedure Laterality Date    AORTIC VALVE REPLACEMENT N/A 10/15/2019    TRANSCATHETER AORTIC VALVE REPLACEMENT FEMORAL APPROACH performed by Mario Dorado MD at [address]  2019    COLONOSCOPY N/A 2019    COLON W/ANES. performed by Jose Cruz MD at [address]  2019    ESOPHAGEAL DILATION Janna Sell performed by Jose Cruz MD at [address] Right 2020    KNEE ARTHROSCOPY Left     Torn meniscus    OTHER SURGICAL HISTORY  10/09/2015    phacoemulsification of cataract with intraocular implant right eye    THYROIDECTOMY, PARTIAL      TONSILLECTOMY      TUBAL LIGATION      UPPER GASTROINTESTINAL ENDOSCOPY N/A 2019    EGD W/ANES. (9:30) performed by Jose Cruz MD at [facility] History:    Social History     Tobacco Use    Smoking status: Former Smoker     Packs/day: 1.00     Years: 20.00     Pack years: 20.00     Types: Cigarettes     Last attempt to quit: 1983     Years since quittin.7    Smokeless tobacco: Never Used   Substance Use Topics    Alcohol use:  No                                Counseling given: Not Answered      Vital Signs (Current):   Vitals:    09/22/20 1109 09/25/20 0614   BP:  (!) 144/62   Pulse:  88   Resp:  16   Temp:  98.3 °F (36.8 °C)   TempSrc:  Temporal   SpO2:  96%   Weight: 151 lb (68.5 kg) 151 lb (68.5 kg)   Height: 5' 3\" (1.6 m) 5' 3\" (1.6 m)                                              BP Readings from Last 3 Encounters:   09/25/20 (!) 144/62   09/03/20 128/70   08/22/20 (!) 148/65       NPO Status: Time of last liquid consumption: 2200                        Time of last solid consumption: 2200                        Date of last liquid consumption: 09/24/20                        Date of last solid food consumption: 09/24/20    BMI:   Wt Readings from Last 3 Encounters:   09/25/20 151 lb (68.5 kg)   09/03/20 149 lb (67.6 kg)   08/22/20 147 lb 7.8 oz (66.9 kg)     Body mass index is 26.75 kg/m². CBC:   Lab Results   Component Value Date    WBC 6.5 09/15/2020    RBC 3.62 09/15/2020    HGB 10.4 09/15/2020    HCT 32.0 09/15/2020    MCV 88.3 09/15/2020    RDW 16.1 09/15/2020     09/15/2020       CMP:   Lab Results   Component Value Date     09/25/2020    K 5.3 09/25/2020    CL 98 09/25/2020    CO2 30 09/25/2020    BUN 22 09/25/2020    CREATININE 1.2 09/25/2020    GFRAA 52 09/25/2020    AGRATIO 1.2 09/19/2019    LABGLOM 43 09/25/2020    GLUCOSE 107 09/25/2020    PROT 7.0 11/01/2019    CALCIUM 8.8 09/25/2020    BILITOT <0.2 11/01/2019    ALKPHOS 81 11/01/2019    AST 34 11/01/2019    ALT 18 11/01/2019       POC Tests: No results for input(s): POCGLU, POCNA, POCK, POCCL, POCBUN, POCHEMO, POCHCT in the last 72 hours.     Coags:   Lab Results   Component Value Date    PROTIME 11.2 08/19/2020    INR 0.97 08/19/2020    APTT 30.6 08/19/2020       HCG (If Applicable): No results found for: PREGTESTUR, PREGSERUM, HCG, HCGQUANT     ABGs:   Lab Results   Component Value Date    PHART 7.280 10/15/2019    PO2ART 287.4 10/15/2019    NVM7IRY 57.8 10/15/2019    JNP5KCW 27.2 10/15/2019    BEART 0 10/15/2019    M8DUCZTF 100 10/15/2019        Type & Screen (If Applicable):  No results found for: LABABO, LABRH    Drug/Infectious Status (If Applicable):  No results found for: HIV, HEPCAB    COVID-19 Screening (If Applicable): No results found for: COVID19      Anesthesia Evaluation  Patient summary reviewed and Nursing notes reviewed  Airway: Mallampati: II  TM distance: >3 FB   Neck ROM: full  Mouth opening: > = 3 FB and < 3 FB Dental:    (+) upper dentures      Pulmonary:Negative Pulmonary ROS   (+) decreased breath sounds,                             Cardiovascular:    (+) hypertension:, valvular problems/murmurs: no interval change, dysrhythmias: atrial fibrillation, CHF:, murmur,         Rhythm: irregular  Rate: normal                 ROS comment: Echo:    Normal left ventricular size and normal to hyperdynamic systolic function   with an estimated ejection fraction of 60-70%. No regional wall motion   abnormalities are seen. There is moderate concentric left ventricular hypertrophy. Grade I diastolic dysfunction with normal filling pressure. Moderate posterior mitral annular calcification is present. Mild mitral valve stenosis. Mean gradient 5 mm of Hg. No evidence of mitral regurgitation. A TAVR 23mmSapien S3 aortic valve appears well seated with a maximum   velocity of 3.12m/s and a mean gradient of 20mmHg. Mild aortic regurgitation is present. S/p TAVR     Neuro/Psych:   (+) neuromuscular disease:,             GI/Hepatic/Renal:   (+) renal disease: ARF,           Endo/Other: Negative Endo/Other ROS                    Abdominal:           Vascular: negative vascular ROS. Anesthesia Plan      general     ASA 3       Induction: intravenous. MIPS: Postoperative opioids intended and Prophylactic antiemetics administered. Anesthetic plan and risks discussed with patient. Plan discussed with CRNA.                   Candelario Serrano MD 9/25/2020

## 2020-09-25 NOTE — PROGRESS NOTES
Pt tolerating pepsi and crackers, caregiver at bedside, remains on 2 liters nasal cannula oxygen as per home

## 2020-10-15 NOTE — PROGRESS NOTES
[address]     H+P // CONSULT // OUTPATIENT VISIT // Sofya Galan     Referring Doctor Lenora Chiu MD   Encounter Type Followup     CHIEF COMPLAINT     Visit Type Chronic   Symptoms None   Problems AS s/p TAVR, pAFIB, HTN, CHOL     HISTORY OF PRESENT ILLNESS      GEN - Doing well. No new concerns.  AS - s/p TAVR. No cp, sob   AFIB/NSVT - no palpitations. Following with EP.    HTN - Ambulatory BP readings in good range. No HA or dizziness.  CHOL - Last cholesterol reviewed and in good range. Tolerating statin without side effects.  MED - Compliant with CV meds listed below without notable side effects. HISTORY/ALLERGIES/ROS     MedHx:  has a past medical history of Acute on chronic diastolic (congestive) heart failure ([facility] Aortic stenosis, Arthritis, Cancer ([facility] Hyperlipidemia, Hypertension, Paroxysmal atrial fibrillation ([facility] and Thyroid disease. SurgHx:  has a past surgical history that includes Thyroidectomy, partial; Tonsillectomy; Tubal ligation; Knee arthroscopy (Left); other surgical history (10/09/2015); Upper gastrointestinal endoscopy (N/A, 8/13/2019); Colonoscopy (N/A, 8/13/2019); Esophagus dilation (8/13/2019); Cardiac catheterization (09/2019); Aortic valve replacement (N/A, 10/15/2019); eye surgery; eye surgery (Right, 08/19/2020); and Cystoscopy (N/A, 9/25/2020). SocHx:  reports that she quit smoking about 37 years ago. Her smoking use included cigarettes. She has a 20.00 pack-year smoking history. She has never used smokeless tobacco. She reports that she does not drink alcohol or use drugs. FamHx: family history includes Colon Cancer in her mother; Heart Disease in her brother and father. Allerg: Iodides;  Shellfish-derived products; and Sulfa antibiotics   ROS: [x]Full ROS obtained and negative except as mentioned in HPI     MEDICATIONS      Current Outpatient Medications   Medication Sig Dispense Refill    DULoxetine (CYMBALTA) 30 MG extended release capsule Take 30 mg by mouth daily      metoprolol succinate (TOPROL XL) 25 MG extended release tablet Take 25 mg by mouth daily      levothyroxine (SYNTHROID) 125 MCG tablet Take 125 mcg by mouth Daily      potassium chloride (KLOR-CON M) 20 MEQ extended release tablet Take 20 mEq by mouth daily      OXYGEN Inhale 2 L into the lungs      furosemide (LASIX) 20 MG tablet Take 2 tablets by mouth every morning 30 tablet 0    gabapentin (NEURONTIN) 600 MG tablet Take 600 mg by mouth 4 times daily. Takes one 300mg pill in early am, one with breakfast, two with dinner, and two at bedtime      Cyanocobalamin (B-12) 2500 MCG TABS Take 1 tablet by mouth once a week       ferrous sulfate 325 (65 Fe) MG tablet Take 1 tablet by mouth daily (with breakfast) 30 tablet 1    diltiazem (CARDIZEM CD) 180 MG extended release capsule Take 1 capsule by mouth daily 90 capsule 3    atorvastatin (LIPITOR) 40 MG tablet Take 1 tablet by mouth nightly 90 tablet 3    pantoprazole (PROTONIX) 20 MG tablet Take 20 mg by mouth daily      melatonin 5 MG TABS tablet Take 5 mg by mouth nightly      aspirin EC 81 MG EC tablet Take 1 tablet by mouth daily 90 tablet 1    HYDROcodone-acetaminophen (NORCO) 5-325 MG per tablet Take 2 tablets by mouth every 6 hours as needed for Pain. Takes one pill early am, one pill at breakfast, two pills with dinner, and two pills at bedtime.  metaxalone (SKELAXIN) 800 MG tablet Take 800 mg by mouth 2 times daily as needed (as needed for spasms) Do not take with vicodin.  Butalbital-Acetaminophen  MG TABS Take 1 tablet by mouth as needed (as needed for headaches) Vary rarely. No current facility-administered medications for this visit.       Reviewed with patient and will remain unchanged except as mentioned in A/P  PHYSICAL EXAM     Vitals:    10/20/20 1115   BP: (!) 156/86   Pulse:    SpO2:       Gen Alert, coop, no distress Heart  Rrr, 1/6   Head NC, AT, no abnorm direct supervision. Any additions to this intellectual property were performed in my presence and at my direction. Furthermore, the content and accuracy of this note have been reviewed by me Roberto Carlos Gibbons MD).   10/20/2020 11:43 AM

## 2020-10-20 ENCOUNTER — OFFICE VISIT (OUTPATIENT)
Dept: CARDIOLOGY CLINIC | Age: 80
End: 2020-10-20
Payer: MEDICARE

## 2020-10-20 VITALS
SYSTOLIC BLOOD PRESSURE: 156 MMHG | OXYGEN SATURATION: 97 % | HEIGHT: 63 IN | HEART RATE: 64 BPM | BODY MASS INDEX: 26.4 KG/M2 | DIASTOLIC BLOOD PRESSURE: 86 MMHG | WEIGHT: 149 LBS

## 2020-10-20 PROCEDURE — 99214 OFFICE O/P EST MOD 30 MIN: CPT | Performed by: INTERNAL MEDICINE

## 2020-12-30 ENCOUNTER — TELEPHONE (OUTPATIENT)
Dept: CARDIOLOGY CLINIC | Age: 80
End: 2020-12-30

## 2020-12-30 NOTE — TELEPHONE ENCOUNTER
Per text, pts tiffany Varma asked if pt can take Relief Factor (supplement for aches/pain). Discussed with Dr. Toña Amos and advised not to take it due to fish oil ingredient with pt being on Eliquis. Karon Varma text back w understanding.  Maritza BLOUNT

## 2021-01-09 NOTE — TELEPHONE ENCOUNTER
Left message for [facility] to return call regarding monitor results: asymptomatic PAF and asymptomatic NSVT of different morphologies. Pt needs to establish care with EP to discuss.
Patient coming in 01/21/2019 to see [address] [facility].
Spontaneous

## 2021-02-01 ENCOUNTER — TELEPHONE (OUTPATIENT)
Dept: CARDIOLOGY CLINIC | Age: 81
End: 2021-02-01

## 2021-02-02 NOTE — PROGRESS NOTES
[address]     H+P // CONSULT // OUTPATIENT VISIT // Galina Connolly     Referring Doctor Jose Garcia MD   Encounter Type Followup     CHIEF COMPLAINT     Visit Type Chronic   Symptoms SOB, fatigue   Problems AS s/p TAVR, pAFIB, HTN, CHOL     HISTORY OF PRESENT ILLNESS     GEN - Doing fair. Reports fatigue and sob with exertion, correlating temporally with hematuria. Concern for anemia. AS - s/p TAVR. No cp, sob  AFIB/NSVT - no palpitations. Following with EP. HTN - Ambulatory BP readings in good range. No HA or dizziness. CHOL - Last cholesterol reviewed and in good range. Tolerating statin without side effects. MED - Compliant with CV meds listed below without notable side effects. HISTORY/ALLERGIES/ROS     MedHx:  has a past medical history of Acute on chronic diastolic (congestive) heart failure ([facility] [address] Aortic stenosis, Arthritis, Cancer ([facility] [address] Hyperlipidemia, Hypertension, Paroxysmal atrial fibrillation ([facility] [address] and Thyroid disease. SurgHx:  has a past surgical history that includes Thyroidectomy, partial; Tonsillectomy; Tubal ligation; Knee arthroscopy (Left); other surgical history (10/09/2015); Upper gastrointestinal endoscopy (N/A, 8/13/2019); Colonoscopy (N/A, 8/13/2019); Esophagus dilation (8/13/2019); Cardiac catheterization (09/2019); Aortic valve replacement (N/A, 10/15/2019); eye surgery; eye surgery (Right, 08/19/2020); and Cystoscopy (N/A, 9/25/2020). SocHx:  reports that she quit smoking about 38 years ago. Her smoking use included cigarettes. She has a 20.00 pack-year smoking history. She has never used smokeless tobacco. She reports that she does not drink alcohol or use drugs. FamHx: family history includes Colon Cancer in her mother; Heart Disease in her brother and father. Allerg:  Iodides, Shellfish-derived products, and Sulfa antibiotics   ROS: [x]Full ROS obtained and negative except as mentioned in HPI     MEDICATIONS      Current Outpatient Medications   Medication Sig Dispense Refill    atorvastatin (LIPITOR) 40 MG tablet TAKE ONE TABLET BY MOUTH ONCE NIGHTLY 90 tablet 3    CARTIA  MG extended release capsule TAKE ONE CAPSULE BY MOUTH DAILY 90 capsule 3    apixaban (ELIQUIS) 2.5 MG TABS tablet Take 1 tablet by mouth 2 times daily 60 tablet 5    DULoxetine (CYMBALTA) 30 MG extended release capsule Take 30 mg by mouth daily      metoprolol succinate (TOPROL XL) 25 MG extended release tablet Take 25 mg by mouth daily      levothyroxine (SYNTHROID) 125 MCG tablet Take 125 mcg by mouth Daily      potassium chloride (KLOR-CON M) 20 MEQ extended release tablet Take 20 mEq by mouth daily      OXYGEN Inhale 2 L into the lungs      furosemide (LASIX) 20 MG tablet Take 2 tablets by mouth every morning 30 tablet 0    gabapentin (NEURONTIN) 600 MG tablet Take 600 mg by mouth 4 times daily. Takes one 300mg pill in early am, one with breakfast, two with dinner, and two at bedtime      Cyanocobalamin (B-12) 2500 MCG TABS Take 1 tablet by mouth once a week       ferrous sulfate 325 (65 Fe) MG tablet Take 1 tablet by mouth daily (with breakfast) 30 tablet 1    pantoprazole (PROTONIX) 20 MG tablet Take 20 mg by mouth daily      melatonin 5 MG TABS tablet Take 5 mg by mouth nightly      aspirin EC 81 MG EC tablet Take 1 tablet by mouth daily 90 tablet 1    HYDROcodone-acetaminophen (NORCO) 5-325 MG per tablet Take 2 tablets by mouth every 6 hours as needed for Pain. Takes one pill early am, one pill at breakfast, two pills with dinner, and two pills at bedtime.  metaxalone (SKELAXIN) 800 MG tablet Take 800 mg by mouth 2 times daily as needed (as needed for spasms) Do not take with vicodin.  Butalbital-Acetaminophen  MG TABS Take 1 tablet by mouth as needed (as needed for headaches) Vary rarely. No current facility-administered medications for this visit.       Reviewed with patient and will remain unchanged except as mentioned

## 2021-02-04 ENCOUNTER — OFFICE VISIT (OUTPATIENT)
Dept: CARDIOLOGY CLINIC | Age: 81
End: 2021-02-04
Payer: MEDICARE

## 2021-02-04 VITALS
BODY MASS INDEX: 27.42 KG/M2 | SYSTOLIC BLOOD PRESSURE: 120 MMHG | OXYGEN SATURATION: 96 % | DIASTOLIC BLOOD PRESSURE: 64 MMHG | WEIGHT: 149 LBS | HEIGHT: 62 IN | HEART RATE: 60 BPM

## 2021-02-04 DIAGNOSIS — I35.0 NONRHEUMATIC AORTIC VALVE STENOSIS: Primary | ICD-10-CM

## 2021-02-04 DIAGNOSIS — I10 ESSENTIAL HYPERTENSION: ICD-10-CM

## 2021-02-04 DIAGNOSIS — I48.0 PAF (PAROXYSMAL ATRIAL FIBRILLATION) (HCC): ICD-10-CM

## 2021-02-04 DIAGNOSIS — E78.00 HYPERCHOLESTEREMIA: ICD-10-CM

## 2021-02-04 DIAGNOSIS — Z95.2 S/P TAVR (TRANSCATHETER AORTIC VALVE REPLACEMENT): ICD-10-CM

## 2021-02-04 PROCEDURE — 99214 OFFICE O/P EST MOD 30 MIN: CPT | Performed by: INTERNAL MEDICINE

## 2021-02-04 RX ORDER — FUROSEMIDE 40 MG/1
40 TABLET ORAL DAILY
COMMUNITY
End: 2021-02-05 | Stop reason: DRUGHIGH

## 2021-02-04 NOTE — LETTER
[address] [facility] [place] [address] [address] [address]  Phone: [phone]  Fax: [phone]    Connie Tolbert MD        February 4, 2021     Charis Landis [address] [facility]    Patient: James Singer  MR Number: [id number]  YOB: 1940  Date of Visit: 2/4/2021    Dear Dr. Yoli Castro:      [address]     H+P // CONSULT // OUTPATIENT VISIT // [facility]     Referring Doctor Yoli Castro MD   Encounter Type Followup     CHIEF COMPLAINT     Visit Type Chronic   Symptoms SOB, fatigue   Problems AS s/p TAVR, pAFIB, HTN, CHOL     HISTORY OF PRESENT ILLNESS     ? GEN - Doing fair. Reports fatigue and sob with exertion, correlating temporally with hematuria. Concern for anemia. ? AS - s/p TAVR. No cp, sob  ? AFIB/NSVT - no palpitations. Following with EP. ? HTN - Ambulatory BP readings in good range. No HA or dizziness. ? CHOL - Last cholesterol reviewed and in good range. Tolerating statin without side effects. ? MED - Compliant with CV meds listed below without notable side effects. HISTORY/ALLERGIES/ROS     MedHx:  has a past medical history of Acute on chronic diastolic (congestive) heart failure ([address] Aortic stenosis, Arthritis, Cancer ([address] Hyperlipidemia, Hypertension, Paroxysmal atrial fibrillation ([address] and Thyroid disease. SurgHx:  has a past surgical history that includes Thyroidectomy, partial; Tonsillectomy; Tubal ligation; Knee arthroscopy (Left); other surgical history (10/09/2015); Upper gastrointestinal endoscopy (N/A, 8/13/2019); Colonoscopy (N/A, 8/13/2019); Esophagus dilation (8/13/2019); Cardiac catheterization (09/2019); Aortic valve replacement (N/A, 10/15/2019); eye surgery; eye surgery (Right, 08/19/2020); and Cystoscopy (N/A, 9/25/2020). SocHx:  reports that she quit smoking about 38 years ago. Her smoking use included cigarettes. She has a 20.00 pack-year smoking history. She has never used smokeless tobacco. She reports that she does not drink alcohol or use drugs. FamHx: family history includes Colon Cancer in her mother; Heart Disease in her brother and father. Allerg: Iodides, Shellfish-derived products, and Sulfa antibiotics   ROS: [x]Full ROS obtained and negative except as mentioned in HPI     MEDICATIONS      Current Outpatient Medications   Medication Sig Dispense Refill    atorvastatin (LIPITOR) 40 MG tablet TAKE ONE TABLET BY MOUTH ONCE NIGHTLY 90 tablet 3    CARTIA  MG extended release capsule TAKE ONE CAPSULE BY MOUTH DAILY 90 capsule 3    apixaban (ELIQUIS) 2.5 MG TABS tablet Take 1 tablet by mouth 2 times daily 60 tablet 5    DULoxetine (CYMBALTA) 30 MG extended release capsule Take 30 mg by mouth daily      metoprolol succinate (TOPROL XL) 25 MG extended release tablet Take 25 mg by mouth daily      levothyroxine (SYNTHROID) 125 MCG tablet Take 125 mcg by mouth Daily      potassium chloride (KLOR-CON M) 20 MEQ extended release tablet Take 20 mEq by mouth daily      OXYGEN Inhale 2 L into the lungs      furosemide (LASIX) 20 MG tablet Take 2 tablets by mouth every morning 30 tablet 0    gabapentin (NEURONTIN) 600 MG tablet Take 600 mg by mouth 4 times daily.  Takes one 300mg pill in early am, one with breakfast, two with dinner, and two at bedtime      Cyanocobalamin (B-12) 2500 MCG TABS Take 1 tablet by mouth once a week       ferrous sulfate 325 (65 Fe) MG tablet Take 1 tablet by mouth daily (with breakfast) 30 tablet 1    pantoprazole (PROTONIX) 20 MG tablet Take 20 mg by mouth daily      melatonin 5 MG TABS tablet Take 5 mg by mouth nightly      aspirin EC 81 MG EC tablet Take 1 tablet by mouth daily 90 tablet 1  HYDROcodone-acetaminophen (NORCO) 5-325 MG per tablet Take 2 tablets by mouth every 6 hours as needed for Pain. Takes one pill early am, one pill at breakfast, two pills with dinner, and two pills at bedtime.  metaxalone (SKELAXIN) 800 MG tablet Take 800 mg by mouth 2 times daily as needed (as needed for spasms) Do not take with vicodin.  Butalbital-Acetaminophen  MG TABS Take 1 tablet by mouth as needed (as needed for headaches) Vary rarely. No current facility-administered medications for this visit.       Reviewed with patient and will remain unchanged except as mentioned in A/P  PHYSICAL EXAM     Vitals:    02/04/21 1020   BP: 120/64   Pulse: 60   SpO2: 96%      Gen Alert, coop, no distress Heart  Rrr, 1/6   Head NC, AT, no abnorm Abd  Soft, NT, +BS, no mass, no OM   Eyes PER, conj/corn clear Ext  Ext nl, AT, no C/C/E   Nose Nares nl, no drain, NT Pulse 2+ and symmetric   Throat Lips, mucosa, tongue nl Skin Col/text/turg nl, no vis rash/les   Neck S/S, TM, NT, no bruit/JVD Psych Nl mood and affect   Lung CTA-B, unlabored, no DTP Lymph   No cervical or axillary LA   Ch wall NT, no deform Neuro  Nl gross M/S exam     ASSESSMENT AND PLAN     *AS   Date EF Detail   Sx   SOB, fatigue   Hx 10/19  TAVR 23 mm Garcia S3   TTE 6/19  10/19  11/19  8/20 60%  65%  65%  70% Severe AS MG 34 and , mild MR, mod TR, mod pulm HTN, mod PI  TAVR MG 14  TAVR MG 20, triv catracho AI, mild MR, mod TR  TAVR well seated MG 20, mild AI, mild MS    LHC 9/19  Mild nonobstructive CAD ([place])   Plan   Continue eliquis unless anemic  Annual echos  Check cbc   *AFIB/NSVT  Hx:  Parox, 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, NSVT  Status No AC due to hematuria in past, cysto negative  Plan Check cbc, if anemic, will discontinue AC   Discussed watchman, patient not interested at this time  *HTN  Status Controlled  Plan Counseled on diet/salt/exercise/weight  *CHOL  Status  Uncontrolled with last LDL of 101 on 9/19 (goal <70) Plan Counseled on diet/exercise/weight, continue statin, lipid/liver surveillance per PCP  *COMPLIANCE  Status Compliant  Plan Discussed importance of compliance with meds/diet/salt/exercise; avoid tob/alc/drugs; patient verbalized understanding  *FOLLOWUP  6 months     [facility] Teresa Fajardo, am scribing for and in the presence of Cassius Modi MD.   SignedTeresa 02/02/21 1:19 PM   Provider Orin Olmedo is working as a scribe for and in the presence of me (Cassius Modi MD). Working as a scribe, Teresa Bond may have prepopulated components of this note with my historical  intellectual property under my direct supervision. Any additions to this intellectual property were performed in my presence and at my direction. Furthermore, the content and accuracy of this note have been reviewed by me Cassius Modi MD).  2/4/2021 11:09 AM    CODING     Category Diagnosis   Stable chronic illness  (49813/31011 - 2 or more) AS, AFIB, HTN, CHOL   Chronic illness with: Exac, progr or SA of Tx  (55271/60138 - 1 or more)    Undiagnosed new problem with: uncertain prognosis  (19099/23811 - 1 or more)    Acute illness with systemic Sx  (74438/58586 - 1 or more)    Acute, complicated injury  (73704/38729 - 1 or more)    80707 1 or more chronic illness with exacerbation, progression or SA of treatment    Time  30-39 minutes spent preparing to see patient including reviewing patient history/prior tests/prior consults, performing a medical exam, counseling and educating patient/family/caregiver, ordering medications/tests/procedures, referring and communicating with PCPs and other pertinent consultants, documenting information in the EMR, independently interpreting results and communicating to family and coordination of patient care. If you have questions, please do not hesitate to call me. I look forward to following Jordan Greer along with you.     Sincerely, Ileana Burris MD

## 2021-02-05 ENCOUNTER — TELEPHONE (OUTPATIENT)
Dept: CARDIOLOGY CLINIC | Age: 81
End: 2021-02-05

## 2021-02-05 RX ORDER — FUROSEMIDE 20 MG/1
20 TABLET ORAL DAILY
COMMUNITY
End: 2021-10-07 | Stop reason: ALTCHOICE

## 2021-02-05 NOTE — TELEPHONE ENCOUNTER
Dr. Dalia Schroeder reviewed blood work results from 2/4/21 which showed a slight drop in H/H to 9.5/32.3.  10/2020 labs showed H/H 10.5/33.4. Due to recent hematuria and decrease in H/H, Dr. Dalia Schroeder wants patient to stop eliquis. Creatinine 1.5 which is up from 1.1 in 9/2020 and 1.18 in 10/2020. He recommends reducing furosemide from 40 mg to 20 mg. He will defer repeat labs (CBC and BMP) to Dr. Christiana Menchaca. Reviewed above info with Daniel Graves (care giver). Instructed her to contact Dr. Nati Altamirano office to inform him of Dr. Dalia Schroeder med changes and to call our office back if she has any questions or if Dr. Christiana Menchaca medication recommendations differ. She verbalized understanding of all info discussed. Med list updated.

## 2021-02-12 ENCOUNTER — IMMUNIZATION (OUTPATIENT)
Dept: PRIMARY CARE CLINIC | Age: 81
End: 2021-02-12
Payer: MEDICARE

## 2021-02-12 PROCEDURE — 91301 COVID-19, MODERNA VACCINE 100MCG/0.5ML DOSE: CPT | Performed by: FAMILY MEDICINE

## 2021-02-12 PROCEDURE — 0011A PR IMM ADMN SARSCOV2 100 MCG/0.5 ML 1ST DOSE: CPT | Performed by: FAMILY MEDICINE

## 2021-03-12 ENCOUNTER — IMMUNIZATION (OUTPATIENT)
Dept: PRIMARY CARE CLINIC | Age: 81
End: 2021-03-12
Payer: MEDICARE

## 2021-03-12 PROCEDURE — 0012A PR IMM ADMN SARSCOV2 100 MCG/0.5 ML 2ND DOSE: CPT | Performed by: FAMILY MEDICINE

## 2021-03-12 PROCEDURE — 91301 COVID-19, MODERNA VACCINE 100MCG/0.5ML DOSE: CPT | Performed by: FAMILY MEDICINE

## 2021-03-24 RX ORDER — FERROUS SULFATE 325(65) MG
TABLET ORAL
Qty: 30 TABLET | Refills: 0 | OUTPATIENT
Start: 2021-03-24

## 2021-03-24 NOTE — TELEPHONE ENCOUNTER
The original prescription was discontinued on 2/4/2021 by Lito Roach MA for the following reason: LIST CLEANUP. Renewing this prescription may not be appropriate. *FOLLOWUP  6 months medication was removed during office visit. The patient is no longer taking this medication.

## 2021-03-30 ENCOUNTER — HOSPITAL ENCOUNTER (OUTPATIENT)
Dept: PHYSICAL THERAPY | Age: 81
Setting detail: THERAPIES SERIES
Discharge: HOME | End: 2021-03-30
Payer: MEDICARE

## 2021-03-30 PROCEDURE — 97162 PT EVAL MOD COMPLEX 30 MIN: CPT

## 2021-03-30 PROCEDURE — 97110 THERAPEUTIC EXERCISES: CPT
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
## 2021-03-30 NOTE — FLOWSHEET NOTE
Physical Therapy Daily Treatment Note  Date:  3/30/2021    Patient Name:  Miguel Fowler    :  1940  MRN: [id number]      Medical/Treatment Diagnosis Information:  · Diagnosis: Sacroiliac joint pain (ICD-719.45), Low back pain (ICD-724.2) (SNC19-I15.5), Lumbar spondylosis, L1-L5 (.3) (ADB26-D38.816), Lumbar Spondylolysis, L5-S1 (Pars defect) (ICD-738.4) (VGC91-W36.07)  ·    ·   Insurance/Certification information:  PT Insurance Information: Sena Mediblue - No Stim, traction, vaso, ionto, DN    Physician Information:  Referring Practitioner: Dr. Pepe Welch of care signed (Y/N): N    Date of Patient follow up with Physician:     Is this a Progress Report:     []  Yes  [x]  No      If Yes:  Date Range for reporting period:  Beginning  Ending    Progress report will be due (10 Rx or 30 days whichever is less):   Recertification will be due (POC Duration  / 90 days whichever is less): 2021         Visit # Insurance Allowable Auth Required     2 tx/week for 6 weeks []  Yes [x]  No        Functional Scale: Tinetti  score :  (50%)  Date assessed:  3/30/21       Latex Allergy:  []NO      []YES  Preferred Language for Healthcare:   [x]English       []other:    Pain level:  10     SUBJECTIVE:  See eval      RESTRICTIONS/PRECAUTIONS:    Sena Mediblue - No Stim, traction, vaso, ionto, DN    OBJECTIVE: See eval        Exercises/Interventions:     Exercises in bold performed in department today. Items not bolded are carried forward from prior visits for continuity of the record. Exercise/Equipment Resistance/Repetitions HEP Other comments     Lateral step in/out , with HHA bilat 1x10  [x] Verbal instruction for HEP. Emphasis on extending SLS time. Pt unable to safely complete standing hip ABD.       Hip IR/ER PROM in supine bilat 1x10 []        []        []        []        []        []        []        []          []         []        []        []        [] []        []        []        []      Therapeutic Exercise/Home Exercise Program:   25 minutes  Reviewed patient's current self-developed HEP which includes seated hip flexion, seated LAQ, standing arm circles, standing arm flexion. Discussed modifications as appropriate for technique. Therapeutic Activity:  0 minutes      Gait: 5 minutes   Ambulation #: 1:  Device: Single point cane (SPC) in R hand  Level of Assist: CGA  Deficits: decreased maria fernanda, Decreased NAHUN, decreased head and trunk rotation, forward flexed posture, step-to pattern, decreased arm swing on left, decreased step height on left, decreased step length on right, decreased stance time on left, lack heel strike on left  Distance: 25ft with 2 turns + 10 ft  Comment: cues for safety during turning    Neuromuscular Re-Education:  0 minutes      Canalith Repositioning Procedure:      Manual Therapy:  0 minutes    Modalities: 0 minutes        Therapeutic Exercise and NMR EXR  [x] (96439) Provided verbal/tactile cueing for activities related to strengthening, flexibility, endurance, ROM  for improvements in proximal hip and core control with self care, mobility, lifting and ambulation. [x] (53914) Provided verbal/tactile cueing for activities related to improving balance, coordination, kinesthetic sense, posture, motor skill, proprioception  to assist with core control in self care, mobility, lifting, and ambulation.      Therapeutic Activities:    [] (60755 or 44403) Provided verbal/tactile cueing for activities related to improving balance, coordination, kinesthetic sense, posture, motor skill, proprioception and motor activation to allow for proper function  with self care and ADLs  [] (29192) Provided training and instruction to the patient for proper core and proximal hip recruitment and positioning with ambulation re-education     Home Exercise Program:    [x] (83993) Reviewed/Progressed HEP activities related to strengthening, flexibility, []? Met: []? Not Met: []? Adjusted        Long Term Goals: To be achieved in: 6 weeks  1. Disability index score of 20/50 (40%) or less for the DEANA to assist with reaching prior level of function. []? Progressing: []? Met: []? Not Met: []? Adjusted  2. Patient will demonstrate good LS mobility, good hip ROM to allow for proper joint functioning as indicated by patients Functional Deficits. []? Progressing: []? Met: []? Not Met: []? Adjusted  3. Patient will demonstrate an increase in Strength to good proximal hip and core activation to allow for proper functional mobility as indicated by patients Functional Deficits. []? Progressing: []? Met: []? Not Met: []? Adjusted  4. Patient will demonstrate dynamic standing activity tolerance of 10 minutes without increased symptoms or restriction. []? Progressing: []? Met: []? Not Met: []? Adjusted  5. Tinetti fall risk assessment score of 20/28 or higher to reduce fall risk. []? Progressing: []? Met: []? Not Met: []? Adjusted         Overall Progression Towards Functional goals/ Treatment Progress Update:  [x] Patient is progressing as expected towards functional goals listed. [] Progression is slowed due to complexities/Impairments listed. [] Progression has been slowed due to co-morbidities.   [x] Plan just implemented, too soon to assess goals progression <30days   [] Goals require adjustment due to lack of progress  [] Patient is not progressing as expected and requires additional follow up with physician  [] Other    Prognosis for POC: [x] Good [] Fair  [] Poor      Patient requires continued skilled intervention: [x] Yes  [] No    Treatment/Activity Tolerance:  [x] Patient able to complete treatment  [] Patient limited by fatigue  [] Patient limited by pain    [] Patient limited by other medical complications  [] Other:         PLAN: See eval  [] Continue per plan of care [] Alter current plan (see comments above)  [x] Plan of care initiated [] Hold pending MD visit [] Discharge    Plan Moving Forward/ For next visit:   · Build HEP to address LE strength deficits, with emphasis on standing activities to improve activity tolerance. · Gait analysis and gait training to address deviations    · Evaluate deep tendon reflexes  · Complete C-SSRT screen      Electronically signed by: Melvi Negron PT,   Note: If patient does not return for scheduled/ recommended follow up visits, this note will serve as a discharge from care along with most recent update on progress.

## 2021-04-01 ENCOUNTER — HOSPITAL ENCOUNTER (OUTPATIENT)
Dept: PHYSICAL THERAPY | Age: 81
Setting detail: THERAPIES SERIES
Discharge: HOME | End: 2021-04-01
Payer: MEDICARE

## 2021-04-01 PROCEDURE — 97140 MANUAL THERAPY 1/> REGIONS: CPT

## 2021-04-01 PROCEDURE — 97110 THERAPEUTIC EXERCISES: CPT

## 2021-04-01 NOTE — FLOWSHEET NOTE
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 Pixie Lundborg of care signed (Y/N): N    Date of Patient follow up with Physician:     Is this a Progress Report:     []  Yes  [x]  No      If Yes:  Date Range for reporting period:  Beginning  Ending    Progress report will be due (10 Rx or 30 days whichever is less):   Recertification will be due (POC Duration  / 90 days whichever is less): 2021         Visit # Insurance Allowable Auth Required     2 tx/week for 6 weeks []  Yes [x]  No        Functional Scale: Tinetti  score : 14 (50%)  Date assessed:  3/30/21       Latex Allergy:  []NO      []YES  Preferred Language for Healthcare:   [x]English       []other:    Current Pain level:  Mild - patient does not rate pain despite prompting. SUBJECTIVE:  Patient reports no change in condition since last visit. Caregiver Sherlyn Castle not present today. RESTRICTIONS/PRECAUTIONS:    Raymond Mediblue - No Stim, traction, vaso, ionto, DN    OBJECTIVE: See eval        Exercises/Interventions:     Exercises in bold performed in department today. Items not bolded are carried forward from prior visits for continuity of the record. Exercise/Equipment Resistance/Repetitions HEP Other comments     Lateral step in/out , with HHA bilat 1x10  [x] Verbal instruction for HEP. Emphasis on extending SLS time. Pt unable to safely complete standing hip ABD.       Hip IR/ER PROM in supine bilat 1x10 []      Step up/down onto 4\" box in // bars bilat 1x10 [] Needed consistent cues for technique. Standing knee flexion in // bars bilat 2x10 alternating  [x]      Standing marches in // bars bilat 2x10 alternating [x]        []        []        []        []          []         []        []        []        []        []        []        []        []      Therapeutic Exercise/Home Exercise Program:   25 minutes  Reviewed patient's current self-developed HEP which includes seated hip flexion, seated LAQ, standing arm circles, standing arm flexion. Discussed modifications as appropriate for technique. Patient provided with written HEP as follows. Discussed tracking reps/sets tolerated before requiring seated rest break as means of assessing activity tolerance. Your Access Code  [email or web address]   · Standing marches  · Standing knee flexion  · Lateral step outs      Postural assessment: Patient was noted today to have increased R lateral trunk lean while ambulating and in static stance. Upon inquiry patient acknowledged awareness of this and attributed it to having to holding portable O2 (although gait deviation is noted while therapist is holding O2). Patient was unable to correct the lean in static stance despite therapist verbal and tactile cuing and facilitation. Rechecked leg length in supine: pt appears to have equal anatomical leg length. Rechecked anatomical landmarks in standing: Iliac crests equal; R ASIS high, R PSIS low  Rechecked standing flexion test: PSIS moves caudally R>L   Special test: Fwd lunge vs Step & Bend - no pain provocation either direction. Patient with limited mobility during both movements.      Therapeutic Activity:  0 minutes      Gait: 5 minutes   Ambulation #: 1:  Device: Single point cane (SPC) in R hand  Level of Assist: CGA  Deficits: decreased maria fernanda, Decreased NAHUN, decreased head and trunk rotation, forward flexed posture, step-to pattern, decreased arm swing on left, decreased step height on left, decreased step length on right, decreased stance time on left, lack heel strike on left  Distance: 25ft with 2 turns + 10 ft  Comment: cues for safety during turning    Neuromuscular Re-Education:  0 minutes      Canalith Repositioning Procedure:      Manual Therapy:  8 minutes  MET for L posterior innominate rotation - 10 x 5 sec holds in supine 90/90 with therapist resisting R hip extension, L hip flexion. Note - first attempted MET in supine with RLE extended, but patient was unable to elicit appreciable rectus activation against gravity. Self-MET for L post rotation - 3 x 10 sec holds in L leading lunge position. Therapist providing CGA. Also given as HEP. Your Access Code  [email or web address]   · Standing Lunge leading with LLE (self-MET for R posterior innominate rotation)     Modalities: 0 minutes    C-SSRT Screen completed, pt answered no to 1st 2 questions. Therapeutic Exercise and NMR EXR  [x] (03231) Provided verbal/tactile cueing for activities related to strengthening, flexibility, endurance, ROM  for improvements in proximal hip and core control with self care, mobility, lifting and ambulation. [x] (91290) Provided verbal/tactile cueing for activities related to improving balance, coordination, kinesthetic sense, posture, motor skill, proprioception  to assist with core control in self care, mobility, lifting, and ambulation.      Therapeutic Activities:    [] (02845 or 41317) Provided verbal/tactile cueing for activities related to improving balance, coordination, kinesthetic sense, posture, motor skill, proprioception and motor activation to allow for proper function  with self care and ADLs  [] (95762) Provided training and instruction to the patient for proper core and proximal hip recruitment and positioning with ambulation re-education     Home Exercise Program:    [x] (14084) Reviewed/Progressed HEP activities related to strengthening, flexibility, endurance, ROM of core, proximal hip and LE for functional self-care, mobility, lifting and ambulation   [] (95639) Reviewed/Progressed HEP activities related to improving balance, coordination, kinesthetic sense, posture, motor skill, proprioception of core, proximal hip and LE for self care, mobility, lifting, and ambulation      Manual Treatments:  PROM / STM / Oscillations-Mobs:  G-I, II, III, IV (PA's, Inf., Post.)  [] (69956) Provided manual therapy to mobilize proximal hip and LS spine soft tissue/joints for the purpose of modulating pain, promoting relaxation,  increasing ROM, reducing/eliminating soft tissue swelling/inflammation/restriction, improving soft tissue extensibility and allowing for proper ROM for normal function with self care, mobility, lifting and ambulation. Modalities:       Charges:  Timed Code Treatment Minutes: 30   Total Treatment Minutes: 45     [] EVAL  [x] NE(41348) x  2   [] IONTO  [] NMR (95075) x     [] VASO  [x] Manual (77860) x 1     [] Other:  [] TA x     [] Gait x   [] Mech Traction (81349)  [] ES(attended) (62349)     [] ES (un) (51284):       ASSESSMENT:  Torres Lincoln demonstrates reduced functional LE strength and endurance during standing exercises and will benefit from progression of tolerated standing activity in HEP. Will reassess SI pain NV after completing MET for R post rotation. Self-MET may be too challenging for patient; will await feedback NV and adjust as necessary. Goals:   Patient stated goal: \"to be able to pick something up, go somewhere, or do something without being in so much pain, so that I feel like I accomplished something\"   [x]? Progressing: []? Met: []? Not Met: []? Adjusted        Therapist goals for Patient:   Short Term Goals: To be achieved in: 2 weeks  1. Independent in HEP and progression per patient tolerance, in order to prevent re-injury. []? Progressing: []? Met: []? Not Met: []? Adjusted  2.  Patient will have a decrease in pain to 5/10 at worst (baseline 8/10) to facilitate improvement in movement, function, and ADLs as indicated by Functional Deficits. []? Progressing: []? Met: []? Not Met: []? Adjusted        Long Term Goals: To be achieved in: 6 weeks  1. Disability index score of 20/50 (40%) or less for the DEANA to assist with reaching prior level of function. []? Progressing: []? Met: []? Not Met: []? Adjusted  2. Patient will demonstrate good LS mobility, good hip ROM to allow for proper joint functioning as indicated by patients Functional Deficits. []? Progressing: []? Met: []? Not Met: []? Adjusted  3. Patient will demonstrate an increase in Strength to good proximal hip and core activation to allow for proper functional mobility as indicated by patients Functional Deficits. []? Progressing: []? Met: []? Not Met: []? Adjusted  4. Patient will demonstrate dynamic standing activity tolerance of 10 minutes without increased symptoms or restriction. []? Progressing: []? Met: []? Not Met: []? Adjusted  5. Tinetti fall risk assessment score of 20/28 or higher to reduce fall risk. []? Progressing: []? Met: []? Not Met: []? Adjusted         Overall Progression Towards Functional goals/ Treatment Progress Update:  [x] Patient is progressing as expected towards functional goals listed. [] Progression is slowed due to complexities/Impairments listed. [] Progression has been slowed due to co-morbidities.   [x] Plan just implemented, too soon to assess goals progression <30days   [] Goals require adjustment due to lack of progress  [] Patient is not progressing as expected and requires additional follow up with physician  [] Other    Prognosis for POC: [x] Good [] Fair  [] Poor      Patient requires continued skilled intervention: [x] Yes  [] No    Treatment/Activity Tolerance:  [x] Patient able to complete treatment  [] Patient limited by fatigue  [] Patient limited by pain    [] Patient limited by other medical complications  [] Other: PLAN: See eval  [] Continue per plan of care [] Alter current plan (see comments above)  [x] Plan of care initiated [] Hold pending MD visit [] Discharge    Plan Moving Forward/ For next visit:   · Build HEP to address LE strength deficits, with emphasis on standing activities to improve activity tolerance. · Gait analysis and gait training to address deviations    · Evaluate deep tendon reflexes  · Complete C-SSRT screen       Electronically signed by: Elder Bashir PT,   Note: If patient does not return for scheduled/ recommended follow up visits, this note will serve as a discharge from care along with most recent update on progress.

## 2021-04-06 ENCOUNTER — HOSPITAL ENCOUNTER (OUTPATIENT)
Dept: PHYSICAL THERAPY | Age: 81
Setting detail: THERAPIES SERIES
Discharge: HOME | End: 2021-04-06
Payer: MEDICARE

## 2021-04-06 PROCEDURE — 97110 THERAPEUTIC EXERCISES: CPT

## 2021-04-06 PROCEDURE — 97140 MANUAL THERAPY 1/> REGIONS: CPT

## 2021-04-06 NOTE — FLOWSHEET NOTE
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 Bety Blackwell of care signed (Y/N): N    Date of Patient follow up with Physician:     Is this a Progress Report:     []  Yes  [x]  No      If Yes:  Date Range for reporting period:  Beginning  Ending    Progress report will be due (10 Rx or 30 days whichever is less): 3/75/72  Recertification will be due (POC Duration  / 90 days whichever is less): 2021         Visit # Insurance Allowable Auth Required   3/12  2 tx/week for 6 weeks []  Yes [x]  No        Functional Scale: Tinetti  score : 14 (50%)  Date assessed:  3/30/21       Latex Allergy:  []NO      []YES  Preferred Language for Healthcare:   [x]English       []other:    Current Pain level:  Mild - patient does not rate pain despite prompting. SUBJECTIVE:  Patient reports feeling more fatigued in last ~week. She requires seated rest breaks after walking just a short distance (room to room). Denies other symptoms such as coughing, wheezing, N/V, lack of smell/taste. Endorses some dizziness but not in correlation with onset of new fatigue. She does not have portable O2 tank with her today because it was not charged overnight. Patient presented to clinic today with rollator due to the fatigue. Encouraged patient to discuss symptoms with PCP when able.    Her SI pain on R side is improved (6/10) - \"feeling better\"; but she has experienced new onset of L sided pain which she says is also at her SI joint. (7/10). She points to :L SIJ when prompted and has palpable pain at L SIJ. Patient's daughter is present today and accompanies patient during treatment. RESTRICTIONS/PRECAUTIONS:    [place] [employer] - No Stim, traction, vaso, ionto, DN    OBJECTIVE: See eval      Exercises/Interventions:     Exercises in bold performed in department today. Items not bolded are carried forward from prior visits for continuity of the record. Exercise/Equipment Resistance/Repetitions HEP Other comments     Lateral step in/out , with HHA bilat 1x10  [x] Verbal instruction for HEP. Emphasis on extending SLS time. Pt unable to safely complete standing hip ABD. Hip IR/ER PROM in supine bilat 1x10 []      Step up/down onto 4\" box in // bars bilat 1x10 [] Needed consistent cues for technique. Standing knee flexion in // bars bilat 2x10 alternating  [x]      Standing marches in // bars bilat 2x10 alternating [x]     Adduction into yellow ball in hooklying with trunk propped (pain in supine hooklying) bilat 2x15 [] Cues for adbdominal contraction and tactile cues to keep pelvis and knees aligned in neutral posture. (pt drifts into R rotation)     Adbuction with yellow TB in hooklying with trunk propped (pain in supine hooklying) bilat 2x15 [x] Cues for adbdominal contraction and tactile cues to keep pelvis and knees aligned in neutral posture. (pt drifts into R rotation)          []        []          []         []        []        []        []        []        []        []        []      Therapeutic Exercise/Home Exercise Program:   30 minutes  4/6/21 - Rechecked anatomical landmarks due to subj report of new pain profile: In sitting: PSIS equal  In sitting flexion: R PSIS moves first.  In standing: L iliac crest high, PSIS equal, moderate R trunk lean. L knee flexes when cued into neutral posture.   In supine: leg length equal.      Reviewed patient's current HEP, patient is completing regularly and has no questions on exercises. Discussed modifying standing lunge self-MET to a supine MET with legs crossed but patient experienced incr pain with legs crossed. Will continue with current standing lunge. Reviewed patient's current self-developed HEP which includes seated hip flexion, seated LAQ, standing arm circles, standing arm flexion. Discussed modifications as appropriate for technique. Patient provided with written HEP as follows. Discussed tracking reps/sets tolerated before requiring seated rest break as means of assessing activity tolerance. Your Access Code  [email or web address]   · Standing marches  · Standing knee flexion  · Lateral step outs      Postural assessment: Patient was noted today to have increased R lateral trunk lean while ambulating and in static stance. Upon inquiry patient acknowledged awareness of this and attributed it to having to holding portable O2 (although gait deviation is noted while therapist is holding O2). Patient was unable to correct the lean in static stance despite therapist verbal and tactile cuing and facilitation. 4/1/21:   Rechecked leg length in supine: pt appears to have equal anatomical leg length. Rechecked anatomical landmarks in standing: Iliac crests equal; R ASIS high, R PSIS low  Rechecked standing flexion test: PSIS moves caudally R>L   Special test: Fwd lunge vs Step & Bend - no pain provocation either direction. Patient with limited mobility during both movements. Therapeutic Activity:  0 minutes    Pain noted with sit>supine and supine>sit with HOB elevated.     Gait: 0 minutes   Ambulation #: 1:  Device: Single point cane (SPC) in R hand  Level of Assist: CGA  Deficits: decreased maria fernanda, Decreased NAHUN, decreased head and trunk rotation, forward flexed posture, step-to pattern, decreased arm swing on left, decreased step height on left, decreased step length on right, decreased stance time on left, lack heel strike on left  Distance: 25ft with 2 turns + 10 ft  Comment: cues for safety during turning    Neuromuscular Re-Education:  0 minutes      Canalith Repositioning Procedure:      Manual Therapy:  15 minutes  MET for L posterior innominate rotation - 10 x 5 sec holds in supine 90/90 with therapist resisting R hip extension, L hip flexion. 4/6-Pt attempted, had difficulty with direction following. Note - first attempted MET in supine with RLE extended, but patient was unable to elicit appreciable rectus activation against gravity. MET in hooklying: bilat hip add 5x5 sec, hip aDd 5x5 sec, hip flex 5x5 sec, hip ext 5x5 sec. Pt with poor NM control in sustained contraction all directions, most notably in aDduction. Self-MET for L post rotation - 3 x 10 sec holds in L leading lunge position. Therapist providing CGA. Also given as HEP. Your Access Code  [email or web address]   · Standing Lunge leading with LLE (self-MET for R posterior innominate rotation)     Modalities: 0 minutes    C-SSRT Screen completed, pt answered no to 1st 2 questions. Therapeutic Exercise and NMR EXR  [x] (17352) Provided verbal/tactile cueing for activities related to strengthening, flexibility, endurance, ROM  for improvements in proximal hip and core control with self care, mobility, lifting and ambulation. [x] (88135) Provided verbal/tactile cueing for activities related to improving balance, coordination, kinesthetic sense, posture, motor skill, proprioception  to assist with core control in self care, mobility, lifting, and ambulation.      Therapeutic Activities:    [] (00760 or 79483) Provided verbal/tactile cueing for activities related to improving balance, coordination, kinesthetic sense, posture, motor skill, proprioception and motor activation to allow for proper function  with self care and ADLs  [] (42567) Provided training and instruction to the patient for proper core and proximal hip recruitment and positioning with ambulation re-education     Home Exercise Program:    [x] (30139) Reviewed/Progressed HEP activities related to strengthening, flexibility, endurance, ROM of core, proximal hip and LE for functional self-care, mobility, lifting and ambulation   [] (67158) Reviewed/Progressed HEP activities related to improving balance, coordination, kinesthetic sense, posture, motor skill, proprioception of core, proximal hip and LE for self care, mobility, lifting, and ambulation      Manual Treatments:  PROM / STM / Oscillations-Mobs:  G-I, II, III, IV (PA's, Inf., Post.)  [x] (10932) Provided manual therapy to mobilize proximal hip and LS spine soft tissue/joints for the purpose of modulating pain, promoting relaxation,  increasing ROM, reducing/eliminating soft tissue swelling/inflammation/restriction, improving soft tissue extensibility and allowing for proper ROM for normal function with self care, mobility, lifting and ambulation. Modalities:       Charges:  Timed Code Treatment Minutes: 45   Total Treatment Minutes: 45     [] EVAL  [x] OY(29076) x  2   [] IONTO  [] NMR (59593) x     [] VASO  [x] Manual (97818) x 1     [] Other:  [] TA x     [] Gait x   [] Mech Traction (30449)  [] ES(attended) (90165)     [] ES (un) (67906):       ASSESSMENT: Patient has moderate pain at bilat SIJ with most supine/hooklying exercise today, requiring modifications as needed. She demonstrates reduced NM control with all hip strength exercises/METs and will benefit from hip and core stabilization as per POC. Goals:   Patient stated goal: \"to be able to pick something up, go somewhere, or do something without being in so much pain, so that I feel like I accomplished something\"   [x]? Progressing: []? Met: []? Not Met: []? Adjusted        Therapist goals for Patient:   Short Term Goals: To be achieved in: 2 weeks  1. Independent in HEP and progression per patient tolerance, in order to prevent re-injury. []? Progressing: []? Met: []? Not Met: []? Adjusted  2. Patient will have a decrease in pain to 5/10 at worst (baseline 8/10) to facilitate improvement in movement, function, and ADLs as indicated by Functional Deficits. []? Progressing: []? Met: []? Not Met: []? Adjusted        Long Term Goals: To be achieved in: 6 weeks  1. Disability index score of 20/50 (40%) or less for the DEANA to assist with reaching prior level of function. []? Progressing: []? Met: []? Not Met: []? Adjusted  2. Patient will demonstrate good LS mobility, good hip ROM to allow for proper joint functioning as indicated by patients Functional Deficits. []? Progressing: []? Met: []? Not Met: []? Adjusted  3. Patient will demonstrate an increase in Strength to good proximal hip and core activation to allow for proper functional mobility as indicated by patients Functional Deficits. []? Progressing: []? Met: []? Not Met: []? Adjusted  4. Patient will demonstrate dynamic standing activity tolerance of 10 minutes without increased symptoms or restriction. []? Progressing: []? Met: []? Not Met: []? Adjusted  5. Tinetti fall risk assessment score of 20/28 or higher to reduce fall risk. []? Progressing: []? Met: []? Not Met: []? Adjusted         Overall Progression Towards Functional goals/ Treatment Progress Update:  [x] Patient is progressing as expected towards functional goals listed. [] Progression is slowed due to complexities/Impairments listed. [] Progression has been slowed due to co-morbidities.   [x] Plan just implemented, too soon to assess goals progression <30days   [] Goals require adjustment due to lack of progress  [] Patient is not progressing as expected and requires additional follow up with physician  [] Other    Prognosis for POC: [x] Good [] Fair  [] Poor      Patient requires continued skilled intervention: [x] Yes  [] No    Treatment/Activity Tolerance:  [x] Patient able to complete treatment  [x] Patient limited by fatigue  [x] Patient limited by pain    [] Patient limited by other medical complications  [] Other:         PLAN: See eval  [x] Continue per plan of care [] Alter current plan (see comments above)  [] Plan of care initiated [] Hold pending MD visit [] Discharge    Plan Moving Forward/ For next visit:   · Recheck pelvic landmarks, continue METs to address pelvic alignment. · Gait analysis and gait training to address deviations and noted asymetry   · Core and hip stabilization/strengthening      Electronically signed by: Jose M Norwood PT, DPT   Note: If patient does not return for scheduled/ recommended follow up visits, this note will serve as a discharge from care along with most recent update on progress.

## 2021-04-08 ENCOUNTER — HOSPITAL ENCOUNTER (OUTPATIENT)
Dept: PHYSICAL THERAPY | Age: 81
Setting detail: THERAPIES SERIES
Discharge: HOME | End: 2021-04-08
Payer: MEDICARE

## 2021-04-08 PROCEDURE — 97110 THERAPEUTIC EXERCISES: CPT

## 2021-04-08 NOTE — FLOWSHEET NOTE
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 Alondra Head of care signed (Y/N): N    Date of Patient follow up with Physician:     Is this a Progress Report:     []  Yes  [x]  No      If Yes:  Date Range for reporting period:  Beginning  Ending    Progress report will be due (10 Rx or 30 days whichever is less): 33  Recertification will be due (POC Duration  / 90 days whichever is less): 2021         Visit # Insurance Allowable Auth Required     2 tx/week for 6 weeks []  Yes [x]  No        Functional Scale: Tinetti  score :  (50%)  Date assessed:  3/30/21       Latex Allergy:  []NO      []YES  Preferred Language for Healthcare:   [x]English       []other:    Current Pain level:  Patient reports significant fatigue, denies pain. SUBJECTIVE:    Patient reports continued fatigue with minimal exertion. She is accompanied by her daughter today. Pt and pt's daughter report that pt has been preoccupied recently by visiting grandkids and this has been wearing her out. She has had trouble getting up off toilet and needed family help. She reports sometimes taking her supplemental O2 off because it feels like \"it is suffocating\". RESTRICTIONS/PRECAUTIONS:    [employer] - No Stim, traction, vaso, ionto, DN    OBJECTIVE: See eval      Exercises/Interventions:     Exercises in bold performed in department today.   Items not bolded are carried forward from prior visits for continuity of the record. Exercise/Equipment Resistance/Repetitions HEP Other comments     Lateral step in/out , with HHA bilat 1x10  [x] Verbal instruction for HEP. Emphasis on extending SLS time. Pt unable to safely complete standing hip ABD. Hip IR/ER PROM in supine bilat 1x10 []      Step up/down onto 4\" box in // bars bilat 1x10 [] Needed consistent cues for technique. Standing knee flexion in // bars bilat 2x10 alternating  [x]      Standing marches in // bars bilat 2x10 alternating [x]     Adduction into yellow ball in hooklying with trunk propped (pain in supine hooklying) bilat 2x15 [] Cues for adbdominal contraction and tactile cues to keep pelvis and knees aligned in neutral posture. (pt drifts into R rotation)     Adbuction with yellow TB in hooklying with trunk propped (pain in supine hooklying) bilat 2x15 [x] Cues for adbdominal contraction and tactile cues to keep pelvis and knees aligned in neutral posture. (pt drifts into R rotation)          []        []          []         []        []        []        []        []        []        []        []      Therapeutic Exercise/Home Exercise Program:   40 minutes  4/8/21 -   After walking into clinic and sitting down:      SpO2 = 76%, AU=912. With 2 minutes of seated rest with pursed lip breathing:      SpO2 = high-70%'s, HR=  Pt sat for another ~5 minutes with continued low SpO2 < 80%. Signs of fatigue (poor posture, agitation) but no signs of distress. O2 titrated from \"setting 3\" to \"setting 4\". With 2 minutes of static sitting:      SpO2 = 88%  With 2 minutes static sitting with cues for pursed lip breathing:      SpO2 = 93%, HR=65  Pt then able to sustain SpO2>93% for next 20 minutes while seated in clinic. Therapist left message with patient's PCP (Dr. Lopez pock - [phone]), no return call as of yet.  Patient was sent home with daughter with instructions to contact PCP and Dr. Yaya Vargas (cardiology, ordering physician for PT).       4/8/21 - Rechecked anatomical landmarks: In sitting: Iliac crests equal  In standing: L iliac crest high, PSIS equal  Incomplete exam due to above circumstances. 4/6/21 - Rechecked anatomical landmarks due to subj report of new pain profile: In sitting: PSIS equal  In sitting flexion: R PSIS moves first.  In standing: L iliac crest high, PSIS equal, moderate R trunk lean. L knee flexes when cued into neutral posture. In supine: leg length equal.      Reviewed patient's current HEP, patient is completing regularly and has no questions on exercises. Discussed modifying standing lunge self-MET to a supine MET with legs crossed but patient experienced incr pain with legs crossed. Will continue with current standing lunge. Reviewed patient's current self-developed HEP which includes seated hip flexion, seated LAQ, standing arm circles, standing arm flexion. Discussed modifications as appropriate for technique. Patient provided with written HEP as follows. Discussed tracking reps/sets tolerated before requiring seated rest break as means of assessing activity tolerance. Your Access Code  [email or web address]   · Standing marches  · Standing knee flexion  · Lateral step outs      Postural assessment: Patient was noted today to have increased R lateral trunk lean while ambulating and in static stance. Upon inquiry patient acknowledged awareness of this and attributed it to having to holding portable O2 (although gait deviation is noted while therapist is holding O2). Patient was unable to correct the lean in static stance despite therapist verbal and tactile cuing and facilitation. 4/1/21:   Rechecked leg length in supine: pt appears to have equal anatomical leg length.    Rechecked anatomical landmarks in standing: Iliac crests equal; R ASIS high, R PSIS low  Rechecked standing flexion test: PSIS moves caudally R>L   Special test: Fwd lunge vs Step & Bend - no pain provocation either direction. Patient with limited mobility during both movements. Therapeutic Activity:  0 minutes    Pain noted with sit>supine and supine>sit with HOB elevated. Gait: 0 minutes   Ambulation #: 1:  Device: Single point cane (SPC) in R hand  Level of Assist: CGA  Deficits: decreased maria fernanda, Decreased NAHUN, decreased head and trunk rotation, forward flexed posture, step-to pattern, decreased arm swing on left, decreased step height on left, decreased step length on right, decreased stance time on left, lack heel strike on left  Distance: 25ft with 2 turns + 10 ft  Comment: cues for safety during turning    Neuromuscular Re-Education:  0 minutes      Canalith Repositioning Procedure:      Manual Therapy:  15 minutes  MET for L posterior innominate rotation - 10 x 5 sec holds in supine 90/90 with therapist resisting R hip extension, L hip flexion. 4/6-Pt attempted, had difficulty with direction following. Note - first attempted MET in supine with RLE extended, but patient was unable to elicit appreciable rectus activation against gravity. MET in hooklying: bilat hip add 5x5 sec, hip aDd 5x5 sec, hip flex 5x5 sec, hip ext 5x5 sec. Pt with poor NM control in sustained contraction all directions, most notably in aDduction. Self-MET for L post rotation - 3 x 10 sec holds in L leading lunge position. Therapist providing CGA. Also given as HEP. Your Access Code  [email or web address]   · Standing Lunge leading with LLE (self-MET for R posterior innominate rotation)     Modalities: 0 minutes    C-SSRT Screen completed, pt answered no to 1st 2 questions.      Therapeutic Exercise and NMR EXR  [x] (17744) Provided verbal/tactile cueing for activities related to strengthening, flexibility, endurance, ROM  for improvements in proximal hip and core control with self care, mobility, lifting and ambulation.  [] (27660) Provided verbal/tactile cueing for activities related to improving balance, coordination, kinesthetic sense, him during this session. With O2 titration and extended education regarding breathing technique, patient was able to sustain SpO2>90% for 20 minutes. Patient was taken home by daughter who states someone is with her at all times. They were instructed to contact PCP/cardiologist once home and we will await further instruction before resuming therapy NV. Patient still c/o SIJ pain but fatigue is primary complaint. Goals:    Patient stated goal: \"to be able to pick something up, go somewhere, or do something without being in so much pain, so that I feel like I accomplished something\"   [x]? Progressing: []? Met: []? Not Met: []? Adjusted        Therapist goals for Patient:   Short Term Goals: To be achieved in: 2 weeks  1. Independent in HEP and progression per patient tolerance, in order to prevent re-injury. []? Progressing: []? Met: []? Not Met: []? Adjusted  2. Patient will have a decrease in pain to 5/10 at worst (baseline 8/10) to facilitate improvement in movement, function, and ADLs as indicated by Functional Deficits. []? Progressing: []? Met: []? Not Met: []? Adjusted        Long Term Goals: To be achieved in: 6 weeks  1. Disability index score of 20/50 (40%) or less for the DEANA to assist with reaching prior level of function. []? Progressing: []? Met: []? Not Met: []? Adjusted  2. Patient will demonstrate good LS mobility, good hip ROM to allow for proper joint functioning as indicated by patients Functional Deficits. []? Progressing: []? Met: []? Not Met: []? Adjusted  3. Patient will demonstrate an increase in Strength to good proximal hip and core activation to allow for proper functional mobility as indicated by patients Functional Deficits. []? Progressing: []? Met: []? Not Met: []? Adjusted  4. Patient will demonstrate dynamic standing activity tolerance of 10 minutes without increased symptoms or restriction. []? Progressing: []? Met: []? Not Met: []? Adjusted  5.  Tinetti fall risk assessment score of 20/28 or higher to reduce fall risk. []? Progressing: []? Met: []? Not Met: []? Adjusted         Overall Progression Towards Functional goals/ Treatment Progress Update:  [] Patient is progressing as expected towards functional goals listed. [x] Progression is slowed due to complexities/Impairments listed. [] Progression has been slowed due to co-morbidities. [] Plan just implemented, too soon to assess goals progression <30days   [] Goals require adjustment due to lack of progress  [x] Patient is not progressing as expected and requires additional follow up with physician  [] Other    Prognosis for POC: [x] Good [] Fair  [] Poor      Patient requires continued skilled intervention: [x] Yes  [] No    Treatment/Activity Tolerance:  [] Patient able to complete treatment  [x] Patient limited by fatigue  [] Patient limited by pain    [] Patient limited by other medical complications  [] Other:         PLAN: See eval  [x] Continue per plan of care [] Alter current plan (see comments above)  [] Plan of care initiated [] Hold pending MD visit [] Discharge    Plan Moving Forward/ For next visit:   · Check vitals  · Recheck pelvic landmarks, continue METs to address pelvic alignment. · Gait analysis and gait training to address deviations and noted asymetry   · Core and hip stabilization/strengthening      Electronically signed by: Ewa Russo PT, DPT   Note: If patient does not return for scheduled/ recommended follow up visits, this note will serve as a discharge from care along with most recent update on progress.

## 2021-04-13 ENCOUNTER — HOSPITAL ENCOUNTER (OUTPATIENT)
Dept: PHYSICAL THERAPY | Age: 81
Setting detail: THERAPIES SERIES
Discharge: HOME | End: 2021-04-13
Payer: MEDICARE

## 2021-04-13 PROCEDURE — 97110 THERAPEUTIC EXERCISES: CPT

## 2021-04-13 PROCEDURE — 97140 MANUAL THERAPY 1/> REGIONS: CPT

## 2021-04-13 NOTE — FLOWSHEET NOTE
Physical Therapy Daily Treatment Note  Date:  2021    Patient Name:  Chacha Odell    :  1940  MRN: [id number]      Medical/Treatment Diagnosis Information:  · Diagnosis: Sacroiliac joint pain (ICD-719.45), Low back pain (ICD-724.2) (JYR04-Z91.5), Lumbar spondylosis, L1-L5 (.3) (ZKK62-U03.816), Lumbar Spondylolysis, L5-S1 (Pars defect) (ICD-738.4) (XQG56-I12.07)     ·   Insurance/Certification information:  PT Insurance Information: [place] [employer] - No Stim, traction, vaso, ionto, DN    Physician Information:  Referring Practitioner: Dr. Rajeev Tran of care signed (Y/N): N    Date of Patient follow up with Physician:     Is this a Progress Report:     []  Yes  [x]  No      If Yes:  Date Range for reporting period:  Beginning  Ending    Progress report will be due (10 Rx or 30 days whichever is less):   Recertification will be due (POC Duration  / 90 days whichever is less): 2021         Visit # Insurance Allowable Auth Required     2 tx/week for 6 weeks []  Yes [x]  No        Functional Scale: Tinetti  score : 14 (50%)  Date assessed:  3/30/21       Latex Allergy:  []NO      []YES  Preferred Language for Healthcare:   [x]English       []other:    Current Pain level:  5/10 in both L and R SI with certain movements (sit<>stand, bed mobility). Seated at rest, minimal pain. SUBJECTIVE:    Patient reports continued generalized fatigue but she is not as significantly distressed as last visit. After last PT visit she was called by her PCP who adjusted her water pill dosage. He did not provide instruction regarding suppl O2 titration. She has been using her portable O2 more consistently since  and maintained at Setting 4 (titrated up from Setting 3 at ). Family reports they have been monitoring SpO2 more regularly. When on the suppl O2 she has been staying in the mid/high 90's.      SI pain is relatively unchanged, has been in the 5-7/10 range and is supine MET with legs crossed but patient experienced incr pain with legs crossed. Will continue with current standing lunge. Reviewed patient's current self-developed HEP which includes seated hip flexion, seated LAQ, standing arm circles, standing arm flexion. Discussed modifications as appropriate for technique. Patient provided with written HEP as follows. Discussed tracking reps/sets tolerated before requiring seated rest break as means of assessing activity tolerance. Your Access Code  [email or web address]   · Standing marches  · Standing knee flexion  · Lateral step outs      Postural assessment: Patient was noted today to have increased R lateral trunk lean while ambulating and in static stance. Upon inquiry patient acknowledged awareness of this and attributed it to having to holding portable O2 (although gait deviation is noted while therapist is holding O2). Patient was unable to correct the lean in static stance despite therapist verbal and tactile cuing and facilitation. 4/1/21:   Rechecked leg length in supine: pt appears to have equal anatomical leg length. Rechecked anatomical landmarks in standing: Iliac crests equal; R ASIS high, R PSIS low  Rechecked standing flexion test: PSIS moves caudally R>L   Special test: Fwd lunge vs Step & Bend - no pain provocation either direction. Patient with limited mobility during both movements. Therapeutic Activity:  0 minutes    Pain noted with sit>supine and supine>sit with HOB elevated.     Gait: 0 minutes   Ambulation #: 1:  Device: Single point cane (SPC) in R hand  Level of Assist: CGA  Deficits: decreased maria fernanda, Decreased NAHUN, decreased head and trunk rotation, forward flexed posture, step-to pattern, decreased arm swing on left, decreased step height on left, decreased step length on right, decreased stance time on left, lack heel strike on left  Distance: 25ft with 2 turns + 10 ft  Comment: cues for safety during turning    Neuromuscular Re-Education:  0 minutes      Canalith Repositioning Procedure:      Manual Therapy:  8 minutes  Prone palpation: L sacrum prominent. Pt is minimally tolerant to prone position. Prone MET for L post innom rotation - 5 x 5 sec holds with therapist resisting hip flexion. S/L MET for sacral torsion - 5 x 5 sec holds   Pt is minimally tolerant to all bed mobility. Mod A for sup>sit. MET for L posterior innominate rotation - 10 x 5 sec holds in supine 90/90 with therapist resisting R hip extension, L hip flexion. 4/6-Pt attempted, had difficulty with direction following. Note - first attempted MET in supine with RLE extended, but patient was unable to elicit appreciable rectus activation against gravity. MET in hooklying: bilat hip add 5x5 sec, hip aDd 5x5 sec, hip flex 5x5 sec, hip ext 5x5 sec. Pt with poor NM control in sustained contraction all directions, most notably in aDduction. Self-MET for L post rotation - 3 x 10 sec holds in L leading lunge position. Therapist providing CGA. Also given as HEP. Your Access Code  [email or web address]   · Standing Lunge leading with LLE (self-MET for R posterior innominate rotation)     Modalities: 0 minutes    C-SSRT Screen completed, pt answered no to 1st 2 questions. Therapeutic Exercise and NMR EXR  [x] (61763) Provided verbal/tactile cueing for activities related to strengthening, flexibility, endurance, ROM  for improvements in proximal hip and core control with self care, mobility, lifting and ambulation.  [] (79475) Provided verbal/tactile cueing for activities related to improving balance, coordination, kinesthetic sense, posture, motor skill, proprioception  to assist with core control in self care, mobility, lifting, and ambulation.      Therapeutic Activities:    [] (25619 or 69773) Provided verbal/tactile cueing for activities related to improving balance, coordination, kinesthetic sense, posture, motor skill, proprioception and motor activation to allow for proper function  with self care and ADLs  [] (19740) Provided training and instruction to the patient for proper core and proximal hip recruitment and positioning with ambulation re-education     Home Exercise Program:    [x] (17155) Reviewed/Progressed HEP activities related to strengthening, flexibility, endurance, ROM of core, proximal hip and LE for functional self-care, mobility, lifting and ambulation   [] (56915) Reviewed/Progressed HEP activities related to improving balance, coordination, kinesthetic sense, posture, motor skill, proprioception of core, proximal hip and LE for self care, mobility, lifting, and ambulation      Manual Treatments:  PROM / STM / Oscillations-Mobs:  G-I, II, III, IV (PA's, Inf., Post.)  [] (55629) Provided manual therapy to mobilize proximal hip and LS spine soft tissue/joints for the purpose of modulating pain, promoting relaxation,  increasing ROM, reducing/eliminating soft tissue swelling/inflammation/restriction, improving soft tissue extensibility and allowing for proper ROM for normal function with self care, mobility, lifting and ambulation. Modalities:       Charges:  Timed Code Treatment Minutes: 40   Total Treatment Minutes: 40     [] EVAL  [x] UY(62062) x  2  [] IONTO  [] NMR (64868) x     [] VASO  [x] Manual (25607) x 1     [] Other:  [] TA x     [] Gait x   [] Mech Traction (92687)  [] ES(attended) (51562)     [] ES (un) (48848):       ASSESSMENT:  Therapist observes continued inconsistent compliance with pt's suppl O2 program, which appears to be primary  of generalized fatigue. Patient's SIJ pain continues to affect her tolerance to bed mobility and transfers as well as therapeutic exercise. Will continue per POC with emphasis on pelvic/core stability and strengthening.     Goals:    Patient stated goal: \"to be able to pick something up, go somewhere, or do something without being in so much pain, so that I feel like I accomplished something\" [x]? Progressing: []? Met: []? Not Met: []? Adjusted        Therapist goals for Patient:   Short Term Goals: To be achieved in: 2 weeks  1. Independent in HEP and progression per patient tolerance, in order to prevent re-injury. []? Progressing: [x]? Met: []? Not Met: []? Adjusted    2. Patient will have a decrease in pain to 5/10 at worst (baseline 8/10) to facilitate improvement in movement, function, and ADLs as indicated by Functional Deficits. []? Progressing: []? Met: [x]? Not Met: []? Adjusted        Long Term Goals: To be achieved in: 6 weeks  1. Disability index score of 20/50 (40%) or less for the DEANA to assist with reaching prior level of function. []? Progressing: []? Met: []? Not Met: []? Adjusted  2. Patient will demonstrate good LS mobility, good hip ROM to allow for proper joint functioning as indicated by patients Functional Deficits. []? Progressing: []? Met: []? Not Met: []? Adjusted  3. Patient will demonstrate an increase in Strength to good proximal hip and core activation to allow for proper functional mobility as indicated by patients Functional Deficits. []? Progressing: []? Met: []? Not Met: []? Adjusted  4. Patient will demonstrate dynamic standing activity tolerance of 10 minutes without increased symptoms or restriction. []? Progressing: []? Met: []? Not Met: []? Adjusted  5. Tinetti fall risk assessment score of 20/28 or higher to reduce fall risk. []? Progressing: []? Met: []? Not Met: []? Adjusted         Overall Progression Towards Functional goals/ Treatment Progress Update:  [] Patient is progressing as expected towards functional goals listed. [x] Progression is slowed due to complexities/Impairments listed. [] Progression has been slowed due to co-morbidities.   [] Plan just implemented, too soon to assess goals progression <30days   [] Goals require adjustment due to lack of progress  [x] Patient is not progressing as expected and requires additional follow up with physician  [] Other    Prognosis for POC: [x] Good [] Fair  [] Poor      Patient requires continued skilled intervention: [x] Yes  [] No    Treatment/Activity Tolerance:  [] Patient able to complete treatment  [x] Patient limited by fatigue  [] Patient limited by pain    [] Patient limited by other medical complications  [] Other:         PLAN: See eval  [x] Continue per plan of care [] Alter current plan (see comments above)  [] Plan of care initiated [] Hold pending MD visit [] Discharge    Plan Moving Forward/ For next visit:   · Check Spo2  · Recheck pelvic landmarks, continue METs to address pelvic alignment. · Gait analysis and gait training to address deviations and noted asymetry   · Core and hip stabilization/strengthening      Electronically signed by: Isidro Chau PT, DPT   Note: If patient does not return for scheduled/ recommended follow up visits, this note will serve as a discharge from care along with most recent update on progress.

## 2021-04-15 ENCOUNTER — HOSPITAL ENCOUNTER (OUTPATIENT)
Dept: PHYSICAL THERAPY | Age: 81
Setting detail: THERAPIES SERIES
Discharge: HOME | End: 2021-04-15
Payer: MEDICARE

## 2021-04-15 NOTE — PROGRESS NOTES
Physical Therapy  Patient's daughter called to cancel due to illness.   Next visit confirmed Detail Level: Detailed

## 2021-04-20 ENCOUNTER — APPOINTMENT (OUTPATIENT)
Dept: PHYSICAL THERAPY | Age: 81
End: 2021-04-20
Payer: MEDICARE

## 2021-04-20 ENCOUNTER — HOSPITAL ENCOUNTER (OUTPATIENT)
Dept: PHYSICAL THERAPY | Age: 81
Setting detail: THERAPIES SERIES
Discharge: HOME | End: 2021-04-20
Payer: MEDICARE

## 2021-04-20 PROCEDURE — 97110 THERAPEUTIC EXERCISES: CPT

## 2021-04-20 PROCEDURE — 97140 MANUAL THERAPY 1/> REGIONS: CPT

## 2021-04-20 NOTE — FLOWSHEET NOTE
Physical Therapy Daily Treatment Note  Date:  2021    Patient Name:  Aris Izaguirre    :  1940  MRN: [id number]      Medical/Treatment Diagnosis Information:  · Diagnosis: Sacroiliac joint pain (ICD-719.45), Low back pain (ICD-724.2) (BKL29-J79.5), Lumbar spondylosis, L1-L5 (.3) (JIQ72-S13.816), Lumbar Spondylolysis, L5-S1 (Pars defect) (ICD-738.4) (KZB34-K39.07)       Insurance/Certification information:  PT Insurance Information: [employer] - No Stim, traction, vaso, ionto, DN    Physician Information:  Referring Practitioner: Dr. Bety Blackwell of care signed (Y/N): N    Date of Patient follow up with Physician:     Is this a Progress Report:     []  Yes  [x]  No      If Yes:  Date Range for reporting period:  Beginning  Ending    Progress report will be due (10 Rx or 30 days whichever is less): 3/23/51  Recertification will be due (POC Duration  / 90 days whichever is less): 2021         Visit # Insurance Allowable Auth Required     2 tx/week for 6 weeks []  Yes [x]  No        Functional Scale: Tinetti  score : 14 (50%)  Date assessed:  3/30/21       Latex Allergy:  []NO      []YES  Preferred Language for Healthcare:   [x]English       []other:    Current Pain level:    Pain 5-6/10  5/10 in both L and R SI with certain movements (sit<>stand, bed mobility). Seated at rest, minimal pain. SUBJECTIVE:    Pt arrives this date with portable O2 tank and w/c. Friend \"Sade\" with her. Pt agitated/ frustrated stating she is in so much pain and nothing is helping. She is frustrated that she cannot tolerate standing for very long and cannot tolerate cooking very much.     [facility] asked a question regarding any suggestions for more appropriate seating in the home     SI pain approx 5-6 today     RESTRICTIONS/PRECAUTIONS:    [employer] - No Stim, traction, vaso, ionto, DN    Plan Moving Forward/ For next visit:   · Check Spo2  · Recheck pelvic landmarks, continue METs to address pelvic alignment. · Gait analysis and gait training to address deviations and noted asymetry   · Core and hip stabilization/strengthening    OBJECTIVE: See eval      Exercises/Interventions:     Exercises in bold performed in department today. Items not bolded are carried forward from prior visits for continuity of the record. Exercise/Equipment Resistance/Repetitions HEP Other comments     Lateral step in/out , with HHA bilat 1x10  [x] Verbal instruction for HEP. Emphasis on extending SLS time. Pt unable to safely complete standing hip ABD. Hip IR/ER PROM in supine bilat 1x10 []      Step up/down onto 4\" box in // bars bilat 1x10 [] Needed consistent cues for technique. Standing knee flexion in // bars bilat 2x10 alternating  [x]      Standing marches in // bars bilat 2x10 alternating [x]     Adduction into yellow ball in hooklying with trunk propped (pain in supine hooklying) bilat 1x15 [] Cues for adbdominal contraction and tactile cues to keep pelvis and knees aligned in neutral posture. (pt drifts into R rotation)     Adbuction with yellow TB in hooklying with trunk propped (pain in supine hooklying) bilat 2x15 [x] Cues for adbdominal contraction and tactile cues to keep pelvis and knees aligned in neutral posture. (pt drifts into R rotation)        TA activation training in hooklying                                           in sitting 8 minutes  3 minutes [] Tactile cuing. Pt inconsistently able to elicit contraction; unable to hold isometric contraction for 2+ seconds. Gentle passive piriformis stretch 2x30 sec []    Gentle passive L Sciatic nerve glide x15 []         []     Sit to stand x 5 with cane  x5 [] Improved posture noted. Decreased c.o pain.     Standing Scap squeeze   2x10 [x]     Hip ABd   2x10  [x]      Heel Raise  2x10 [x]      Mini Squat  2x10 [x]      Bridge  2x10 [x]        []      Therapeutic Exercise/Home Exercise Program:  30  Minutes  Lengthy discussion today regarding pt frustration levels. Reviewed sitting mechanics and mechanics of the sacrum. Pt provided towel roll and pillow, positioned differently , to promote anterior pelvic tilt and to reduce pressure on SI. Reports decreased pain with these interventions. Reviewed importance of increased activity. Pt has been very sedentary during the course of the Covid shutdown. Pt advised to set an alarm, to remind her to get up from her recliner HOURLY and so some exercise or walking. Pt agreeable. Reviewed importance of performing exercises while looking up to promote trunk extension . Importance of Glute Squeeze in standing to promote upright posture. Pt agreeable. Patient arrived to clinic without portable O2. (Daughter brought into clinic a few minutes later). On room air, SpO2=86%. With \"Setting 4\" O2, SpO2=mid-90%s. Reviewed importance of consistent use of O2 at home to maintain SpO2>90%. Pt re-educated on PLB technique, use of regular breathing cycle (for ex 3 sec in / 3 sec out) and asked to practice cycles at home. With ~5 min remaining in therapy session, pt's O2 battery . Pt/daughter do not have power plug with them. Therapy completed on RA, supine ex only. SpO2 in low 90%'s. 21 -   After walking into clinic and sitting down:      SpO2 = 76%, TV=504. With 2 minutes of seated rest with pursed lip breathing:      SpO2 = high-70%'s, HR=  Pt sat for another ~5 minutes with continued low SpO2 < 80%. Signs of fatigue (poor posture, agitation) but no signs of distress. O2 titrated from \"setting 3\" to \"setting 4\". With 2 minutes of static sitting:      SpO2 = 88%  With 2 minutes static sitting with cues for pursed lip breathing:      SpO2 = 93%, HR=65  Pt then able to sustain SpO2>93% for next 20 minutes while seated in clinic. Therapist left message with patient's PCP (Dr. Sergey Cole - [phone]), no return call as of yet.  Patient was sent home with caudally R>L   Special test: Fwd lunge vs Step & Bend - no pain provocation either direction. Patient with limited mobility during both movements. Therapeutic Activity:  5 minutes  W/C and Car positioning to promote improved pelvic alignment and decreased sacral nutation/counternutation. Pain noted with sit>supine and supine>sit with HOB elevated. Gait: 0 minutes   Ambulation #: 1:  Device: Single point cane (SPC) in R hand  Level of Assist: CGA  Deficits: decreased maria frenanda, Decreased NAHUN, decreased head and trunk rotation, forward flexed posture, step-to pattern, decreased arm swing on left, decreased step height on left, decreased step length on right, decreased stance time on left, lack heel strike on left  Distance: 25ft with 2 turns + 10 ft  Comment: cues for safety during turning    Neuromuscular Re-Education:  0 minutes      Canalith Repositioning Procedure:      Manual Therapy:10 MIN   (+) Right posterior rotation noted   MET to correct Right posterior rotation in supine  MET to correct sacral torsion in sidelying *(Left)   Long axis distraction with mobilization, performed bilat   Bridges with facilitation x 5 at end of manual tx. Pt returned to sitting, reports decreased pain. MET for L posterior innominate rotation - 10 x 5 sec holds in supine 90/90 with therapist resisting R hip extension, L hip flexion. 4/6-Pt attempted, had difficulty with direction following. Note - first attempted MET in supine with RLE extended, but patient was unable to elicit appreciable rectus activation against gravity. MET in hooklying: bilat hip add 5x5 sec, hip aDd 5x5 sec, hip flex 5x5 sec, hip ext 5x5 sec. Pt with poor NM control in sustained contraction all directions, most notably in aDduction. Self-MET for L post rotation - 3 x 10 sec holds in L leading lunge position. Therapist providing CGA. Also given as HEP.       Your Access Code  [email or web address]   · Standing Lunge leading with LLE (self-MET for R posterior innominate rotation)     Modalities: 0 minutes    C-SSRT Screen completed, pt answered no to 1st 2 questions. Therapeutic Exercise and NMR EXR  [x] (41030) Provided verbal/tactile cueing for activities related to strengthening, flexibility, endurance, ROM  for improvements in proximal hip and core control with self care, mobility, lifting and ambulation.  [] (63578) Provided verbal/tactile cueing for activities related to improving balance, coordination, kinesthetic sense, posture, motor skill, proprioception  to assist with core control in self care, mobility, lifting, and ambulation. Therapeutic Activities:    [] (91443 or 94487) Provided verbal/tactile cueing for activities related to improving balance, coordination, kinesthetic sense, posture, motor skill, proprioception and motor activation to allow for proper function  with self care and ADLs  [] (22031) Provided training and instruction to the patient for proper core and proximal hip recruitment and positioning with ambulation re-education     Home Exercise Program:    [x] (21101) Reviewed/Progressed HEP activities related to strengthening, flexibility, endurance, ROM of core, proximal hip and LE for functional self-care, mobility, lifting and ambulation   [] (44296) Reviewed/Progressed HEP activities related to improving balance, coordination, kinesthetic sense, posture, motor skill, proprioception of core, proximal hip and LE for self care, mobility, lifting, and ambulation      Manual Treatments:  PROM / STM / Oscillations-Mobs:  G-I, II, III, IV (PA's, Inf., Post.)  [] (07974) Provided manual therapy to mobilize proximal hip and LS spine soft tissue/joints for the purpose of modulating pain, promoting relaxation,  increasing ROM, reducing/eliminating soft tissue swelling/inflammation/restriction, improving soft tissue extensibility and allowing for proper ROM for normal function with self care, mobility, lifting and ambulation. Modalities:       Charges:  Timed Code Treatment Minutes: 45   Total Treatment Minutes: 45     [] EVAL  [x] XP(09277) x  2  [] IONTO  [] NMR (52151) x     [] VASO  [x] Manual (40582) x 1     [] Other:  [] TA x     [] Gait x   [] Mech Traction (73862)  [] ES(attended) (62139)     [] ES (un) (33993):       ASSESSMENT:  Improved compliance with O2 this visit. Marked improvement in affect and frustration level at end of session. Advised activity hourly, new seating posture, and progressed HEP to standing to promote leg strength and standing tolerance. Pt agreeable to all recommendations. Goals:    Patient stated goal: \"to be able to pick something up, go somewhere, or do something without being in so much pain, so that I feel like I accomplished something\"   [x]? Progressing: []? Met: []? Not Met: []? Adjusted        Therapist goals for Patient:   Short Term Goals: To be achieved in: 2 weeks  1. Independent in HEP and progression per patient tolerance, in order to prevent re-injury. []? Progressing: [x]? Met: []? Not Met: []? Adjusted    2. Patient will have a decrease in pain to 5/10 at worst (baseline 8/10) to facilitate improvement in movement, function, and ADLs as indicated by Functional Deficits. []? Progressing: []? Met: [x]? Not Met: []? Adjusted        Long Term Goals: To be achieved in: 6 weeks  1. Disability index score of 20/50 (40%) or less for the DEANA to assist with reaching prior level of function. []? Progressing: []? Met: []? Not Met: []? Adjusted  2. Patient will demonstrate good LS mobility, good hip ROM to allow for proper joint functioning as indicated by patients Functional Deficits. []? Progressing: []? Met: []? Not Met: []? Adjusted  3. Patient will demonstrate an increase in Strength to good proximal hip and core activation to allow for proper functional mobility as indicated by patients Functional Deficits. []? Progressing: []? Met: []? Not Met: []? Adjusted  4.  Patient

## 2021-04-27 ENCOUNTER — HOSPITAL ENCOUNTER (OUTPATIENT)
Dept: PHYSICAL THERAPY | Age: 81
Setting detail: THERAPIES SERIES
Discharge: HOME | End: 2021-04-27
Payer: MEDICARE

## 2021-04-27 PROCEDURE — 97140 MANUAL THERAPY 1/> REGIONS: CPT

## 2021-04-27 PROCEDURE — 97110 THERAPEUTIC EXERCISES: CPT

## 2021-04-27 PROCEDURE — 97530 THERAPEUTIC ACTIVITIES: CPT

## 2021-04-27 NOTE — FLOWSHEET NOTE
Physical Therapy Daily Treatment Note  Date:  2021    Patient Name:  Bimal Cohn    :  1940  MRN: [id number]      Medical/Treatment Diagnosis Information:  · Diagnosis: Sacroiliac joint pain (ICD-719.45), Low back pain (ICD-724.2) (WDU58-P53.5), Lumbar spondylosis, L1-L5 (.3) (HMI86-F83.816), Lumbar Spondylolysis, L5-S1 (Pars defect) (ICD-738.4) (HZE47-F76.07)       Insurance/Certification information:  PT Insurance Information: [employer] - No Stim, traction, vaso, ionto, DN    Physician Information:  Referring Practitioner: Dr. Risa Contreras of care signed (Y/N): N    Date of Patient follow up with Physician:     Is this a Progress Report:     []  Yes  [x]  No      If Yes:  Date Range for reporting period:  Beginning  Ending    Progress report will be due (10 Rx or 30 days whichever is less): 41  Recertification will be due (POC Duration  / 90 days whichever is less): 2021         Visit # Insurance Allowable Auth Required     2 tx/week for 6 weeks []  Yes [x]  No        Functional Scale: Tinetti  score : 14 (50%)  Date assessed:  3/30/21       Latex Allergy:  []NO      []YES  Preferred Language for Healthcare:   [x]English       []other:    Current Pain level:      8/10 in L SI (some pain in R SI, unrated) with certain movements (sit<>stand, bed mobility). Seated at rest, minimal pain. SUBJECTIVE:    Pt arrives this date with portable O2 tank, SPC, and rollator. Friend \"Sade\" with her. Pt and Briseyda Acevedo state they have been doing HEP and have been working on seated postural corrections as advised by therapist last week, and feel like this is providing some postural improvement but pt does state the towel behind lower back makes her feel tired in sustained seated postures after ~30 minutes. Pt will be getting an iron infusion later this week to address ongoing fatigue.           RESTRICTIONS/PRECAUTIONS:    [employer] - No Stim, traction, vaso, ionto, DN    Plan Moving Forward/ For next visit:   · Check Spo2  · Recheck pelvic landmarks, continue METs to address pelvic alignment. · Gait analysis and gait training to address deviations and noted asymetry   · Core and hip stabilization/strengthening    OBJECTIVE: See eval      Exercises/Interventions:     Exercises in bold performed in department today. Items not bolded are carried forward from prior visits for continuity of the record. Exercise/Equipment Resistance/Repetitions HEP Other comments     Lateral step in/out , with HHA bilat 1x10  [x] Verbal instruction for HEP. Emphasis on extending SLS time. Pt unable to safely complete standing hip ABD. Hip IR/ER PROM in supine bilat 1x10 []      Step up/down onto 4\" box in // bars bilat 1x10 [] Needed consistent cues for technique. Standing knee flexion in // bars bilat 2x10 alternating  [x]      Standing marches in // bars bilat 2x10 alternating [x]     Adduction into yellow ball in hooklying with trunk propped (pain in supine hooklying) bilat 1x15 [] Cues for adbdominal contraction and tactile cues to keep pelvis and knees aligned in neutral posture. (pt drifts into R rotation)     Adbuction with yellow TB in hooklying with trunk propped (pain in supine hooklying) bilat 2x15 [x] Cues for adbdominal contraction and tactile cues to keep pelvis and knees aligned in neutral posture. (pt drifts into R rotation)        TA activation training in hooklying                                           in sitting 8 minutes  3 minutes [] Tactile cuing. Pt inconsistently able to elicit contraction; unable to hold isometric contraction for 2+ seconds. Gentle passive piriformis stretch 2x30 sec []    Gentle passive L Sciatic nerve glide x15 []         []     Sit to stand x 5 with cane  x5 [] Improved posture noted. Decreased c.o pain.     Seated Scap squeeze   2x10 [x]     Hip ABd    - dec conc/ecc control noted with >5 reps  3x5 [x] Heel Raise  2x10 [x]      Mini Squat  2x10 [x]      Bridge bilat 2x10 [x] Increased SI pain bilat. Asymetry with L>R hip ext   Bridge RLE only 2x10  Symmetry noted in ext. Supine hip extension 1x5  Attempted to address inc pain in bridge position - no change. Pelvic ant/post tilt in hooklying 2x15 []      Therapeutic Exercise/Home Exercise Program:  30  Minutes  SpO2=83% on Setting 3 portable O2 upon initially entering clinic. Increased to Setting 4 and SpO2 held in mid-90's during treatment. Reinforced need to monitor frequently at home and maintain >90% SpO2. Reviewed all HEP exercises with reinforcement and correction of technique as needed. Lengthy discussion today regarding pt frustration levels. Reviewed sitting mechanics and mechanics of the sacrum. Pt provided towel roll and pillow, positioned differently , to promote anterior pelvic tilt and to reduce pressure on SI. Reports decreased pain with these interventions. Reviewed importance of increased activity. Pt has been very sedentary during the course of the Covid shutdown. Pt advised to set an alarm, to remind her to get up from her recliner HOURLY and so some exercise or walking. Pt agreeable. Reviewed importance of performing exercises while looking up to promote trunk extension . Importance of Glute Squeeze in standing to promote upright posture. Pt agreeable. Patient arrived to clinic without portable O2. (Daughter brought into clinic a few minutes later). On room air, SpO2=86%. With \"Setting 4\" O2, SpO2=mid-90%s. Reviewed importance of consistent use of O2 at home to maintain SpO2>90%. Pt re-educated on PLB technique, use of regular breathing cycle (for ex 3 sec in / 3 sec out) and asked to practice cycles at home. With ~5 min remaining in therapy session, pt's O2 battery . Pt/daughter do not have power plug with them. Therapy completed on RA, supine ex only. SpO2 in low 90%'s.      21 -   After walking into clinic and sitting down:      SpO2 = 76%, GE=429. With 2 minutes of seated rest with pursed lip breathing:      SpO2 = high-70%'s, HR=  Pt sat for another ~5 minutes with continued low SpO2 < 80%. Signs of fatigue (poor posture, agitation) but no signs of distress. O2 titrated from \"setting 3\" to \"setting 4\". With 2 minutes of static sitting:      SpO2 = 88%  With 2 minutes static sitting with cues for pursed lip breathing:      SpO2 = 93%, HR=65  Pt then able to sustain SpO2>93% for next 20 minutes while seated in clinic. Therapist left message with patient's PCP (Dr. Yudi Kearns - [phone]), no return call as of yet. Patient was sent home with daughter with instructions to contact PCP and Dr. Mary Munroe (cardiology, ordering physician for PT).       4/8/21 - Rechecked anatomical landmarks: In sitting: Iliac crests equal  In standing: L iliac crest high, PSIS equal  Incomplete exam due to above circumstances. 4/6/21 - Rechecked anatomical landmarks due to subj report of new pain profile: In sitting: PSIS equal  In sitting flexion: R PSIS moves first.  In standing: L iliac crest high, PSIS equal, moderate R trunk lean. L knee flexes when cued into neutral posture. In supine: leg length equal.      Reviewed patient's current HEP, patient is completing regularly and has no questions on exercises. Discussed modifying standing lunge self-MET to a supine MET with legs crossed but patient experienced incr pain with legs crossed. Will continue with current standing lunge. Reviewed patient's current self-developed HEP which includes seated hip flexion, seated LAQ, standing arm circles, standing arm flexion. Discussed modifications as appropriate for technique. Patient provided with written HEP as follows. Discussed tracking reps/sets tolerated before requiring seated rest break as means of assessing activity tolerance.       Your Access Code  [email or web address]   · Standing marches  · Standing ambulation   [] (19761) Reviewed/Progressed HEP activities related to improving balance, coordination, kinesthetic sense, posture, motor skill, proprioception of core, proximal hip and LE for self care, mobility, lifting, and ambulation      Manual Treatments:  PROM / STM / Oscillations-Mobs:  G-I, II, III, IV (PA's, Inf., Post.)  [] (76367) Provided manual therapy to mobilize proximal hip and LS spine soft tissue/joints for the purpose of modulating pain, promoting relaxation,  increasing ROM, reducing/eliminating soft tissue swelling/inflammation/restriction, improving soft tissue extensibility and allowing for proper ROM for normal function with self care, mobility, lifting and ambulation. Modalities:       Charges:  Timed Code Treatment Minutes: 45   Total Treatment Minutes: 45     [] EVAL  [x] ID([id number]) x  2  [] IONTO  [] NMR (38271) x     [] VASO  [x] Manual (47526) x 1     [] Other:  [] TA x     [] Gait x   [] Mech Traction (35166)  [] ES(attended) (46311)     [] ES (un) (03858):       ASSESSMENT:  Seated and standing posture improved since LV but still requires attention. Reinforced and corrected technique for HEP as needed; pt and pt's caregiver very attentive throughout. Pt with decreased pain at end of session. Overall pt is showing improved strength and NM control as noted by improved ability to activate core, gluts, and lateral hip muscles during exercise. Goals:    Patient stated goal: \"to be able to pick something up, go somewhere, or do something without being in so much pain, so that I feel like I accomplished something\"   [x]? Progressing: []? Met: []? Not Met: []? Adjusted        Therapist goals for Patient:   Short Term Goals: To be achieved in: 2 weeks  1. Independent in HEP and progression per patient tolerance, in order to prevent re-injury. []? Progressing: [x]? Met: []? Not Met: []? Adjusted    2.  Patient will have a decrease in pain to 5/10 at worst (baseline 8/10) to facilitate improvement in movement, function, and ADLs as indicated by Functional Deficits. []? Progressing: []? Met: [x]? Not Met: []? Adjusted        Long Term Goals: To be achieved in: 6 weeks  1. Disability index score of 20/50 (40%) or less for the DEANA to assist with reaching prior level of function. []? Progressing: []? Met: []? Not Met: []? Adjusted  2. Patient will demonstrate good LS mobility, good hip ROM to allow for proper joint functioning as indicated by patients Functional Deficits. []? Progressing: []? Met: []? Not Met: []? Adjusted  3. Patient will demonstrate an increase in Strength to good proximal hip and core activation to allow for proper functional mobility as indicated by patients Functional Deficits. []? Progressing: []? Met: []? Not Met: []? Adjusted  4. Patient will demonstrate dynamic standing activity tolerance of 10 minutes without increased symptoms or restriction. []? Progressing: []? Met: []? Not Met: []? Adjusted  5. Tinetti fall risk assessment score of 20/28 or higher to reduce fall risk. []? Progressing: []? Met: []? Not Met: []? Adjusted         Overall Progression Towards Functional goals/ Treatment Progress Update:  [] Patient is progressing as expected towards functional goals listed. [x] Progression is slowed due to complexities/Impairments listed. [] Progression has been slowed due to co-morbidities.   [] Plan just implemented, too soon to assess goals progression <30days   [] Goals require adjustment due to lack of progress  [x] Patient is not progressing as expected and requires additional follow up with physician  [] Other    Prognosis for POC: [x] Good [] Fair  [] Poor    Patient requires continued skilled intervention: [x] Yes  [] No    Treatment/Activity Tolerance:  [] Patient able to complete treatment  [x] Patient limited by fatigue  [] Patient limited by pain    [] Patient limited by other medical complications  [] Other:         PLAN: See eval  [x] Continue per plan of care [] Alter current plan (see comments above)  [] Plan of care initiated [] Hold pending MD visit [] Discharge        Electronically signed by: Hector Roth PT, DPT      Note: If patient does not return for scheduled/ recommended follow up visits, this note will serve as a discharge from care along with most recent update on progress.

## 2021-04-29 ENCOUNTER — HOSPITAL ENCOUNTER (OUTPATIENT)
Dept: PHYSICAL THERAPY | Age: 81
Setting detail: THERAPIES SERIES
Discharge: HOME | End: 2021-04-29
Payer: MEDICARE

## 2021-04-29 NOTE — PROGRESS NOTES
Physical Therapy  Cancellation/No-show Note    Patient Name:  Chacha Odell  :  1940   Date:  2021  Cancels to Date: 2  No-shows to Date: 0    For today's appointment patient:  [x]  Cancelled  []  Rescheduled appointment  []  No-show     Reason given by patient:  []  Patient ill   []  Conflicting appointment  []  No transportation    []  Conflict with work  []  No reason given  [x]  Other:     Comments:  Daughter called to state she could not bring pt to therapy today.          Electronically signed by:  Zohaib Wallace, PT, MPT, OMT-c [id number]

## 2021-05-04 ENCOUNTER — HOSPITAL ENCOUNTER (OUTPATIENT)
Dept: PHYSICAL THERAPY | Age: 81
Setting detail: THERAPIES SERIES
Discharge: HOME | End: 2021-05-04
Payer: MEDICARE

## 2021-05-04 NOTE — PROGRESS NOTES
Physical Therapy  Cancellation/No-show Note    Patient Name:  Rogelio Granados  :  1940   Date:  2021  Cancels to Date: 3  No-shows to Date: 0    For today's appointment patient:  [x]  Cancelled  []  Rescheduled appointment  []  No-show     Reason given by patient:  [x]  Patient ill   []  Conflicting appointment  []  No transportation    []  Conflict with work  []  No reason given  [x]  Other:     Comments:  Daughter called to state that the patient fell last week and this is why she was unable to attend therapy. Pt remains slightly confused per family. Pt advised by staff to contact MD for further workup. Pt placed on hold at this time.          Electronically signed by:  Ute Martin, PT, MPT, OMT-c [id number]

## 2021-05-04 NOTE — PROGRESS NOTES
Physical Therapy  Daughter called to cancel today. Patient fell last week & is having some confusion & soreness. Staff asked if patient will see MD since she is not feeling better. Daughter will call MD & call us back if patient will return this week.

## 2021-05-11 ENCOUNTER — HOSPITAL ENCOUNTER (OUTPATIENT)
Dept: PHYSICAL THERAPY | Age: 81
Setting detail: THERAPIES SERIES
Discharge: HOME | End: 2021-05-11
Payer: MEDICARE

## 2021-05-11 PROCEDURE — 97140 MANUAL THERAPY 1/> REGIONS: CPT

## 2021-05-11 PROCEDURE — 97110 THERAPEUTIC EXERCISES: CPT

## 2021-05-11 NOTE — FLOWSHEET NOTE
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 Lobito Jimenez of care signed (Y/N): N    Date of Patient follow up with Physician:     Is this a Progress Report:     []  Yes  [x]  No      If Yes:  Date Range for reporting period:  Beginning  Ending    Progress report will be due (10 Rx or 30 days whichever is less):   Recertification will be due (POC Duration  / 90 days whichever is less): 2021         Visit # Insurance Allowable Auth Required     2 tx/week for 6 weeks []  Yes [x]  No        Functional Scale: Tinetti  score :  (50%)  Date assessed:  3/30/21       Latex Allergy:  []NO      []YES  Preferred Language for Healthcare:   [x]English       []other:    Current Pain level:      8/10 in L SI (some pain in R SI, unrated) with certain movements (sit<>stand, bed mobility). Seated at rest, minimal pain. SUBJECTIVE:    Pt arrives this date pushing portable O2 tank in wc. O2 not applied. SPO2 86% on RA. Improves to 91% with cues for PLB. Dropped off to clinic by . Has received iron infusion, and is visibly more alert and talkative than prior visits. Reports having a fall a few weeks ago, with continued low back pain (points to L SI region)  (+) Ecchymosis noted upon observation.       RESTRICTIONS/PRECAUTIONS:    [employer] [place] [employer] - No Stim, traction, vaso, ionto, DN    Plan Moving Forward/ For next visit:   · Check Spo2  · Recheck pelvic landmarks, continue METs to address pelvic alignment. · Gait analysis and gait training to address deviations and noted asymetry   · Core and hip stabilization/strengthening    OBJECTIVE: See eval      Exercises/Interventions:     Exercises in bold performed in department today. Items not bolded are carried forward from prior visits for continuity of the record. Exercise/Equipment Resistance/Repetitions HEP Other comments     Lateral step in/out , with HHA bilat 1x10  [x] Verbal instruction for HEP. Emphasis on extending SLS time. Pt unable to safely complete standing hip ABD. Hip IR/ER PROM in supine bilat 1x10 []      Step up/down onto 4\" box in // bars bilat 1x10 [] Needed consistent cues for technique. Standing knee flexion in // bars bilat 2x10 alternating  [x]      Standing marches in // bars bilat 2x10 alternating [x]     Adduction into yellow ball in hooklying with trunk propped (pain in supine hooklying) bilat 1x15 [] Cues for adbdominal contraction and tactile cues to keep pelvis and knees aligned in neutral posture. (pt drifts into R rotation)     Adbuction with yellow TB in hooklying with trunk propped (pain in supine hooklying) bilat 2x15 [x] Cues for adbdominal contraction and tactile cues to keep pelvis and knees aligned in neutral posture. (pt drifts into R rotation)        TA activation training in hooklying                                           in sitting 8 minutes  3 minutes [] Tactile cuing. Pt inconsistently able to elicit contraction; unable to hold isometric contraction for 2+ seconds. Gentle passive piriformis stretch 2x30 sec []    Gentle passive L Sciatic nerve glide x15 []         []     Sit to stand  with cane  3x10. Cues for technique [] Improved posture noted. Decreased c.o pain.     Seated Scap squeeze   2x10 [x]     Hip ABd    - dec conc/ecc control noted with >5 reps  3x5 [x]      Heel Raise  2x10 [x]      Mini Squat  2x10 [x]      Bridge bilat 3x10 [x] Bridge RLE only 2x10     Supine hip extension 1x5     Pelvic ant/post tilt in hooklying 2x15 []      Therapeutic Exercise/Home Exercise Program: 15  Minutes  SpO2=86% on room air. Portable tank Setting 3, but tank is turned off. SPO2 increased to 91% with cues for PLB. Supplemental O2 applied for course of tx. SPO2 remained 93-94% on setting 3. Reviewed all HEP exercises with reinforcement and correction of technique as needed. Your Access Code  [email or web address]   · Standing marches  · Standing knee flexion  · Lateral step outs      Postural assessment: Patient was noted today to have increased R lateral trunk lean while ambulating and in static stance. Upon inquiry patient acknowledged awareness of this and attributed it to having to holding portable O2 (although gait deviation is noted while therapist is holding O2). Patient was unable to correct the lean in static stance despite therapist verbal and tactile cuing and facilitation. Therapeutic Activity:  0 minutes  W/C and Car positioning to promote improved pelvic alignment and decreased sacral nutation/counternutation. Pain noted with sit>supine and supine>sit with HOB elevated.     Gait: 0 minutes   Ambulation #: 1:  Device: Single point cane (SPC) in R hand  Level of Assist: CGA  Deficits: decreased maria fernanda, Decreased NAHUN, decreased head and trunk rotation, forward flexed posture, step-to pattern, decreased arm swing on left, decreased step height on left, decreased step length on right, decreased stance time on left, lack heel strike on left  Distance: 25ft with 2 turns + 10 ft  Comment: cues for safety during turning    Neuromuscular Re-Education:  0 minutes      Canalith Repositioning Procedure:      Manual Therapy:  25 MIN   (+) Right posterior rotation noted    MET to correct Right posterior rotation in supine  MET to correct sacral torsion in sidelying *(Left)   Long axis distraction with mobilization, performed bilat    Gentle manual lumbar distraction in supine. Pt reports relief of back pain. Spouse instructed in tecnique, per pt request.  Bridges with facilitation x10 at end of manual tx. Pt returned to sitting, reports decreased pain immediately and after session. Self-MET for L post rotation - 3 x 10 sec holds in L leading lunge position. Therapist providing CGA. Also given as HEP. Your Access Code  [email or web address]   · Standing Lunge leading with LLE (self-MET for R posterior innominate rotation)     Modalities: 0 minutes    C-SSRT Screen completed, pt answered no to 1st 2 questions. Therapeutic Exercise and NMR EXR  [x] (52304) Provided verbal/tactile cueing for activities related to strengthening, flexibility, endurance, ROM  for improvements in proximal hip and core control with self care, mobility, lifting and ambulation.  [] (47192) Provided verbal/tactile cueing for activities related to improving balance, coordination, kinesthetic sense, posture, motor skill, proprioception  to assist with core control in self care, mobility, lifting, and ambulation.      Therapeutic Activities:    [] (38352 or 87733) Provided verbal/tactile cueing for activities related to improving balance, coordination, kinesthetic sense, posture, motor skill, proprioception and motor activation to allow for proper function  with self care and ADLs  [] (38179) Provided training and instruction to the patient for proper core and proximal hip recruitment and positioning with ambulation re-education     Home Exercise Program:    [x] (84202) Reviewed/Progressed HEP activities related to strengthening, flexibility, endurance, ROM of core, proximal hip and LE for functional self-care, mobility, lifting and ambulation   [] (29017) Reviewed/Progressed HEP activities related to improving balance, coordination, kinesthetic sense, posture, motor skill, proprioception of core, proximal hip and LE for self care, mobility, lifting, and ambulation      Manual Treatments: proper joint functioning as indicated by patients Functional Deficits. []? Progressing: []? Met: []? Not Met: []? Adjusted  3. Patient will demonstrate an increase in Strength to good proximal hip and core activation to allow for proper functional mobility as indicated by patients Functional Deficits. []? Progressing: []? Met: []? Not Met: []? Adjusted  4. Patient will demonstrate dynamic standing activity tolerance of 10 minutes without increased symptoms or restriction. []? Progressing: []? Met: []? Not Met: []? Adjusted  5. Tinetti fall risk assessment score of 20/28 or higher to reduce fall risk. []? Progressing: []? Met: []? Not Met: []? Adjusted         Overall Progression Towards Functional goals/ Treatment Progress Update:  [] Patient is progressing as expected towards functional goals listed. [x] Progression is slowed due to complexities/Impairments listed. [] Progression has been slowed due to co-morbidities. [] Plan just implemented, too soon to assess goals progression <30days   [] Goals require adjustment due to lack of progress  [x] Patient is not progressing as expected and requires additional follow up with physician  [] Other    Prognosis for POC: [x] Good [] Fair  [] Poor    Patient requires continued skilled intervention: [x] Yes  [] No    Treatment/Activity Tolerance:  [] Patient able to complete treatment  [x] Patient limited by fatigue  [] Patient limited by pain    [] Patient limited by other medical complications  [] Other:         PLAN: See eval  [x] Continue per plan of care [] Alter current plan (see comments above)  [] Plan of care initiated [] Hold pending MD visit [] Discharge        Electronically signed by: Jesika Eubanks PT, MPT, OMT-c [id number]      Note: If patient does not return for scheduled/ recommended follow up visits, this note will serve as a discharge from care along with most recent update on progress.

## 2021-05-11 NOTE — FLOWSHEET NOTE
Physical Therapy Daily Treatment Note  Date:  2021    Patient Name:  Derek Whtie    :  1940  MRN: [id number]      Medical/Treatment Diagnosis Information:  · Diagnosis: Sacroiliac joint pain (ICD-719.45), Low back pain (ICD-724.2) (RTS59-T67.5), Lumbar spondylosis, L1-L5 (.3) (TCH03-C31.816), Lumbar Spondylolysis, L5-S1 (Pars defect) (ICD-738.4) (ENJ92-L99.07)       Insurance/Certification information:  PT Insurance Information: [employer] - No Stim, traction, vaso, ionto, DN    Physician Information:  Referring Practitioner: Dr. Maximus Whitehead of care signed (Y/N): N    Date of Patient follow up with Physician:     Is this a Progress Report:     []  Yes  [x]  No      If Yes:  Date Range for reporting period:  Beginning  Ending    Progress report will be due (10 Rx or 30 days whichever is less): 3/84/09  Recertification will be due (POC Duration  / 90 days whichever is less): 2021         Visit # Insurance Allowable Auth Required     2 tx/week for 6 weeks []  Yes [x]  No        Functional Scale: Tinetti  score :  (50%)  Date assessed:  3/30/21       Latex Allergy:  []NO      []YES  Preferred Language for Healthcare:   [x]English       []other:    Current Pain level:    Pain 5-6/10  5/10 in both L and R SI with certain movements (sit<>stand, bed mobility). Seated at rest, minimal pain. SUBJECTIVE:    Pt arrives this date with portable O2 tank and w/c. Friend \"Sade\" with her. Pt agitated/ frustrated stating she is in so much pain and nothing is helping. She is frustrated that she cannot tolerate standing for very long and cannot tolerate cooking very much.     Michelle Walter asked a question regarding any suggestions for more appropriate seating in the home     SI pain approx 5-6 today     RESTRICTIONS/PRECAUTIONS:    [employer] - No Stim, traction, vaso, ionto, DN    Plan Moving Forward/ For next visit:   · Check Spo2  · Recheck pelvic landmarks, discussion today regarding pt frustration levels. Reviewed sitting mechanics and mechanics of the sacrum. Pt provided towel roll and pillow, positioned differently , to promote anterior pelvic tilt and to reduce pressure on SI. Reports decreased pain with these interventions. Reviewed importance of increased activity. Pt has been very sedentary during the course of the Covid shutdown. Pt advised to set an alarm, to remind her to get up from her recliner HOURLY and so some exercise or walking. Pt agreeable. Reviewed importance of performing exercises while looking up to promote trunk extension . Importance of Glute Squeeze in standing to promote upright posture. Pt agreeable. Patient arrived to clinic without portable O2. (Daughter brought into clinic a few minutes later). On room air, SpO2=86%. With \"Setting 4\" O2, SpO2=mid-90%s. Reviewed importance of consistent use of O2 at home to maintain SpO2>90%. Pt re-educated on PLB technique, use of regular breathing cycle (for ex 3 sec in / 3 sec out) and asked to practice cycles at home. With ~5 min remaining in therapy session, pt's O2 battery . Pt/daughter do not have power plug with them. Therapy completed on RA, supine ex only. SpO2 in low 90%'s. 21 -   After walking into clinic and sitting down:      SpO2 = 76%, HT=683. With 2 minutes of seated rest with pursed lip breathing:      SpO2 = high-70%'s, HR=  Pt sat for another ~5 minutes with continued low SpO2 < 80%. Signs of fatigue (poor posture, agitation) but no signs of distress. O2 titrated from \"setting 3\" to \"setting 4\". With 2 minutes of static sitting:      SpO2 = 88%  With 2 minutes static sitting with cues for pursed lip breathing:      SpO2 = 93%, HR=65  Pt then able to sustain SpO2>93% for next 20 minutes while seated in clinic. Therapist left message with patient's PCP (Dr. John Carter - [phone]), no return call as of yet.  Patient was sent home with daughter with instructions to contact PCP and Dr. Valentín Decker (cardiology, ordering physician for PT).       4/8/21 - Rechecked anatomical landmarks: In sitting: Iliac crests equal  In standing: L iliac crest high, PSIS equal  Incomplete exam due to above circumstances. 4/6/21 - Rechecked anatomical landmarks due to subj report of new pain profile: In sitting: PSIS equal  In sitting flexion: R PSIS moves first.  In standing: L iliac crest high, PSIS equal, moderate R trunk lean. L knee flexes when cued into neutral posture. In supine: leg length equal.      Reviewed patient's current HEP, patient is completing regularly and has no questions on exercises. Discussed modifying standing lunge self-MET to a supine MET with legs crossed but patient experienced incr pain with legs crossed. Will continue with current standing lunge. Reviewed patient's current self-developed HEP which includes seated hip flexion, seated LAQ, standing arm circles, standing arm flexion. Discussed modifications as appropriate for technique. Patient provided with written HEP as follows. Discussed tracking reps/sets tolerated before requiring seated rest break as means of assessing activity tolerance. Your Access Code  [email or web address]   · Standing marches  · Standing knee flexion  · Lateral step outs      Postural assessment: Patient was noted today to have increased R lateral trunk lean while ambulating and in static stance. Upon inquiry patient acknowledged awareness of this and attributed it to having to holding portable O2 (although gait deviation is noted while therapist is holding O2). Patient was unable to correct the lean in static stance despite therapist verbal and tactile cuing and facilitation. 4/1/21:   Rechecked leg length in supine: pt appears to have equal anatomical leg length.    Rechecked anatomical landmarks in standing: Iliac crests equal; R ASIS high, R PSIS low  Rechecked standing flexion test: PSIS moves caudally R>L   Special test: Fwd lunge vs Step & Bend - no pain provocation either direction. Patient with limited mobility during both movements. Therapeutic Activity:  5 minutes  W/C and Car positioning to promote improved pelvic alignment and decreased sacral nutation/counternutation. Pain noted with sit>supine and supine>sit with HOB elevated. Gait: 0 minutes   Ambulation #: 1:  Device: Single point cane (SPC) in R hand  Level of Assist: CGA  Deficits: decreased maria fernanda, Decreased NAHUN, decreased head and trunk rotation, forward flexed posture, step-to pattern, decreased arm swing on left, decreased step height on left, decreased step length on right, decreased stance time on left, lack heel strike on left  Distance: 25ft with 2 turns + 10 ft  Comment: cues for safety during turning    Neuromuscular Re-Education:  0 minutes      Canalith Repositioning Procedure:      Manual Therapy:10 MIN   (+) Right posterior rotation noted   MET to correct Right posterior rotation in supine  MET to correct sacral torsion in sidelying *(Left)   Long axis distraction with mobilization, performed bilat   Bridges with facilitation x 5 at end of manual tx. Pt returned to sitting, reports decreased pain. MET for L posterior innominate rotation - 10 x 5 sec holds in supine 90/90 with therapist resisting R hip extension, L hip flexion. 4/6-Pt attempted, had difficulty with direction following. Note - first attempted MET in supine with RLE extended, but patient was unable to elicit appreciable rectus activation against gravity. MET in hooklying: bilat hip add 5x5 sec, hip aDd 5x5 sec, hip flex 5x5 sec, hip ext 5x5 sec. Pt with poor NM control in sustained contraction all directions, most notably in aDduction. Self-MET for L post rotation - 3 x 10 sec holds in L leading lunge position. Therapist providing CGA. Also given as HEP.       Your Access Code  [email or web address]   · Standing Lunge leading with LLE (self-MET for R posterior innominate rotation)     Modalities: 0 minutes    C-SSRT Screen completed, pt answered no to 1st 2 questions. Therapeutic Exercise and NMR EXR  [x] (55514) Provided verbal/tactile cueing for activities related to strengthening, flexibility, endurance, ROM  for improvements in proximal hip and core control with self care, mobility, lifting and ambulation.  [] (96439) Provided verbal/tactile cueing for activities related to improving balance, coordination, kinesthetic sense, posture, motor skill, proprioception  to assist with core control in self care, mobility, lifting, and ambulation. Therapeutic Activities:    [] (52056 or 18735) Provided verbal/tactile cueing for activities related to improving balance, coordination, kinesthetic sense, posture, motor skill, proprioception and motor activation to allow for proper function  with self care and ADLs  [] (99921) Provided training and instruction to the patient for proper core and proximal hip recruitment and positioning with ambulation re-education     Home Exercise Program:    [x] (48869) Reviewed/Progressed HEP activities related to strengthening, flexibility, endurance, ROM of core, proximal hip and LE for functional self-care, mobility, lifting and ambulation   [] (21229) Reviewed/Progressed HEP activities related to improving balance, coordination, kinesthetic sense, posture, motor skill, proprioception of core, proximal hip and LE for self care, mobility, lifting, and ambulation      Manual Treatments:  PROM / STM / Oscillations-Mobs:  G-I, II, III, IV (PA's, Inf., Post.)  [] (12059) Provided manual therapy to mobilize proximal hip and LS spine soft tissue/joints for the purpose of modulating pain, promoting relaxation,  increasing ROM, reducing/eliminating soft tissue swelling/inflammation/restriction, improving soft tissue extensibility and allowing for proper ROM for normal function with self care, mobility, lifting and ambulation. Modalities:       Charges:  Timed Code Treatment Minutes: 45   Total Treatment Minutes: 45     [] EVAL  [x] NS(59658) x  2  [] IONTO  [] NMR (83812) x     [] VASO  [x] Manual (22324) x 1     [] Other:  [] TA x     [] Gait x   [] Mech Traction (09457)  [] ES(attended) (35009)     [] ES (un) (23517):       ASSESSMENT:  Improved compliance with O2 this visit. Marked improvement in affect and frustration level at end of session. Advised activity hourly, new seating posture, and progressed HEP to standing to promote leg strength and standing tolerance. Pt agreeable to all recommendations. Goals:    Patient stated goal: \"to be able to pick something up, go somewhere, or do something without being in so much pain, so that I feel like I accomplished something\"   [x]? Progressing: []? Met: []? Not Met: []? Adjusted        Therapist goals for Patient:   Short Term Goals: To be achieved in: 2 weeks  1. Independent in HEP and progression per patient tolerance, in order to prevent re-injury. []? Progressing: [x]? Met: []? Not Met: []? Adjusted    2. Patient will have a decrease in pain to 5/10 at worst (baseline 8/10) to facilitate improvement in movement, function, and ADLs as indicated by Functional Deficits. []? Progressing: []? Met: [x]? Not Met: []? Adjusted        Long Term Goals: To be achieved in: 6 weeks  1. Disability index score of 20/50 (40%) or less for the DEANA to assist with reaching prior level of function. []? Progressing: []? Met: []? Not Met: []? Adjusted  2. Patient will demonstrate good LS mobility, good hip ROM to allow for proper joint functioning as indicated by patients Functional Deficits. []? Progressing: []? Met: []? Not Met: []? Adjusted  3. Patient will demonstrate an increase in Strength to good proximal hip and core activation to allow for proper functional mobility as indicated by patients Functional Deficits. []? Progressing: []? Met: []? Not Met: []? Adjusted  4.  Patient will

## 2021-05-17 ENCOUNTER — HOSPITAL ENCOUNTER (OUTPATIENT)
Dept: PHYSICAL THERAPY | Age: 81
Setting detail: THERAPIES SERIES
Discharge: HOME | End: 2021-05-17
Payer: MEDICARE

## 2021-05-17 NOTE — PROGRESS NOTES
Physical Therapy  Cancellation/No-show Note    Patient Name:  Lucy Negrete  :  1940   Date:  2021  Cancels to Date: 4  No-shows to Date: 0    For today's appointment patient:  [x]  Cancelled  []  Rescheduled appointment  []  No-show     Reason given by patient:  [x]  Patient ill   []  Conflicting appointment  []  No transportation    []  Conflict with work  []  No reason given  [x]  Other:     Comments:  Daughter called to state that pt was having increased back and neck pain. She did not want to participate in therapy today. She will call to confirm her next appointment.         Electronically signed by:  Amanda Ware, PT, MPT, OMT-c [id number]

## 2021-05-17 NOTE — PROGRESS NOTES
Physical Therapy  Family member called to cancel all week. Patient reports increased pain in low back & neck since last therapy treatment. Patient will call by end of week with update.

## 2021-05-19 ENCOUNTER — APPOINTMENT (OUTPATIENT)
Dept: PHYSICAL THERAPY | Age: 81
End: 2021-05-19
Payer: MEDICARE

## 2021-07-02 ENCOUNTER — HOSPITAL ENCOUNTER (OUTPATIENT)
Dept: VASCULAR LAB | Age: 81
Discharge: HOME | End: 2021-07-02
Payer: MEDICARE

## 2021-07-02 DIAGNOSIS — I82.432 EMBOLISM AND THROMBOSIS OF LEFT POPLITEAL VEIN (HCC): ICD-10-CM

## 2021-07-02 DIAGNOSIS — I82.431 EMBOLISM AND THROMBOSIS OF RIGHT POPLITEAL VEIN (HCC): ICD-10-CM

## 2021-07-02 PROCEDURE — 93970 EXTREMITY STUDY: CPT

## 2021-08-09 ENCOUNTER — TELEPHONE (OUTPATIENT)
Dept: CARDIOLOGY CLINIC | Age: 81
End: 2021-08-09

## 2021-08-09 ENCOUNTER — HOSPITAL ENCOUNTER (OUTPATIENT)
Age: 81
Discharge: HOME | End: 2021-08-09
Payer: MEDICARE

## 2021-08-09 DIAGNOSIS — I48.0 PAF (PAROXYSMAL ATRIAL FIBRILLATION) (HCC): Primary | ICD-10-CM

## 2021-08-09 LAB
EKG ATRIAL RATE: 102 BPM
EKG DIAGNOSIS: NORMAL
EKG Q-T INTERVAL: 322 MS
EKG QRS DURATION: 70 MS
EKG QTC CALCULATION (BAZETT): 466 MS
EKG R AXIS: -9 DEGREES
EKG T AXIS: 16 DEGREES
EKG VENTRICULAR RATE: 126 BPM

## 2021-08-09 PROCEDURE — 93005 ELECTROCARDIOGRAM TRACING: CPT | Performed by: INTERNAL MEDICINE

## 2021-08-09 PROCEDURE — 93010 ELECTROCARDIOGRAM REPORT: CPT | Performed by: INTERNAL MEDICINE

## 2021-08-09 RX ORDER — DILTIAZEM HYDROCHLORIDE 240 MG/1
240 CAPSULE, COATED, EXTENDED RELEASE ORAL DAILY
Qty: 90 CAPSULE | Refills: 3 | Status: SHIPPED | OUTPATIENT
Start: 2021-08-09 | End: 2021-10-13

## 2021-08-09 NOTE — TELEPHONE ENCOUNTER
Sade, pts caregiver, called in stating that for the past 2 weeks, pts HR has been 100-120. She states BP good, pt is feeling well but over the past few days, pt has noted to be more tired. States that she is getting blood work via her PCP today at [facility]. Asked that she go to [facility] to get an ECG today. Verbalized understanding of all.  Maritza BLOUNT

## 2021-08-09 NOTE — TELEPHONE ENCOUNTER
Per Dr. Sri Ramos, can you set pt up to see Dr. Maliha Stevens for PAF? Please call Jess Willis to set up appt.  Maritza BLOUNT

## 2021-08-09 NOTE — TELEPHONE ENCOUNTER
Darrell Mahmood to let her know that Dr Gena Brantley reviewed ECG (AF-RVR) and advised to increase Cartia to 240mg daily. [facility] is to keep a watch on BP, symptoms and let me know if there are concerns. Dr. Gena Brantley also wants pt to see EP. Will send a message to [facility] to schedule an appt for PAF. Sade verbalized understanding of all.  Maritza BLOUNT

## 2021-08-10 NOTE — TELEPHONE ENCOUNTER
Spoke with pt's caregiver Bradtheresafrancesca Friedman and scheduled pt to see [address] [facility] for 8-26-21 at [phone]

## 2021-08-24 NOTE — PROGRESS NOTES
[facility]   Electrophysiology Consult Note              Date: 8/26/21  Patient Name: Allie Cabrera  YOB: 1940    Primary Care Physician: Kalpesh Yost MD    CHIEF COMPLAINT:   Chief Complaint   Patient presents with    Follow-up     7 months    Atrial Fibrillation     Frequent episodes since last OV     Shortness of Breath       HISTORY OF PRESENT ILLNESS: Allie Cabrera is a 80 y.o. female with a PMH for AF. She follows with cardiology for AF, HTN, and HLD. Today, 8/26/2021, her ECG demonstrated AF (100). She states that she has been in AF recently since about 3 weeks ago, and was restarted on diltiazem. She presents today on oxygen. She reports that she is taking her medications. Denies recent issues with bleeding or bruising. Patient denies current edema, chest pain, sob, palpitations, dizziness or syncope. Past Medical History:   has a past medical history of Acute on chronic diastolic (congestive) heart failure ([address] Aortic stenosis, Arthritis, Cancer ([address] Hyperlipidemia, Hypertension, Paroxysmal atrial fibrillation ([address] and Thyroid disease. Past Surgical History:   has a past surgical history that includes Thyroidectomy, partial; Tonsillectomy; Tubal ligation; Knee arthroscopy (Left); other surgical history (10/09/2015); Upper gastrointestinal endoscopy (N/A, 8/13/2019); Colonoscopy (N/A, 8/13/2019); Esophagus dilation (8/13/2019); Cardiac catheterization (09/2019); Aortic valve replacement (N/A, 10/15/2019); eye surgery; eye surgery (Right, 08/19/2020); and Cystoscopy (N/A, 9/25/2020). Allergies: Iodides, Shellfish-derived products, and Sulfa antibiotics    Social History:   reports that she quit smoking about 38 years ago. Her smoking use included cigarettes. She has a 20.00 pack-year smoking history. She has never used smokeless tobacco. She reports that she does not drink alcohol and does not use drugs.      Family History: family history includes Colon Cancer in her mother; Heart Disease in her brother and father. Home Medications:    Prior to Admission medications    Medication Sig Start Date End Date Taking? Authorizing Provider   apixaban (ELIQUIS) 2.5 MG TABS tablet Take 2.5 mg by mouth 2 times daily 6/25/21  Yes Historical Provider, MD   dilTIAZem (CARTIA XT) 240 MG extended release capsule Take 1 capsule by mouth daily  Patient taking differently: Take 360 mg by mouth daily  8/9/21  Yes Adela Clark MD   furosemide (LASIX) 20 MG tablet Take 20 mg by mouth daily   Yes Historical Provider, MD   atorvastatin (LIPITOR) 40 MG tablet TAKE ONE TABLET BY MOUTH ONCE NIGHTLY 1/19/21  Yes Adela Clark MD   DULoxetine (CYMBALTA) 30 MG extended release capsule Take 30 mg by mouth daily   Yes Historical Provider, MD   metoprolol succinate (TOPROL XL) 25 MG extended release tablet Take 25 mg by mouth daily   Yes Historical Provider, MD   levothyroxine (SYNTHROID) 125 MCG tablet Take 125 mcg by mouth Daily   Yes Historical Provider, MD   potassium chloride (KLOR-CON M) 20 MEQ extended release tablet Take 20 mEq by mouth daily   Yes Historical Provider, MD   OXYGEN Inhale 2 L into the lungs   Yes Historical Provider, MD   gabapentin (NEURONTIN) 600 MG tablet Take 600 mg by mouth 4 times daily. Takes one 300mg pill in early am, one with breakfast, two with dinner, and two at bedtime   Yes Historical Provider, MD   Cyanocobalamin (B-12) 2500 MCG TABS Take 1 tablet by mouth once a week    Yes Historical Provider, MD   pantoprazole (PROTONIX) 20 MG tablet Take 20 mg by mouth daily 10/28/19  Yes Historical Provider, MD   melatonin 5 MG TABS tablet Take 5 mg by mouth nightly   Yes Historical Provider, MD   aspirin EC 81 MG EC tablet Take 1 tablet by mouth daily 9/25/19  Yes Trip Boston MD   HYDROcodone-acetaminophen (NORCO) 5-325 MG per tablet Take 2 tablets by mouth every 6 hours as needed for Pain.  Takes one pill early am, one pill at breakfast, two pills with dinner, and two pills at bedtime. Yes Historical Provider, MD   metaxalone (SKELAXIN) 800 MG tablet Take 800 mg by mouth 2 times daily as needed (as needed for spasms) Do not take with vicodin. Yes Historical Provider, MD   Butalbital-Acetaminophen  MG TABS Take 1 tablet by mouth as needed (as needed for headaches) Vary rarely. Yes Historical Provider, MD       REVIEW OF SYSTEMS:    All 14-point review of systems are completed and  pertinent positives are mentioned in the history of present illness. Other  systems are reviewed and are negative. Physical Examination:    /70   Pulse 100   Ht 5' 1.5\" (1.562 m)   Wt 138 lb (62.6 kg)   SpO2 97%   BMI 25.65 kg/m²      Constitutional and General Appearance:    alert, cooperative, no distress and appears stated age  [de-identified]:    PERRLA, no cervical lymphadenopathy. No masses palpable. Normal oral  mucosa  Respiratory:  · Normal excursion and expansion without use of accessory muscles  · Resp Auscultation: Normal breath sounds without dullness or wheezing  Cardiovascular:  · The apical impulse is not displaced  · Irregular rate and rhythm w/o M/G/R  Abdomen:  · No masses or tenderness  · Bowel sounds present  Extremities:  ·  No Cyanosis or Clubbing  ·  Lower extremity edema: No  · Skin: Warm and dry  Neurological:  · Alert and oriented. · Moves all extremities well  · No abnormalities of mood, affect, memory, mentation, or behavior are noted    DATA:    ECG 8/26/21: Personally reviewed. IMPRESSION:    1. Paroxysmal atrial fibrillation (DEFHG2AYe score 4).  8/26/2021  Patient is a pleasant 49-year-old female with a medical history significant for persistent atrial fibrillation, hypothyroidism, COPD on oxygen, GI bleeding and a history of digoxin toxicity who presents from home for follow-up. Patient continues to be mildly anemic. She is also tachycardic in atrial fibrillation with borderline rate control.   We discussed options including further rate control (is limited by her blood pressure), antiarrhythmic therapy, ablation, and pace and ablate strategy. Patient and family would prefer rhythm control at this point given the fact that her heart rates are not well controlled. We discussed antiarrhythmic therapy and patient decided on dronedarone (we discussed cost). We will plan for cardioversion when caregiver is back in town. We will need to discuss Watchman device in future with patient given anemia. I agree with lower dose apixaban for now given her weight of nearly 60 kg and age greater than [de-identified].    - Discuss Watchman after cardioversion.  - Start dronedarone. - Schedule cardioversion. RECOMMENDATIONS:  1. Discussed at length treatment options for AF:   1. Medications to slow heart rate (goal of less than 90 at rest, less than 110 with mild activity) Discussed Digoxin for HR control. Patient would like to defer this due to previous dig toxicity. 2. Medications for rhythm control (amiodarone, dronedarone, and dofetilide which requires hospital admission for three days). Discussed risks and benefits. Patient would like to try dronedarone followed by a CV. 3. Ablation to burn the abnormal electrical activity within the heart. Discussed risks and benefits. 4. Pace and Ablate to put pacemaker in the heart and burn the electrical activity in the heart. Would be dependent upon pacemaker for the rest of your life. This would be a last resort. 5. Cardioversion to electrically shock heart back into normal rhythm. 2.  Continue oral anticoagulant to prevent stroke in the presence of AF. 3.  Follow up after cardioversion. QUALITY MEASURES  1. Tobacco Cessation Counseling: NA  2. Retake of BP if >140/90:   NA  3. Documentation to PCP/referring for new patient:  Sent to PCP at close of office visit  4. CAD patient on anti-platelet: NA  5. CAD patient on STATIN therapy:  NA  6.  Patient with CHF and aFib on anticoagulation:  Yes     All questions and concerns were addressed to the patient/family. Alternatives to my treatment were discussed. Dr. David Hatfield MD  Electrophysiology  [facility] [facility]. [address]. [place]  Phone: [phone]  Fax: [phone]     NOTE: This report was transcribed using voice recognition software. Every effort was made to ensure accuracy, however, inadvertent computerized transcription errors may be present. Al Eldridge RN, am scribing for and in the presence of Dr. Marisa Patel. 08/26/21 10:06 AM   Keneth Mcburney, RN    The scribe's documentation has been prepared under my direction and personally reviewed by me in its entirety. I confirm that the note above accurately reflects all work, physical examination, the discussion of treatments and procedures, and medical decision making performed by me. Mitzi Sue MD personally performed the services described in this documentation as scribed by nurse in my presence, and is both accurate and complete.     Electronically signed by Jada Rodríguez MD on 8/26/2021 at 10:58 AM

## 2021-08-26 ENCOUNTER — TELEPHONE (OUTPATIENT)
Dept: CARDIOLOGY CLINIC | Age: 81
End: 2021-08-26

## 2021-08-26 ENCOUNTER — OFFICE VISIT (OUTPATIENT)
Dept: CARDIOLOGY CLINIC | Age: 81
End: 2021-08-26
Payer: MEDICARE

## 2021-08-26 VITALS
BODY MASS INDEX: 25.4 KG/M2 | WEIGHT: 138 LBS | HEART RATE: 100 BPM | SYSTOLIC BLOOD PRESSURE: 100 MMHG | DIASTOLIC BLOOD PRESSURE: 70 MMHG | OXYGEN SATURATION: 97 % | HEIGHT: 62 IN

## 2021-08-26 DIAGNOSIS — Z95.2 S/P TAVR (TRANSCATHETER AORTIC VALVE REPLACEMENT): ICD-10-CM

## 2021-08-26 DIAGNOSIS — I48.0 PAF (PAROXYSMAL ATRIAL FIBRILLATION) (HCC): Primary | ICD-10-CM

## 2021-08-26 PROCEDURE — 99214 OFFICE O/P EST MOD 30 MIN: CPT | Performed by: INTERNAL MEDICINE

## 2021-08-26 PROCEDURE — 93000 ELECTROCARDIOGRAM COMPLETE: CPT | Performed by: INTERNAL MEDICINE

## 2021-08-26 NOTE — LETTER
[address] Cardiology - [address] [place] [address] [facility]  Phone: [phone]  Fax: [phone]    Teresa Martinez MD    August 27, 2021     Charis Roberts [address] [place] [address]    Patient: Miranda Orr   MR Number: [id number]   YOB: 1940   Date of Visit: 8/26/2021       Dear Terra Carrizales:    Thank you for referring Karen Lind to me for evaluation/treatment. Below are the relevant portions of my assessment and plan of care. If you have questions, please do not hesitate to call me. I look forward to following Aravind Adams along with you.     Sincerely,    Teresa Martinez MD

## 2021-08-26 NOTE — TELEPHONE ENCOUNTER
Vickie Kwon called to let [address] know that the Multaq's efficacy is decreased since pt is also taking Fioricet 300 mg. I called pt and spoke with her care giver Rosarua Butcher and Rosaura Butcher advised pt does not take this very often at all. She takes it for headaches and is only given 6 a quarter. FYI.

## 2021-08-26 NOTE — PATIENT INSTRUCTIONS
RECOMMENDATIONS:  1. Discussed at length treatment options for AF:   1. Medications to slow heart rate (goal of less than 90 at rest, less than 110 with mild activity) Discussed Digoxin for HR control. Patient would like to defer this due to previous dig toxicity. 2. Medications for rhythm control (amiodarone, dronedarone, and dofetilide which requires hospital admission for three days). Discussed risks and benefits. Patient would like to try dronedarone followed by a CV. 3. Ablation to burn the abnormal electrical activity within the heart. Discussed risks and benefits. 4. Pace and Ablate to put pacemaker in the heart and burn the electrical activity in the heart. Would be dependent upon pacemaker for the rest of your life. This would be a last resort. 5. Cardioversion to electrically shock heart back into normal rhythm. 2.  Continue oral anticoagulant to prevent stroke in the presence of AF. 3.  Follow up after cardioversion. Patient Education        Electrophysiology Study and Catheter Ablation: Before Your Procedure  What is an electrophysiology study and catheter ablation? An electrophysiology study is a test to see if there is a problem with your heart rhythm and to find out how to fix it. It is also called an EP study. A catheter ablation procedure is sometimes done at the same time. This procedure destroys (ablates) small areas of your heart that are causing your heart rhythm problem. The doctor puts plastic tubes called catheters into blood vessels in your groin, arm, or neck. He or she then uses an X-ray machine to guide long wires through the tubes to your heart. The doctor can use these wires to record your heart's electrical signals. If the doctor thinks your problem can be fixed with ablation, he or she can use the wires to destroy a small part of your heart tissue. This is most often done with radio waves. You will probably be awake during the procedure.  But you might be asleep. The doctor will give you medicines to help you feel relaxed and to numb the areas where the catheters go in. An EP study and ablation can take 2 to 6 hours. In rare cases, it can take longer. If you have an EP study only and you don't need more treatment, you may go home the same day. But if you also have ablation, you may stay overnight in the hospital. How long you stay in the hospital depends on the type of ablation you have. Do not exercise hard or lift anything heavy for a week. You may be able to go back to work and to your normal routine in 1 or 2 days. Follow-up care is a key part of your treatment and safety. Be sure to make and go to all appointments, and call your doctor if you are having problems. It's also a good idea to know your test results and keep a list of the medicines you take. How do you prepare for the procedure? Procedures can be stressful. This information will help you understand what you can expect. And it will help you safely prepare for your procedure. Preparing for the procedure    · Be sure you have someone to take you home. Anesthesia and pain medicine will make it unsafe for you to drive or get home on your own.     · Understand exactly what procedure is planned, along with the risks, benefits, and other options.     · Tell your doctor ALL the medicines, vitamins, supplements, and herbal remedies you take. Some may increase the risk of problems during your procedure. Your doctor will tell you if you should stop taking any of them before the procedure and how soon to do it.     · If you take aspirin or some other blood thinner, ask your doctor if you should stop taking it before your procedure. Make sure that you understand exactly what your doctor wants you to do. These medicines increase the risk of bleeding.     · Make sure your doctor and the hospital have a copy of your advance directive. If you don't have one, you may want to prepare one.  It lets others know your health care wishes. It's a good thing to have before any type of surgery or procedure. What happens on the day of the procedure? · Follow the instructions exactly about when to stop eating and drinking. If you don't, your procedure may be canceled. If your doctor told you to take your medicines on the day of the procedure, take them with only a sip of water.     · Take a bath or shower before you come in for your procedure. Do not apply lotions, perfumes, deodorants, or nail polish.     · Take off all jewelry and piercings. And take out contact lenses, if you wear them. At the hospital or surgery center   · Bring a picture ID.     · You will be kept comfortable and safe by your anesthesia provider. The anesthesia may make you sleep. Or it may just numb the area being worked on.     · This procedure can take 2 to 6 hours. In rare cases, it can take longer.     · After the procedure, pressure will be applied to the area where the catheter was put in your blood vessel. Then the area may be covered with a bandage or a compression device. This will prevent bleeding.     · Nurses will check your heart rate and blood pressure. The nurse will also check the catheter site for bleeding.     · If the catheter was put in your groin, you will need to lie still and keep your leg straight for several hours. The nurse may put a weighted bag on your leg to keep it still.     · If the catheter was put in your arm, you may be able to sit up and get out of bed right away. But you will need to keep your arm still for at least 1 hour.     · You may have a bruise or a small lump where the catheter was put in your blood vessel. This is normal and will go away. When should you call your doctor?    · You have questions or concerns.     · You don't understand how to prepare for your procedure.     · You become ill before the procedure (such as fever, flu, or a cold).     · You need to reschedule or have changed your mind about having the procedure. Where can you learn more? Go to [email or web address] org and sign in to your [employer] account. Enter U409 in the [facility] box to learn more about \"Electrophysiology Study and Catheter Ablation: Before Your Procedure. \"     If you do not have an account, please click on the \"Sign Up Now\" link. Current as of: August 31, 2020               Content Version: 12.9  © 2006-2021 [employer]. Care instructions adapted under license by [facility] ([facility]). If you have questions about a medical condition or this instruction, always ask your healthcare professional. [place] [address] any warranty or liability for your use of this information. Patient Education        Electrophysiology Study and Catheter Ablation: What to Expect at [address] [place] [address] Drive had an electrophysiology study for a problem with your heartbeat. You may also have had a catheter ablation to try to correct the problem. You may have swelling, bruising, or a small lump around the site where the catheters went into your body. These should go away in 3 to 4 weeks. Do not exercise hard or lift anything heavy for a week. You may be able to go back to work and to your normal routine in 1 or 2 days. This care sheet gives you a general idea about how long it will take for you to recover. But each person recovers at a different pace. Follow the steps below to get better as quickly as possible. How can you care for yourself at home? Activity    · For 1 week, do not lift anything that would make you strain. This may include heavy grocery bags and milk containers, a heavy briefcase or backpack, cat litter or dog food bags, a vacuum , or a child.     · For 1 week, do not exercise hard or do any activity that could strain your blood vessels or the site where the catheters went into your body.     · Ask your doctor when it is okay to have sex.    Diet    · You can eat your normal diet. If your stomach is upset, try bland, low-fat foods like plain rice, broiled chicken, toast, and yogurt.     · Drink plenty of fluids (unless your doctor tells you not to). Medicines    · Your doctor will tell you if and when you can restart your medicines. He or she will also give you instructions about taking any new medicines.     · If you take aspirin or some other blood thinner, ask your doctor if and when to start taking it again. Make sure that you understand exactly what your doctor wants you to do.     · Ask your doctor if you can take acetaminophen (Tylenol) for pain. Do not take aspirin for 3 days, unless your doctor says it is okay.     · Check with your doctor before you take aspirin or anti-inflammatory medicines to reduce pain and swelling. These include ibuprofen (Advil, Motrin) and naproxen (Aleve).   · Make sure you know which heart medicines to continue and which ones to stop. Ask your doctor if you aren't sure. Catheter site care    · You can remove your bandages the day after the procedure.     · You may shower 24 to 48 hours after the procedure, if your doctor okays it. Pat the incision dry.     · Do not soak the catheter site until it is healed. Don't take a bath for 1 week, or until your doctor tells you it is okay.     · Watch for bleeding from the site. A small amount of blood (up to the size of a quarter) on the bandage can be normal.     · If you are bleeding, lie down and press on the area for 15 minutes to try to make it stop. If the bleeding does not stop, call your doctor or seek immediate medical care. Follow-up care is a key part of your treatment and safety. Be sure to make and go to all appointments, and call your doctor if you are having problems. It's also a good idea to know your test results and keep a list of the medicines you take. When should you call for help? Call 911  anytime you think you may need emergency care.  For example, call if:    · You passed out (lost consciousness).     · You have symptoms of a heart attack. These may include:  ? Chest pain or pressure, or a strange feeling in the chest.  ? Sweating. ? Shortness of breath. ? Nausea or vomiting. ? Pain, pressure, or a strange feeling in the back, neck, jaw, or upper belly, or in one or both shoulders or arms. ? Lightheadedness or sudden weakness. ? A fast or irregular heartbeat. After you call 911, the  may tell you to chew 1 adult-strength or 2 to 4 low-dose aspirin. Wait for an ambulance. Do not try to drive yourself.     · You have symptoms of a stroke. These may include:  ? Sudden numbness, tingling, weakness, or loss of movement in your face, arm, or leg, especially on only one side of your body. ? Sudden vision changes. ? Sudden trouble speaking. ? Sudden confusion or trouble understanding simple statements. ? Sudden problems with walking or balance. ? A sudden, severe headache that is different from past headaches. Call your doctor now or seek immediate medical care if:    · You are bleeding from the area where the catheter was put in your blood vessel.     · You have a fast-growing, painful lump at the catheter site.     · You have signs of infection, such as:  ? Increased pain, swelling, warmth, or redness. ? Red streaks leading from the catheter site. ? Pus draining from the catheter site. ? A fever.     · Your leg, arm, or hand is painful, looks blue, or feels cold, numb, or tingly. Watch closely for any changes in your health, and be sure to contact your doctor if you have any problems. Where can you learn more? Go to https://[employer]. org and sign in to your [employer] account. Enter [phone] in the [employer] box to learn more about \"Electrophysiology Study and Catheter Ablation: What to Expect at Home. \"     If you do not have an account, please click on the \"Sign Up Now\" link.   Current as of: August 31, 2020               Content Version: 12.9  © 2006-2021 [employer]. Care instructions adapted under license by [facility] ([facility]). If you have questions about a medical condition or this instruction, always ask your healthcare professional. [address] any warranty or liability for your use of this information. Patient Education        dronedarone  Pronunciation:  teddy ott madi  Brand:  Multaq  What is the most important information I should know about dronedarone? You should not use dronedarone if you have severe liver disease, if you are pregnant or breast-feeding, or if you have ever used amiodarone and then had liver or lung problems. You should not use dronedarone if you have a serious heart condition such as very slow heartbeats, \"sick sinus syndrome,\" or \"AV block\" (unless you have a pacemaker). Dronedarone can double your risk of death if you have certain heart conditions. You should not use this medicine if you have severe heart failure, if you were recently hospitalized for worsening heart failure symptoms, or if you have a \"permanent\" type of atrial fibrillation (this will be determined by your doctor). Check your pulse often, and tell your doctor right away if you notice an irregular rhythm. Tell your doctor about all your current medicines and any you start or stop using. Many drugs can interact with dronedarone, and some drugs should not be used together. Dronedarone can cause liver problems. Call your doctor at once if you have symptoms such as nausea, loss of appetite, unusual tiredness, dark urine, or yellowing of your skin or eyes. What is dronedarone? Dronedarone is a heart rhythm medicine that helps maintain normal heartbeats in certain people with life-threatening rhythm disorders of the atrium (the upper chambers of the heart that allow blood to flow into the heart).   Dronedarone helps lower your risk of needing to be hospitalized for a heart rhythm disorder called atrial fibrillation. Dronedarone is for people who have had this disorder in the past, but now have normal heart rhythm. Dronedarone may also be used for purposes not listed in this medication guide. What should I discuss with my healthcare provider before taking dronedarone? You should not use dronedarone if you are allergic to it, or if:  · you have severe liver disease;  · you have a serious heart condition such as \"sick sinus syndrome,\" \"AV block\" (unless you have a pacemaker), or very slow heartbeats that have caused you to faint;  · you are pregnant or breast-feeding; or  · you used a medicine called amiodarone and then had lung problems or liver problems. Dronedarone is used to treat intermittent or \"temporary\" heart rhythm disorders. In some people with \"permanent\" atrial fibrillation, dronedarone increased the risk of stroke, hospitalization, and death. Dronedarone can double your risk of death if you have certain heart conditions. You should not use this medicine if:  · you have severe heart failure;  · you were recently hospitalized for worsening heart failure symptoms (shortness of breath, chest tightness, night-time breathing problems, swelling, rapid weight gain); or  · you have a \"permanent\" atrial fibrillation that cannot be changed back to a normal rhythm (this will be determined by your doctor). Some medicines can cause unwanted or dangerous effects when used with dronedarone.  Your doctor may need to change your treatment plan if you use any of the following drugs:  · cyclosporine;  · ritonavir;  · other heart rhythm medicines;  · an antibiotic --azithromycin, clarithromycin, erythromycin, levofloxacin, moxifloxacin, pentamidine, telithromycin;  · antifungal medicine --ketoconazole, itraconazole, voriconazole;  · an antidepressant --amitriptyline, amoxapine, clomipramine, desipramine, doxepin, imipramine, maprotiline, mirtazapine, nefazodone, nortriptyline, protriptyline, trimipramine; or  · antipsychotic medicine --chlorpromazine, fluphenazine, perphenazine, prochlorperazine, promethazine, thioridazine, trifluoperazine. To make sure dronedarone is safe for you, tell your doctor if you have ever had:  · other heart problems;  · an electrolyte imbalance (such as low levels of potassium or magnesium in your blood); or  · liver or kidney disease. Do not use dronedarone if you are pregnant. It could harm the unborn baby or cause birth defects. Use effective birth control to prevent pregnancy while you are using this medicine and tell your doctor if you become pregnant. It is not known whether dronedarone passes into breast milk or if it could harm a nursing baby. You should not breast-feed while taking dronedarone. How should I take dronedarone? Follow all directions on your prescription label. Do not take this medicine in larger or smaller amounts or for longer than recommended. Dronedarone works best if you take it with your morning and evening meals. Your heart function may need to be checked using an electrocardiograph or ECG (sometimes called an EKG) every 3 months. This will help your doctor determine how long to treat you with dronedarone. Your liver and kidney function may also need to be checked. Check your own pulse often, and call your doctor right away if you notice an irregular rhythm. Use dronedarone regularly even if you feel fine or have no symptoms. Get your prescription refilled before you run out of medicine completely. You should not stop using dronedarone suddenly. Stopping suddenly may make your condition worse. Store at room temperature away from heat and moisture. What happens if I miss a dose? Skip the missed dose and take the next regularly scheduled dose. Do not use extra medicine to make up the missed dose. What happens if I overdose? Seek emergency medical attention or call the Poison Help line at 1-152.978.9440.   What should I avoid while taking dronedarone? Grapefruit and grapefruit juice may interact with dronedarone and lead to unwanted side effects. Avoid the use of grapefruit products while taking dronedarone. What are the possible side effects of dronedarone? Get emergency medical help if you have signs of an allergic reaction: hives; difficult breathing; swelling of your face, lips, tongue, or throat. Call your doctor at once if you have:  · shortness of breath (even with mild exertion), swelling, rapid weight gain;  · chest pain, wheezing, trouble breathing, dry cough, or coughing up mucus;  · breathing problems while lying down trying to sleep;  · severe dizziness, fainting, fast or pounding heartbeats;  · slow heart rate, feeling like you might pass out;  · a new or a worsening irregular heartbeat pattern;  · kidney problems --little or no urination, swelling in your feet or ankles, feeling tired or short of breath; or  · liver problems --nausea, upper stomach pain, itching, unusual tiredness, loss of appetite, dark urine, inge-colored stools, jaundice (yellowing of the skin or eyes). Common side effects may include:  · stomach pain, indigestion, nausea, vomiting, diarrhea;  · feeling weak or tired; or  · skin rash, itching, or redness. This is not a complete list of side effects and others may occur. Call your doctor for medical advice about side effects. You may report side effects to FDA at 8-043-FDA-0959. What other drugs will affect dronedarone? Many drugs can interact with dronedarone. Not all possible interactions are listed here.  Tell your doctor about all your current medicines and any you start or stop using, especially:  · digoxin;  · Newman's wort;  · a blood thinner (warfarin, Coumadin, Jantoven);  · heart or blood pressure medication;  · medicines to treat tuberculosis;  · medicine to prevent organ transplant rejection;  · seizure medicine; or  · \"statin\" cholesterol medication (Lipitor, Zocor, Vytorin, and others). This list is not complete and many other drugs can interact with dronedarone. This includes prescription and over-the-counter medicines, vitamins, and herbal products. Give a list of all your medicines to any healthcare provider who treats you. Where can I get more information? Your pharmacist can provide more information about dronedarone. Remember, keep this and all other medicines out of the reach of children, never share your medicines with others, and use this medication only for the indication prescribed. Every effort has been made to ensure that the information provided by [address] is accurate, up-to-date, and complete, but no guarantee is made to that effect. Drug information contained herein may be time sensitive. [facility] information has been compiled for use by healthcare practitioners and consumers in the [facility] and therefore [facility] does not warrant that uses outside of the [facility] are appropriate, unless specifically indicated otherwise. [facility]'s drug information does not endorse drugs, diagnose patients or recommend therapy. [facility]'s drug information is an informational resource designed to assist licensed healthcare practitioners in caring for their patients and/or to serve consumers viewing this service as a supplement to, and not a substitute for, the expertise, skill, knowledge and judgment of healthcare practitioners. The absence of a warning for a given drug or drug combination in no way should be construed to indicate that the drug or drug combination is safe, effective or appropriate for any given patient. [facility] does not assume any responsibility for any aspect of healthcare administered with the aid of information [facility] provides. The information contained herein is not intended to cover all possible uses, directions, precautions, warnings, drug interactions, allergic reactions, or adverse effects.  If you have questions about the drugs you are taking, check with your doctor, nurse or pharmacist.  Copyright 5764-9514 [address]: 12.01. Revision date: 11/15/2017. Care instructions adapted under license by [facility] ([facility]). If you have questions about a medical condition or this instruction, always ask your healthcare professional. [place] [address] any warranty or liability for your use of this information.

## 2021-08-27 NOTE — TELEPHONE ENCOUNTER
Spoke pharmacists at [facility]. Per [address] [place],[address] pt is okay to try the Multaq with the  [phone] [facility].  V/U.    TY

## 2021-08-31 ENCOUNTER — HOSPITAL ENCOUNTER (OUTPATIENT)
Dept: CARDIOLOGY | Age: 81
Discharge: HOME | End: 2021-08-31
Payer: MEDICARE

## 2021-08-31 DIAGNOSIS — Z95.2 S/P TAVR (TRANSCATHETER AORTIC VALVE REPLACEMENT): ICD-10-CM

## 2021-08-31 DIAGNOSIS — I35.0 NONRHEUMATIC AORTIC VALVE STENOSIS: ICD-10-CM

## 2021-08-31 LAB
LV EF: 55 %
LVEF MODALITY: NORMAL

## 2021-08-31 PROCEDURE — 93306 TTE W/DOPPLER COMPLETE: CPT

## 2021-09-05 ENCOUNTER — HOSPITAL ENCOUNTER (INPATIENT)
Age: 81
LOS: 4 days | Discharge: HOME | DRG: 291 | End: 2021-09-09
Attending: STUDENT IN AN ORGANIZED HEALTH CARE EDUCATION/TRAINING PROGRAM | Admitting: INTERNAL MEDICINE
Payer: MEDICARE

## 2021-09-05 ENCOUNTER — APPOINTMENT (OUTPATIENT)
Dept: CT IMAGING | Age: 81
DRG: 291 | End: 2021-09-05
Payer: MEDICARE

## 2021-09-05 ENCOUNTER — APPOINTMENT (OUTPATIENT)
Dept: GENERAL RADIOLOGY | Age: 81
DRG: 291 | End: 2021-09-05
Payer: MEDICARE

## 2021-09-05 DIAGNOSIS — J96.11 CHRONIC RESPIRATORY FAILURE WITH HYPOXIA (HCC): ICD-10-CM

## 2021-09-05 DIAGNOSIS — I50.9 ACUTE ON CHRONIC CONGESTIVE HEART FAILURE, UNSPECIFIED HEART FAILURE TYPE (HCC): Primary | ICD-10-CM

## 2021-09-05 DIAGNOSIS — J96.21 ACUTE ON CHRONIC RESPIRATORY FAILURE WITH HYPOXIA (HCC): ICD-10-CM

## 2021-09-05 PROBLEM — I50.43 CHF (CONGESTIVE HEART FAILURE), NYHA CLASS I, ACUTE ON CHRONIC, COMBINED (HCC): Status: ACTIVE | Noted: 2021-09-05

## 2021-09-05 LAB
A/G RATIO: 1.2 (ref 1.1–2.2)
ALBUMIN SERPL-MCNC: 3.8 G/DL (ref 3.4–5)
ALP BLD-CCNC: 97 U/L (ref 40–129)
ALT SERPL-CCNC: 18 U/L (ref 10–40)
ANION GAP SERPL CALCULATED.3IONS-SCNC: 26 MMOL/L (ref 3–16)
AST SERPL-CCNC: 22 U/L (ref 15–37)
BASE EXCESS ARTERIAL: -2.4 MMOL/L (ref -3–3)
BASE EXCESS VENOUS: 1.8 MMOL/L (ref -3–3)
BASOPHILS ABSOLUTE: 0.1 K/UL (ref 0–0.2)
BASOPHILS RELATIVE PERCENT: 1 %
BILIRUB SERPL-MCNC: 0.4 MG/DL (ref 0–1)
BUN BLDV-MCNC: 23 MG/DL (ref 7–20)
CALCIUM SERPL-MCNC: 9.5 MG/DL (ref 8.3–10.6)
CARBOXYHEMOGLOBIN ARTERIAL: 0.1 % (ref 0–1.5)
CARBOXYHEMOGLOBIN: 2.1 % (ref 0–1.5)
CHLORIDE BLD-SCNC: 90 MMOL/L (ref 99–110)
CO2: 27 MMOL/L (ref 21–32)
CREAT SERPL-MCNC: 1.6 MG/DL (ref 0.6–1.2)
EKG ATRIAL RATE: 129 BPM
EKG DIAGNOSIS: NORMAL
EKG Q-T INTERVAL: 370 MS
EKG QRS DURATION: 74 MS
EKG QTC CALCULATION (BAZETT): 474 MS
EKG R AXIS: 58 DEGREES
EKG T AXIS: 43 DEGREES
EKG VENTRICULAR RATE: 99 BPM
EOSINOPHILS ABSOLUTE: 0 K/UL (ref 0–0.6)
EOSINOPHILS RELATIVE PERCENT: 0.6 %
GFR AFRICAN AMERICAN: 37
GFR NON-AFRICAN AMERICAN: 31
GLOBULIN: 3.3 G/DL
GLUCOSE BLD-MCNC: 150 MG/DL (ref 70–99)
HCO3 ARTERIAL: 23.5 MMOL/L (ref 21–29)
HCO3 VENOUS: 30.3 MMOL/L (ref 23–29)
HCT VFR BLD CALC: 34.6 % (ref 36–48)
HEMOGLOBIN, ART, EXTENDED: 11.7 G/DL (ref 12–16)
HEMOGLOBIN: 10.8 G/DL (ref 12–16)
INFLUENZA A: NOT DETECTED
INFLUENZA B: NOT DETECTED
LYMPHOCYTES ABSOLUTE: 0.4 K/UL (ref 1–5.1)
LYMPHOCYTES RELATIVE PERCENT: 6.5 %
MCH RBC QN AUTO: 29.4 PG (ref 26–34)
MCHC RBC AUTO-ENTMCNC: 31.4 G/DL (ref 31–36)
MCV RBC AUTO: 93.8 FL (ref 80–100)
METHEMOGLOBIN ARTERIAL: 0.3 %
METHEMOGLOBIN VENOUS: 0 %
MONOCYTES ABSOLUTE: 0.6 K/UL (ref 0–1.3)
MONOCYTES RELATIVE PERCENT: 9.4 %
NEUTROPHILS ABSOLUTE: 4.8 K/UL (ref 1.7–7.7)
NEUTROPHILS RELATIVE PERCENT: 82.5 %
O2 CONTENT, VEN: 14 VOL %
O2 SAT, ARTERIAL: 92.1 %
O2 SAT, VEN: 82 %
O2 THERAPY: ABNORMAL
O2 THERAPY: ABNORMAL
PCO2 ARTERIAL: 45.2 MMHG (ref 35–45)
PCO2, VEN: 68.3 MMHG (ref 40–50)
PDW BLD-RTO: 18 % (ref 12.4–15.4)
PH ARTERIAL: 7.33 (ref 7.35–7.45)
PH VENOUS: 7.26 (ref 7.35–7.45)
PLATELET # BLD: 190 K/UL (ref 135–450)
PMV BLD AUTO: 8.4 FL (ref 5–10.5)
PO2 ARTERIAL: 67.1 MMHG (ref 75–108)
PO2, VEN: 53.7 MMHG (ref 25–40)
POTASSIUM REFLEX MAGNESIUM: 4.9 MMOL/L (ref 3.5–5.1)
PRO-BNP: 4644 PG/ML (ref 0–449)
PROCALCITONIN: 0.05 NG/ML (ref 0–0.15)
RBC # BLD: 3.68 M/UL (ref 4–5.2)
SARS-COV-2 RNA, RT PCR: NOT DETECTED
SODIUM BLD-SCNC: 143 MMOL/L (ref 136–145)
TCO2 ARTERIAL: 24.9 MMOL/L
TCO2 CALC VENOUS: 32 MMOL/L
TOTAL PROTEIN: 7.1 G/DL (ref 6.4–8.2)
TROPONIN: 0.01 NG/ML
WBC # BLD: 5.9 K/UL (ref 4–11)

## 2021-09-05 PROCEDURE — 6360000002 HC RX W HCPCS: Performed by: STUDENT IN AN ORGANIZED HEALTH CARE EDUCATION/TRAINING PROGRAM

## 2021-09-05 PROCEDURE — 80053 COMPREHEN METABOLIC PANEL: CPT

## 2021-09-05 PROCEDURE — 71250 CT THORAX DX C-: CPT

## 2021-09-05 PROCEDURE — 87636 SARSCOV2 & INF A&B AMP PRB: CPT

## 2021-09-05 PROCEDURE — 84484 ASSAY OF TROPONIN QUANT: CPT

## 2021-09-05 PROCEDURE — 36415 COLL VENOUS BLD VENIPUNCTURE: CPT

## 2021-09-05 PROCEDURE — 2580000003 HC RX 258: Performed by: STUDENT IN AN ORGANIZED HEALTH CARE EDUCATION/TRAINING PROGRAM

## 2021-09-05 PROCEDURE — 6370000000 HC RX 637 (ALT 250 FOR IP): Performed by: INTERNAL MEDICINE

## 2021-09-05 PROCEDURE — 2500000003 HC RX 250 WO HCPCS: Performed by: INTERNAL MEDICINE

## 2021-09-05 PROCEDURE — 83880 ASSAY OF NATRIURETIC PEPTIDE: CPT

## 2021-09-05 PROCEDURE — 82803 BLOOD GASES ANY COMBINATION: CPT

## 2021-09-05 PROCEDURE — 85025 COMPLETE CBC W/AUTO DIFF WBC: CPT

## 2021-09-05 PROCEDURE — 2580000003 HC RX 258: Performed by: INTERNAL MEDICINE

## 2021-09-05 PROCEDURE — 96365 THER/PROPH/DIAG IV INF INIT: CPT

## 2021-09-05 PROCEDURE — 84145 PROCALCITONIN (PCT): CPT

## 2021-09-05 PROCEDURE — 93010 ELECTROCARDIOGRAM REPORT: CPT | Performed by: INTERNAL MEDICINE

## 2021-09-05 PROCEDURE — 96375 TX/PRO/DX INJ NEW DRUG ADDON: CPT

## 2021-09-05 PROCEDURE — 93005 ELECTROCARDIOGRAM TRACING: CPT | Performed by: STUDENT IN AN ORGANIZED HEALTH CARE EDUCATION/TRAINING PROGRAM

## 2021-09-05 PROCEDURE — 6370000000 HC RX 637 (ALT 250 FOR IP): Performed by: STUDENT IN AN ORGANIZED HEALTH CARE EDUCATION/TRAINING PROGRAM

## 2021-09-05 PROCEDURE — 96367 TX/PROPH/DG ADDL SEQ IV INF: CPT

## 2021-09-05 PROCEDURE — 71045 X-RAY EXAM CHEST 1 VIEW: CPT

## 2021-09-05 PROCEDURE — 99284 EMERGENCY DEPT VISIT MOD MDM: CPT

## 2021-09-05 PROCEDURE — 2060000000 HC ICU INTERMEDIATE R&B

## 2021-09-05 RX ORDER — ASPIRIN 81 MG/1
81 TABLET ORAL DAILY
Status: DISCONTINUED | OUTPATIENT
Start: 2021-09-05 | End: 2021-09-09 | Stop reason: HOSPADM

## 2021-09-05 RX ORDER — ATORVASTATIN CALCIUM 40 MG/1
40 TABLET, FILM COATED ORAL NIGHTLY
Status: DISCONTINUED | OUTPATIENT
Start: 2021-09-05 | End: 2021-09-09 | Stop reason: HOSPADM

## 2021-09-05 RX ORDER — HYDROCODONE BITARTRATE AND ACETAMINOPHEN 5; 325 MG/1; MG/1
1 TABLET ORAL EVERY 6 HOURS PRN
Status: DISCONTINUED | OUTPATIENT
Start: 2021-09-05 | End: 2021-09-09 | Stop reason: HOSPADM

## 2021-09-05 RX ORDER — DILTIAZEM HYDROCHLORIDE 180 MG/1
360 CAPSULE, COATED, EXTENDED RELEASE ORAL DAILY
Status: DISCONTINUED | OUTPATIENT
Start: 2021-09-05 | End: 2021-09-05

## 2021-09-05 RX ORDER — DULOXETIN HYDROCHLORIDE 30 MG/1
30 CAPSULE, DELAYED RELEASE ORAL DAILY
Status: DISCONTINUED | OUTPATIENT
Start: 2021-09-05 | End: 2021-09-09 | Stop reason: HOSPADM

## 2021-09-05 RX ORDER — DILTIAZEM HYDROCHLORIDE 240 MG/1
240 CAPSULE, COATED, EXTENDED RELEASE ORAL DAILY
Status: DISCONTINUED | OUTPATIENT
Start: 2021-09-06 | End: 2021-09-09 | Stop reason: HOSPADM

## 2021-09-05 RX ORDER — ACETAMINOPHEN 325 MG/1
650 TABLET ORAL EVERY 6 HOURS PRN
Status: DISCONTINUED | OUTPATIENT
Start: 2021-09-05 | End: 2021-09-09 | Stop reason: HOSPADM

## 2021-09-05 RX ORDER — GABAPENTIN 600 MG/1
300 TABLET ORAL 4 TIMES DAILY
Status: DISCONTINUED | OUTPATIENT
Start: 2021-09-05 | End: 2021-09-05

## 2021-09-05 RX ORDER — POLYETHYLENE GLYCOL 3350 17 G/17G
17 POWDER, FOR SOLUTION ORAL DAILY PRN
Status: DISCONTINUED | OUTPATIENT
Start: 2021-09-05 | End: 2021-09-09 | Stop reason: HOSPADM

## 2021-09-05 RX ORDER — ONDANSETRON 2 MG/ML
4 INJECTION INTRAMUSCULAR; INTRAVENOUS EVERY 6 HOURS PRN
Status: DISCONTINUED | OUTPATIENT
Start: 2021-09-05 | End: 2021-09-09 | Stop reason: HOSPADM

## 2021-09-05 RX ORDER — LEVALBUTEROL 1.25 MG/.5ML
1.25 SOLUTION, CONCENTRATE RESPIRATORY (INHALATION) EVERY 4 HOURS PRN
Status: DISCONTINUED | OUTPATIENT
Start: 2021-09-05 | End: 2021-09-09 | Stop reason: HOSPADM

## 2021-09-05 RX ORDER — METHYLPREDNISOLONE SODIUM SUCCINATE 40 MG/ML
40 INJECTION, POWDER, LYOPHILIZED, FOR SOLUTION INTRAMUSCULAR; INTRAVENOUS DAILY
Status: DISCONTINUED | OUTPATIENT
Start: 2021-09-06 | End: 2021-09-07

## 2021-09-05 RX ORDER — METHYLPREDNISOLONE SODIUM SUCCINATE 40 MG/ML
40 INJECTION, POWDER, LYOPHILIZED, FOR SOLUTION INTRAMUSCULAR; INTRAVENOUS ONCE
Status: COMPLETED | OUTPATIENT
Start: 2021-09-05 | End: 2021-09-05

## 2021-09-05 RX ORDER — LEVOTHYROXINE SODIUM 0.12 MG/1
125 TABLET ORAL DAILY
Status: DISCONTINUED | OUTPATIENT
Start: 2021-09-05 | End: 2021-09-09 | Stop reason: HOSPADM

## 2021-09-05 RX ORDER — IPRATROPIUM BROMIDE AND ALBUTEROL SULFATE 2.5; .5 MG/3ML; MG/3ML
1 SOLUTION RESPIRATORY (INHALATION) ONCE
Status: COMPLETED | OUTPATIENT
Start: 2021-09-05 | End: 2021-09-05

## 2021-09-05 RX ORDER — ONDANSETRON 4 MG/1
4 TABLET, ORALLY DISINTEGRATING ORAL EVERY 8 HOURS PRN
Status: DISCONTINUED | OUTPATIENT
Start: 2021-09-05 | End: 2021-09-09 | Stop reason: HOSPADM

## 2021-09-05 RX ORDER — MECOBALAMIN 5000 MCG
5 TABLET,DISINTEGRATING ORAL NIGHTLY
Status: DISCONTINUED | OUTPATIENT
Start: 2021-09-05 | End: 2021-09-09 | Stop reason: HOSPADM

## 2021-09-05 RX ORDER — METOPROLOL SUCCINATE 25 MG/1
25 TABLET, EXTENDED RELEASE ORAL DAILY
Status: DISCONTINUED | OUTPATIENT
Start: 2021-09-05 | End: 2021-09-09 | Stop reason: HOSPADM

## 2021-09-05 RX ORDER — SODIUM CHLORIDE 0.9 % (FLUSH) 0.9 %
5-40 SYRINGE (ML) INJECTION PRN
Status: DISCONTINUED | OUTPATIENT
Start: 2021-09-05 | End: 2021-09-09 | Stop reason: HOSPADM

## 2021-09-05 RX ORDER — SODIUM CHLORIDE 9 MG/ML
25 INJECTION, SOLUTION INTRAVENOUS PRN
Status: DISCONTINUED | OUTPATIENT
Start: 2021-09-05 | End: 2021-09-09 | Stop reason: HOSPADM

## 2021-09-05 RX ORDER — SODIUM CHLORIDE 0.9 % (FLUSH) 0.9 %
5-40 SYRINGE (ML) INJECTION EVERY 12 HOURS SCHEDULED
Status: DISCONTINUED | OUTPATIENT
Start: 2021-09-05 | End: 2021-09-09 | Stop reason: HOSPADM

## 2021-09-05 RX ORDER — CHOLECALCIFEROL (VITAMIN D3) 25 MCG
1 TABLET,CHEWABLE ORAL WEEKLY
Status: DISCONTINUED | OUTPATIENT
Start: 2021-09-05 | End: 2021-09-08 | Stop reason: SDUPTHER

## 2021-09-05 RX ORDER — ACETAMINOPHEN 650 MG/1
650 SUPPOSITORY RECTAL EVERY 6 HOURS PRN
Status: DISCONTINUED | OUTPATIENT
Start: 2021-09-05 | End: 2021-09-09 | Stop reason: HOSPADM

## 2021-09-05 RX ORDER — FUROSEMIDE 10 MG/ML
20 INJECTION INTRAMUSCULAR; INTRAVENOUS ONCE
Status: COMPLETED | OUTPATIENT
Start: 2021-09-05 | End: 2021-09-05

## 2021-09-05 RX ORDER — OMEPRAZOLE 20 MG/1
20 CAPSULE, DELAYED RELEASE ORAL DAILY
COMMUNITY

## 2021-09-05 RX ORDER — PANTOPRAZOLE SODIUM 40 MG/1
40 TABLET, DELAYED RELEASE ORAL
Status: DISCONTINUED | OUTPATIENT
Start: 2021-09-06 | End: 2021-09-09 | Stop reason: HOSPADM

## 2021-09-05 RX ORDER — FUROSEMIDE 10 MG/ML
40 INJECTION INTRAMUSCULAR; INTRAVENOUS 2 TIMES DAILY
Status: DISCONTINUED | OUTPATIENT
Start: 2021-09-05 | End: 2021-09-05

## 2021-09-05 RX ORDER — LEVALBUTEROL 1.25 MG/.5ML
1.25 SOLUTION, CONCENTRATE RESPIRATORY (INHALATION) EVERY 8 HOURS
Status: DISCONTINUED | OUTPATIENT
Start: 2021-09-05 | End: 2021-09-05

## 2021-09-05 RX ADMIN — ATORVASTATIN CALCIUM 40 MG: 40 TABLET, FILM COATED ORAL at 20:50

## 2021-09-05 RX ADMIN — CEFTRIAXONE SODIUM 1000 MG: 1 INJECTION, POWDER, FOR SOLUTION INTRAMUSCULAR; INTRAVENOUS at 13:49

## 2021-09-05 RX ADMIN — DOXYCYCLINE 100 MG: 100 INJECTION, POWDER, LYOPHILIZED, FOR SOLUTION INTRAVENOUS at 19:02

## 2021-09-05 RX ADMIN — Medication 10 ML: at 20:51

## 2021-09-05 RX ADMIN — METHYLPREDNISOLONE SODIUM SUCCINATE 40 MG: 40 INJECTION, POWDER, FOR SOLUTION INTRAMUSCULAR; INTRAVENOUS at 12:58

## 2021-09-05 RX ADMIN — DEXTROSE MONOHYDRATE 500 MG: 50 INJECTION, SOLUTION INTRAVENOUS at 14:37

## 2021-09-05 RX ADMIN — HYDROCODONE BITARTRATE AND ACETAMINOPHEN 1 TABLET: 5; 325 TABLET ORAL at 16:14

## 2021-09-05 RX ADMIN — IPRATROPIUM BROMIDE AND ALBUTEROL SULFATE 1 AMPULE: .5; 3 SOLUTION RESPIRATORY (INHALATION) at 12:23

## 2021-09-05 RX ADMIN — HYDROCODONE BITARTRATE AND ACETAMINOPHEN 1 TABLET: 5; 325 TABLET ORAL at 20:51

## 2021-09-05 RX ADMIN — FUROSEMIDE 20 MG: 10 INJECTION, SOLUTION INTRAMUSCULAR; INTRAVENOUS at 13:45

## 2021-09-05 RX ADMIN — Medication 5 MG: at 20:50

## 2021-09-05 RX ADMIN — APIXABAN 2.5 MG: 5 TABLET, FILM COATED ORAL at 20:50

## 2021-09-05 RX ADMIN — DRONEDARONE 400 MG: 400 TABLET, FILM COATED ORAL at 17:42

## 2021-09-05 ASSESSMENT — PAIN SCALES - GENERAL
PAINLEVEL_OUTOF10: 7
PAINLEVEL_OUTOF10: 4
PAINLEVEL_OUTOF10: 7
PAINLEVEL_OUTOF10: 6

## 2021-09-05 ASSESSMENT — PAIN DESCRIPTION - PAIN TYPE: TYPE: ACUTE PAIN

## 2021-09-05 NOTE — PROGRESS NOTES
Patient admitted to PCU from ED at this time.  at bedside. Admission questions completed. Lungs decreased with crackles noted in bases. BS+. 2+ edema noted BLE, non-pitting. No acute distress noted. Patient denies any needs. Call light in reach. Will continue to monitor.

## 2021-09-05 NOTE — H&P
Hospital Medicine History & Physical      PCP: Lance Moore MD    Date of Admission: 9/5/2021    Date of Service: Pt seen/examined on 9/5/2021 and Admitted to Inpatient    Chief Complaint:  SOB. History Of Present Illness: The patient is a 80 y.o. female w Hx of TAVR done at [facility] in 2020, Hx of persistent Afib - recently seen by cardiology at [address] added to her rate control medications, who presents with worsening dyspnea. Pt reports she felt progressively worsening SOB since about Thursday of this week. Associated with orthopnea and dry rhonchorous cough - unable to bring up any sputum. She does report worsening LE edema. He had to increase her chronic O2 up to 4l/min to maintain saturations. Both pt and her  are vaccinated with 2 doses of Cornelious Landsman (feb and march of this year).  doing well. Past Medical History:        Diagnosis Date    Acute on chronic diastolic (congestive) heart failure (HCC) 8/19/2020    Aortic stenosis     Arthritis     Cancer (HCC)     skin basal    Hyperlipidemia     Hypertension     Paroxysmal atrial fibrillation ([address]     Thyroid disease        Past Surgical History:        Procedure Laterality Date    AORTIC VALVE REPLACEMENT N/A 10/15/2019    TRANSCATHETER AORTIC VALVE REPLACEMENT FEMORAL APPROACH performed by Roverto Rush MD at [address]  09/2019    COLONOSCOPY N/A 8/13/2019    COLON W/ANES.  performed by Lolis Leonard MD at [address] [facility] N/A 9/25/2020    CYSTOSCOPY performed by Raheel Wheeler MD at [address]  8/13/2019    ESOPHAGEAL DILATION [facility]veta Frame [facility] 08/19/[facility]    KNEE ARTHROSCOPY Left Torn meniscus    OTHER SURGICAL HISTORY  10/09/2015    phacoemulsification of cataract with intraocular implant right eye    THYROIDECTOMY, PARTIAL      TONSILLECTOMY      TUBAL LIGATION      UPPER GASTROINTESTINAL ENDOSCOPY N/A 8/13/2019    EGD W/ANES. (9:30) performed by Cj Navarro MD at [address] [facility]       Medications Prior to Admission:    Prior to Admission medications    Medication Sig Start Date End Date Taking?  Authorizing Provider   omeprazole (PRILOSEC) 20 MG delayed release capsule Take 20 mg by mouth daily   Yes Historical Provider, MD   apixaban (ELIQUIS) 2.5 MG TABS tablet Take 2.5 mg by mouth 2 times daily 6/25/21  Yes Historical Provider, MD   dronedarone hcl (MULTAQ) 400 MG TABS Take 1 tablet by mouth 2 times daily (with meals) 8/26/21  Yes GRACIE Schwartz., MD   dilTIAZem (CARTIA XT) 240 MG extended release capsule Take 1 capsule by mouth daily  Patient taking differently: Take 360 mg by mouth daily  8/9/21  Yes Saida Kinney MD   furosemide (LASIX) 20 MG tablet Take 20 mg by mouth daily   Yes Historical Provider, MD   atorvastatin (LIPITOR) 40 MG tablet TAKE ONE TABLET BY MOUTH ONCE NIGHTLY 1/19/21  Yes Saida Kinney MD   DULoxetine (CYMBALTA) 30 MG extended release capsule Take 30 mg by mouth daily   Yes Historical Provider, MD   metoprolol succinate (TOPROL XL) 25 MG extended release tablet Take 25 mg by mouth daily   Yes Historical Provider, MD   levothyroxine (SYNTHROID) 125 MCG tablet Take 125 mcg by mouth Daily   Yes Historical Provider, MD   potassium chloride (KLOR-CON M) 20 MEQ extended release tablet Take 20 mEq by mouth daily   Yes Historical Provider, MD   OXYGEN Inhale 2 L into the lungs   Yes Historical Provider, MD   Cyanocobalamin (B-12) 2500 MCG TABS Take 1 tablet by mouth once a week    Yes Historical Provider, MD   pantoprazole (PROTONIX) 20 MG tablet Take 20 mg by mouth daily 10/28/19  Yes Historical Provider, MD   melatonin 5 MG TABS tablet Take 5 mg by mouth nightly   Yes Historical Provider, MD   aspirin EC 81 MG EC tablet Take 1 tablet by mouth daily 9/25/19  Yes Trip Boston MD   HYDROcodone-acetaminophen (NORCO) 5-325 MG per tablet Take 1 tablet by mouth every 6 hours as needed for Pain. Takes one pill @ 8am, one pill @ 3pm, one pill @ 6p, and one pill at bedtime. Yes Historical Provider, MD   metaxalone (SKELAXIN) 800 MG tablet Take 800 mg by mouth 2 times daily as needed (as needed for spasms) Do not take with vicodin. Yes Historical Provider, MD   Butalbital-Acetaminophen  MG TABS Take 1 tablet by mouth as needed (as needed for headaches) Vary rarely. Yes Historical Provider, MD       Allergies: Iodides, Shellfish-derived products, and Sulfa antibiotics    Social History:  The patient currently lives at home. TOBACCO:   reports that she quit smoking about 38 years ago. Her smoking use included cigarettes. She has a 20.00 pack-year smoking history. She has never used smokeless tobacco.  ETOH:   reports no history of alcohol use. Family History:  Reviewed in detail and negative for DM, Early CAD, Cancer, CVA. Positive as follows:        Problem Relation Age of Onset    Colon Cancer Mother     Heart Disease Father     Heart Disease Brother        REVIEW OF SYSTEMS:   As noted in the HPI. All other systems reviewed and negative. PHYSICAL EXAM:    /69   Pulse 54   Temp 97.6 °F (36.4 °C) (Oral)   Resp 24   Ht 5' 1\" (1.549 m)   Wt 141 lb (64 kg)   SpO2 96%   BMI 26.64 kg/m²     General appearance: No apparent distress appears stated age and cooperative. HEENT Normal cephalic, atraumatic without obvious deformity. Pupils equal, round, and reactive to light. Extra ocular muscles intact. Conjunctivae/corneas clear. Neck: Supple, No jugular venous distention/bruits. Trachea midline without thyromegaly or adenopathy with full range of motion. Lungs: R lung rales and coarse upper airway sounds. based on the following assessment and plan:      ASSESSMENT/PLAN:      Worsening Hypoxia - on chronic hypoxic resp failure. Afib RVR  Pt shows signs of R heart failure on exam.   Her lungs have rales on the right. Clear sounds on the left. He recent ECHO (Aug 31, 2021)   - EF 55%   - R ventric systolic function reduced. - Left atrium severely enlarged   - Bioprosthetic aortic valve in good position. - overall signs of worsening pulm HTN   Plan:    - will ask cardiology to eval.    - will hold off additional lasix for tonight. - will obtain CT chest - due to R lung rales asymmetric to left lung on exam.    - cont dilt 240, multaq, eliquis. COPD with Acute Exacerbation on chronic Hypox RF  COVID testing negative. CT chest pending. Will use xopenex nebulizer. O2 via NC. Solumedrol 40  Doxycycline. DVT Prophylaxis: on eliquis. Diet: ADULT DIET; Regular; Low Sodium (2 gm)  Code Status: Full Code      Dispo - PCU. Shahram Huerta MD    Thank you Angelica MD Raven for the opportunity to be involved in this patient's care. If you have any questions or concerns please feel free to contact me at [phone].

## 2021-09-05 NOTE — PROGRESS NOTES
RESPIRATORY THERAPY ASSESSMENT    Name:  Tunde Estevez Record Number:  [id number]  Age: 80 y.o. Gender: female  : 1940  Today's Date:  2021  Room:  /0319-01    Assessment     Is the patient being admitted for a COPD or Asthma exacerbation? No   (If yes the patient will be seen every 4 hours for the first 24 hours and then reassessed)    Patient Admission Diagnosis      Allergies  Allergies   Allergen Reactions    Iodides     Shellfish-Derived Products      Unknown what happens    Sulfa Antibiotics        Minimum Predicted Vital Capacity:               Actual Vital Capacity:                    Pulmonary History:CHF/Pulmonary Edema  Home Oxygen Therapy:  room air  Home Respiratory Therapy:None   Current Respiratory Therapy:  xopenex q8          Respiratory Severity Index(RSI)   Patients with orders for inhalation medications, oxygen, or any therapeutic treatment modality will be placed on Respiratory Protocol. They will be assessed with the first treatment and at least every 72 hours thereafter. The following severity scale will be used to determine frequency of treatment intervention. Smoking History: Pulmonary Disease or Smoking History, Greater than 15 pack year = 2    Social History  Social History     Tobacco Use    Smoking status: Former Smoker     Packs/day: 1.00     Years: 20.00     Pack years: 20.00     Types: Cigarettes     Quit date: 1983     Years since quittin.7    Smokeless tobacco: Never Used   Vaping Use    Vaping Use: Never used   Substance Use Topics    Alcohol use: No    Drug use: Never       Recent Surgical History: None = 0  Past Surgical History  Past Surgical History:   Procedure Laterality Date    AORTIC VALVE REPLACEMENT N/A 10/15/2019    TRANSCATHETER AORTIC VALVE REPLACEMENT FEMORAL APPROACH performed by Jesus Dang MD at [address]  2019    COLONOSCOPY N/A 2019    COLON W/ANES.  performed by Mohsen Curran MD at [address] N/A 9/25/2020    CYSTOSCOPY performed by Sergey Tolbert MD at [address]  8/13/2019    ESOPHAGEAL DILATION Chapo Carrasco [facility] 08/19/[facility]    KNEE ARTHROSCOPY Left     Torn meniscus    OTHER SURGICAL HISTORY  10/09/2015    phacoemulsification of cataract with intraocular implant right eye    THYROIDECTOMY, PARTIAL      TONSILLECTOMY      TUBAL LIGATION      UPPER GASTROINTESTINAL ENDOSCOPY N/A 8/13/2019    EGD W/ANES. (9:30) [facility]       Level of Consciousness: Alert, Oriented, and Cooperative = 0    Level of Activity: Walking with assistance = 1    Respiratory Pattern: Regular Pattern; RR 8-20 = 0    Breath Sounds: Diminshed bilaterally and/or crackles = 2    Sputum   ,  ,    Cough: Strong, spontaneous, non-productive = 0    Vital Signs   /69   Pulse 54   Temp 97.6 °F (36.4 °C) (Oral)   Resp 24   Ht 5' 1\" (1.549 m)   Wt 141 lb (64 kg)   SpO2 96%   BMI 26.64 kg/m²   SPO2 (COPD values may differ): Greater than or equal to 92% on room air = 0    Peak Flow (asthma only): not applicable = 0    RSI: 5-6 = Q4hr PRN (every four hours as needed) for dyspnea        Plan       Goals: medication delivery, mobilize retained secretions, volume expansion and improve oxygenation    Patient/caregiver was educated on the proper method of use for Respiratory Care Devices:  No:       Level of patient/caregiver understanding able to:   ? Verbalize understanding   ? Demonstrate understanding       ? Teach back        ? Needs reinforcement       ? No available caregiver               ? Other:     Response to education:  not given. pt stated shes not sob and doesnt want treatment     Is patient being placed on Home Treatment Regimen?   No     Does the patient have everything they need prior to discharge? NA     Comments: pt assessed and chart reviewed    Plan of Care: xopenex q4 prn    Electronically signed by Sung Ojeda RCP on 9/5/2021 at 7:56 PM    Respiratory Protocol Guidelines     1. Assessment and treatment by Respiratory Therapy will be initiated for medication and therapeutic interventions upon initiation of aerosolized medication. 2. Physician will be contacted for respiratory rate (RR) greater than 35 breaths per minute. Therapy will be held for heart rate (HR) greater than 140 beats per minute, pending direction from physician. 3. Bronchodilators will be administered via Metered Dose Inhaler (MDI) with spacer when the following criteria are met:  a. Alert and cooperative     b. HR < 140 bpm  c. RR < 30 bpm                d. Can demonstrate a 2-3 second inspiratory hold  4. Bronchodilators will be administered via Hand Held Nebulizer DUC [facility]) to patients when ANY of the following criteria are met  a. Incognizant or uncooperative          b. Patients treated with HHN at Home        c. Unable to demonstrate proper use of MDI with spacer     d. RR > 30 bpm   5. Bronchodilators will be delivered via Metered Dose Inhaler (MDI), HHN, Aerogen to intubated patients on mechanical ventilation. 6. Inhalation medication orders will be delivered and/or substituted as outlined below. Aerosolized Medications Ordering and Administration Guidelines:    1. All Medications will be ordered by a physician, and their frequency and/or modality will be adjusted as defined by the patients Respiratory Severity Index (RSI) score. 2. If the patient does not have documented COPD, consider discontinuing anticholinergics when RSI is less than 9.  3. If the bronchospasm worsens (increased RSI), then the bronchodilator frequency can be increased to a maximum of every 4 hours. If greater than every 4 hours is required, the physician will be contacted.   4. If the bronchospasm improves, the frequency of the bronchodilator

## 2021-09-06 LAB
ALBUMIN SERPL-MCNC: 3.7 G/DL (ref 3.4–5)
ANION GAP SERPL CALCULATED.3IONS-SCNC: 12 MMOL/L (ref 3–16)
BASOPHILS ABSOLUTE: 0 K/UL (ref 0–0.2)
BASOPHILS RELATIVE PERCENT: 0.2 %
BUN BLDV-MCNC: 29 MG/DL (ref 7–20)
CALCIUM SERPL-MCNC: 9.2 MG/DL (ref 8.3–10.6)
CHLORIDE BLD-SCNC: 95 MMOL/L (ref 99–110)
CO2: 24 MMOL/L (ref 21–32)
CREAT SERPL-MCNC: 1.9 MG/DL (ref 0.6–1.2)
EOSINOPHILS ABSOLUTE: 0 K/UL (ref 0–0.6)
EOSINOPHILS RELATIVE PERCENT: 0 %
GFR AFRICAN AMERICAN: 31
GFR NON-AFRICAN AMERICAN: 25
GLUCOSE BLD-MCNC: 168 MG/DL (ref 70–99)
HCT VFR BLD CALC: 32.1 % (ref 36–48)
HEMOGLOBIN: 10.5 G/DL (ref 12–16)
LYMPHOCYTES ABSOLUTE: 0.3 K/UL (ref 1–5.1)
LYMPHOCYTES RELATIVE PERCENT: 4.6 %
MCH RBC QN AUTO: 30.1 PG (ref 26–34)
MCHC RBC AUTO-ENTMCNC: 32.6 G/DL (ref 31–36)
MCV RBC AUTO: 92.4 FL (ref 80–100)
MONOCYTES ABSOLUTE: 0.1 K/UL (ref 0–1.3)
MONOCYTES RELATIVE PERCENT: 2.5 %
NEUTROPHILS ABSOLUTE: 5.4 K/UL (ref 1.7–7.7)
NEUTROPHILS RELATIVE PERCENT: 92.7 %
PDW BLD-RTO: 17.7 % (ref 12.4–15.4)
PHOSPHORUS: 4.3 MG/DL (ref 2.5–4.9)
PLATELET # BLD: 199 K/UL (ref 135–450)
PMV BLD AUTO: 8.4 FL (ref 5–10.5)
POTASSIUM SERPL-SCNC: 4.5 MMOL/L (ref 3.5–5.1)
RBC # BLD: 3.47 M/UL (ref 4–5.2)
SODIUM BLD-SCNC: 131 MMOL/L (ref 136–145)
WBC # BLD: 5.8 K/UL (ref 4–11)

## 2021-09-06 PROCEDURE — 6360000002 HC RX W HCPCS: Performed by: INTERNAL MEDICINE

## 2021-09-06 PROCEDURE — 0202U NFCT DS 22 TRGT SARS-COV-2: CPT

## 2021-09-06 PROCEDURE — 80069 RENAL FUNCTION PANEL: CPT

## 2021-09-06 PROCEDURE — 6370000000 HC RX 637 (ALT 250 FOR IP): Performed by: INTERNAL MEDICINE

## 2021-09-06 PROCEDURE — 36415 COLL VENOUS BLD VENIPUNCTURE: CPT

## 2021-09-06 PROCEDURE — 2500000003 HC RX 250 WO HCPCS: Performed by: INTERNAL MEDICINE

## 2021-09-06 PROCEDURE — 99223 1ST HOSP IP/OBS HIGH 75: CPT | Performed by: INTERNAL MEDICINE

## 2021-09-06 PROCEDURE — 85025 COMPLETE CBC W/AUTO DIFF WBC: CPT

## 2021-09-06 PROCEDURE — 2580000003 HC RX 258: Performed by: INTERNAL MEDICINE

## 2021-09-06 PROCEDURE — 2060000000 HC ICU INTERMEDIATE R&B

## 2021-09-06 RX ORDER — SUMATRIPTAN 25 MG/1
50 TABLET, FILM COATED ORAL ONCE
Status: COMPLETED | OUTPATIENT
Start: 2021-09-06 | End: 2021-09-06

## 2021-09-06 RX ORDER — FUROSEMIDE 10 MG/ML
40 INJECTION INTRAMUSCULAR; INTRAVENOUS DAILY
Status: DISCONTINUED | OUTPATIENT
Start: 2021-09-06 | End: 2021-09-06

## 2021-09-06 RX ORDER — FUROSEMIDE 10 MG/ML
40 INJECTION INTRAMUSCULAR; INTRAVENOUS ONCE
Status: DISCONTINUED | OUTPATIENT
Start: 2021-09-06 | End: 2021-09-09 | Stop reason: HOSPADM

## 2021-09-06 RX ADMIN — DULOXETINE HYDROCHLORIDE 30 MG: 30 CAPSULE, DELAYED RELEASE ORAL at 08:46

## 2021-09-06 RX ADMIN — SUMATRIPTAN SUCCINATE 50 MG: 25 TABLET ORAL at 11:16

## 2021-09-06 RX ADMIN — DRONEDARONE 400 MG: 400 TABLET, FILM COATED ORAL at 08:51

## 2021-09-06 RX ADMIN — METHYLPREDNISOLONE SODIUM SUCCINATE 40 MG: 40 INJECTION, POWDER, FOR SOLUTION INTRAMUSCULAR; INTRAVENOUS at 08:46

## 2021-09-06 RX ADMIN — DILTIAZEM HYDROCHLORIDE 240 MG: 240 CAPSULE, COATED, EXTENDED RELEASE ORAL at 08:46

## 2021-09-06 RX ADMIN — ACETAMINOPHEN 650 MG: 325 TABLET ORAL at 02:05

## 2021-09-06 RX ADMIN — ONDANSETRON HYDROCHLORIDE 4 MG: 2 INJECTION, SOLUTION INTRAMUSCULAR; INTRAVENOUS at 04:36

## 2021-09-06 RX ADMIN — APIXABAN 2.5 MG: 5 TABLET, FILM COATED ORAL at 08:46

## 2021-09-06 RX ADMIN — ATORVASTATIN CALCIUM 40 MG: 40 TABLET, FILM COATED ORAL at 20:37

## 2021-09-06 RX ADMIN — HYDROCODONE BITARTRATE AND ACETAMINOPHEN 1 TABLET: 5; 325 TABLET ORAL at 15:24

## 2021-09-06 RX ADMIN — METOPROLOL SUCCINATE 25 MG: 25 TABLET, EXTENDED RELEASE ORAL at 08:46

## 2021-09-06 RX ADMIN — HYDROCODONE BITARTRATE AND ACETAMINOPHEN 1 TABLET: 5; 325 TABLET ORAL at 08:51

## 2021-09-06 RX ADMIN — ASPIRIN 81 MG: 81 TABLET, COATED ORAL at 08:46

## 2021-09-06 RX ADMIN — Medication 10 ML: at 20:38

## 2021-09-06 RX ADMIN — Medication 5 MG: at 20:37

## 2021-09-06 RX ADMIN — LEVOTHYROXINE SODIUM 125 MCG: 125 TABLET ORAL at 06:05

## 2021-09-06 RX ADMIN — APIXABAN 2.5 MG: 5 TABLET, FILM COATED ORAL at 20:36

## 2021-09-06 RX ADMIN — DRONEDARONE 400 MG: 400 TABLET, FILM COATED ORAL at 19:57

## 2021-09-06 RX ADMIN — DOXYCYCLINE 100 MG: 100 INJECTION, POWDER, LYOPHILIZED, FOR SOLUTION INTRAVENOUS at 08:53

## 2021-09-06 RX ADMIN — PANTOPRAZOLE SODIUM 40 MG: 40 TABLET, DELAYED RELEASE ORAL at 06:05

## 2021-09-06 RX ADMIN — DOXYCYCLINE 100 MG: 100 INJECTION, POWDER, LYOPHILIZED, FOR SOLUTION INTRAVENOUS at 20:45

## 2021-09-06 RX ADMIN — FUROSEMIDE 40 MG: 10 INJECTION, SOLUTION INTRAMUSCULAR; INTRAVENOUS at 08:47

## 2021-09-06 ASSESSMENT — PAIN SCALES - GENERAL
PAINLEVEL_OUTOF10: 2
PAINLEVEL_OUTOF10: 3
PAINLEVEL_OUTOF10: 7
PAINLEVEL_OUTOF10: 7
PAINLEVEL_OUTOF10: 0
PAINLEVEL_OUTOF10: 9

## 2021-09-06 NOTE — CONSULTS
Cardiology Consultation   Date: 9/6/2021  Admit Date:  9/5/2021  Reason for Consultation: Acute heart failure with preserved ejection fraction exacerbation. Consult Requesting Physician: Juan Villegas MD     Chief Complaint   Patient presents with    Shortness of Breath     Pt's caregiver states that pt's O2 was 78% at home. Pt states that she is having a difficult time breathing. Pt normally wears 3L O2 at home and for the past three days has been wearing 4-4.5L O2. HPI:   Mrs. Ivania Good is a 80year old female with a medical history significant for persistent atrial fibrillation (known to me and recently started on dronedarone), aortic valve stenosis status post TAVR, COPD on home oxygen, GI bleeding, hypothyroidism, hypertension, and hyperlipidemia who presents from home with progressively worsening shortness of breath. According to patient she recently started on her dronedarone last Monday and did well until Friday when she began to suffer from shortness of breath. This progressively worsened to the point of having orthopnea, PND, and left lower extremity more than right edema. She presented to [facility] for [facility]. Of note, patient denies increase salt or fluid intake. She has a history of migraines and complains this am of having chest pain. She reports to having some URI symptoms prior to beginning of her symptoms. Patient denies fevers, chest pain, abdominal swelling, chills, visual changes, headaches, sore throat, cough, abdominal pain, nausea, vomiting, bleeding, bruising, dysuria, muscle/joint pain, confusion, depression, anxiety, skin lesions, etc.    Emergency Room/Hospital Course:  Patient was evaluated in the ER. Her CMP was notable for acute on chronic renal failure with Cr of 1.9. Her CBC was reassuring. Her chest CT was stable with some COPD changes and her chest radiograph was notable for early CHF changes. Her BNP was elevated at 4644.    Cardiology was consulted. Past Medical History:   Diagnosis Date    Acute on chronic diastolic (congestive) heart failure (HCC) 8/19/2020    Aortic stenosis     Arthritis     Cancer (HCC)     skin basal    Hyperlipidemia     Hypertension     Paroxysmal atrial fibrillation ([address]     Thyroid disease         Past Surgical History:   Procedure Laterality Date    AORTIC VALVE REPLACEMENT N/A 10/15/2019    TRANSCATHETER AORTIC VALVE REPLACEMENT FEMORAL APPROACH performed by Teresa Ward MD at [address]  09/2019    COLONOSCOPY N/A 8/13/2019    COLON W/ANES. performed by Espinoza South MD at [address] N/A 9/25/2020    CYSTOSCOPY performed by Francisco Rosado MD at [address]  8/13/2019    ESOPHAGEAL DILATION Dk Dose performed by Espinoza South MD at [address] Right 08/19/2020    KNEE ARTHROSCOPY Left     Torn meniscus    OTHER SURGICAL HISTORY  10/09/2015    phacoemulsification of cataract with intraocular implant right eye    THYROIDECTOMY, PARTIAL      TONSILLECTOMY      TUBAL LIGATION      UPPER GASTROINTESTINAL ENDOSCOPY N/A 8/13/2019    EGD W/ANES. (9:30) performed by Espinoza South MD at [address] [facility] Reactions    Iodides     Shellfish-Derived Products      Unknown what happens    Sulfa Antibiotics        Social History:  Reviewed. reports that she quit smoking about 38 years ago. Her smoking use included cigarettes. She has a 20.00 pack-year smoking history. She has never used smokeless tobacco. She reports that she does not drink alcohol and does not use drugs. Family History:  Reviewed. family history includes Colon Cancer in her mother; Heart Disease in her brother and father. No premature CAD. Review of System:  All other systems reviewed except for that noted above.  Pertinent negatives and positives are:     · General: negative for fever, chills   · Ophthalmic ROS: negative for - eye pain or loss of vision  · ENT ROS: negative for - headaches, sore throat   · Respiratory: negative for - cough, sputum  · Cardiovascular: Reviewed in HPI  · Gastrointestinal: negative for - abdominal pain, diarrhea, N/V  · Hematology: negative for - bleeding, blood clots, bruising or jaundice  · Genito-Urinary:  negative for - Dysuria or incontinence  · Musculoskeletal: negative for - Joint swelling, muscle pain  · Neurological: negative for - confusion, dizziness, headaches   · Psychiatric: No anxiety, no depression. · Dermatological: negative for - rash    Physical Examination:  Vitals:    21 0813   BP: 109/71   Pulse: 95   Resp: 18   Temp: 97.5 °F (36.4 °C)   SpO2: 90%        Intake/Output Summary (Last 24 hours) at 2021 0925  Last data filed at 2021 0241  Gross per 24 hour   Intake 300 ml   Output 150 ml   Net 150 ml     In: 250   Out: 150    Wt Readings from Last 3 Encounters:   21 140 lb 6.4 oz (63.7 kg)   21 138 lb (62.6 kg)   21 149 lb (67.6 kg)     Temp  Av.7 °F (36.5 °C)  Min: 97.1 °F (36.2 °C)  Max: 98.3 °F (36.8 °C)  Pulse  Av.9  Min: 54  Max: 103  BP  Min: 109/71  Max: 157/95  SpO2  Av %  Min: 90 %  Max: 96 %    · Telemetry: Atrial fibrillation with borderline RVR. · Constitutional: Alert. Oriented to person, place, and time. No distress. · Head: Normocephalic and atraumatic. · Mouth/Throat: Lips appear moist. Oropharynx is clear and moist.  · Neck: Neck supple. No lymphadenopathy. No rigidity. No JVD present. · Cardiovascular: Irregular rate and rhythm w/o M/G/R  · Pulmonary/Chest: Bilateral respiratory sounds present. No respiratory accessory muscle use. · Abdominal: Soft. Normal bowel sounds present. No distension, No tenderness. No splenomegaly. No hernia. · Musculoskeletal: No tenderness. L>R  edema    · Neurological: Alert and oriented.  Cranial nerve II-XII grossly hypertension. The IVC is dilated in size (>2.1 cm) and collapses <50% with respiration   consistent with markedly elevated right atrial pressures (15 mmHg) . Compared with the previous exam on 8/20/20, RV function is down and PHTN and   tricuspid regurgitation have significantly increased. Cath: 09/25/2019  Procedures: Cor angio, sedation     Mild nonobstructive CAD  No LVG due to AS     OK for TAVR  ASA, statin    I independently reviewed the ECG and telemetry. Scheduled Meds:   furosemide  40 mg IntraVENous Daily    apixaban  2.5 mg Oral BID    aspirin EC  81 mg Oral Daily    atorvastatin  40 mg Oral Nightly    B-12  1 tablet Oral Weekly    dronedarone hcl  400 mg Oral BID WC    DULoxetine  30 mg Oral Daily    levothyroxine  125 mcg Oral Daily    melatonin  5 mg Oral Nightly    metoprolol succinate  25 mg Oral Daily    pantoprazole  40 mg Oral QAM AC    sodium chloride flush  5-40 mL IntraVENous 2 times per day    dilTIAZem  240 mg Oral Daily    methylPREDNISolone  40 mg IntraVENous Daily    doxycycline (VIBRAMYCIN) IV  100 mg IntraVENous Q12H     Continuous Infusions:   sodium chloride       PRN Meds:. HYDROcodone-acetaminophen, sodium chloride flush, sodium chloride, ondansetron **OR** ondansetron, polyethylene glycol, acetaminophen **OR** acetaminophen, levalbuterol     Assessment:   Patient Active Problem List    Diagnosis Date Noted    CHF (congestive heart failure), NYHA class I, acute on chronic, combined ([address] 09/05/2021    Aortic valve disease     Acute on chronic congestive heart failure ([address]     Acute respiratory failure with hypoxia ([address]     Acute on chronic diastolic (congestive) heart failure ([address] 08/19/2020    FLORENCE (acute kidney injury) ([address] 11/05/2019    Digoxin overdose, accidental or unintentional, initial encounter     S/P TAVR (transcatheter aortic valve replacement) 11/02/2019    PAF (paroxysmal atrial fibrillation) ([address] 11/02/2019    Digoxin toxicity, accidental or unintentional, initial encounter 11/01/2019    Nonrheumatic aortic valve stenosis 10/15/2019    Loss of appetite     Weight loss     Anemia     Positive colorectal cancer screening using Cologuard test     Schatzki's ring     Aortic stenosis 06/25/2019    Hypercholesteremia 06/25/2019    Essential hypertension 06/25/2019    Lumbar radiculopathy 10/11/2016    Lumbar spine pain 10/11/2016    Hip pain, left 10/11/2016      Active Hospital Problems    Diagnosis Date Noted    CHF (congestive heart failure), NYHA class I, acute on chronic, combined ([facility] [I50.43] 09/05/2021     Mrs. Natalia Jernigan is a 80year old female with a medical history significant for persistent atrial fibrillation (known to me and recently started on dronedarone), aortic valve stenosis status post TAVR, COPD on home oxygen, GI bleeding, hypothyroidism, hypertension, and hyperlipidemia who presents from home with progressively worsening shortness of breath. Problem List:  1. Acute on chronic heart failure with preserved ejection fraction. 2. COPD exacerbation. 3. Atrial fibrillation with RVR. 4. Headache/migraine. 5. Nausea. Assessment and Plan:  1. Acute on chronic heart failure with preserved ejection fraction. Patient is a pleasant 80year old female with a medical history significant for heart failure with preserved ejection fraction, persistent atrial fibrillation with RVR, COPD, GI bleeding, and hypertension who presents from home with likely multifactorial shortness of breath related to volume overload and COPD. Based on clinical presentation, left lower extremity edema, BNP and chest radiograph, I suspect some mild volume overload. This is somewhat contradicted by worsening renal function. Discussed briefly with hospitalist team and agree with continued gentle diuresis.   If renal function continues to worsen may try some IVF or bed side evaluation of her IVC with echocardiogram.  - Agree with gentle diuresis with spot doses of furosemide watching Cr. Closely. If Cr continues to worsen then may try some IVF or bedside evaluation of IVC with ultrasound. - We will continue to follow along with you. 2. COPD exacerbation. URI symptoms plus shortness of breath in patient with known COPD, suspect some exacerbation of her COPD play a role in presentation.  - Per primary team..    3. Atrial fibrillation with RVR ([id number] score 4)  Patient is a pleasant 80year old female with a medical history significant for persistent atrial fibrillation who was recently started on dronedarone with plan for cardioversion in future who presents with worsening shortness of breath. Post resolution of shortness of breath or improvement, I would recommend cardioversion (no need for JAYNE given no missed doses of [address]). - Continue lower dose apixaban 2.5 mg BID.  - Continue dronedarone. - Continue metoprolol 25 mg daily. May increase however blood pressure this am lower. - Cardioversion post improvement of shortness of breath. 4. Headache/migraine.  - Trial of triptan. 5. Nausea. May be secondary to volume. - Continue to monitor for now. 6. Acute on chronic renal failure. - Plan as per above. If no improvement with diuresis then will try small bolus or ultrasound evaluation of IVC given difficult volume status evaluation. Thank you for allowing me to participate in the care of Miladis Xiao . If you have any questions/comments, please do not hesitate to contact us.     Lang Rodriguez MD  Cardiac Electrophysiology  [address]  [phone] [facility]

## 2021-09-06 NOTE — PROGRESS NOTES
Hospitalist Progress Note      PCP: Ana Rosa Lyons MD    Date of Admission: 9/5/2021    Chief Complaint: SOB, cough. Subjective: feels her SOB is better today. Persisting dry cough. Medications:  Reviewed    Infusion Medications    sodium chloride       Scheduled Medications    furosemide  40 mg IntraVENous Once    apixaban  2.5 mg Oral BID    aspirin EC  81 mg Oral Daily    atorvastatin  40 mg Oral Nightly    B-12  1 tablet Oral Weekly    dronedarone hcl  400 mg Oral BID WC    DULoxetine  30 mg Oral Daily    levothyroxine  125 mcg Oral Daily    melatonin  5 mg Oral Nightly    metoprolol succinate  25 mg Oral Daily    pantoprazole  40 mg Oral QAM AC    sodium chloride flush  5-40 mL IntraVENous 2 times per day    dilTIAZem  240 mg Oral Daily    methylPREDNISolone  40 mg IntraVENous Daily    doxycycline (VIBRAMYCIN) IV  100 mg IntraVENous Q12H     PRN Meds: HYDROcodone-acetaminophen, sodium chloride flush, sodium chloride, ondansetron **OR** ondansetron, polyethylene glycol, acetaminophen **OR** acetaminophen, levalbuterol      Intake/Output Summary (Last 24 hours) at 9/6/2021 1519  Last data filed at 9/6/2021 0241  Gross per 24 hour   Intake 250 ml   Output 150 ml   Net 100 ml       Physical Exam Performed:    /71   Pulse 95   Temp 97.5 °F (36.4 °C) (Oral)   Resp 18   Ht 5' 1\" (1.549 m)   Wt 140 lb 6.4 oz (63.7 kg)   SpO2 90%   BMI 26.53 kg/m²     General appearance: No apparent distress appears stated age and cooperative. HEENT Normal cephalic, atraumatic without obvious deformity. Pupils equal, round, and reactive to light. Extra ocular muscles intact. Conjunctivae/corneas clear. Neck: Supple, No jugular venous distention/bruits. Trachea midline without thyromegaly or adenopathy with full range of motion. Lungs: R lung rales and coarse upper airway sounds. Left lung fairly clear. Heart: irregularly irregular tachycardia.    Abdomen: Soft, non-tender or non-distended without rigidity or guarding and positive bowel sounds all four quadrants. Extremities: 2+ tense pitting edema. Skin: Skin color, texture, turgor normal.  No rashes or lesions. Neurologic: Alert and oriented X 3, neurovascularly intact with sensory/motor intact upper extremities/lower extremities, bilaterally. Cranial nerves: II-XII intact, grossly non-focal.  Mental status: Alert, oriented, thought content appropriate. Capillary Refill: Acceptable  < 3 seconds  Peripheral Pulses: +3 Easily felt, not easily obliterated with pressure         Labs:   Recent Labs     09/05/21  1205 09/06/21  0450   WBC 5.9 5.8   HGB 10.8* 10.5*   HCT 34.6* 32.1*    199     Recent Labs     09/05/21  1205 09/06/21  0450    131*   K 4.9 4.5   CL 90* 95*   CO2 27 24   BUN 23* 29*   CREATININE 1.6* 1.9*   CALCIUM 9.5 9.2   PHOS  --  4.3     Recent Labs     09/05/21  1205   AST 22   ALT 18   BILITOT 0.4   ALKPHOS 97     No results for input(s): INR in the last 72 hours. Recent Labs     09/05/21  1205   TROPONINI 0.01       Urinalysis:      Lab Results   Component Value Date    NITRU Negative 09/15/2020    WBCUA 3-5 09/15/2020    BACTERIA 1+ 09/15/2020    RBCUA >100 09/15/2020    BLOODU LARGE 09/15/2020    SPECGRAV 1.015 09/15/2020    GLUCOSEU Negative 09/15/2020       Radiology:  CT CHEST WO CONTRAST   Final Result   No focal consolidation to indicate bacterial pneumonia. A few tiny   centrilobular nodules are present in the lingula which could be infectious or   inflammatory. Mild emphysematous changes with areas of peripheral atelectasis/scarring. XR CHEST PORTABLE   Final Result   Mild cardiomegaly with mild perihilar edema most consistent with early CHF. Assessment/Plan:    Active Hospital Problems    Diagnosis     CHF (congestive heart failure), NYHA class I, acute on chronic, combined ([facility] [address] [I50.43]      Worsening Hypoxia - on chronic hypoxic resp failure.    Afib RVR  Pt shows signs of R heart failure on exam, though her leg edema is better today. .   Her lungs have rales on the right. Clear sounds on the left - improved  He recent ECHO (Aug 31, 2021)              - EF 55%              - R ventric systolic function reduced. - Left atrium severely enlarged              - Bioprosthetic aortic valve in good position. - overall signs of worsening pulm HTN   Plan:               - cardiology to eval.               - s/p additional lasix this am - hold off any additional doses - she actually appear dry today.                - CT chest - did not reveal PNA               - cont dilt 240, multaq, eliquis.        COPD with Acute Exacerbation on chronic Hypox RF  COVID testing negative. CT chest without PNA  Clinically with mild bronchospasm and bronchitis - suspect acute bacterial bronchitis. Will use xopenex nebulizer. O2 via NC. Solumedrol 40  Doxycycline. Will check rapid resp panel for other pathogens as COVID is negative.            Afib RVR - rate better. Noted cardiology plans for cardioversion.              DVT Prophylaxis: on eliquis. Diet: ADULT DIET; Regular;  Low Sodium (2 gm)  Code Status: Full Code        Dispo - PCU.          Sindy Hdez MD

## 2021-09-06 NOTE — PROGRESS NOTES
Order placed for a respiratory panel with COVID, PCR negative for patient yesterday. Dr. Meryle Drain okay with patient not being in [facility] isolation at this time. Will continue to monitor.

## 2021-09-06 NOTE — PROGRESS NOTES
Patient resting in bed, AM assessment completed. Lungs decreased, few crackles noted. BS+. INT intact. O2 at 5L per NC, 90%. No acute distress noted. Call light in reach. Will continue to monitor.

## 2021-09-06 NOTE — ACP (ADVANCE CARE PLANNING)
Advance Care Planning   Healthcare Decision Maker:    Primary Decision Maker: Vijay Haque Other - [phone]    Secondary Decision Maker: Jose Sanchez Spouse - [phone]     Patient and spouse wanted Jim Delmer (cousin/caretaker to be the primary decision maker), then the spouse, Lino Rodriguez is secondary. Click here to complete Healthcare Decision Makers including selection of the Healthcare Decision Maker Relationship (ie \"Primary\").

## 2021-09-06 NOTE — FLOWSHEET NOTE
09/05/21 2041   Vital Signs   Temp 98 °F (36.7 °C)   Temp Source Oral   Pulse 90   Heart Rate Source Monitor   Resp 18   BP (!) 142/71   BP Location Right upper arm   Level of Consciousness Alert (0)   MEWS Score 1   Oxygen Therapy   SpO2 95 %   O2 Device Nasal cannula   O2 Flow Rate (L/min) 4 L/min   Pt assessment complete. Pt lying in bed quietly. Lung sounds diminished with fine crackles. Pt on 02 4 liters via nasal canula. Pt Afib per monitor with HR 90. Nightly medications given. PRN Norco given for back pain. Pt rates pain 6/10. No other needs at this time. Call light within reach. Bed exit alarm on.

## 2021-09-06 NOTE — PROGRESS NOTES
Pt back to room assisted to bed. Pt remains on 02 4 liters. Call light within reach. Bed exit alarm on.

## 2021-09-06 NOTE — CARE COORDINATION
Case Management Assessment  Initial Evaluation      Patient Name: Melissa Cabrera  YOB: 1940  Diagnosis: CHF (congestive heart failure), NYHA class I, acute on chronic, combined ([facility] [address] [I50.43]  Acute on chronic respiratory failure with hypoxia ([facility] [address] [J96.21]  Acute on chronic congestive heart failure, unspecified heart failure type ([facility] [address] [I50.9]  Date / Time: 9/5/2021 11:35 AM    Admission status/Date:9/5/2021 inpt  Chart Reviewed: Yes      Patient Interviewed: Yes   Family Interviewed:  Yes - Twylla Lipoma (spouse)      Hospitalization in the last 30 days:  No      Health Care Decision Maker :   Primary Decision Maker: preeti calhoun - Other - [phone]    Secondary Decision Maker: Batsheva Guillen - Spouse - [phone]    (CM - must 1st enter selection under Navigator - emergency contact- [place] Relationship and pick relationship)   Who do you trust or have selected to make healthcare decisions for you  Patient and spouse wanted Feliciano Cee (cousin/caretaker to be the primary decision maker), then the spouse, Lisa Breauxoma is secondary.        Met with: pt and spouse, Butch Best conducted  (bedside/phone): bedside    Current PCP: Elgin Guerra MD    Financial  BCBS Medicare  Precert required for SNF : Y, N          3 night stay required - Y, N    ADLS  Support Systems/Care Needs:    Transportation: spouse    Meal Preparation: self    Housing  Living Arrangements: 2 story with spouse and kids and Feliciano Cee (cousin/caretaker) lives next door  Steps: chair lift into home  Intent for return to present living arrangements: Yes  Identified Issues: n/a    Home Care Information  Active with Home Health Care : No Agency:(Services)     Passport/Waiver : No  :                      Phone Number:    Passport/Waiver Services: n/a          Durable Medical Equiptment   DME Provider: Gilbert Recnios for homeO2 (2-4L baseline)  Equipment: yes  Walker___Cane___RTS___ BSC___Shower Chair___Hospital

## 2021-09-06 NOTE — PROGRESS NOTES
Pt is lying in bed with their eyes closed. Respirations are easy and even. Call light within reach bed in lowest position with the wheels locked. Will continue to monitor.  Monie Grace RN

## 2021-09-07 PROBLEM — J96.11 CHRONIC RESPIRATORY FAILURE WITH HYPOXIA (HCC): Status: ACTIVE | Noted: 2021-09-07

## 2021-09-07 PROBLEM — J44.1 COPD EXACERBATION (HCC): Status: ACTIVE | Noted: 2021-09-07

## 2021-09-07 LAB
A/G RATIO: 1.3 (ref 1.1–2.2)
ALBUMIN SERPL-MCNC: 3.9 G/DL (ref 3.4–5)
ALP BLD-CCNC: 86 U/L (ref 40–129)
ALT SERPL-CCNC: 21 U/L (ref 10–40)
ANION GAP SERPL CALCULATED.3IONS-SCNC: 13 MMOL/L (ref 3–16)
ANION GAP SERPL CALCULATED.3IONS-SCNC: 13 MMOL/L (ref 3–16)
AST SERPL-CCNC: 24 U/L (ref 15–37)
BILIRUB SERPL-MCNC: 0.3 MG/DL (ref 0–1)
BUN BLDV-MCNC: 45 MG/DL (ref 7–20)
BUN BLDV-MCNC: 46 MG/DL (ref 7–20)
CALCIUM SERPL-MCNC: 8.5 MG/DL (ref 8.3–10.6)
CALCIUM SERPL-MCNC: 9 MG/DL (ref 8.3–10.6)
CHLORIDE BLD-SCNC: 95 MMOL/L (ref 99–110)
CHLORIDE BLD-SCNC: 96 MMOL/L (ref 99–110)
CO2: 22 MMOL/L (ref 21–32)
CO2: 23 MMOL/L (ref 21–32)
CREAT SERPL-MCNC: 2.5 MG/DL (ref 0.6–1.2)
CREAT SERPL-MCNC: 2.7 MG/DL (ref 0.6–1.2)
GFR AFRICAN AMERICAN: 20
GFR AFRICAN AMERICAN: 22
GFR NON-AFRICAN AMERICAN: 17
GFR NON-AFRICAN AMERICAN: 18
GLOBULIN: 3.1 G/DL
GLUCOSE BLD-MCNC: 152 MG/DL (ref 70–99)
GLUCOSE BLD-MCNC: 207 MG/DL (ref 70–99)
ORGANISM: ABNORMAL
POTASSIUM REFLEX MAGNESIUM: 5.1 MMOL/L (ref 3.5–5.1)
POTASSIUM SERPL-SCNC: 4.7 MMOL/L (ref 3.5–5.1)
PRO-BNP: 9424 PG/ML (ref 0–449)
REPORT: NORMAL
RESPIRATORY PANEL PCR: ABNORMAL
SODIUM BLD-SCNC: 130 MMOL/L (ref 136–145)
SODIUM BLD-SCNC: 132 MMOL/L (ref 136–145)
TOTAL PROTEIN: 7 G/DL (ref 6.4–8.2)

## 2021-09-07 PROCEDURE — 2580000003 HC RX 258: Performed by: INTERNAL MEDICINE

## 2021-09-07 PROCEDURE — 2700000000 HC OXYGEN THERAPY PER DAY

## 2021-09-07 PROCEDURE — 99233 SBSQ HOSP IP/OBS HIGH 50: CPT | Performed by: INTERNAL MEDICINE

## 2021-09-07 PROCEDURE — 80053 COMPREHEN METABOLIC PANEL: CPT

## 2021-09-07 PROCEDURE — 6370000000 HC RX 637 (ALT 250 FOR IP): Performed by: INTERNAL MEDICINE

## 2021-09-07 PROCEDURE — 2060000000 HC ICU INTERMEDIATE R&B

## 2021-09-07 PROCEDURE — 36415 COLL VENOUS BLD VENIPUNCTURE: CPT

## 2021-09-07 PROCEDURE — 2500000003 HC RX 250 WO HCPCS: Performed by: INTERNAL MEDICINE

## 2021-09-07 PROCEDURE — 6370000000 HC RX 637 (ALT 250 FOR IP): Performed by: PHYSICIAN ASSISTANT

## 2021-09-07 PROCEDURE — 83880 ASSAY OF NATRIURETIC PEPTIDE: CPT

## 2021-09-07 RX ORDER — BUTALBITAL, ACETAMINOPHEN AND CAFFEINE 50; 325; 40 MG/1; MG/1; MG/1
1 TABLET ORAL EVERY 4 HOURS PRN
Status: DISCONTINUED | OUTPATIENT
Start: 2021-09-07 | End: 2021-09-09 | Stop reason: HOSPADM

## 2021-09-07 RX ORDER — SODIUM CHLORIDE 9 MG/ML
INJECTION, SOLUTION INTRAVENOUS CONTINUOUS
Status: ACTIVE | OUTPATIENT
Start: 2021-09-07 | End: 2021-09-07

## 2021-09-07 RX ORDER — PREDNISONE 20 MG/1
40 TABLET ORAL DAILY
Status: DISCONTINUED | OUTPATIENT
Start: 2021-09-07 | End: 2021-09-09 | Stop reason: HOSPADM

## 2021-09-07 RX ORDER — BUTALBITAL AND ACETAMINOPHEN 50; 300 MG/1; MG/1
1 TABLET ORAL PRN
Status: DISCONTINUED | OUTPATIENT
Start: 2021-09-07 | End: 2021-09-07

## 2021-09-07 RX ADMIN — LEVOTHYROXINE SODIUM 125 MCG: 125 TABLET ORAL at 05:21

## 2021-09-07 RX ADMIN — PREDNISONE 40 MG: 20 TABLET ORAL at 08:15

## 2021-09-07 RX ADMIN — SODIUM CHLORIDE 500 ML: 9 INJECTION, SOLUTION INTRAVENOUS at 08:06

## 2021-09-07 RX ADMIN — SODIUM CHLORIDE 25 ML: 9 INJECTION, SOLUTION INTRAVENOUS at 20:02

## 2021-09-07 RX ADMIN — DULOXETINE HYDROCHLORIDE 30 MG: 30 CAPSULE, DELAYED RELEASE ORAL at 08:15

## 2021-09-07 RX ADMIN — DILTIAZEM HYDROCHLORIDE 240 MG: 240 CAPSULE, COATED, EXTENDED RELEASE ORAL at 08:15

## 2021-09-07 RX ADMIN — ATORVASTATIN CALCIUM 40 MG: 40 TABLET, FILM COATED ORAL at 19:56

## 2021-09-07 RX ADMIN — METOPROLOL SUCCINATE 25 MG: 25 TABLET, EXTENDED RELEASE ORAL at 08:15

## 2021-09-07 RX ADMIN — DRONEDARONE 400 MG: 400 TABLET, FILM COATED ORAL at 08:15

## 2021-09-07 RX ADMIN — APIXABAN 2.5 MG: 5 TABLET, FILM COATED ORAL at 19:56

## 2021-09-07 RX ADMIN — Medication 10 ML: at 08:15

## 2021-09-07 RX ADMIN — ASPIRIN 81 MG: 81 TABLET, COATED ORAL at 08:15

## 2021-09-07 RX ADMIN — DOXYCYCLINE 100 MG: 100 INJECTION, POWDER, LYOPHILIZED, FOR SOLUTION INTRAVENOUS at 08:11

## 2021-09-07 RX ADMIN — Medication 5 MG: at 19:56

## 2021-09-07 RX ADMIN — BUTALBITAL, ACETAMINOPHEN, AND CAFFEINE 1 TABLET: 50; 325; 40 TABLET ORAL at 15:29

## 2021-09-07 RX ADMIN — PANTOPRAZOLE SODIUM 40 MG: 40 TABLET, DELAYED RELEASE ORAL at 05:21

## 2021-09-07 RX ADMIN — APIXABAN 2.5 MG: 5 TABLET, FILM COATED ORAL at 08:15

## 2021-09-07 RX ADMIN — DOXYCYCLINE 100 MG: 100 INJECTION, POWDER, LYOPHILIZED, FOR SOLUTION INTRAVENOUS at 20:05

## 2021-09-07 RX ADMIN — Medication 10 ML: at 19:57

## 2021-09-07 RX ADMIN — BUTALBITAL, ACETAMINOPHEN, AND CAFFEINE 1 TABLET: 50; 325; 40 TABLET ORAL at 07:53

## 2021-09-07 RX ADMIN — DRONEDARONE 400 MG: 400 TABLET, FILM COATED ORAL at 16:25

## 2021-09-07 RX ADMIN — HYDROCODONE BITARTRATE AND ACETAMINOPHEN 1 TABLET: 5; 325 TABLET ORAL at 00:41

## 2021-09-07 ASSESSMENT — PAIN DESCRIPTION - PAIN TYPE
TYPE: ACUTE PAIN

## 2021-09-07 ASSESSMENT — PAIN DESCRIPTION - ORIENTATION
ORIENTATION: LEFT;MID
ORIENTATION: RIGHT;LEFT

## 2021-09-07 ASSESSMENT — PAIN DESCRIPTION - LOCATION
LOCATION: HEAD
LOCATION: HEAD

## 2021-09-07 ASSESSMENT — PAIN SCALES - GENERAL
PAINLEVEL_OUTOF10: 5
PAINLEVEL_OUTOF10: 2
PAINLEVEL_OUTOF10: 0
PAINLEVEL_OUTOF10: 8

## 2021-09-07 ASSESSMENT — PAIN DESCRIPTION - PROGRESSION
CLINICAL_PROGRESSION: GRADUALLY WORSENING
CLINICAL_PROGRESSION: GRADUALLY WORSENING

## 2021-09-07 ASSESSMENT — PAIN DESCRIPTION - DESCRIPTORS
DESCRIPTORS: HEADACHE
DESCRIPTORS: HEADACHE

## 2021-09-07 ASSESSMENT — PAIN - FUNCTIONAL ASSESSMENT
PAIN_FUNCTIONAL_ASSESSMENT: ACTIVITIES ARE NOT PREVENTED
PAIN_FUNCTIONAL_ASSESSMENT: ACTIVITIES ARE NOT PREVENTED

## 2021-09-07 ASSESSMENT — PAIN DESCRIPTION - FREQUENCY
FREQUENCY: INTERMITTENT
FREQUENCY: INTERMITTENT

## 2021-09-07 ASSESSMENT — PAIN DESCRIPTION - ONSET
ONSET: ON-GOING
ONSET: ON-GOING

## 2021-09-07 NOTE — CARE COORDINATION
INTERDISCIPLINARY PLAN OF CARE CONFERENCE    Date/Time: 9/7/2021 11:36 AM  Completed by: Wale Martínez RN, Case Management      Patient Name:  Purnima Narvaez  YOB: 1940  Admitting Diagnosis: CHF (congestive heart failure), NYHA class I, acute on chronic, combined ([facility] [address] [I50.43]  Acute on chronic respiratory failure with hypoxia ([facility] [address] [J96.21]  Acute on chronic congestive heart failure, unspecified heart failure type ([facility] [address] [I50.9]     Admit Date/Time:  9/5/2021 11:35 AM    Chart reviewed. Interdisciplinary team contacted or reviewed plan related to patient progress and discharge plans. Disciplines included Case Management, Nursing, and Dietitian. Current Status:INPT   PT/OT recommendation for discharge plan of care: n/a    Expected D/C Disposition:  Home  Confirmed plan with patient and/or family Yes confirmed with: (name) pt/spouse  Discharge Plan Comments: Reviewed chart and spoke with pt spouse via telephone. Pt spouse states that plan cont for pt to return home at discharge. Spouse denies need for hhc or other services at this time stating that he has a caregiver that lives next door that assist pt as needed. CM will cont to follow. Home O2 in place on admit: Yes  Pt informed of need to bring portable home O2 tank on day of discharge for nursing to connect prior to leaving:  Yes  Verbalized agreement/Understanding:   Yes

## 2021-09-07 NOTE — PROGRESS NOTES
Patient resting in bed, watching television. Report given to [facility] for transfer of care.

## 2021-09-07 NOTE — FLOWSHEET NOTE
09/07/21 0813   Vital Signs   Temp 97 °F (36.1 °C)   Temp Source Oral   Pulse 73   Resp 20   BP (!) 144/64   BP Location Right upper arm   Level of Consciousness Alert (0)   MEWS Score 1   Oxygen Therapy   SpO2 96 %   O2 Device Nasal cannula   O2 Flow Rate (L/min) 4 L/min     Patient resting quietly in bed. No s/s of distress noted. Shift assessment complete, see flow sheet. Denies needs at this time. Call light in reach. Will monitor.

## 2021-09-07 NOTE — CONSULTS
[address]   Progress Note  Cardiology    CC: sob    HPI: no chest pain, breathing about the same     Past Medical History   has a past medical history of Acute on chronic diastolic (congestive) heart failure ([address] Aortic stenosis, Arthritis, Cancer ([address] Chronic respiratory failure with hypoxia ([address] COPD exacerbation ([address] Hyperlipidemia, Hypertension, Paroxysmal atrial fibrillation ([address] and Thyroid disease. Past Surgical History   has a past surgical history that includes Thyroidectomy, partial; Tonsillectomy; Tubal ligation; Knee arthroscopy (Left); other surgical history (10/09/2015); Upper gastrointestinal endoscopy (N/A, 8/13/2019); Colonoscopy (N/A, 8/13/2019); Esophagus dilation (8/13/2019); Cardiac catheterization (09/2019); Aortic valve replacement (N/A, 10/15/2019); Cystoscopy (N/A, 9/25/2020); eye surgery; and eye surgery (Right, 08/19/2020). Social History   reports that she quit smoking about 38 years ago. Her smoking use included cigarettes. She has a 20.00 pack-year smoking history. She has never used smokeless tobacco. She reports that she does not drink alcohol and does not use drugs. Family History  family history includes Colon Cancer in her mother; Heart Disease in her brother and father. Medications  Prior to Admission medications    Medication Sig Start Date End Date Taking?  Authorizing Provider   omeprazole (PRILOSEC) 20 MG delayed release capsule Take 20 mg by mouth daily   Yes Historical Provider, MD   apixaban (ELIQUIS) 2.5 MG TABS tablet Take 2.5 mg by mouth 2 times daily 6/25/21  Yes Historical Provider, MD   dronedarone hcl (MULTAQ) 400 MG TABS Take 1 tablet by mouth 2 times daily (with meals) 8/26/21  Yes Farhat Tierney MD   dilTIAZem (CARTIA XT) 240 MG extended release capsule Take 1 capsule by mouth daily  Patient taking differently: Take 360 mg by mouth daily  8/9/21  Yes Terence Benton MD   furosemide (LASIX) 20 MG tablet Take 20 mg by mouth daily   Yes Historical Provider, MD   atorvastatin (LIPITOR) 40 MG tablet TAKE ONE TABLET BY MOUTH ONCE NIGHTLY 1/19/21  Yes Lajuan Favre, MD   DULoxetine (CYMBALTA) 30 MG extended release capsule Take 30 mg by mouth daily   Yes Historical Provider, MD   metoprolol succinate (TOPROL XL) 25 MG extended release tablet Take 25 mg by mouth daily   Yes Historical Provider, MD   levothyroxine (SYNTHROID) 125 MCG tablet Take 125 mcg by mouth Daily   Yes Historical Provider, MD   potassium chloride (KLOR-CON M) 20 MEQ extended release tablet Take 20 mEq by mouth daily   Yes Historical Provider, MD   OXYGEN Inhale 2 L into the lungs   Yes Historical Provider, MD   Cyanocobalamin (B-12) 2500 MCG TABS Take 1 tablet by mouth once a week    Yes Historical Provider, MD   pantoprazole (PROTONIX) 20 MG tablet Take 20 mg by mouth daily 10/28/19  Yes Historical Provider, MD   melatonin 5 MG TABS tablet Take 5 mg by mouth nightly   Yes Historical Provider, MD   aspirin EC 81 MG EC tablet Take 1 tablet by mouth daily 9/25/19  Yes Marisela Ceballos MD   HYDROcodone-acetaminophen (NORCO) 5-325 MG per tablet Take 1 tablet by mouth every 6 hours as needed for Pain. Takes one pill @ 8am, one pill @ 3pm, one pill @ 6p, and one pill at bedtime. Yes Historical Provider, MD   metaxalone (SKELAXIN) 800 MG tablet Take 800 mg by mouth 2 times daily as needed (as needed for spasms) Do not take with vicodin. Yes Historical Provider, MD   Butalbital-Acetaminophen  MG TABS Take 1 tablet by mouth as needed (as needed for headaches) Vary rarely. Yes Historical Provider, MD       Allergies  Iodides, Shellfish-derived products, and Sulfa antibiotics    Review of Systems:   Reviewed.  No changes except as noted in HPI and A/P      Lab Results   Component Value Date    WBC 5.8 09/06/2021    HGB 10.5 (L) 09/06/2021    HCT 32.1 (L) 09/06/2021    MCV 92.4 09/06/2021     09/06/2021     Lab Results   Component Value Date CREATININE 2.7 (H) 09/07/2021    BUN 45 (H) 09/07/2021     (L) 09/07/2021    K 5.1 09/07/2021    CL 96 (L) 09/07/2021    CO2 23 09/07/2021    BNP 56 03/18/2014     Lab Results   Component Value Date    INR 0.97 08/19/2020    PROTIME 11.2 08/19/2020       I reviewed EKGs and radiology imaging. Pertinent findings and changes as described in assessment below. Physical Examination:    BP (!) 144/64   Pulse 73   Temp 97 °F (36.1 °C) (Oral)   Resp 20   Ht 5' 1\" (1.549 m)   Wt 140 lb 9.6 oz (63.8 kg)   SpO2 96%   BMI 26.57 kg/m²      General Appearance:  Alert, no distress, appears stated age   Head:  Normocephalic, without obvious abnormality, atraumatic   Eyes:  PERRL, conjunctiva/corneas clear         Nose: Nares normal, no drainage or sinus tenderness   Throat: Lips, mucosa, and tongue normal   Neck: Supple, symmetrical, trachea midline, no adenopathy         Lungs:   Clear to auscultation bilaterally    Chest Wall:  No tenderness or deformity   Heart:  irregular rate and rhythm, S1, S2 normal, no murmur, rub or gallop   Abdomen:   Soft, non tender, normal bowel sounds                Extremities: No cyanosis, + edema   Pulses: 2+ and symmetric   Skin: Skin color, texture, turgor normal, no rashes    Pysch: Normal mood and affect   Neurologic: Normal gross motor and sensory exam.      Tele: AF  Reviewed CXR, CT, BMP, CBC    Assessment:    SOPB - about the same   Chronic diastolic (congestive) heart failure - although BNP elevated, CT/CXR w/o overt pulm kimmie. Has developed FLORENCE - ?  Due to diuretics   Hypercholesteremia  Essential hypertension - labile, will monitor   S/P TAVR (transcatheter aortic valve replacement)  PAF (paroxysmal atrial fibrillation)   FLORENCE (acute kidney injury) - Cr worsened w/ diuretics   Acute on chronic respiratory failure with hypoxia (HCC)  COPD exacerbation    Plan  Hold diuretics  Fluids  Reassess BMP in am     Naresh Garcia MD, 9/7/2021 9:10 AM

## 2021-09-07 NOTE — PROGRESS NOTES
Pt awake in bed. Assessment completed. Pt denies any needs at this time. Call light in reach. Will continue to monitor.

## 2021-09-07 NOTE — FLOWSHEET NOTE
09/07/21 0055   Vital Signs   Temp 97.2 °F (36.2 °C)   Temp Source Oral   Pulse 89   Resp 18   /69   BP Location Right upper arm   Level of Consciousness Alert (0)   MEWS Score 1   Patient Currently in Pain Denies     Pt awake sitting on side of the bed. Pt denies any needs at this time. Call light in reach. Will continue to monitor.

## 2021-09-07 NOTE — PROGRESS NOTES
Hospitalist Progress Note      PCP: Kalpesh Yost MD    Date of Admission: 9/5/2021    Chief Complaint: SOB, cough. Subjective: admitted for dyspnea. Gets dyspneic at home with moderate exertion. She is on 2 L of oxygen. She is on 5 L at present. No chest pain. Has chronic atrial fibrillation. Medications:  Reviewed    Infusion Medications    sodium chloride       Scheduled Medications    predniSONE  40 mg Oral Daily    furosemide  40 mg IntraVENous Once    apixaban  2.5 mg Oral BID    aspirin EC  81 mg Oral Daily    atorvastatin  40 mg Oral Nightly    B-12  1 tablet Oral Weekly    dronedarone hcl  400 mg Oral BID WC    DULoxetine  30 mg Oral Daily    levothyroxine  125 mcg Oral Daily    melatonin  5 mg Oral Nightly    metoprolol succinate  25 mg Oral Daily    pantoprazole  40 mg Oral QAM AC    sodium chloride flush  5-40 mL IntraVENous 2 times per day    dilTIAZem  240 mg Oral Daily    doxycycline (VIBRAMYCIN) IV  100 mg IntraVENous Q12H     PRN Meds: Butalbital-Acetaminophen, HYDROcodone-acetaminophen, sodium chloride flush, sodium chloride, ondansetron **OR** ondansetron, polyethylene glycol, acetaminophen **OR** acetaminophen, levalbuterol      Intake/Output Summary (Last 24 hours) at 9/7/2021 0708  Last data filed at 9/6/2021 2038  Gross per 24 hour   Intake 401.25 ml   Output 350 ml   Net 51.25 ml       Physical Exam Performed:    /69   Pulse 89   Temp 97.2 °F (36.2 °C) (Oral)   Resp 18   Ht 5' 1\" (1.549 m)   Wt 140 lb 9.6 oz (63.8 kg)   SpO2 93%   BMI 26.57 kg/m²     General appearance: No apparent distress appears stated age and cooperative. HEENT Normal cephalic, atraumatic without obvious deformity. Pupils equal, round, and reactive to light. Extra ocular muscles intact. Conjunctivae/corneas clear. Neck: Supple, No jugular venous distention/bruits. Trachea midline without thyromegaly or adenopathy with full range of motion. Lungs: R lung rales.  No wheezes Left lung fairly clear. Mild tachypnea  Heart: irregularly irregular, controlled rate. Abdomen: Soft, non-tender or non-distended without rigidity or guarding and positive bowel sounds all four quadrants. Extremities: + edema. No cyanosis or clubbing   Skin: Skin color, texture, turgor normal.  No rashes or lesions. Neurologic: Alert and oriented X 3, neurovascularly intact with sensory/motor intact upper extremities/lower extremities, bilaterally. Cranial nerves: II-XII intact, grossly non-focal.  Mental status: Alert, oriented, thought content appropriate. Capillary Refill: Acceptable  < 3 seconds  Peripheral Pulses: +3 Easily felt, not easily obliterated with pressure         Labs:   Recent Labs     09/05/21  1205 09/06/21  0450   WBC 5.9 5.8   HGB 10.8* 10.5*   HCT 34.6* 32.1*    199     Recent Labs     09/05/21  1205 09/06/21  0450    131*   K 4.9 4.5   CL 90* 95*   CO2 27 24   BUN 23* 29*   CREATININE 1.6* 1.9*   CALCIUM 9.5 9.2   PHOS  --  4.3     Recent Labs     09/05/21  1205   AST 22   ALT 18   BILITOT 0.4   ALKPHOS 97     No results for input(s): INR in the last 72 hours. Recent Labs     09/05/21  1205   TROPONINI 0.01       Urinalysis:      Lab Results   Component Value Date    NITRU Negative 09/15/2020    WBCUA 3-5 09/15/2020    BACTERIA 1+ 09/15/2020    RBCUA >100 09/15/2020    BLOODU LARGE 09/15/2020    SPECGRAV 1.015 09/15/2020    GLUCOSEU Negative 09/15/2020       Radiology:  CT CHEST WO CONTRAST   Final Result   No focal consolidation to indicate bacterial pneumonia. A few tiny   centrilobular nodules are present in the lingula which could be infectious or   inflammatory. Mild emphysematous changes with areas of peripheral atelectasis/scarring. XR CHEST PORTABLE   Final Result   Mild cardiomegaly with mild perihilar edema most consistent with early CHF.                  Assessment/Plan:    Principal Problem:    Acute on chronic diastolic (congestive) heart failure (HCC)  Active Problems:    Hypercholesteremia    Essential hypertension    S/P TAVR (transcatheter aortic valve replacement)    PAF (paroxysmal atrial fibrillation) (HCC)    FLORENCE (acute kidney injury) ([facility] [address]    Acute respiratory failure with hypoxia (HCC)    Chronic respiratory failure with hypoxia (HCC)    COPD exacerbation (HCC)  Resolved Problems:    * No resolved hospital problems. *       #Acute on chronic diastolic heart failure. Admitted to hospital with dyspnea. Responded to diuresis. Feeling somewhat better. Continue maintain strict I's and O's. Fluid restriction. #Acute on chronic respiratory failure. Is usually on 2 L of oxygen at home. Presently requiring 5 L. This was present on admission. Mildly dyspneic and tachypneic on exam.  I weaned her oxygen level down to 4 L. She is usually on 2 L at home. #COPD exacerbation. Continue doxycycline. Stop IV Solu-Medrol. Change to p.o. prednisone. Chest CT did not reveal any pneumonia. RSV seen on respiratory panel. #Paroxysmal atrial fibrillation. On Multaq. Continue. It is controlled. On Eliquis. Cardiology seeing patient. #Status post TAVR. #FLORENCE on CKD stage III. Creatinine bumped up to 1.9 yesterday. Hold diuretics till morning labs are back. I ordered stat labs. #Hyperlipidemia. Stable. #Migraine headache. I ordered Fiorinal.    #DVT prophylaxis on Eliquis. IMPORTANT: Please note that some portions of this note may have been created using Dragon voice recognition software. Some \"sound-alike\" and totally wrong word substitutions may have taken place due to known inherent limitations of any such software, including this voice recognition software. In spite of efforts to eliminate such errors, some may not have been corrected. So please read the note with this in mind and recognize such mistakes and understand the correct version using the  context.  If there are still uncertainties in the mind of the medical provider reading this note about any aspect of the note, the provider can feel free to contact me. Thanks.            DVT Prophylaxis: on eliquis. Diet: ADULT DIET; Regular;  Low Sodium (2 gm)  Code Status: Full Code        Dispo - PCU.        Sofya Madrigal MD 9/7/2021 7:17 AM

## 2021-09-07 NOTE — PROGRESS NOTES
Informed by patient caregiver that patient's  has been eating patient's meal trays. RN addressed  in room and asked him not to eat from patient's tray so that staff could measure how much patient herself was eating.

## 2021-09-08 LAB
A/G RATIO: 1.4 (ref 1.1–2.2)
ALBUMIN SERPL-MCNC: 3.8 G/DL (ref 3.4–5)
ALP BLD-CCNC: 79 U/L (ref 40–129)
ALT SERPL-CCNC: 27 U/L (ref 10–40)
ANION GAP SERPL CALCULATED.3IONS-SCNC: 13 MMOL/L (ref 3–16)
AST SERPL-CCNC: 29 U/L (ref 15–37)
BASOPHILS ABSOLUTE: 0 K/UL (ref 0–0.2)
BASOPHILS RELATIVE PERCENT: 0.3 %
BILIRUB SERPL-MCNC: 0.4 MG/DL (ref 0–1)
BUN BLDV-MCNC: 47 MG/DL (ref 7–20)
CALCIUM SERPL-MCNC: 8.8 MG/DL (ref 8.3–10.6)
CHLORIDE BLD-SCNC: 96 MMOL/L (ref 99–110)
CO2: 23 MMOL/L (ref 21–32)
CREAT SERPL-MCNC: 2.3 MG/DL (ref 0.6–1.2)
EOSINOPHILS ABSOLUTE: 0 K/UL (ref 0–0.6)
EOSINOPHILS RELATIVE PERCENT: 0 %
GFR AFRICAN AMERICAN: 25
GFR NON-AFRICAN AMERICAN: 20
GLOBULIN: 2.7 G/DL
GLUCOSE BLD-MCNC: 132 MG/DL (ref 70–99)
HCT VFR BLD CALC: 31.7 % (ref 36–48)
HEMOGLOBIN: 10.3 G/DL (ref 12–16)
LYMPHOCYTES ABSOLUTE: 0.4 K/UL (ref 1–5.1)
LYMPHOCYTES RELATIVE PERCENT: 5.4 %
MCH RBC QN AUTO: 29.9 PG (ref 26–34)
MCHC RBC AUTO-ENTMCNC: 32.5 G/DL (ref 31–36)
MCV RBC AUTO: 91.9 FL (ref 80–100)
MONOCYTES ABSOLUTE: 0.5 K/UL (ref 0–1.3)
MONOCYTES RELATIVE PERCENT: 6.3 %
NEUTROPHILS ABSOLUTE: 6.9 K/UL (ref 1.7–7.7)
NEUTROPHILS RELATIVE PERCENT: 88 %
PDW BLD-RTO: 18.4 % (ref 12.4–15.4)
PLATELET # BLD: 243 K/UL (ref 135–450)
PMV BLD AUTO: 8.4 FL (ref 5–10.5)
POTASSIUM REFLEX MAGNESIUM: 4.2 MMOL/L (ref 3.5–5.1)
RBC # BLD: 3.45 M/UL (ref 4–5.2)
SODIUM BLD-SCNC: 132 MMOL/L (ref 136–145)
TOTAL PROTEIN: 6.5 G/DL (ref 6.4–8.2)
WBC # BLD: 7.9 K/UL (ref 4–11)

## 2021-09-08 PROCEDURE — 2580000003 HC RX 258: Performed by: INTERNAL MEDICINE

## 2021-09-08 PROCEDURE — 6370000000 HC RX 637 (ALT 250 FOR IP): Performed by: PHYSICIAN ASSISTANT

## 2021-09-08 PROCEDURE — 2060000000 HC ICU INTERMEDIATE R&B

## 2021-09-08 PROCEDURE — 99232 SBSQ HOSP IP/OBS MODERATE 35: CPT | Performed by: INTERNAL MEDICINE

## 2021-09-08 PROCEDURE — 36415 COLL VENOUS BLD VENIPUNCTURE: CPT

## 2021-09-08 PROCEDURE — 97116 GAIT TRAINING THERAPY: CPT

## 2021-09-08 PROCEDURE — 6370000000 HC RX 637 (ALT 250 FOR IP): Performed by: INTERNAL MEDICINE

## 2021-09-08 PROCEDURE — 97530 THERAPEUTIC ACTIVITIES: CPT

## 2021-09-08 PROCEDURE — 94761 N-INVAS EAR/PLS OXIMETRY MLT: CPT

## 2021-09-08 PROCEDURE — 97110 THERAPEUTIC EXERCISES: CPT

## 2021-09-08 PROCEDURE — 2700000000 HC OXYGEN THERAPY PER DAY

## 2021-09-08 PROCEDURE — 80053 COMPREHEN METABOLIC PANEL: CPT

## 2021-09-08 PROCEDURE — 85025 COMPLETE CBC W/AUTO DIFF WBC: CPT

## 2021-09-08 PROCEDURE — 97161 PT EVAL LOW COMPLEX 20 MIN: CPT

## 2021-09-08 PROCEDURE — 99233 SBSQ HOSP IP/OBS HIGH 50: CPT | Performed by: INTERNAL MEDICINE

## 2021-09-08 PROCEDURE — 2500000003 HC RX 250 WO HCPCS: Performed by: INTERNAL MEDICINE

## 2021-09-08 PROCEDURE — 97166 OT EVAL MOD COMPLEX 45 MIN: CPT

## 2021-09-08 RX ORDER — SODIUM CHLORIDE 9 MG/ML
INJECTION, SOLUTION INTRAVENOUS CONTINUOUS
Status: ACTIVE | OUTPATIENT
Start: 2021-09-08 | End: 2021-09-08

## 2021-09-08 RX ORDER — CHOLECALCIFEROL (VITAMIN D3) 125 MCG
2500 CAPSULE ORAL WEEKLY
Status: DISCONTINUED | OUTPATIENT
Start: 2021-09-08 | End: 2021-09-09 | Stop reason: HOSPADM

## 2021-09-08 RX ORDER — DOXYCYCLINE HYCLATE 100 MG
100 TABLET ORAL EVERY 12 HOURS SCHEDULED
Status: DISCONTINUED | OUTPATIENT
Start: 2021-09-08 | End: 2021-09-09 | Stop reason: HOSPADM

## 2021-09-08 RX ADMIN — DRONEDARONE 400 MG: 400 TABLET, FILM COATED ORAL at 16:43

## 2021-09-08 RX ADMIN — CYANOCOBALAMIN TAB 500 MCG 2500 MCG: 500 TAB at 12:41

## 2021-09-08 RX ADMIN — HYDROCODONE BITARTRATE AND ACETAMINOPHEN 1 TABLET: 5; 325 TABLET ORAL at 11:19

## 2021-09-08 RX ADMIN — HYDROCODONE BITARTRATE AND ACETAMINOPHEN 1 TABLET: 5; 325 TABLET ORAL at 21:01

## 2021-09-08 RX ADMIN — LEVOTHYROXINE SODIUM 125 MCG: 125 TABLET ORAL at 05:57

## 2021-09-08 RX ADMIN — DULOXETINE HYDROCHLORIDE 30 MG: 30 CAPSULE, DELAYED RELEASE ORAL at 08:23

## 2021-09-08 RX ADMIN — APIXABAN 2.5 MG: 5 TABLET, FILM COATED ORAL at 21:00

## 2021-09-08 RX ADMIN — DILTIAZEM HYDROCHLORIDE 240 MG: 240 CAPSULE, COATED, EXTENDED RELEASE ORAL at 08:23

## 2021-09-08 RX ADMIN — DOXYCYCLINE HYCLATE 100 MG: 100 TABLET, COATED ORAL at 21:00

## 2021-09-08 RX ADMIN — Medication 5 MG: at 21:00

## 2021-09-08 RX ADMIN — ASPIRIN 81 MG: 81 TABLET, COATED ORAL at 08:23

## 2021-09-08 RX ADMIN — PREDNISONE 40 MG: 20 TABLET ORAL at 08:23

## 2021-09-08 RX ADMIN — METOPROLOL SUCCINATE 25 MG: 25 TABLET, EXTENDED RELEASE ORAL at 08:23

## 2021-09-08 RX ADMIN — BUTALBITAL, ACETAMINOPHEN, AND CAFFEINE 1 TABLET: 50; 325; 40 TABLET ORAL at 04:18

## 2021-09-08 RX ADMIN — Medication 10 ML: at 21:01

## 2021-09-08 RX ADMIN — DRONEDARONE 400 MG: 400 TABLET, FILM COATED ORAL at 08:23

## 2021-09-08 RX ADMIN — ATORVASTATIN CALCIUM 40 MG: 40 TABLET, FILM COATED ORAL at 21:00

## 2021-09-08 RX ADMIN — Medication 10 ML: at 08:23

## 2021-09-08 RX ADMIN — DOXYCYCLINE 100 MG: 100 INJECTION, POWDER, LYOPHILIZED, FOR SOLUTION INTRAVENOUS at 08:23

## 2021-09-08 RX ADMIN — PANTOPRAZOLE SODIUM 40 MG: 40 TABLET, DELAYED RELEASE ORAL at 05:57

## 2021-09-08 RX ADMIN — SODIUM CHLORIDE: 9 INJECTION, SOLUTION INTRAVENOUS at 08:22

## 2021-09-08 RX ADMIN — APIXABAN 2.5 MG: 5 TABLET, FILM COATED ORAL at 08:23

## 2021-09-08 ASSESSMENT — PAIN DESCRIPTION - FREQUENCY
FREQUENCY: INTERMITTENT
FREQUENCY: CONTINUOUS
FREQUENCY: CONTINUOUS

## 2021-09-08 ASSESSMENT — PAIN DESCRIPTION - ONSET
ONSET: ON-GOING

## 2021-09-08 ASSESSMENT — PAIN DESCRIPTION - PROGRESSION
CLINICAL_PROGRESSION: NOT CHANGED
CLINICAL_PROGRESSION: GRADUALLY WORSENING
CLINICAL_PROGRESSION: NOT CHANGED

## 2021-09-08 ASSESSMENT — PAIN DESCRIPTION - DESCRIPTORS
DESCRIPTORS: ACHING
DESCRIPTORS: ACHING
DESCRIPTORS: DISCOMFORT

## 2021-09-08 ASSESSMENT — PAIN DESCRIPTION - LOCATION
LOCATION: BACK
LOCATION: BACK
LOCATION: HEAD

## 2021-09-08 ASSESSMENT — PAIN DESCRIPTION - PAIN TYPE
TYPE: CHRONIC PAIN

## 2021-09-08 ASSESSMENT — PAIN DESCRIPTION - ORIENTATION
ORIENTATION: RIGHT;LEFT
ORIENTATION: LOWER

## 2021-09-08 ASSESSMENT — PAIN SCALES - GENERAL
PAINLEVEL_OUTOF10: 9
PAINLEVEL_OUTOF10: 8
PAINLEVEL_OUTOF10: 7

## 2021-09-08 NOTE — PROGRESS NOTES
[facility] Daily Progress Note      Admit Date:  9/5/2021    Subjective:  Ms. Sydney Christiansen is being seen today for f/u afib, s/p TAVR, and diastolic CHF. She feels better today. She has mild cough but breathing better. Tele reviewed showing afib.        Objective:   /80   Pulse 93   Temp 96.9 °F (36.1 °C) (Oral)   Resp 18   Ht 5' 1\" (1.549 m)   Wt 142 lb 11.2 oz (64.7 kg)   SpO2 90%   BMI 26.96 kg/m²     Intake/Output Summary (Last 24 hours) at 9/8/2021 1007  Last data filed at 9/7/2021 1957  Gross per 24 hour   Intake 310 ml   Output 700 ml   Net -390 ml       TELEMETRY: Atrial fibrillation     Physical Exam:  General:  Awake, alert, NAD  Skin:  Warm and dry  Neck:  JVD none obvious  Chest:  Clear to auscultation, respiration easy  Cardiovascular: +irregularly irregular; S1S2  Abdomen:  Soft NT  Extremities:  1+ BLE edema    Medications:    predniSONE  40 mg Oral Daily    furosemide  40 mg IntraVENous Once    apixaban  2.5 mg Oral BID    aspirin EC  81 mg Oral Daily    atorvastatin  40 mg Oral Nightly    B-12  1 tablet Oral Weekly    dronedarone hcl  400 mg Oral BID WC    DULoxetine  30 mg Oral Daily    levothyroxine  125 mcg Oral Daily    melatonin  5 mg Oral Nightly    metoprolol succinate  25 mg Oral Daily    pantoprazole  40 mg Oral QAM AC    sodium chloride flush  5-40 mL IntraVENous 2 times per day    dilTIAZem  240 mg Oral Daily    doxycycline (VIBRAMYCIN) IV  100 mg IntraVENous Q12H      sodium chloride      sodium chloride 25 mL (09/07/21 2002)     butalbital-acetaminophen-caffeine, HYDROcodone-acetaminophen, sodium chloride flush, sodium chloride, ondansetron **OR** ondansetron, polyethylene glycol, acetaminophen **OR** acetaminophen, levalbuterol    Lab Data:  CBC:   Recent Labs     09/05/21  1205 09/06/21  0450   WBC 5.9 5.8   HGB 10.8* 10.5*   HCT 34.6* 32.1*   MCV 93.8 92.4    199     BMP:   Recent Labs     09/05/21  1205 09/06/21  0450 09/07/21  0000 09/07/21  1502 09/08/21  0415   NA   < > 131* 132* 130* 132*   K   < > 4.5 5.1 4.7 4.2   CL   < > 95* 96* 95* 96*   CO2   < > 24 23 22 23   PHOS  --  4.3  --   --   --    BUN   < > 29* 45* 46* 47*   CREATININE   < > 1.9* 2.7* 2.5* 2.3*    < > = values in this interval not displayed. LIVER PROFILE:   Recent Labs     09/05/21  1205 09/07/21  0723 09/08/21  0415   AST 22 24 29   ALT 18 21 27   BILITOT 0.4 0.3 0.4   ALKPHOS 97 86 79     EKG 9/5/2021  Atrial fibrillation  Abnormal ECG    Left heart cath 9/25/2019  Procedures: Cor angio, sedation  Mild nonobstructive CAD  No LVG due to AS      TAVR procedure 10/15/2019 [place]   Procedures:  Temporary transvenous pacemaker placement  Transcutaneous aortic valve replacement (23 mm Joni S3 Valve)  Peripheral angiography    Echo 11/22/2019   Summary   Atrial fibrillation throughout the study. Left ventricular systolic function is normal with ejection fraction   estimated at 60-65 %. No regional wall motion abnormalities are noted. There is mild concentric left ventricular hypertrophy. Left ventricle size is normal.   Grade II diastolic dysfunction with elevated filling pressure. The left atrium is moderately dilated. A TAVR aortic valve appears well seated with a maximum velocity of 2.94m/s   and a mean gradient of 20mmHg. Trace catracho-valvular aortic valve regurgitation. Mitral annular calcification is present. Mitral valve leaflets appear moderately thickened. Mild mitral regurgitation. Moderate tricuspid regurgitation. Systolic pulmonary artery pressure (SPAP) is normal and estimated at 55 mmHg   (right atrial pressure 3 mmHg). This is suggestive of moderate pulmonary hypertension. Mild 40-49 Mod 50-59   Severe >60. IVC is normal in size (< 2.1 cm) and collapses > 50% with respiration   consistent with normal RA pressure (3 mmHg).    Previous echo done 10/16/2019 - EF 65, mac lae      Echo:  8/20/2020  Summary   No significant change since 11.22.19   Normal left ventricular size and normal to hyperdynamic systolic function   with an estimated ejection fraction of 60-70%. No regional wall motion   abnormalities are seen. There is moderate concentric left ventricular hypertrophy. Grade I diastolic dysfunction with normal filling pressure. Moderate posterior mitral annular calcification is present. Mild mitral valve stenosis. Mean gradient 5 mm of Hg. No evidence of mitral regurgitation. A TAVR 23mmSapien S3 aortic valve appears well seated with a maximum   velocity of 3.12m/s and a mean gradient of 20mmHg. Mild aortic regurgitation is present. Echo: 8/31/2021  Summary   -- Left ventricular systolic function is normal with a visually estimated   ejection fraction of 55%. The left ventricle is normal in size with mild   concentric hypertrophy. No obvious regional wall motion abnormalities noted. Elevated left atrial pressures with a septal E/e' ratio of 33.1.   -- Right ventricular systolic function is reduced. -- The left atrium appears severely enlarged. The right atrium is moderatelyenlarged. -- Mild mitral and pulmonic regurgitation. -- Mild mitral stenosis. -- A 23 mm Garcia Joni 3 bioprosthetic aortic valve appears well seated   with mildly abnormal Doppler values; however the values are stable since one   month s/p TAVR. There is mild perivalvular aortic valve regurgitation. -- Moderate tricuspid regurgitation. Systolic pulmonary artery pressure (SPAP) is elevated and estimated at 55   mmHg (right atrial pressure 15 mmHg) consistent with moderate pulmonary   hypertension. The IVC is dilated in size (>2.1 cm) and collapses <50% with respiration   consistent with markedly elevated right atrial pressures (15 mmHg) . Compared with the previous exam on 8/20/20, RV function is down and PHTN and   tricuspid regurgitation have significantly increased.      Chest Xray 9/5/2021  Mild cardiomegaly with mild perihilar edema most consistent with early CHF. Assessment:    1. Acute on chronic diastolic CHF:   -IV diuresis caused ARF   -holding diuretics and currently getting IVF   -restart low dose diuretic lasix 20mg qd couple days after d/c; I d/w Dr. Princess Waldrop   -will need to follow BMP as outpatient   -BNP elevated 4644 and 9424   -CXR with early CHF; likely not very volume overloaded when iV diuresis started causing  renal issues. 2. Hx paroxysmal atrial fibrillation:   -persistent currently   -Dr. Hollie Man started multaq with goal of cardioverting once stable medically   -continue multaq 400mg BID   -continue diltiazem CD 240mg qd   -continue eliquis 2.5mg BID given age > [de-identified] and renal issues   -will make f/u appt with EP doc and CV can be scheduled as outpatient    3. S/P TAVR:   -performed October 2019   -most recent ECHO 8/31/21 EF=55%;s/p TAVR stable values with mild perivalvular AI;   Moderate TR and moderate pulm HTN 55mmHg; TATYANA; mild MS/MR    4. COPD exacerbation:   -abx and steroids per IM team   -on 2L home oxygen; initially 5L on admit but improved down to 2L now    5.  ARF:   -likely related to IV diuresis   -giving back IVF and getting 500cc NS today   -BUN/Cr=47/2.3 (45/2.7 earlier; note 22/1.2 on 9j/24/20)      Patient Active Problem List    Diagnosis Date Noted    Chronic respiratory failure with hypoxia ([facility] 09/07/2021    COPD exacerbation ([facility] 09/07/2021    CHF (congestive heart failure), NYHA class I, acute on chronic, combined ([address] 09/05/2021    Aortic valve disease     Acute on chronic congestive heart failure (HCC)     Acute on chronic respiratory failure with hypoxia (HCC)     Acute on chronic diastolic (congestive) heart failure ([address] 08/19/2020    FLORENCE (acute kidney injury) ([address] 11/05/2019    Digoxin overdose, accidental or unintentional, initial encounter     S/P TAVR (transcatheter aortic valve replacement) 11/02/2019    PAF (paroxysmal atrial fibrillation) ([facility] 11/02/2019    Digoxin toxicity, accidental or unintentional, initial encounter 11/01/2019    Nonrheumatic aortic valve stenosis 10/15/2019    Loss of appetite     Weight loss     Anemia     Positive colorectal cancer screening using Cologuard test     Schatzki's ring     Aortic stenosis 06/25/2019    Hypercholesteremia 06/25/2019    Essential hypertension 06/25/2019    Lumbar radiculopathy 10/11/2016    Lumbar spine pain 10/11/2016    Hip pain, left 10/11/2016       Silvano Salazar MD, MD 9/8/2021 7:18 AM

## 2021-09-08 NOTE — PROGRESS NOTES
distress appears stated age and cooperative. HEENT Normal cephalic, atraumatic without obvious deformity. Pupils equal, round, and reactive to light. Extra ocular muscles intact. Conjunctivae/corneas clear. Neck: Supple, No jugular venous distention/bruits. Trachea midline without thyromegaly or adenopathy with full range of motion. Lungs:  Diminished breath sounds the bases. No wheezes or rhonchi. No crackles   Mild tachypnea  Heart: irregularly irregular, controlled rate. Abdomen: Soft, non-tender or non-distended without rigidity or guarding and positive bowel sounds all four quadrants. Extremities:  Mild edema. No cyanosis or clubbing   Skin: Skin color, texture, turgor normal.  No rashes or lesions. Neurologic: Alert and oriented X 3, neurovascularly intact with sensory/motor intact upper extremities/lower extremities, bilaterally. Cranial nerves: II-XII intact, grossly non-focal.  Mental status: Alert, oriented, thought content appropriate. Capillary Refill: Acceptable  < 3 seconds  Peripheral Pulses: +3 Easily felt, not easily obliterated with pressure         Labs:   Recent Labs     09/05/21  1205 09/06/21  0450   WBC 5.9 5.8   HGB 10.8* 10.5*   HCT 34.6* 32.1*    199     Recent Labs     09/05/21  1205 09/06/21  0450 09/07/21  0723 09/07/21  1502 09/08/21  0415   NA   < > 131* 132* 130* 132*   K   < > 4.5 5.1 4.7 4.2   CL   < > 95* 96* 95* 96*   CO2   < > 24 23 22 23   BUN   < > 29* 45* 46* 47*   CREATININE   < > 1.9* 2.7* 2.5* 2.3*   CALCIUM   < > 9.2 9.0 8.5 8.8   PHOS  --  4.3  --   --   --     < > = values in this interval not displayed. Recent Labs     09/05/21  1205 09/07/21  0723 09/08/21  0415   AST 22 24 29   ALT 18 21 27   BILITOT 0.4 0.3 0.4   ALKPHOS 97 86 79     No results for input(s): INR in the last 72 hours.   Recent Labs     09/05/21  1205   TROPONINI 0.01       Urinalysis:      Lab Results   Component Value Date    NITRU Negative 09/15/2020    WBCUA 3-5 09/15/2020 BACTERIA 1+ 09/15/2020    RBCUA >100 09/15/2020    BLOODU LARGE 09/15/2020    SPECGRAV 1.015 09/15/2020    GLUCOSEU Negative 09/15/2020       Radiology:  CT CHEST WO CONTRAST   Final Result   No focal consolidation to indicate bacterial pneumonia. A few tiny   centrilobular nodules are present in the lingula which could be infectious or   inflammatory. Mild emphysematous changes with areas of peripheral atelectasis/scarring. XR CHEST PORTABLE   Final Result   Mild cardiomegaly with mild perihilar edema most consistent with early CHF. Assessment/Plan:    Principal Problem:    Acute on chronic diastolic (congestive) heart failure (HCC)  Active Problems:    Hypercholesteremia    Essential hypertension    S/P TAVR (transcatheter aortic valve replacement)    PAF (paroxysmal atrial fibrillation) ([facility])    FLORENCE (acute kidney injury) ([facility]    Acute on chronic respiratory failure with hypoxia (HCC)    Chronic respiratory failure with hypoxia ([facility])    COPD exacerbation ([facility])  Resolved Problems:    * No resolved hospital problems. *       #Acute on chronic diastolic heart failure. Admitted to hospital with dyspnea. Responded to diuresis but creatinine jumped up. Diuretics held. Feeling somewhat better. Continue maintain strict I's and O's. Fluid restriction. #Acute on chronic respiratory failure. Is usually on 2 L of oxygen at home. She was 90% on room air. I put her back on 2 L. She uses 2 L at home. #COPD exacerbation. Continue doxycycline. Stopped IV Solu-Medrol and changed to p.o. prednisone. Chest CT did not reveal any pneumonia. RSV seen on respiratory panel. #Paroxysmal atrial fibrillation. On Multaq. Continue. It is controlled. On Eliquis. Cardiology seeing patient. #Status post TAVR. #FLORENCE on CKD stage III. Creatinine is down to 2.3. I will give her another fluid bolus of 500 cc. Recheck in a.m. #Hyperlipidemia. Stable. #Migraine headache.   I ordered Fiorinal.    #DVT prophylaxis on Eliquis. Discharge planning for a.m. IMPORTANT: Please note that some portions of this note may have been created using Dragon voice recognition software. Some \"sound-alike\" and totally wrong word substitutions may have taken place due to known inherent limitations of any such software, including this voice recognition software. In spite of efforts to eliminate such errors, some may not have been corrected. So please read the note with this in mind and recognize such mistakes and understand the correct version using the  context. If there are still uncertainties in the mind of the medical provider reading this note about any aspect of the note, the provider can feel free to contact me. Thanks.            DVT Prophylaxis: on eliquis. Diet: ADULT DIET; Regular;  Low Sodium (2 gm)  Code Status: Full Code        Dispo - PCU.        Sofya Madrigal MD 9/8/2021 7:07 AM

## 2021-09-08 NOTE — CARE COORDINATION
[facility]  Received referral regarding HC services from Leana Singh. Plan for pt to discharge home tomorrow 9/9 with family. Sent request to [facility] for [facility] coverage with plan for dc 9/9.     Electronically signed by Pricila Benoit RN on 9/8/2021 at 1:29 PM

## 2021-09-08 NOTE — FLOWSHEET NOTE
Pt's  at bedside.  some 58 yrs. Originally from [place], then to [place], and finally to farm in [place], where they could have horses. Shared life story. Anticipating discharge today. Restoration background. Welcomed prayer. 09/08/21 1246   Encounter Summary   Services provided to: Patient and family together  ( present)   Referral/Consult From: [address] [place] [address] [address] (in past)   Continue Visiting   (9/8 - Spiritual support, conversaton, prayer)   Complexity of Encounter Low   Length of Encounter 30 minutes   Spiritual Assessment Completed Yes   Spiritual/Evangelical   Type Spiritual support   Assessment Calm; Approachable   Intervention Explored feelings, thoughts, concerns;Nurtured hope;Prayer;Discussed relationship with God   Outcome Expressed gratitude;Engaged in conversation; Shared life review;Receptive; Hopeful

## 2021-09-08 NOTE — FLOWSHEET NOTE
09/08/21 0816   Vital Signs   Temp 97.4 °F (36.3 °C)   Temp Source Oral   Pulse 94   Resp 18   /74   BP Location Right upper arm   Level of Consciousness Alert (0)   MEWS Score 1   Oxygen Therapy   SpO2 94 %   O2 Device Nasal cannula   O2 Flow Rate (L/min) 2 L/min     Patient resting quietly in bed. No s/s of distress noted. Shift assessment complete, see flow sheet. Denies needs at this time. Call light in reach. Will monitor.

## 2021-09-08 NOTE — PROGRESS NOTES
Pt awake in bed. Assessment complete. Pt denies any further needs at this time. Call light in reach. Will continue to monitor.

## 2021-09-08 NOTE — PROGRESS NOTES
Inpatient Physical Therapy Evaluation and Treatment    Unit: PCU  Date:  9/8/2021  Patient Name:    Buzzy Shone  Admitting diagnosis:  CHF (congestive heart failure), NYHA class I, acute on chronic, combined ([facility] [I50.43]  Acute on chronic respiratory failure with hypoxia ([facility] [J96.21]  Acute on chronic congestive heart failure, unspecified heart failure type ([facility] [I50.9]  Admit Date:  9/5/2021  Precautions/Restrictions/WB Status/ Lines/ Wounds/ Oxygen: Fall risk, Bed/chair alarm, Lines -IV and Supplemental O2 (2L/min) and Telemetry    Treatment Time: 1030 - 1120  Treatment Number:  1   Timed Code Treatment Minutes: 40 minutes  Total Treatment Minutes:  50  minutes    Patient Goals for Therapy: \" Go home \"          Discharge Recommendations: Home 24 hr assist and with home PT   DME needs for discharge: Needs Met       Therapy recommendation for EMS Transport: can transport by wheelchair    Therapy recommendations for staff:   Assist of 1 with use of Single point cane  and gait belt for all transfers and ambulation to/from [facility]  to/from chair  to/from bathroom    History of Present Illness: H & P as per Inocencio Godfrey MD's note dated 9/5/2021  The patient is a 80 y.o. female w Hx of TAVR done at [facility] in 2020, Hx of persistent Afib - recently seen by cardiology at [address] added to her rate control medications, who presents with worsening dyspnea. Pt reports she felt progressively worsening SOB since about Thursday of this week. Associated with orthopnea and dry rhonchorous cough - unable to bring up any sputum. She does report worsening LE edema. He had to increase her chronic O2 up to 4l/min to maintain saturations. Both pt and her  are vaccinated with 2 doses of Tiney File (feb and march of this year).  doing well.      Home Health S4 Level Recommendation:  Level 1 Standard  AM-PAC Mobility Score    AM-PAC Inpatient Mobility Raw Score : 21       Preadmission Sit to stand:   Supervision  Stand to sit:   Supervision  Bed to Chair:   SBA with use of gait belt and Single point cane     Gait gait completed as indicated below  Distance:      50 ft  Deviations (firm surface/linoleum):  decreased maria fernanda and increased NAHUN  Assistive Device Used:    gait belt and Single point cane   Level of Assist:    SBA  Comment:     Stair Training deferred, pt unsafe/ not appropriate to complete stairs at this time    Activity Tolerance   Pt completed therapy session with SOB noted with ambulation  EOB:    SpO2: fluctuating 80-90% on 2L, increased to 3L, on 3L improved to 93% with cues to keep breathing and not to hold breath. HR:   BP: 125/71     With functional mobility in room:  SpO2: 95% on 2L, difficult to get reading (had to use ear pulse ox)  HR: 95    RN notified about the treatment response. Positioning Needs   Pt up in chair, alarm set, positioned in proper neutral alignment and pressure relief provided. Call light provided and all needs within reach    Exercises Initiated  all completed bilaterally unless indicated  Ankle Pumps x 10 reps  LAQ x 10 reps  Pursed lip breathing exercises 3-5 times after each activity at regular intervals. Other  None. Patient/Family Education   Pt educated on role of inpatient PT, POC, importance of continued activity, DC recommendations, safety awareness, transfer techniques, pursed lip breathing, energy conservation, pacing activity and calling for assist with mobility. Assessment  Pt seen for Physical Therapy evaluation in acute care setting. Pt demonstrated decreased Activity tolerance, Balance, Safety and Strength as well as decreased independence with Ambulation and Transfers. Recommending Home 24 hr assist and with home PT upon discharge as patient functioning close to baseline level and would benefit from continued therapy services    Goals : To be met in 3 visits:  1).  Independent with LE Ex x 10 reps    To be met in 6 visits:  1). Supine to/from sit: Independent  2). Sit to/from stand: Independent  3). Bed to chair: Independent  4). Gait: Ambulate 150 ft.  with  Modified Independent and use of LRAD (least restrictive assistive device)  5). Tolerate B LE exercises 3 sets of 10-15 reps  6). Ascend/descend 1 steps with SBA with use of hand rail bilateral and LRAD (least restrictive assistive device)    Rehabilitation Potential: Good  Strengths for achieving goals include:   Pt motivated, PLOF, Family Support and Pt cooperative   Barriers to achieving goals include:    Complexity of condition    Plan    To be seen 3-5 x / week  while in acute care setting for therapeutic exercises, bed mobility, transfers, progressive gait training, balance training, and family/patient education. Signature: Dago Stock MS PT, # [id number]    If patient discharges from this facility prior to next visit, this note will serve as the Discharge Summary.

## 2021-09-08 NOTE — PROGRESS NOTES
Inpatient Occupational Therapy  Evaluation and Treatment    Unit: PCU  Date:  9/8/2021  Patient Name:    Erenest Romberg  Admitting diagnosis:  CHF (congestive heart failure), NYHA class I, acute on chronic, combined ([facility] [I50.43]  Acute on chronic respiratory failure with hypoxia ([facility] [J96.21]  Acute on chronic congestive heart failure, unspecified heart failure type ([facility] [I50.9]  Admit Date:  9/5/2021  Precautions/Restrictions/WB Status/ Lines/ Wounds/ Oxygen: Fall risk, Bed/chair alarm, Lines -IV and Supplemental O2 (2L) and Telemetry    Treatment Time:  0477-7218  Treatment Number: 1   Timed code treatment minutes 46 minutes   Total Treatment minutes:   56   minutes    Patient Goals for Therapy:  \" go home \"      Discharge Recommendations: Home 24 hr assist, with home PT and with home OT   DME needs for discharge: Needs Met       Therapy recommendations for staff:   Assist of 1 with use of Single point cane  for all transfers and ambulation to/from chair  to/from bathroom    History of Present Illness: per Dr. Senthil Lo MD H&P 9/5/21:  \"The patient is a 80 y.o. female w Hx of TAVR done at [place] in 2020, Hx of persistent Afib - recently seen by cardiology at [address] [place] [address] added to her rate control medications, who presents with worsening dyspnea.      Pt reports she felt progressively worsening SOB since about Thursday of this week. Associated with orthopnea and dry rhonchorous cough - unable to bring up any sputum.      She does report worsening LE edema. He had to increase her chronic O2 up to 4l/min to maintain saturations.      Both pt and her  are vaccinated with 2 doses of Dakota Sax (feb and march of this year).       doing well. \"    Home Health S4 Level Recommendation:  Level 1 Standard  AM-PAC Score: AM-PAC Inpatient Daily Activity Raw Score: 18    Preadmission Environment    Pt.  Lives with family (son (recently had back surgery and staying with patient temporarily), spouse), assist available   Delano Damon is cousin and patient's caregiver, often at patient's house (eat dinner together)  Home environment:  two story home  Steps to enter first floor: 1 steps to enter  Steps to second floor: chair lift on full flight to upstairs  Bathroom: walk in shower, grab bars, standard height commode and uses RTS with arms and shower seat  Equipment owned: rollator, shower chair/bench, BSC, SPC, home O2 (2L) continous, pulse ox, reacher and RTS with arms    Preadmission Status:  Pt. Able to drive: No, caregiver Belkis Bustillo or spouse drive to appointments  Pt Fully independent with ADLs: No,  Belkis Knack or spouse assist throughout  Pt. Required assistance from family for: Bathing, Cleaning, Cooking, Dressing and Laundry   Pt. independent for transfers and gait and walked with Zigabid, sometimes uses furniture for BUE support  History of falls No    Pain  Yes  Ratin  Location:low back  Pain Medicine Status: Pain med requested and RN notified      Cognition    A&O x4   Able to follow 2 step commands    Subjective  Patient sitting EOB with spouse present.  Manan Moya arrived during session. Pt agreeable to this OT eval & tx. Upper Extremity ROM:    WFL,  pt able to perform all bed mobility, transfers, and gait without ROM limitation. Upper Extremity Strength:    BUE strength WFL, but not formally assessed w/ MMT      Upper Extremity Sensation    WFL    Upper Extremity Proprioception:  WFL    Coordination and Tone  WFL    Balance  Functional Sitting Balance:  WFL  Functional Standing Balance:Impaired SBA with SPC    Bed mobility:    Supine to sit:    Independent  Sit to supine:   Independent  Rolling:    Not Tested  Scooting in sitting:  Independent  Scooting to head of bed:   Not Tested    Bridging:   Not Tested    Transfers:    Sit to stand:  Supervision  Stand to sit:  Supervision  Bed to chair:   SBA and with SPC and gait belt, completed functional mobility in room with same (SBA, use of SPC and gait belt)-intermittent assistance with oxygen tubing management  Standard toilet: Not Tested  Bed to [facility]:  Not Tested    Dressing:      UE:   Not Tested  LE:    Max A doff/don socks    Bathing:    UE:  Not Tested  LE:  Not Tested    Eating:   Not Tested    Toileting:  Not Tested    Activity Tolerance   Pt completed therapy session with SOB noted with exertion   EOB:    SpO2: fluctuating 80-90% on 2L, increased to 3L, on 3L improved to 93% with cues to keep breathing and not hold breath. HR:   BP: 125/71    With functional mobility in room:  SpO2: 95% on 2L, difficult to get reading (had to use ear pulse ox)  HR: 95    Positioning Needs:   Pt up in chair, alarm set, positioned in proper neutral alignment and pressure relief provided. Call light provided and all needs within reach    Exercise / Activities Initiated: NA  N/A    Patient/Family Education:   Role of OT  Recommendations for DC  Energy conservation techniques-PLB    Assessment of Deficits: Pt seen for Occupational therapy evaluation in acute care setting. Pt demonstrated decreased Activity tolerance, ADLs, IADLs and Strength. Pt functioning below baseline and will likely benefit from skilled occupational therapy services to maximize safety and independence. Goal(s) : To be met in 3 Visits:  1). Bed to toilet/BSC: Independent    To be met in 5 Visits:  1). Supine to/from Sit:  Independent  2). Upper Body Bathing:   Independent  3). Lower Body Bathing:   Min A   4). Upper Body Dressing:  Independent  5). Lower Body Dressing:  Min A   6). Pt to demonstrate UE exs x 15 reps with minimal cues    Rehabilitation Potential:  Good for goals listed above. Strengths for achieving goals include: Pt motivated, PLOF, Family Support and Pt cooperative  Barriers to achieving goals include:  Weakness     Plan:   To be seen 3-5 x/wk while in acute care setting for therapeutic exercises, bed mobility, transfers, dressing, bathing, family/patient education, ADL/IADL retraining, energy conservation training.      Mirtha Arceo, OTR/L [id number]              If patient discharges from this facility prior to next visit, this note will serve as the Discharge Summary

## 2021-09-08 NOTE — FLOWSHEET NOTE
09/08/21 0212   Vital Signs   Temp 96.9 °F (36.1 °C)   Temp Source Oral   Pulse 93   Resp 18   /80   BP Location Right upper arm   Level of Consciousness Alert (0)   MEWS Score 1   Patient Currently in Pain Denies     Pt awake up to the bathroom and returned back to bed. . Pt denies any further needs. Call light in reach will continue to monitor.

## 2021-09-09 VITALS
RESPIRATION RATE: 16 BRPM | WEIGHT: 142.2 LBS | HEIGHT: 61 IN | SYSTOLIC BLOOD PRESSURE: 148 MMHG | BODY MASS INDEX: 26.85 KG/M2 | OXYGEN SATURATION: 95 % | TEMPERATURE: 98.3 F | DIASTOLIC BLOOD PRESSURE: 94 MMHG | HEART RATE: 94 BPM

## 2021-09-09 LAB
A/G RATIO: 1.3 (ref 1.1–2.2)
ALBUMIN SERPL-MCNC: 3.4 G/DL (ref 3.4–5)
ALP BLD-CCNC: 71 U/L (ref 40–129)
ALT SERPL-CCNC: 28 U/L (ref 10–40)
ANION GAP SERPL CALCULATED.3IONS-SCNC: 11 MMOL/L (ref 3–16)
AST SERPL-CCNC: 24 U/L (ref 15–37)
BASOPHILS ABSOLUTE: 0.1 K/UL (ref 0–0.2)
BASOPHILS RELATIVE PERCENT: 0.8 %
BILIRUB SERPL-MCNC: 0.4 MG/DL (ref 0–1)
BUN BLDV-MCNC: 36 MG/DL (ref 7–20)
CALCIUM SERPL-MCNC: 8.7 MG/DL (ref 8.3–10.6)
CHLORIDE BLD-SCNC: 101 MMOL/L (ref 99–110)
CO2: 24 MMOL/L (ref 21–32)
CREAT SERPL-MCNC: 1.5 MG/DL (ref 0.6–1.2)
EOSINOPHILS ABSOLUTE: 0 K/UL (ref 0–0.6)
EOSINOPHILS RELATIVE PERCENT: 0.1 %
GFR AFRICAN AMERICAN: 40
GFR NON-AFRICAN AMERICAN: 33
GLOBULIN: 2.6 G/DL
GLUCOSE BLD-MCNC: 129 MG/DL (ref 70–99)
HCT VFR BLD CALC: 31.1 % (ref 36–48)
HEMOGLOBIN: 10.2 G/DL (ref 12–16)
LYMPHOCYTES ABSOLUTE: 0.5 K/UL (ref 1–5.1)
LYMPHOCYTES RELATIVE PERCENT: 6.8 %
MCH RBC QN AUTO: 30.1 PG (ref 26–34)
MCHC RBC AUTO-ENTMCNC: 32.9 G/DL (ref 31–36)
MCV RBC AUTO: 91.6 FL (ref 80–100)
MONOCYTES ABSOLUTE: 0.5 K/UL (ref 0–1.3)
MONOCYTES RELATIVE PERCENT: 7.9 %
NEUTROPHILS ABSOLUTE: 5.9 K/UL (ref 1.7–7.7)
NEUTROPHILS RELATIVE PERCENT: 84.4 %
PDW BLD-RTO: 18.5 % (ref 12.4–15.4)
PLATELET # BLD: 218 K/UL (ref 135–450)
PMV BLD AUTO: 8.3 FL (ref 5–10.5)
POTASSIUM REFLEX MAGNESIUM: 4.3 MMOL/L (ref 3.5–5.1)
RBC # BLD: 3.39 M/UL (ref 4–5.2)
SODIUM BLD-SCNC: 136 MMOL/L (ref 136–145)
TOTAL PROTEIN: 6 G/DL (ref 6.4–8.2)
WBC # BLD: 7 K/UL (ref 4–11)

## 2021-09-09 PROCEDURE — 6370000000 HC RX 637 (ALT 250 FOR IP): Performed by: INTERNAL MEDICINE

## 2021-09-09 PROCEDURE — 2580000003 HC RX 258: Performed by: INTERNAL MEDICINE

## 2021-09-09 PROCEDURE — 85025 COMPLETE CBC W/AUTO DIFF WBC: CPT

## 2021-09-09 PROCEDURE — 99233 SBSQ HOSP IP/OBS HIGH 50: CPT | Performed by: INTERNAL MEDICINE

## 2021-09-09 PROCEDURE — 80053 COMPREHEN METABOLIC PANEL: CPT

## 2021-09-09 PROCEDURE — 36415 COLL VENOUS BLD VENIPUNCTURE: CPT

## 2021-09-09 PROCEDURE — 99239 HOSP IP/OBS DSCHRG MGMT >30: CPT | Performed by: INTERNAL MEDICINE

## 2021-09-09 RX ORDER — DOXYCYCLINE HYCLATE 100 MG
100 TABLET ORAL EVERY 12 HOURS SCHEDULED
Qty: 6 TABLET | Refills: 0 | Status: SHIPPED | OUTPATIENT
Start: 2021-09-09 | End: 2021-09-12

## 2021-09-09 RX ORDER — LEVALBUTEROL 1.25 MG/.5ML
1.25 SOLUTION, CONCENTRATE RESPIRATORY (INHALATION) EVERY 8 HOURS
Qty: 90 EACH | Refills: 2 | Status: SHIPPED | OUTPATIENT
Start: 2021-09-09 | End: 2021-10-07 | Stop reason: ALTCHOICE

## 2021-09-09 RX ORDER — PREDNISONE 10 MG/1
TABLET ORAL
Qty: 30 TABLET | Refills: 0 | Status: SHIPPED | OUTPATIENT
Start: 2021-09-09 | End: 2021-10-07 | Stop reason: ALTCHOICE

## 2021-09-09 RX ADMIN — Medication 10 ML: at 08:11

## 2021-09-09 RX ADMIN — LEVOTHYROXINE SODIUM 125 MCG: 125 TABLET ORAL at 05:55

## 2021-09-09 RX ADMIN — HYDROCODONE BITARTRATE AND ACETAMINOPHEN 1 TABLET: 5; 325 TABLET ORAL at 08:09

## 2021-09-09 RX ADMIN — ASPIRIN 81 MG: 81 TABLET, COATED ORAL at 08:09

## 2021-09-09 RX ADMIN — DULOXETINE HYDROCHLORIDE 30 MG: 30 CAPSULE, DELAYED RELEASE ORAL at 08:09

## 2021-09-09 RX ADMIN — PANTOPRAZOLE SODIUM 40 MG: 40 TABLET, DELAYED RELEASE ORAL at 05:55

## 2021-09-09 RX ADMIN — PREDNISONE 40 MG: 20 TABLET ORAL at 08:09

## 2021-09-09 RX ADMIN — APIXABAN 2.5 MG: 5 TABLET, FILM COATED ORAL at 08:09

## 2021-09-09 RX ADMIN — DOXYCYCLINE HYCLATE 100 MG: 100 TABLET, COATED ORAL at 08:09

## 2021-09-09 RX ADMIN — DRONEDARONE 400 MG: 400 TABLET, FILM COATED ORAL at 08:09

## 2021-09-09 RX ADMIN — DILTIAZEM HYDROCHLORIDE 240 MG: 240 CAPSULE, COATED, EXTENDED RELEASE ORAL at 08:11

## 2021-09-09 RX ADMIN — METOPROLOL SUCCINATE 25 MG: 25 TABLET, EXTENDED RELEASE ORAL at 08:09

## 2021-09-09 ASSESSMENT — PAIN SCALES - GENERAL: PAINLEVEL_OUTOF10: 4

## 2021-09-09 NOTE — PLAN OF CARE
Problem: Skin Integrity:  Goal: Will show no infection signs and symptoms  Outcome: Met This Shift  Goal: Absence of new skin breakdown  Outcome: Met This Shift     Problem: Pain:  Goal: Pain level will decrease  Outcome: Met This Shift  Goal: Control of acute pain  Outcome: Met This Shift  Goal: Control of chronic pain  Outcome: Met This Shift

## 2021-09-09 NOTE — FLOWSHEET NOTE
09/09/21 0805   Vital Signs   Temp 98.3 °F (36.8 °C)   Temp Source Oral   Pulse 94   Resp 16   BP (!) 148/94   BP Location Right upper arm   Level of Consciousness Alert (0)   MEWS Score 1   Oxygen Therapy   SpO2 95 %   O2 Device Nasal cannula   O2 Flow Rate (L/min) 2 L/min     Patient resting quietly in bed. No s/s of distress noted. Shift assessment complete, see flow sheet. Denies needs at this time. Call light in reach. Will monitor.

## 2021-09-09 NOTE — PROGRESS NOTES
Patient educated on discharge instructions as well as new medications use, dosage, administration and possible side effects. Patient verified knowledge. IV removed without difficulty and dry dressing in place. Telemetry monitor removed and returned to [facility]. Spoke with JOSE ALEJANDRO Bonds with case management regarding order for xopenex but patient does not have nebulizer machine at home.

## 2021-09-09 NOTE — PROGRESS NOTES
End of shift report given to [facility]. Pt in stable condition, care transferred at this time.  Electronically signed by Chanel Maldonado RN on 9/9/2021 at 7:18 AM'

## 2021-09-09 NOTE — DISCHARGE INSTR - COC
Continuity of Care Form    Patient Name: Estrada Suazo   :  1940  MRN:  [id number]    [facility] date:  2021  Discharge date:  2021    Code Status Order: Full Code   Advance Directives:     Admitting Physician:  Ismael Morgan MD  PCP: Nirav Stephen MD    Discharging Nurse: Genny Mosher 7010 Unit/Room#: /5763-35  Discharging Unit Phone Number: [phone]    Emergency Contact:   Extended Emergency Contact Information  Primary Emergency Contact: preeti calhoun  Home Phone: [phone]  Mobile Phone: [phone]  Relation: Other  Secondary Emergency Contact: Michelenicolasa Torrez  Address: [address] [place] [address], [facility] [address]  Home Phone: [phone]  Mobile Phone: [phone]  Relation: Spouse    Past Surgical History:  Past Surgical History:   Procedure Laterality Date    AORTIC VALVE REPLACEMENT N/A 10/15/2019    TRANSCATHETER AORTIC VALVE REPLACEMENT FEMORAL APPROACH performed by Walter Tracey MD at [address]  2019    COLONOSCOPY N/A 2019    COLON W/ANES. performed by Bre Gtz MD at [facility] N/A 2020    CYSTOSCOPY performed by Raquel Yepez MD at [facility]  2019    ESOPHAGEAL DILATION [place] See performed by Bre Gtz MD at [address] Right 2020    KNEE ARTHROSCOPY Left     Torn meniscus    OTHER SURGICAL HISTORY  10/09/2015    phacoemulsification of cataract with intraocular implant right eye    THYROIDECTOMY, PARTIAL      TONSILLECTOMY      TUBAL LIGATION      UPPER GASTROINTESTINAL ENDOSCOPY N/A 2019    EGD W/ANES.  (9:30) performed by Bre Gtz MD at [address] [facility]       Immunization History:   Immunization History   Administered Date(s) Administered    COVID-19, Moderna, PF, 100mcg/0.5mL 2021, 2021    Influenza, Quadv, IM, PF (6 mo and 95%   BMI 26.87 kg/m²     Last documented pain score (0-10 scale): Pain Level: 4  Last Weight:   Wt Readings from Last 1 Encounters:   09/09/21 142 lb 3.2 oz (64.5 kg)     Mental Status:  oriented, alert, coherent, logical, thought processes intact and able to concentrate and follow conversation    IV Access:  - None    Nursing Mobility/ADLs:  Walking   Assisted  Transfer  Assisted  Bathing  Assisted  Dressing  Assisted  Toileting  Assisted  Feeding  [address]  Assisted  Med Delivery   whole    Wound Care Documentation and Therapy:        Elimination:  Continence:   · Bowel: Yes  · Bladder: Yes  Urinary Catheter: None   Colostomy/Ileostomy/Ileal Conduit: No       Date of Last BM: 9/6    Intake/Output Summary (Last 24 hours) at 9/9/2021 1022  Last data filed at 9/9/2021 0805  Gross per 24 hour   Intake 955 ml   Output 800 ml   Net 155 ml     I/O last 3 completed shifts: In: 600 [P.O.:600]  Out: 800 [Urine:800]    Safety Concerns: At Risk for Falls    Impairments/Disabilities:      Vision    Nutrition Therapy:  Current Nutrition Therapy:   - Oral Diet:  Low Sodium (2gm)    Routes of Feeding: Oral  Liquids: Thin Liquids  Daily Fluid Restriction: no  Last Modified Barium Swallow with Video (Video Swallowing Test): not done    Treatments at the Time of Hospital Discharge:   Respiratory Treatments:   Oxygen Therapy:  is on oxygen at 2 L/min per nasal cannula.   Ventilator:    - No ventilator support    Rehab Therapies: Physical Therapy and Occupational Therapy  Weight Bearing Status/Restrictions: No weight bearing restirctions  Other Medical Equipment (for information only, NOT a DME order):  cane  Other Treatments:     Patient's personal belongings (please select all that are sent with patient):  Jeremie    RN SIGNATURE:  Electronically signed by Yanick Mcarthur RN on 9/9/21 at 11:06 AM EDT    CASE MANAGEMENT/SOCIAL WORK SECTION    Inpatient Status Date: 9/5/2021    Readmission Risk Assessment Score:  Readmission Risk              Risk of Unplanned Readmission:  20           Discharging to Facility/ Agency   · Name: [facility]  · Address:  · Phone: [phone]  · Fax: [phone]: LEVEL 1 STANDARD    Home health agency to establish plan of care for patient over 60 day period   PT/OT   Evaluate with goal of regaining prior level of functioning   OT to evaluate if patient has [address] needs for personal care      PCP Visit scheduled within 7 days of hospital discharge           / signature: Electronically signed by Corinne Belts, RN on 9/9/21 at 1:29 PM EDT    PHYSICIAN SECTION    Prognosis: {Prognosis:4967631775}    Condition at Discharge: [address] Patient Condition:[id number]}    Rehab Potential (if transferring to Rehab): {Prognosis:4678493418}    Recommended Labs or Other Treatments After Discharge: PT/OT      Physician Certification: I certify the above information and transfer of Herbert Montgomery  is necessary for the continuing treatment of the diagnosis listed and that she requires Home Care for less 30 days.      Update Admission H&P: {CHP DME Changes in PVSLO:315536498}    PHYSICIAN SIGNATURE:  Electronically signed by SAEID Calles MD/Cammie Saini, RN on 9/9/21 at 1:30 PM EDT

## 2021-09-09 NOTE — DISCHARGE SUMMARY
Name:  Marsha Liao  Room:  /9988-78  MRN:    [id number]    Discharge Summary      This discharge summary is in conjunction with a complete physical exam done on the day of discharge. Discharging Physician: Qian Kim MD       Admit: 9/5/2021  Discharge:   9/9/2021     Diagnoses this Admission    Principal Problem:    Acute on chronic diastolic (congestive) heart failure (HCC)  Active Problems:    Hypercholesteremia    Essential hypertension    S/P TAVR (transcatheter aortic valve replacement)    PAF (paroxysmal atrial fibrillation) (HCC)    FLORENCE (acute kidney injury) ([address]    Acute on chronic respiratory failure with hypoxia (HCC)    Chronic respiratory failure with hypoxia (HCC)    COPD exacerbation (HCC)  Resolved Problems:    * No resolved hospital problems. *          Procedures (Please Review Full Report for Details)      none    Consults    Cardiology    HPI:         The patient is a 80 y.o. female w Hx of TAVR done at [place] in 2020, Hx of persistent Afib - recently seen by cardiology at [facility] added to her rate control medications, who presents with worsening dyspnea.      Pt reports she felt progressively worsening SOB since about Thursday of this week. Associated with orthopnea and dry rhonchorous cough - unable to bring up any sputum.      She does report worsening LE edema. He had to increase her chronic O2 up to 4l/min to maintain saturations.      Both pt and her  are vaccinated with 2 doses of Tiney File (feb and march of this year).       doing well. Physical Exam at Discharge:  BP (!) 155/85   Pulse 98   Temp 97.2 °F (36.2 °C) (Oral)   Resp 22   Ht 5' 1\" (1.549 m)   Wt 142 lb 3.2 oz (64.5 kg)   SpO2 94%   BMI 26.87 kg/m²     General appearance: No apparent distress appears stated age and cooperative.   HEENT Normal cephalic, atraumatic without obvious deformity.  Pupils equal, round, and reactive to light.  Extra ocular muscles intact.  Conjunctivae/corneas clear. Neck: Supple, No jugular venous distention/bruits.  Trachea midline without thyromegaly or adenopathy with full range of motion. Lungs:  Diminished breath sounds the bases. No wheezes or rhonchi. No crackles   no tachypnea  Heart: irregularly irregular, controlled rate. Abdomen: Soft, non-tender or non-distended without rigidity or guarding and positive bowel sounds all four quadrants. Extremities:  Mild edema. No cyanosis or clubbing   Skin: Skin color, texture, turgor normal.  No rashes or lesions. Neurologic: Alert and oriented X 3, neurovascularly intact with sensory/motor intact upper extremities/lower extremities, bilaterally.  Cranial nerves: II-XII intact, grossly non-focal.  Mental status: Alert, oriented, thought content appropriate. Capillary Refill: Acceptable  < 3 seconds  Peripheral Pulses: +3 Easily felt, not easily obliterated with pressure    Hospital Course    #Acute on chronic diastolic heart failure. Admitted to hospital with dyspnea. Responded to diuresis but creatinine jumped up. Diuretics held. Feeling somewhat better. Continue maintain strict I's and O's. Fluid restriction. Hold Lasix at discharge for 2 days. Hold potassium for 2 days. Resume after that. Acute diastolic heart failure resolved.     #Acute on chronic respiratory failure. Is usually on 2 L of oxygen at home. She was 90% on room air. Back on 2 L of oxygen which is her baseline     #COPD exacerbation. Continue doxycycline. Stopped IV Solu-Medrol and changed to p.o. prednisone. Chest CT did not reveal any pneumonia. RSV seen on respiratory panel. Discharged home with doxycycline and prednisone     #Paroxysmal atrial fibrillation. On Multaq. Continue. It is controlled. On Eliquis. Cardiology seeing patient.     #Status post TAVR.     #FLORENCE on CKD stage III. Creatinine is down to 1.5. Received 2 fluid boluses over 2 days.   Initially given Lasix but then held as the creatinine jumped. Creatinine back to baseline. Resume Lasix in a couple of days. #Hyperlipidemia. Stable.     #Migraine headache. I ordered Fiorinal.     #DVT prophylaxis on Eliquis. CBC: Recent Labs     09/08/21 0415 09/09/21 0417   WBC 7.9 7.0   HGB 10.3* 10.2*   HCT 31.7* 31.1*   MCV 91.9 91.6    218     BMP:   Recent Labs     09/07/21  1502 09/08/21 0415 09/09/21 0417   * 132* 136   K 4.7 4.2 4.3   CL 95* 96* 101   CO2 22 23 24   BUN 46* 47* 36*   CREATININE 2.5* 2.3* 1.5*     LIVER PROFILE:   Recent Labs     09/07/21  0723 09/08/21 0415 09/09/21 0417   AST 24 29 24   ALT 21 27 28   BILITOT 0.3 0.4 0.4   ALKPHOS 86 79 71         CT CHEST WO CONTRAST   Final Result   No focal consolidation to indicate bacterial pneumonia. A few tiny   centrilobular nodules are present in the lingula which could be infectious or   inflammatory. Mild emphysematous changes with areas of peripheral atelectasis/scarring. XR CHEST PORTABLE   Final Result   Mild cardiomegaly with mild perihilar edema most consistent with early CHF.               Discharge Medications     Medication List      START taking these medications    doxycycline hyclate 100 MG tablet  Commonly known as: VIBRA-TABS  Take 1 tablet by mouth every 12 hours for 3 days     levalbuterol 1.25 MG/0.5ML nebulizer solution  Commonly known as: XOPENEX  Take 0.5 mLs by nebulization every 8 hours     predniSONE 10 MG tablet  Commonly known as: DELTASONE  4 tabs for 3 days 3 tabs for 3 days 2 tabs for 3 days 1 tabs for 3 days        CHANGE how you take these medications    dilTIAZem 240 MG extended release capsule  Commonly known as: Cartia XT  Take 1 capsule by mouth daily  What changed: how much to take        CONTINUE taking these medications    aspirin EC 81 MG EC tablet  Take 1 tablet by mouth daily     atorvastatin 40 MG tablet  Commonly known as: LIPITOR  TAKE ONE TABLET BY MOUTH ONCE NIGHTLY     B-12 2500 MCG Tabs Butalbital-Acetaminophen  MG Tabs     dronedarone hcl 400 MG Tabs  Commonly known as: MULTAQ  Take 1 tablet by mouth 2 times daily (with meals)     DULoxetine 30 MG extended release capsule  Commonly known as: CYMBALTA     Eliquis 2.5 MG Tabs tablet  Generic drug: apixaban     furosemide 20 MG tablet  Commonly known as: LASIX     HYDROcodone-acetaminophen 5-325 MG per tablet  Commonly known as: NORCO     levothyroxine 125 MCG tablet  Commonly known as: SYNTHROID     melatonin 5 MG Tabs tablet     metaxalone 800 MG tablet  Commonly known as: SKELAXIN     metoprolol succinate 25 MG extended release tablet  Commonly known as: TOPROL XL     omeprazole 20 MG delayed release capsule  Commonly known as: PRILOSEC     OXYGEN     pantoprazole 20 MG tablet  Commonly known as: PROTONIX     potassium chloride 20 MEQ extended release tablet  Commonly known as: KLOR-CON M           Where to Get Your Medications      These medications were sent to [address] [place] [address] BLVD - P [phone] - F [phone]  [address] [facility] [facility] [address]    Phone: [phone]   · doxycycline hyclate 100 MG tablet  · levalbuterol 1.25 MG/0.5ML nebulizer solution  · predniSONE 10 MG tablet           Discharge Condition/Location: Stable    Follow Up: Follow up with PCP.     More than 30 minutes spent    [facility], MD 9/9/2021 6:36 AM

## 2021-09-09 NOTE — PROGRESS NOTES
Shift assessment complete. Call light and bedside table within reach.  Electronically signed by Madaline Hatchet, RN on 9/8/2021 at 9:08 PM

## 2021-09-09 NOTE — CARE COORDINATION
DISCHARGE ORDER  Date/Time 2021 1:05 PM  Completed by: Ian Haney RN, Case Management    Patient Name: Melissa Cabrera      : 1940  Admitting Diagnosis: CHF (congestive heart failure), NYHA class I, acute on chronic, combined ([facility] [I50.43]  Acute on chronic respiratory failure with hypoxia ([facility] [J96.21]  Acute on chronic congestive heart failure, unspecified heart failure type ([facility] [I50.9]      Admit order Date and Status:2021  (verify MD's last order for status of admission)      Noted discharge order. If applicable PT/OT recommendation at Discharge: home with 24 hours assist and home PT  DME recommendation by PT/OT:n/a  Confirmed discharge plan  (pt): Yes  with whom____________pt___  If pt confirmed DC plan does family need to be contacted by CM Yes if yes who____spouse Robert__  Discharge Plan: Reviewed chart. Role of discharge planner explained and patient and spouse, Lisa Lopes, verbalized understanding. Discharge order is noted. Pt is being d/c'd to home today. Pt's O2 sats are 92% on RA. Pt is active with [facility] for home O2 2-4 L baseline. Pt wanted PT/OT via [facility]. Zuleyma Mcelroy with [facility] states she can accept pt for PT/OT. +HC orders, +eCOC. Pt is wanting a HHN and chose to pay cash for the [facility] from [address] [place] [address] with [facility] brought it to the room. No further discharge needs needed or noted. Reviewed chart. Role of discharge planner explained and patient verbalized understanding. Discharge order is noted. Has Home O2 in place on admit:  Yes  Informed of need to bring portable home O2 tank on day of discharge for nursing to connect prior to leaving:   Yes  Verbalized agreement/Understanding:   Yes    Discharge timeout done with Yunior Holt RN. All discharge needs and concerns addressed. Addendum 0862 2021: [facility] (pt caregiver) called and stated that she went to  pt's medication, and [facility]. [facility] did not have Levalbuterol.   Cm advised to call our Outpt Pharmacy and CM provided the number to her and she was agreeable to have Levalbuterol filled here. CM called [facility] and spoke  With [facility] to call Norberto Vora to have the RX pulled from [facility]. Ni and filled here. [facility] stated that they would do that. CM called and left a VM for [facility] to inform of this that they were calling to have it transferred to [facility].  CM informed [facility] that pharmacy closes at [address] [facility].

## 2021-09-09 NOTE — CARE COORDINATION
[facility]  Notified [facility] of pt plan to discharge home today. Spoke with pt's spouse, Lino Rodriguez, in follow up. Agreeable to [facility] for SN, PT/OT. Discussed [facility] for HF management at home. Verified demographics. Liaison to fax orders to [facility] when received. Sent referral to [facility] for [facility] with PT/OT only.       Electronically signed by Vic Mcclellan RN on 9/9/2021 at 11:20 AM

## 2021-09-09 NOTE — PROGRESS NOTES
[address] Daily Progress Note      Admit Date:  9/5/2021    Subjective:  Ms. Murtaza Rodriguez is being seen today for f/u afib, s/p TAVR, diastolic CHF. She still c/o baseline SOB but improved from prior to admit. No other complaints.  Tele reviewed showing afib 97-101bpm.      Objective:   BP (!) 155/85   Pulse 98   Temp 97.2 °F (36.2 °C) (Oral)   Resp 22   Ht 5' 1\" (1.549 m)   Wt 142 lb 3.2 oz (64.5 kg)   SpO2 94%   BMI 26.87 kg/m²       Intake/Output Summary (Last 24 hours) at 9/9/2021 0719  Last data filed at 9/9/2021 4670  Gross per 24 hour   Intake 600 ml   Output 800 ml   Net -200 ml       TELEMETRY: Atrial fibrillation     Physical Exam:  General:  Awake, alert, NAD  Skin:  Warm and dry  Neck:  JVD none obvious  Chest:  Clear to auscultation, respiration easy  Cardiovascular: +irregularly irregular; S1S2  Abdomen:  Soft NT  Extremities:  Trace RLE and 1+ LLE edema    Medications:    vitamin B-12  2,500 mcg Oral Weekly    doxycycline hyclate  100 mg Oral 2 times per day    predniSONE  40 mg Oral Daily    furosemide  40 mg IntraVENous Once    apixaban  2.5 mg Oral BID    aspirin EC  81 mg Oral Daily    atorvastatin  40 mg Oral Nightly    dronedarone hcl  400 mg Oral BID WC    DULoxetine  30 mg Oral Daily    levothyroxine  125 mcg Oral Daily    melatonin  5 mg Oral Nightly    metoprolol succinate  25 mg Oral Daily    pantoprazole  40 mg Oral QAM AC    sodium chloride flush  5-40 mL IntraVENous 2 times per day    dilTIAZem  240 mg Oral Daily      sodium chloride 25 mL (09/07/21 2002)     butalbital-acetaminophen-caffeine, HYDROcodone-acetaminophen, sodium chloride flush, sodium chloride, ondansetron **OR** ondansetron, polyethylene glycol, acetaminophen **OR** acetaminophen, levalbuterol    Lab Data:  CBC:   Recent Labs     09/08/21  0415 09/09/21  0417   WBC 7.9 7.0   HGB 10.3* 10.2*   HCT 31.7* 31.1*   MCV 91.9 91.6    218     BMP:   Recent Labs     09/07/21  1502 09/08/21 0415 09/09/21 0417   * 132* 136   K 4.7 4.2 4.3   CL 95* 96* 101   CO2 22 23 24   BUN 46* 47* 36*   CREATININE 2.5* 2.3* 1.5*     LIVER PROFILE:   Recent Labs     09/07/21  0723 09/08/21 0415 09/09/21 0417   AST 24 29 24   ALT 21 27 28   BILITOT 0.3 0.4 0.4   ALKPHOS 86 79 71     EKG 9/5/2021  Atrial fibrillation  Abnormal ECG    Left heart cath 9/25/2019  Procedures: Cor angio, sedation  Mild nonobstructive CAD  No LVG due to AS      TAVR procedure 10/15/2019 [place]   Procedures:  Temporary transvenous pacemaker placement  Transcutaneous aortic valve replacement (23 mm Joni S3 Valve)  Peripheral angiography    Echo 11/22/2019   Summary   Atrial fibrillation throughout the study. Left ventricular systolic function is normal with ejection fraction   estimated at 60-65 %. No regional wall motion abnormalities are noted. There is mild concentric left ventricular hypertrophy. Left ventricle size is normal.   Grade II diastolic dysfunction with elevated filling pressure. The left atrium is moderately dilated. A TAVR aortic valve appears well seated with a maximum velocity of 2.94m/s   and a mean gradient of 20mmHg. Trace catracho-valvular aortic valve regurgitation. Mitral annular calcification is present. Mitral valve leaflets appear moderately thickened. Mild mitral regurgitation. Moderate tricuspid regurgitation. Systolic pulmonary artery pressure (SPAP) is normal and estimated at 55 mmHg   (right atrial pressure 3 mmHg). This is suggestive of moderate pulmonary hypertension. Mild 40-49 Mod 50-59   Severe >60. IVC is normal in size (< 2.1 cm) and collapses > 50% with respiration   consistent with normal RA pressure (3 mmHg). Previous echo done 10/16/2019 - EF 65, mac lae      Echo:  8/20/2020  Summary   No significant change since 11.22.19   Normal left ventricular size and normal to hyperdynamic systolic function   with an estimated ejection fraction of 60-70%.  No regional wall motion   abnormalities are seen. There is moderate concentric left ventricular hypertrophy. Grade I diastolic dysfunction with normal filling pressure. Moderate posterior mitral annular calcification is present. Mild mitral valve stenosis. Mean gradient 5 mm of Hg. No evidence of mitral regurgitation. A TAVR 23mmSapien S3 aortic valve appears well seated with a maximum   velocity of 3.12m/s and a mean gradient of 20mmHg. Mild aortic regurgitation is present. Echo: 8/31/2021  Summary   -- Left ventricular systolic function is normal with a visually estimated   ejection fraction of 55%. The left ventricle is normal in size with mild   concentric hypertrophy. No obvious regional wall motion abnormalities noted. Elevated left atrial pressures with a septal E/e' ratio of 33.1.   -- Right ventricular systolic function is reduced. -- The left atrium appears severely enlarged. The right atrium is moderatelyenlarged. -- Mild mitral and pulmonic regurgitation. -- Mild mitral stenosis. -- A 23 mm Garcia Joni 3 bioprosthetic aortic valve appears well seated   with mildly abnormal Doppler values; however the values are stable since one   month s/p TAVR. There is mild perivalvular aortic valve regurgitation. -- Moderate tricuspid regurgitation. Systolic pulmonary artery pressure (SPAP) is elevated and estimated at 55   mmHg (right atrial pressure 15 mmHg) consistent with moderate pulmonary   hypertension. The IVC is dilated in size (>2.1 cm) and collapses <50% with respiration   consistent with markedly elevated right atrial pressures (15 mmHg) . Compared with the previous exam on 8/20/20, RV function is down and PHTN and   tricuspid regurgitation have significantly increased. Chest Xray 9/5/2021  Mild cardiomegaly with mild perihilar edema most consistent with early CHF. Assessment:    1.  Acute on chronic diastolic CHF:   -IV diuresis caused ARF   -holding diuretics   -restart low dose diuretic lasix 20mg qd on Saturday, 9/11   -will need to follow BMP as outpatient   -BNP elevated 4644 and 9424   -CXR with early CHF; likely not very volume overloaded when iV diuresis started causing  renal issues. 2. Hx paroxysmal atrial fibrillation:   -persistent currently   -Dr. Hong Jarvis started multaq with goal of cardioverting once stable medically   -continue multaq 400mg BID   -continue diltiazem CD 240mg qd   -continue eliquis 2.5mg BID given age > [de-identified] and renal issues and baby asa for TAVR   -will make f/u appt with EP doc and CV can be scheduled as outpatient    3. S/P TAVR:   -performed October 2019   -most recent ECHO 8/31/21 EF=55%;s/p TAVR stable values with mild perivalvular AI;   Moderate TR and moderate pulm HTN 55mmHg; TATYANA; mild MS/MR    4. COPD exacerbation:   -abx and steroids per IM team   -on 2L home oxygen; initially 5L on admit but improved down to 2L now    5. ARF:   -likely related to IV diuresis   -Improved BUN/Cr=36/1.5 today (47/2.3 and 45/2.7 earlier; note 22/1.2 on 9j/24/20)    OK for d/c from cardiology today if OK with IM doc. Dr. Pepe Arce nurse contacted and will call for f/u appt in office.     Signing off      Patient Active Problem List    Diagnosis Date Noted    Chronic respiratory failure with hypoxia ([address] 09/07/2021    COPD exacerbation ([address] 09/07/2021    CHF (congestive heart failure), NYHA class I, acute on chronic, combined ([address] 09/05/2021    Aortic valve disease     Acute on chronic congestive heart failure ([address]     Acute on chronic respiratory failure with hypoxia ([address]     Acute on chronic diastolic (congestive) heart failure ([address] 08/19/2020    FLORENCE (acute kidney injury) ([address] 11/05/2019    Digoxin overdose, accidental or unintentional, initial encounter     S/P TAVR (transcatheter aortic valve replacement) 11/02/2019    PAF (paroxysmal atrial fibrillation) ([address] 11/02/2019    Digoxin toxicity, accidental or unintentional, initial encounter 11/01/2019    Nonrheumatic aortic valve stenosis 10/15/2019    Loss of appetite     Weight loss     Anemia     Positive colorectal cancer screening using Cologuard test     Schatzki's ring     Aortic stenosis 06/25/2019    Hypercholesteremia 06/25/2019    Essential hypertension 06/25/2019    Lumbar radiculopathy 10/11/2016    Lumbar spine pain 10/11/2016    Hip pain, left 10/11/2016       Arleth Leo MD, MD 9/9/2021 7:19 AM

## 2021-10-05 NOTE — PATIENT INSTRUCTIONS
Stop Lasix    Begin Torsemide 20mg twice a day (in the morning and in the afternoon)    Check weight daily and watch your swelling    Bloodwork next Monday

## 2021-10-05 NOTE — PROGRESS NOTES
[address]    H+P // CONSULT // OUTPATIENT VISIT // Christiano Barboza MD   ENC TYPE Followup     CHIEF COMPLAINT     TYPE Chronic   SX LE edema, sob   PROBS AS s/p TAVR, pAFIB, HTN, CHOL     HISTORY OF PRESENT ILLNESS     GEN Doing fair. Continued sob and le edema. On 2l o2   AS S/p TAVR. Denies cp, dizziness, syncope, palpitations   AFIB Follows with EP. HTN Ambulatory BP in good range, no ha or dizziness. CHOL Last chol in good range, tolerating statin without sa. MED Compliant with CV meds listed below without reported side effects. HISTORY/ALLERGIES/ROS     MedHx:  has a past medical history of Acute on chronic diastolic (congestive) heart failure (HCC), Aortic stenosis, Arthritis, Cancer ([address] Chronic respiratory failure with hypoxia ([address] COPD exacerbation ([facility] Hyperlipidemia, Hypertension, Paroxysmal atrial fibrillation ([address] and Thyroid disease. SurgHx:  has a past surgical history that includes Thyroidectomy, partial; Tonsillectomy; Tubal ligation; Knee arthroscopy (Left); other surgical history (10/09/2015); Upper gastrointestinal endoscopy (N/A, 8/13/2019); Colonoscopy (N/A, 8/13/2019); Esophagus dilation (8/13/2019); Cardiac catheterization (09/2019); Aortic valve replacement (N/A, 10/15/2019); Cystoscopy (N/A, 9/25/2020); eye surgery; and eye surgery (Right, 08/19/2020). SocHx:  reports that she quit smoking about 38 years ago. Her smoking use included cigarettes. She has a 20.00 pack-year smoking history. She has never used smokeless tobacco. She reports that she does not drink alcohol and does not use drugs. FamHx: family history includes Colon Cancer in her mother; Heart Disease in her brother and father. Allerg:  Iodides, Shellfish-derived products, and Sulfa antibiotics   ROS: [x]Full ROS obtained and negative except as mentioned in HPI     MEDICATIONS      Current Outpatient Medications   Medication Sig Dispense Refill    levalbuterol (XOPENEX) 1.25 MG/0.5ML nebulizer solution Take 0.5 mLs by nebulization every 8 hours 90 each 2    predniSONE (DELTASONE) 10 MG tablet 4 tabs for 3 days 3 tabs for 3 days 2 tabs for 3 days 1 tabs for 3 days 30 tablet 0    omeprazole (PRILOSEC) 20 MG delayed release capsule Take 20 mg by mouth daily      apixaban (ELIQUIS) 2.5 MG TABS tablet Take 2.5 mg by mouth 2 times daily      dronedarone hcl (MULTAQ) 400 MG TABS Take 1 tablet by mouth 2 times daily (with meals) 60 tablet 3    dilTIAZem (CARTIA XT) 240 MG extended release capsule Take 1 capsule by mouth daily (Patient taking differently: Take 360 mg by mouth daily ) 90 capsule 3    furosemide (LASIX) 20 MG tablet Take 20 mg by mouth daily      atorvastatin (LIPITOR) 40 MG tablet TAKE ONE TABLET BY MOUTH ONCE NIGHTLY 90 tablet 3    DULoxetine (CYMBALTA) 30 MG extended release capsule Take 30 mg by mouth daily      metoprolol succinate (TOPROL XL) 25 MG extended release tablet Take 25 mg by mouth daily      levothyroxine (SYNTHROID) 125 MCG tablet Take 125 mcg by mouth Daily      potassium chloride (KLOR-CON M) 20 MEQ extended release tablet Take 20 mEq by mouth daily      OXYGEN Inhale 2 L into the lungs      Cyanocobalamin (B-12) 2500 MCG TABS Take 2,500 mcg by mouth once a week Weekly on Wednesdays.  pantoprazole (PROTONIX) 20 MG tablet Take 20 mg by mouth daily      melatonin 5 MG TABS tablet Take 5 mg by mouth nightly      aspirin EC 81 MG EC tablet Take 1 tablet by mouth daily 90 tablet 1    HYDROcodone-acetaminophen (NORCO) 5-325 MG per tablet Take 1 tablet by mouth every 6 hours as needed for Pain. Takes one pill @ 8am, one pill @ 3pm, one pill @ 6p, and one pill at bedtime.  metaxalone (SKELAXIN) 800 MG tablet Take 800 mg by mouth 2 times daily as needed (as needed for spasms) Do not take with vicodin.  Butalbital-Acetaminophen  MG TABS Take 1 tablet by mouth as needed (as needed for headaches) Vary rarely. No current facility-administered medications for this visit. Reviewed with patient and will remain unchanged except as mentioned in A/P  PHYSICAL EXAM     Vitals:    10/07/21 0933   BP: 128/62   Pulse: 71   SpO2: 95%      Gen Alert, coop, no distress Heart  irreg irreg   Head NC, AT, no abnorm Abd  Soft, NT, +BS, no mass, no OM   Eyes PER, conj/corn clear Ext  Ext nl, AT, no C/C/E   Nose Nares nl, no drain, NT Pulse 2+ and symmetric   Throat Lips, mucosa, tongue nl Skin Col/text/turg nl, no vis rash/les   Neck S/S, TM, NT, no bruit/JVD Psych Nl mood and affect   Lung CTA-B, unlabored, no DTP Lymph   No cervical or axillary LA   Ch wall NT, no deform Neuro  Nl gross M/S exam     ASSESSMENT AND PLAN     *AS   Date EF Detail   Sx   SOB, fatigue   Hx 10/19  TAVR 23 mm Garcia S3   TTE 6/19  10/19  11/19  8/20  8/21 60%  65%  65%  70%  55% Severe AS MG 34 and , mild MR, mod TR, mod pulm HTN, mod PI  TAVR MG 14  TAVR MG 20, triv catracho AI, mild MR, mod TR  TAVR well seated MG 20, mild AI, mild MS   TAVR well seated, mild AI, mild MS   LHC 9/19  Mild nonobstructive CAD ([place])   Plan   Annual echos  Change lasix to torsemide 20mg bid, renal next week. *AFIB/NSVT  Hx:  Parox, 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, NSVT  Status No AC due to hematuria in past, cysto negative  Plan On eliquis, no interest in watchman  *HTN  Status Controlled  Plan Counseled on diet/salt/exercise/weight  *CHOL  Status  Uncontrolled with last LDL of 101 on 9/19 (goal <70)  Plan Counseled on diet/exercise/weight, continue statin, lipid/liver surveillance per PCP  *COMPLIANCE  Status Compliant  Plan Discussed importance of compliance with meds/diet/salt/exercise; avoid tob/alc/drugs; patient verbalized understanding  *FOLLOWUP  Pending response to diuretics    Scribe Attestation  I, Mary Martin, am scribing for and in the presence of Grover Chiu MD.   Signed, Mary Martin RN.   Provider Hattie Calzada is working as a scribe for and in the presence of raman Marr MD). Working as a scribe, Mary Fitzgerald may have prepopulated components of this note with my historical  intellectual property under my direct supervision. Any additions to this intellectual property were performed in my presence and at my direction. Furthermore, the content and accuracy of this note have been reviewed by me Marbella Marr MD). 10/7/2021 7:25 AM    CODING     Category Diagnosis   Stable chronic illness  (51346/22299 - 2 or more) AS, AFIB, HTN, CHOL   Chronic illness with: Exac, progr or SA of Tx  (30404/03072 - 1 or more)    Undiagnosed new problem with: uncertain prognosis  (08486/64014 - 1 or more)    Acute illness with systemic Sx  (63542/40531 - 1 or more)    Acute, complicated injury  (86532/11793 - 1 or more)    16836 1 or more chronic illness with exacerbation, progression or SA of treatment    Time  30-39 minutes spent preparing to see patient including reviewing patient history/prior tests/prior consults, performing a medical exam, counseling and educating patient/family/caregiver, ordering medications/tests/procedures, referring and communicating with PCPs and other pertinent consultants, documenting information in the EMR, independently interpreting results and communicating to family and coordination of patient care.

## 2021-10-07 ENCOUNTER — OFFICE VISIT (OUTPATIENT)
Dept: CARDIOLOGY CLINIC | Age: 81
End: 2021-10-07
Payer: MEDICARE

## 2021-10-07 VITALS
DIASTOLIC BLOOD PRESSURE: 62 MMHG | BODY MASS INDEX: 24.88 KG/M2 | HEIGHT: 62 IN | OXYGEN SATURATION: 95 % | HEART RATE: 71 BPM | SYSTOLIC BLOOD PRESSURE: 128 MMHG | WEIGHT: 135.2 LBS

## 2021-10-07 DIAGNOSIS — I35.0 NONRHEUMATIC AORTIC VALVE STENOSIS: Primary | ICD-10-CM

## 2021-10-07 PROCEDURE — 99214 OFFICE O/P EST MOD 30 MIN: CPT | Performed by: INTERNAL MEDICINE

## 2021-10-07 RX ORDER — TORSEMIDE 20 MG/1
20 TABLET ORAL 2 TIMES DAILY
Qty: 60 TABLET | Refills: 5 | Status: SHIPPED | OUTPATIENT
Start: 2021-10-07 | End: 2022-03-22

## 2021-10-08 ENCOUNTER — APPOINTMENT (OUTPATIENT)
Dept: CT IMAGING | Age: 81
End: 2021-10-08
Payer: MEDICARE

## 2021-10-08 ENCOUNTER — HOSPITAL ENCOUNTER (EMERGENCY)
Age: 81
Discharge: HOME | End: 2021-10-09
Attending: STUDENT IN AN ORGANIZED HEALTH CARE EDUCATION/TRAINING PROGRAM
Payer: MEDICARE

## 2021-10-08 ENCOUNTER — APPOINTMENT (OUTPATIENT)
Dept: GENERAL RADIOLOGY | Age: 81
End: 2021-10-08
Payer: MEDICARE

## 2021-10-08 VITALS
SYSTOLIC BLOOD PRESSURE: 118 MMHG | HEART RATE: 92 BPM | TEMPERATURE: 98.5 F | WEIGHT: 139 LBS | OXYGEN SATURATION: 98 % | RESPIRATION RATE: 16 BRPM | DIASTOLIC BLOOD PRESSURE: 76 MMHG | BODY MASS INDEX: 25.58 KG/M2 | HEIGHT: 62 IN

## 2021-10-08 DIAGNOSIS — S09.90XA CLOSED HEAD INJURY, INITIAL ENCOUNTER: ICD-10-CM

## 2021-10-08 DIAGNOSIS — S46.812A STRAIN OF LEFT TRAPEZIUS MUSCLE, INITIAL ENCOUNTER: ICD-10-CM
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
## 2021-10-08 PROCEDURE — 70450 CT HEAD/BRAIN W/O DYE: CPT

## 2021-10-08 PROCEDURE — 6370000000 HC RX 637 (ALT 250 FOR IP): Performed by: STUDENT IN AN ORGANIZED HEALTH CARE EDUCATION/TRAINING PROGRAM

## 2021-10-08 PROCEDURE — 72125 CT NECK SPINE W/O DYE: CPT

## 2021-10-08 PROCEDURE — 71046 X-RAY EXAM CHEST 2 VIEWS: CPT

## 2021-10-08 PROCEDURE — 99284 EMERGENCY DEPT VISIT MOD MDM: CPT

## 2021-10-08 RX ORDER — CYCLOBENZAPRINE HCL 10 MG
5 TABLET ORAL ONCE
Status: COMPLETED | OUTPATIENT
Start: 2021-10-08 | End: 2021-10-08

## 2021-10-08 RX ORDER — LIDOCAINE 4 G/G
1 PATCH TOPICAL ONCE
Status: DISCONTINUED | OUTPATIENT
Start: 2021-10-08 | End: 2021-10-09 | Stop reason: HOSPADM

## 2021-10-08 RX ADMIN — CYCLOBENZAPRINE 5 MG: 10 TABLET, FILM COATED ORAL at 21:58

## 2021-10-08 ASSESSMENT — PAIN DESCRIPTION - PROGRESSION: CLINICAL_PROGRESSION: NOT CHANGED

## 2021-10-08 ASSESSMENT — PAIN DESCRIPTION - LOCATION: LOCATION: NECK

## 2021-10-08 ASSESSMENT — PAIN SCALES - GENERAL: PAINLEVEL_OUTOF10: 7

## 2021-10-08 ASSESSMENT — PAIN DESCRIPTION - ORIENTATION: ORIENTATION: POSTERIOR;LEFT

## 2021-10-08 ASSESSMENT — PAIN DESCRIPTION - DESCRIPTORS: DESCRIPTORS: DISCOMFORT

## 2021-10-08 ASSESSMENT — PAIN DESCRIPTION - PAIN TYPE: TYPE: ACUTE PAIN

## 2021-10-09 RX ORDER — LIDOCAINE 50 MG/G
1 PATCH TOPICAL EVERY 24 HOURS
Qty: 30 PATCH | Refills: 0 | Status: SHIPPED | OUTPATIENT
Start: 2021-10-09 | End: 2021-11-08

## 2021-10-09 NOTE — ED PROVIDER NOTES
[facility] EMERGENCY DEPARTMENT      CHIEF COMPLAINT  Fall and neck pain     HISTORY OF PRESENT ILLNESS  Marian Cooks is a 80 y.o. female with a past medical history of aortic stenosis, hypertension, aortic valve stenosis, TAVR, paroxysmal atrial fibrillation on Eliquis, heart failure, and COPD, who presents to the ED complaining of neck pain after fall. Mechanism of injury: [facility] 6d ago. Reports got up to go to the bathroom in the night and had an episode of lightheadedness and lost balance. Deies other episodes of lightheadedness. Did hit head. Denies LOC. Is on eliquis. Denies vomiting. Reports neck pain. Mostly left sided. Denies palliative or provocative factors. Has tried heat and cold. Denies numbness or weakness. Denies any other trauma or new pains. Saw cardiologist yesterday and had medications adjusted for A fib. Patient states she does not want any work-up for cause of lightheadedness that led to the fall. Only wants to be evaluated for traumatic injury. No other complaints, modifying factors or associated symptoms. I have reviewed the following from the nursing documentation. Past Medical History:   Diagnosis Date    Acute on chronic diastolic (congestive) heart failure (HCC) 8/19/2020    Aortic stenosis     Arthritis     Cancer (HCC)     skin basal    Chronic respiratory failure with hypoxia ([address] 9/7/2021    COPD exacerbation ([facility] 9/7/2021    Hyperlipidemia     Hypertension     Paroxysmal atrial fibrillation (HCC)     Thyroid disease      Past Surgical History:   Procedure Laterality Date    AORTIC VALVE REPLACEMENT N/A 10/15/2019    TRANSCATHETER AORTIC VALVE REPLACEMENT FEMORAL APPROACH performed by Hi Carrasco MD at [address]  09/2019    COLONOSCOPY N/A 8/13/2019    COLON W/ANES.  performed by Minna Toribio MD at [address] [facility] N/A 9/25/2020    CYSTOSCOPY performed by Alistair Lopez MD at [facility] ESOPHAGEAL DILATATION  2019    ESOPHAGEAL DILATION [facility] performed by Dina Felipe MD at [address] Right 2020    KNEE ARTHROSCOPY Left     Torn meniscus    OTHER SURGICAL HISTORY  10/09/2015    phacoemulsification of cataract with intraocular implant right eye    THYROIDECTOMY, PARTIAL      TONSILLECTOMY      TUBAL LIGATION      UPPER GASTROINTESTINAL ENDOSCOPY N/A 2019    EGD W/ANES. (9:30) performed by Dina Felipe MD at [address] [facility]     Family History   Problem Relation Age of Onset    Colon Cancer Mother     Heart Disease Father     Heart Disease Brother      Social History     Socioeconomic History    Marital status:      Spouse name: Not on file    Number of children: Not on file    Years of education: Not on file    Highest education level: Not on file   Occupational History    Not on file   Tobacco Use    Smoking status: Former Smoker     Packs/day: 1.00     Years: 20.00     Pack years: 20.00     Types: Cigarettes     Quit date: 1983     Years since quittin.7    Smokeless tobacco: Never Used   Vaping Use    Vaping Use: Never used   Substance and Sexual Activity    Alcohol use: No    Drug use: Never    Sexual activity: Not Currently     Partners: Male   Other Topics Concern    Not on file   Social History Narrative    Not on file     Social Determinants of Health     Financial Resource Strain:     Difficulty of Paying Living Expenses:    Food Insecurity:     Worried About Running Out of Food in the Last Year:     Ran Out of Food in the Last Year:    Transportation Needs:     Lack of Transportation (Medical):      Lack of Transportation (Non-Medical):    Physical Activity:     Days of Exercise per Week:     Minutes of Exercise per Session:    Stress:     Feeling of Stress :    Social Connections:     Frequency of Communication with Friends and Family:     Frequency of Social Gatherings with Friends and Family:     Attends Taoism Services:     Active Member of Clubs or Organizations:     Attends Club or Organization Meetings:     Marital Status:    Intimate Partner Violence:     Fear of Current or Ex-Partner:     Emotionally Abused:     Physically Abused:     Sexually Abused:      Current Facility-Administered Medications   Medication Dose Route Frequency Provider Last Rate Last Admin    lidocaine 4 % external patch 1 patch  1 patch TransDERmal Once Linda Carrasco MD   1 patch at 10/08/21 5749     Current Outpatient Medications   Medication Sig Dispense Refill    lidocaine (LIDODERM) 5 % Place 1 patch onto the skin every 24 hours Place 1 patch onto the skin daily 12 hours on, 12 hours off. 30 patch 0    torsemide (DEMADEX) 20 MG tablet Take 1 tablet by mouth 2 times daily 60 tablet 5    omeprazole (PRILOSEC) 20 MG delayed release capsule Take 20 mg by mouth daily      apixaban (ELIQUIS) 2.5 MG TABS tablet Take 2.5 mg by mouth 2 times daily      dronedarone hcl (MULTAQ) 400 MG TABS Take 1 tablet by mouth 2 times daily (with meals) 60 tablet 3    dilTIAZem (CARTIA XT) 240 MG extended release capsule Take 1 capsule by mouth daily (Patient taking differently: Take 360 mg by mouth daily ) 90 capsule 3    atorvastatin (LIPITOR) 40 MG tablet TAKE ONE TABLET BY MOUTH ONCE NIGHTLY 90 tablet 3    DULoxetine (CYMBALTA) 30 MG extended release capsule Take 30 mg by mouth daily      metoprolol succinate (TOPROL XL) 25 MG extended release tablet Take 25 mg by mouth daily      levothyroxine (SYNTHROID) 125 MCG tablet Take 125 mcg by mouth Daily      potassium chloride (KLOR-CON M) 20 MEQ extended release tablet Take 20 mEq by mouth daily      OXYGEN Inhale 2 L into the lungs      Cyanocobalamin (B-12) 2500 MCG TABS Take 2,500 mcg by mouth once a week Weekly on Wednesdays.       melatonin 5 MG TABS tablet Take 5 mg by mouth nightly      HYDROcodone-acetaminophen (NORCO) 5-325 MG per tablet Take 1 tablet by mouth every 6 hours as needed for Pain. Takes one pill @ 8am, one pill @ 3pm, one pill @ 6p, and one pill at bedtime.  metaxalone (SKELAXIN) 800 MG tablet Take 800 mg by mouth 2 times daily as needed (as needed for spasms) Do not take with vicodin.  Butalbital-Acetaminophen  MG TABS Take 1 tablet by mouth as needed (as needed for headaches) Vary rarely. Allergies   Allergen Reactions    Iodides     Shellfish-Derived Products      Unknown what happens    Sulfa Antibiotics        REVIEW OF SYSTEMS  All systems reviewed, pertinent positives per HPI otherwise noted to be negative. PHYSICAL EXAM  GENERAL APPEARANCE: Kalyani Wing is in no acute respiratory distress. Awake and alert. Answers questions appropriately. VITAL SIGNS: /76   Pulse 92   Temp 98.5 °F (36.9 °C) (Oral)   Resp 16   Ht 5' 2\" (1.575 m)   Wt 139 lb (63 kg)   SpO2 98%   Breastfeeding No   BMI 25.42 kg/m²   HEENT: Normocephalic, atraumatic. No Salmons sign or Raccoon Eyes. No depressed skull fractures. Extraocular muscles are intact. Pupils equal round and reactive to light. Conjunctivas are pink. Negative scleral icterus. No epistaxis. No septal hematomas. No hemotympanum. Mucous membranes are moist. Tongue in the midline. Pharynx without erythema or exudates. No uvular deviation. NECK: Nontender and supple. No stridor or meningismus. Trachea in the midine. Cervical spine is non-tender to palpation in the midline. Tenderness to palpation over the left trapezius. Patient does have some midline pain with range of motion. No abrasions. CHEST: Nontender to palpation. Clear to auscultation bilaterally. No rales, rhonchi, or wheezing. No abrasions. HEART: Regular rate and irregular rhythm. No murmurs, gallops or rubs. Strong and equal pulses in the upper and lower extremities. ABDOMEN: Soft, nontender, nondistended, positive bowel sounds, no palpable pulsatile masses.  No Intracranial trauma, C-spine fracture, trapezius strain, muscular sprain or strain, low suspicion for shoulder pathology    I did offer work-up including EKG and labs for the cause of the patient's fall. She does report she had an episode of lightheadedness. Patient states that she only wants evaluation for trauma at this time, she will follow up with her PCP and cardiologist regarding this episode. She states she has not had any further episodes since the fall 6 days ago. Workup showed:  ED Course as of Oct 09 0015   Fri Oct 08, 2021   2348 CT Head: IMPRESSION:  Artifact degraded images. No acute hemorrhage or midline shift.     Other findings as described. [ER]   1927 CXR: IMPRESSION:  Right upper lobe and lingular opacities. Correlate with presentation.     Nonspecific trace pleural effusions. [ER]   8799 CT C spine: IMPRESSION:  1. No acute fracture. Degenerative changes with grade 1 spondylolistheses. 2. Small right pleural effusion. 3. Other findings as described. [ER]      ED Course User Index  [ER] Christina Tran MD      Imaging does show some nonspecific opacities and pleural effusions. Patient does report that she was recently admitted 3 weeks ago for pneumonia, suspect these are remnants of that. She denies any cough, chest pain, fever, shortness of breath. She does not want any further work-up for these at this time. During the patient's ED course, the patient was given:  Medications   lidocaine 4 % external patch 1 patch (1 patch TransDERmal Patch Applied 10/8/21 2158)   cyclobenzaprine (FLEXERIL) tablet 5 mg (5 mg Oral Given 10/8/21 2158)        Work-up overall reassuring. At this time, feel the patient is appropriate for discharge to follow-up with a primary care doctor. Patient feels comfortable with discharge at this time. Patient was provided with prescriptions for lidocaine patches, patient already takes Vicodin and has a muscle relaxer at home.   Return precautions given.  Encouraged PCP follow-up in 3 days. Patient discharged in stable condition. I estimate there is LOW risk for ABDOMINAL AORTIC ANEURYSM, CAUDA EQUINA or CENTRAL CORD SYNDROME, COMPARTMENT SYNDROME, EPIDURAL MASS LESION, HERNIATED DISK CAUSING SEVERE STENOSIS, INTRACRANIAL HEMORRHAGE, INTRA-ABDOMINAL INJURY, PERFORATED BOWEL, SUBDURAL HEMATOMA, TENDON or NEUROVASCULAR INJURY, or a THORACIC AORTIC DISSECTION, thus I consider the discharge disposition reasonable. Also, there is no evidence or peritonitis, sepsis, or toxicity. Skylar Ly and I have discussed the diagnosis and risks, and we agree with discharging home to follow-up with their primary doctor. We also discussed returning to the Emergency Department immediately if new or worsening symptoms occur. We have discussed the symptoms which are most concerning (e.g., bloody stool, fever, changing or worsening pain, vomiting) that necessitate immediate return. CLINICAL IMPRESSION  1. Fall, initial encounter    2. Strain of left trapezius muscle, initial encounter    3. Closed head injury, initial encounter        Blood pressure 118/76, pulse 92, temperature 98.5 °F (36.9 °C), temperature source Oral, resp. rate 16, height 5' 2\" (1.575 m), weight 139 lb (63 kg), SpO2 98 %, not currently breastfeeding. DISPOSITION  Skylar Ly was discharged to home in stable condition. Patient was given scripts for the following medications. I counseled patient how to take these medications. New Prescriptions    LIDOCAINE (LIDODERM) 5 %    Place 1 patch onto the skin every 24 hours Place 1 patch onto the skin daily 12 hours on, 12 hours off. Follow-up with:  Lorraine Sweet MD  [address] [place] [address] Drive [address] [place] [address]  [phone]    Schedule an appointment as soon as possible for a visit on 10/11/2021      [facility] Emergency Department  [address] [place] [address]  [phone]  Go to   As needed, If symptoms worsen      DISCLAIMER: This chart was created using Dragon dictation software. Efforts were made by me to ensure accuracy, however some errors may be present due to limitations of this technology and occasionally words are not transcribed correctly.           Damion Chance MD  10/09/21 3253

## 2021-10-12 LAB
ANION GAP SERPL CALCULATED.3IONS-SCNC: 8 MMOL/L
BUN BLDV-MCNC: 25 MG/DL (ref 8–26)
CALCIUM SERPL-MCNC: 8.5 MG/DL (ref 8.5–10.4)
CHLORIDE BLD-SCNC: 102 MEQ/L (ref 98–111)
CO2: 30 MMOL/L (ref 21–31)
CREAT SERPL-MCNC: 1.84 MG/DL (ref 0.6–1.2)
GFR AFRICAN AMERICAN: 28 ML/MIN/1.73 M2
GFR NON-AFRICAN AMERICAN: 25 ML/MIN/1.73 M2
GLUCOSE BLD-MCNC: 126 MG/DL (ref 70–99)
POTASSIUM SERPL-SCNC: 3.7 MEQ/L (ref 3.6–5.1)
SODIUM BLD-SCNC: 140 MEQ/L (ref 135–145)

## 2021-10-13 ENCOUNTER — TELEPHONE (OUTPATIENT)
Dept: CARDIOLOGY | Age: 81
End: 2021-10-13

## 2021-10-13 RX ORDER — DILTIAZEM HYDROCHLORIDE 120 MG/1
120 CAPSULE, COATED, EXTENDED RELEASE ORAL DAILY
Qty: 90 CAPSULE | Refills: 3 | Status: SHIPPED | OUTPATIENT
Start: 2021-10-13 | End: 2022-09-30

## 2021-10-13 RX ORDER — POTASSIUM CHLORIDE 750 MG/1
10 TABLET, EXTENDED RELEASE ORAL DAILY
Qty: 30 TABLET | Refills: 0
Start: 2021-10-13

## 2021-10-25 ENCOUNTER — TELEPHONE (OUTPATIENT)
Dept: CARDIOLOGY CLINIC | Age: 81
End: 2021-10-25

## 2021-10-25 NOTE — LETTER
[address] [place] [address] Cardiology [place] [address] [address] [address]  Phone: [phone]  Fax: [phone]    Toby Damian MD        November 1, 2021     Patient: Maria Isabel Mojica   YOB: 1940   Date of Visit: 10/25/2021       To Whom It May Concern: It is [facility]  [facility] if necessary for procedure. If you have any questions or concerns, please don't hesitate to call.     Sincerely,        Toby Damian MD

## 2021-10-25 NOTE — TELEPHONE ENCOUNTER
Dentist Dr Henrry Vásquez states pt  needs a tooth extraction on  11/17/21 and asking if pt can hold eliquis 3 days prior and 1 day after.  Please fax [phone]

## 2021-10-25 NOTE — LETTER
[address] [place] [address] Cardiology [address] [place] [address]  [address]  Phone: [phone]  Fax: [phone]    Jose Miranda MD        November 1, 2021     Patient: Zoraida Anand   YOB: 1940   Date of Visit: 10/25/2021       To Whom It May Concern: It is my medical opinion that [facility] is Ok to hold Eliquis f necessary for procedure. If you have any questions or concerns, please don't hesitate to call.     Sincerely,        Jose Miranda MD

## 2021-10-26 ENCOUNTER — HOSPITAL ENCOUNTER (OUTPATIENT)
Dept: ULTRASOUND IMAGING | Age: 81
Discharge: HOME | End: 2021-10-26
Payer: MEDICARE

## 2021-10-26 DIAGNOSIS — R63.4 LOSS OF WEIGHT: ICD-10-CM

## 2021-10-26 PROCEDURE — 76700 US EXAM ABDOM COMPLETE: CPT

## 2021-10-26 NOTE — TELEPHONE ENCOUNTER
Called patient and Isabel (Caregiver ) answered and messaged realyed per hippa to her with verbal understanding.

## 2021-10-29 LAB
ALBUMIN SERPL-MCNC: 3.4 G/DL
ALBUMIN: 3.4
ALP BLD-CCNC: 93 U/L
ALT SERPL-CCNC: 14 U/L
ANION GAP SERPL CALCULATED.3IONS-SCNC: 1.5 MMOL/L
AST SERPL-CCNC: 17 U/L
BILIRUB SERPL-MCNC: 0.3 MG/DL (ref 0.1–1.4)
BUN BLDV-MCNC: 17 MG/DL
CALCIUM SERPL-MCNC: 8.4 MG/DL
CALCIUM SERPL-MCNC: 8.4 MG/DL
CHLORIDE BLD-SCNC: 103 MMOL/L
CO2: 22 MMOL/L
CREAT SERPL-MCNC: 1.69 MG/DL
CREATININE: 1.7 MG/DL
GFR CALCULATED: 32
GLUCOSE BLD-MCNC: 108 MG/DL
GLUCOSE BLD-MCNC: 108 MG/DL
HCT VFR BLD CALC: 30.9 % (ref 36–46)
IRON SATURATION: 8
IRON: 21
IRON: 21
POTASSIUM SERPL-SCNC: 4.3 MMOL/L
SODIUM BLD-SCNC: 142 MMOL/L
TOTAL IRON BINDING CAPACITY: NORMAL
TOTAL PROTEIN: 5.6

## 2021-11-02 ENCOUNTER — TELEPHONE (OUTPATIENT)
Dept: CARDIOLOGY | Age: 81
End: 2021-11-02

## 2021-11-16 ENCOUNTER — TELEPHONE (OUTPATIENT)
Dept: CARDIOLOGY CLINIC | Age: 81
End: 2021-11-16

## 2021-11-16 ENCOUNTER — OFFICE VISIT (OUTPATIENT)
Dept: CARDIOLOGY CLINIC | Age: 81
End: 2021-11-16
Payer: MEDICARE

## 2021-11-16 VITALS
BODY MASS INDEX: 25.14 KG/M2 | OXYGEN SATURATION: 91 % | WEIGHT: 136.6 LBS | HEART RATE: 101 BPM | DIASTOLIC BLOOD PRESSURE: 72 MMHG | SYSTOLIC BLOOD PRESSURE: 124 MMHG | HEIGHT: 62 IN

## 2021-11-16 DIAGNOSIS — I35.0 NONRHEUMATIC AORTIC VALVE STENOSIS: Primary | ICD-10-CM

## 2021-11-16 PROCEDURE — 99214 OFFICE O/P EST MOD 30 MIN: CPT | Performed by: INTERNAL MEDICINE

## 2021-11-16 NOTE — TELEPHONE ENCOUNTER
Pt was seen with  today and was informed to see Daniel Ruiz as a new pt to discuss watchman procedure. The pt and family are willing to go to [facility] for this appt. Can the pt be scheduled at that location for 11/23/21 at 1215pm hold slot? Our o ffice will contact the pt at [phone].

## 2021-11-16 NOTE — PROGRESS NOTES
[address]  H+P // Hansa Shane // OP VISIT // Bridget Armendariz MD  ENC TYPE FU    CC HX HPI   GEN  Doing well. Denies new concerns. AS TAVR No cp, sob. AFIB  Off eliquis due to hematuria. Cysto next week. Follows with EP.     CAD Nonobst Denies cp, sob, dizzy, syncope, palps. HTN  Amb bp in good range, no ha or dizzy. CHOL  Last chol reviewed, on statin w/out sa   MED  Compliant with CV meds listed below w/out reported sa. HISTORY/ALLLERGY/ROS   MedHx  has a past medical history of Acute on chronic diastolic (congestive) heart failure (HCC), Aortic stenosis, Arthritis, Cancer ([address] Chronic respiratory failure with hypoxia ([address] COPD exacerbation ([address] Hyperlipidemia, Hypertension, Paroxysmal atrial fibrillation ([address] and Thyroid disease. SurgHx  has a past surgical history that includes Thyroidectomy, partial; Tonsillectomy; Tubal ligation; Knee arthroscopy (Left); other surgical history (10/09/2015); Upper gastrointestinal endoscopy (N/A, 8/13/2019); Colonoscopy (N/A, 8/13/2019); Esophagus dilation (8/13/2019); Cardiac catheterization (09/2019); Aortic valve replacement (N/A, 10/15/2019); Cystoscopy (N/A, 9/25/2020); eye surgery; and eye surgery (Right, 08/19/2020). SocHx  reports that she quit smoking about 38 years ago. Her smoking use included cigarettes. She has a 20.00 pack-year smoking history. She has never used smokeless tobacco. She reports that she does not drink alcohol and does not use drugs. FamHx family history includes Colon Cancer in her mother; Heart Disease in her brother and father.    Allerg Iodides, Shellfish-derived products, and Sulfa antibiotics   ROS [x]Full ROS obtained and negative except as mentioned in HPI      MEDICATIONS      Current Outpatient Medications   Medication Sig Dispense Refill    dilTIAZem (CARTIA XT) 120 MG extended release capsule Take 1 capsule by mouth daily 90 capsule 3    potassium chloride (KLOR-CON M) 10 MEQ extended release tablet Take 1 tablet by mouth daily 30 tablet 0    torsemide (DEMADEX) 20 MG tablet Take 1 tablet by mouth 2 times daily 60 tablet 5    omeprazole (PRILOSEC) 20 MG delayed release capsule Take 20 mg by mouth daily      apixaban (ELIQUIS) 2.5 MG TABS tablet Take 2.5 mg by mouth 2 times daily      dronedarone hcl (MULTAQ) 400 MG TABS Take 1 tablet by mouth 2 times daily (with meals) 60 tablet 3    atorvastatin (LIPITOR) 40 MG tablet TAKE ONE TABLET BY MOUTH ONCE NIGHTLY 90 tablet 3    DULoxetine (CYMBALTA) 30 MG extended release capsule Take 30 mg by mouth daily      metoprolol succinate (TOPROL XL) 25 MG extended release tablet Take 25 mg by mouth daily      levothyroxine (SYNTHROID) 125 MCG tablet Take 125 mcg by mouth Daily      OXYGEN Inhale 2 L into the lungs      Cyanocobalamin (B-12) 2500 MCG TABS Take 2,500 mcg by mouth once a week Weekly on Wednesdays.  melatonin 5 MG TABS tablet Take 5 mg by mouth nightly      HYDROcodone-acetaminophen (NORCO) 5-325 MG per tablet Take 1 tablet by mouth every 6 hours as needed for Pain. Takes one pill @ 8am, one pill @ 3pm, one pill @ 6p, and one pill at bedtime.  metaxalone (SKELAXIN) 800 MG tablet Take 800 mg by mouth 2 times daily as needed (as needed for spasms) Do not take with vicodin.  Butalbital-Acetaminophen  MG TABS Take 1 tablet by mouth as needed (as needed for headaches) Vary rarely. No current facility-administered medications for this visit.      [x]Reviewed with patient and will remain unchanged except as mentioned in A/P    PHYSICAL EXAM   Vitals:    11/16/21 0938   BP: 124/72   Pulse: 101   SpO2: 91%        Gen Alert, coop, no distress Heart  Rrr, 1/6   Head NC, AT, no abnorm Abd  Soft, NT, +BS, no mass, no OM   Eyes PER, conj/corn clear Ext  Ext nl, AT, no C/C/E   Nose Nares nl, no drain, NT Pulse 2+ and symmetric   Throat Lips, mucosa, tongue nl Skin Col/text/turg nl, no vis rash/les   Neck S/S, TM, NT, no bruit/JVD Psych Nl mood and affect   Lung CTA-B, unlabored, no DTP Lymph   No cervical or axillary LA   Ch wall NT, no deform Neuro  Nl gross M/S exam     ASSESSMENT AND PLAN   *AS   Date EF Detail   Sx   No concerning   Hx 10/19  TAVR 23 mm Garcia S3   TTE 6/19  10/19  11/19  8/20  8/21 60%  65%  65%  70%  55% Severe AS MG 34 and , mild MR, mod TR, mod pulm HTN, mod PI  TAVR MG 14  TAVR MG 20, triv catracho AI, mild MR, mod TR  TAVR well seated MG 20, mild AI, mild MS   TAVR well seated, mild AI, mild MS   LHC 9/19  Mild nonobstructive CAD ([place])   Plan   Continued observation, annual echos   *AFIB/NSVT  Hx  Parox, 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, NSVT  Plan Off AC due to hematuria   Watchman referral, patient historically refused but willing now  *HTN  Status Controlled  Plan Counseled on diet/salt/exercise/weight  *CHOL  Status  Uncontrolled with last LDL of 101 on 9/19 (goal <70)  Plan Counseled on diet/exercise/weight, continue statin, lipid/liver surveillance per PCP  *COMPLIANCE  Status Compliant  Plan Discussed importance of compliance with meds/diet/salt/exercise; avoid tob/alc/drugs; patient verbalized understanding  *FOLLOWUP  As scheduled with EP  FU structural 1 year    CODING   Category Diagnosis   Stable chronic illness  (69113/58498 - 2 or more) AS, AFIB, HTN, CHOL   Chronic illness w/: Exac, progr or SA of Tx  (95362/84166 - 1 or more)    Time 30-39 minutes spent preparing to see patient including reviewing patient history/prior tests/prior consults, performing a medical exam, counseling and educating patient/family/caregiver, ordering medications/tests/procedures, referring and communicating with PCPs and other pertinent consultants, documenting information in the EMR, independently interpreting results and communicating to family and coordination of patient care.

## 2021-11-16 NOTE — PATIENT INSTRUCTIONS
We talked about Watchman today. This is a device that can plug the area that clots can form in the heart. Watch your salt intake!  Try to limit to 2000mg a day    Go back to twice a day of the water pill as your fluid status is up

## 2021-11-16 NOTE — TELEPHONE ENCOUNTER
Spoke with RN Favio Lara, he approved 11/23 at [id number] at [facility]. I contacted Rufino Morgan, pt's contact, and informed her and scheduled pt. Also emailed preeti the appt info as well.

## 2021-11-17 ENCOUNTER — TELEPHONE (OUTPATIENT)
Dept: CARDIOLOGY CLINIC | Age: 81
End: 2021-11-17

## 2021-11-17 NOTE — TELEPHONE ENCOUNTER
Patient goes to [facility] but wants to be seen sooner than next year and agreed to go to [facility].

## 2021-11-22 NOTE — PROGRESS NOTES
[address]  Cardiology Consult    Maria Isabel Mojica  1940 November 23, 2021    Primary Cardiologist: Jony Spears MD  Referring Physician: Vivian Watkins MD    Reason for Referral: Left atrial appendage closure    CC: \"I have atrial fibrillation. \"      Subjective:     History of Present Illness:    Maria Isabel Mojica is a 80 y.o. patient with a PMH significant for chronic diastolic heart failure, aortic stenosis, COPD, hyperlipidemia, hypertension, thyroid disease and paroxsymal atrial fibrillation. She has a history of hematuria. She has had cystoscopy in the past. She has not been on the Eliquis since 10/31/2021. Patient is being referred for Left Atrial Appendage Closure with WATCHMAN device for management of stroke risk resulting from non-valvular atrial fibrillation. Based on their past history, it has been determined that they are poor candidates for long-term oral-anticoagulation, however may be tolerant of short term treatment with warfarin as necessary. Today, she is here accompanied by her caregiver. She has episodes of hematuria in the past. She was restarted on Eliquis 2.5 mg and had recurrent hematuria. She is on continuous oxygen therapy. She does not see a lung doctor. She is scheduled for a repeat cystoscopy on 12/9 with Dr Janan Childress. Patient denies exertional chest pain/pressure, dyspnea at rest, SALAMANCA, PND, orthopnea, palpitations, lightheadedness, weight changes, changes in LE edema, and syncope. Patient reports compliance to her medications. Past Medical History:   has a past medical history of Acute on chronic diastolic (congestive) heart failure ([address] Aortic stenosis, Arthritis, Cancer ([address] Chronic respiratory failure with hypoxia ([address] COPD exacerbation ([address] Hyperlipidemia, Hypertension, Paroxysmal atrial fibrillation ([address] and Thyroid disease. Surgical History:   has a past surgical history that includes Thyroidectomy, partial; Tonsillectomy;  Tubal ligation; Knee arthroscopy (Left); other surgical history (10/09/2015); Upper gastrointestinal endoscopy (N/A, 8/13/2019); Colonoscopy (N/A, 8/13/2019); Esophagus dilation (8/13/2019); Cardiac catheterization (09/2019); Aortic valve replacement (N/A, 10/15/2019); Cystoscopy (N/A, 9/25/2020); eye surgery; and eye surgery (Right, 08/19/2020). Social History:   reports that she quit smoking about 38 years ago. Her smoking use included cigarettes. She has a 20.00 pack-year smoking history. She has never used smokeless tobacco. She reports that she does not drink alcohol and does not use drugs. Family History:  family history includes Colon Cancer in her mother; Heart Disease in her brother and father. Home Medications:  Were reviewed and are listed in nursing record and/or below  Prior to Admission medications    Medication Sig Start Date End Date Taking?  Authorizing Provider   dilTIAZem (CARTIA XT) 120 MG extended release capsule Take 1 capsule by mouth daily 10/13/21  Yes Wil Kelly MD   potassium chloride (KLOR-CON M) 10 MEQ extended release tablet Take 1 tablet by mouth daily 10/13/21  Yes Wil Kelly MD   torsemide [facility]) 20 MG tablet Take 1 tablet by mouth 2 times daily 10/7/21  Yes Wil Kelly MD   omeprazole (PRILOSEC) 20 MG delayed release capsule Take 20 mg by mouth daily   Yes Historical Provider, MD   apixaban (ELIQUIS) 2.5 MG TABS tablet Take 2.5 mg by mouth 2 times daily  6/25/21  Yes Historical Provider, MD   dronedarone hcl (MULTAQ) 400 MG TABS Take 1 tablet by mouth 2 times daily (with meals) 8/26/21  Yes Efrem Dickerson MD   atorvastatin (LIPITOR) 40 MG tablet TAKE ONE TABLET BY MOUTH ONCE NIGHTLY 1/19/21  Yes Wil Kelly MD   DULoxetine (CYMBALTA) 30 MG extended release capsule Take 30 mg by mouth daily   Yes Historical Provider, MD   metoprolol succinate (TOPROL XL) 25 MG extended release tablet Take 25 mg by mouth daily   Yes Historical Provider, MD   levothyroxine (SYNTHROID) 125 MCG tablet Take 125 mcg by mouth Daily   Yes Historical Provider, MD   OXYGEN Inhale 2 L into the lungs   Yes Historical Provider, MD   Cyanocobalamin (B-12) 2500 MCG TABS Take 2,500 mcg by mouth once a week Weekly on Wednesdays. Yes Historical Provider, MD   melatonin 5 MG TABS tablet Take 5 mg by mouth nightly   Yes Historical Provider, MD   HYDROcodone-acetaminophen (NORCO) 5-325 MG per tablet Take 1 tablet by mouth every 6 hours as needed for Pain. Takes one pill @ 8am, one pill @ 3pm, one pill @ 6p, and one pill at bedtime. Yes Historical Provider, MD   metaxalone (SKELAXIN) 800 MG tablet Take 800 mg by mouth 2 times daily as needed (as needed for spasms) Do not take with vicodin. Yes Historical Provider, MD   Butalbital-Acetaminophen  MG TABS Take 1 tablet by mouth as needed (as needed for headaches) Vary rarely. Yes Historical Provider, MD        CURRENT Medications:  No current facility-administered medications for this visit. Allergies: Iodides, Shellfish-derived products, and Sulfa antibiotics           Review of Systems: All reviewed and refer to HPI  · Constitutional: no unanticipated weight loss. There's been no change in energy level, sleep pattern, or activity level. No fevers, chills. · Eyes: No visual changes or diplopia. No scleral icterus. · ENT: No Headaches, hearing loss or vertigo. No mouth sores or sore throat. · Cardiovascular: No Chest pain, tightness or discomfort.  No Shortness of breath. No Dyspnea on exertion, Orthopnea, Paroxysmal nocturnal dyspnea or breathlessness at rest.   No Palpitations.  No Syncope ('blackouts', 'faints', 'collapse') or dizziness. · Respiratory: No cough or wheezing, no sputum production. No hematemesis. · Gastrointestinal: No abdominal pain, appetite loss, blood in stools. No change in bowel or bladder habits.   · Genitourinary: No dysuria, trouble voiding, or hematuria. · Musculoskeletal:  No gait disturbance, no joint complaints. · Integumentary: No rash or pruritis. · Neurological: No headache, diplopia, change in muscle strength, numbness or tingling. · Psychiatric: No anxiety or depression. · Endocrine: No temperature intolerance. No excessive thirst, fluid intake, or urination. No tremor. · Hematologic/Lymphatic: No abnormal bruising or bleeding, blood clots or swollen lymph nodes. · Allergic/Immunologic: No nasal congestion or hives. Objective: all reveiwed      PHYSICAL EXAM:      Vitals:    11/23/21 1251   BP: 122/84   Pulse: 90   SpO2: (!) 86%    Weight: 133 lb (60.3 kg)       General Appearance:  Alert, cooperative, no distress, appears stated age. Head:  Normocephalic, without obvious abnormality, atraumatic. Eyes:  Pupils equal and round. No scleral icterus. Mouth: Moist mucosa, no pharyngeal erythema. Nose: Nares normal. No drainage or sinus tenderness. Neck: Supple, symmetrical, trachea midline. No adenopathy. No tenderness/mass/nodules. No carotid bruit or elevated JVD. Lungs:   Respiratory Effort: Normal   Auscultation: Clear to auscultation bilaterally, respirations unlabored. No wheeze, rales   Chest Wall:  No tenderness or deformity. Cardiovascular:    Pulses  Palpation: normal   Ascultation: Regular rate, S1/ S2 normal. No murmur, rub, or gallop. 2+ radial and pedal pulses, symmetric  Carotid  Femoral   Abdomen and Gastrointestinal:   Soft, non-tender, bowel sounds active. Liver and Spleen  Masses   Musculoskeletal: No muscle wasting  Back  Gait   Extremities: Extremities normal, atraumatic. No cyanosis or edema. No cyanosis clubbing       Skin: Inspection and palpation performed, no rashes or lesions. Pysch: Normal mood and affect.  Alert and oriented to time place person   Neurologic: Normal gross motor and sensory exam.       Labs: all labs have been reviewed      Lab Results   Component Value Date    WBC 7.0 2021    RBC 3.39 2021    HGB 10.2 2021    HCT 30.9 10/05/2021    MCV 91.6 2021    RDW 18.5 2021     2021     Lab Results   Component Value Date     10/12/2021    K 3.7 10/12/2021    K 4.3 2021     10/12/2021    CO2 30 10/12/2021    BUN 25 10/12/2021    CREATININE 1.84 10/12/2021    CREATININE 1.7 10/05/2021    GFRAA 28 10/12/2021    AGRATIO 1.3 2021    LABGLOM 25 10/12/2021    GLUCOSE 126 10/12/2021    PROT 6.0 2021    CALCIUM 8.5 10/12/2021    BILITOT 0.3 10/04/2021    ALKPHOS 93 10/04/2021    AST 17 10/04/2021    ALT 14 10/04/2021     No results found for: PTINR  Lab Results   Component Value Date    LABA1C 6.3 2019     Lab Results   Component Value Date    TROPONINI 0.01 2021       Cardiac, Vascular and Imaging Data: All Personally Reviewed in Detail by Myself      EK2021 Atrial fibrillation    Echocardiogram:   ECHO 2021  Left ventricular systolic function is normal with a visually estimated  ejection fraction of 55%. The left ventricle is normal in size with mild  concentric hypertrophy. No obvious regional wall motion abnormalities noted. Elevated left atrial pressures with a septal E/e' ratio of 33.1. Right ventricular systolic function is reduced. The left atrium appears severely enlarged. The right atrium is moderately  enlarged. Mild mitral and pulmonic regurgitation. Mild mitral stenosis. A 23 mm Garcia Joni 3 bioprosthetic aortic valve appears well seated  with mildly abnormal Doppler values; however the values are stable since one  month s/p TAVR. There is mild perivalvular aortic valve regurgitation. Moderate tricuspid regurgitation. Systolic pulmonary artery pressure (SPAP) is elevated and estimated at 55  mmHg (right atrial pressure 15 mmHg) consistent with moderate pulmonary  hypertension.   The IVC is dilated in size (>2.1 cm) and collapses <50% with respiration  consistent with markedly elevated right atrial pressures (15 mmHg) . Compared with the previous exam on 8/20/20, RV function is down and PHTN and  tricuspid regurgitation have significantly increased. ECHO 8/020/2021  No significant change since 11.22.19  Normal left ventricular size and normal to hyperdynamic systolic function  with an estimated ejection fraction of 60-70%. No regional wall motion  abnormalities are seen. There is moderate concentric left ventricular hypertrophy. Grade I diastolic dysfunction with normal filling pressure. Moderate posterior mitral annular calcification is present. Mild mitral valve stenosis. Mean gradient 5 mm of Hg. No evidence of mitral regurgitation. A TAVR 23mmSapien S3 aortic valve appears well seated with a maximum  velocity of 3.12m/s and a mean gradient of 20mmHg. Mild aortic regurgitation is present. Assessment and Plan     Atrial Fibrillation,paroxsymal     Primary Cardiologist: Jessica Kyle MD  Referring Physician: Sirisha Ritter MD  Referring Reason: Hematuria    CHADSvasc is at least 6 (Age,Female,CHF,HTN,CAD)  HASbled is at least 3    Episodes of hematuria. Currently not on anticoagulation therapy. Will place referral for pulmonary since she is on continuous oxygen therapy. Will do JAYNE at time of Watchman if able to proceed. Specifically regarding risk of anticoagulation they have demonstrated:  ? History of bleeding (eg. Intracerebral, subdural, GI, retro-peritoneal)  ? Intolerance oral anticoagulation  ? Increased bleeding risk (e.g. Thrombocytopenia, anemia)  ?  High risk of recurrent falls    We have discussed their unique stroke and bleeding risk both on and off oral-anticoagulation, and the rationale for this referral.  Based on both stroke and bleeding risk, a shared decision has been made to pursue closure of the left atrial appendage as an alternative to oral anticoagulant therapy for stroke prophylaxis and to reduce their long term risk of incidence of bleeding. Discussed Watchman LAAC at length with patient, heart model, device model and video. We gave educational materials. We discussed pre-procedure workup, timing of restarting AC, AC length, procedure and post procedure follow up/management. No Iodine Allergy. No Nickel/Titanium Allergy. He/She is agreeable to short term AC, proceeding with JAYNE, 2nd opinion/shared decision making  and will follow up with me post workup to discussing timing of the procedure. I had a detail discussion with the patient and family regarding the risk and benefit of the procedures. I explained to them the details of the procedure. I explained to them that the procedure will be performed under general anesthesia. I explained any risk of bleeding, pericardial effusion and tamponade, perforation of the vessel, stroke, myocardial infarction or death. The patient and family understood the risk and benefits and the details of procedure and would like to go ahead with the procedure. The patient gave informed consent. All questions and concerns answered at this time. Confirmed per patient before leaving the office. I have also asked her to call the office and speak with the [address] [place] [address] with any questions or concerns moving forward. Thank you for allowing us to participate in the care of Lisa Abraham. Please do not hesitate to contact me if you have any questions. Nirmal Mccord MD, MPH    [address], [address] [place] [address] [address]  Ph: [phone]  Fax: [phone]      This note was scribed in the presence of Dr Sarah Portillo, by Ele Mcgee RN  Physician Attestation:  The scribes documentation has been prepared under my direction and personally reviewed by me in its entirety. I confirm that the note above accurately reflects all work, treatment, procedures, and medical decision making performed by me.

## 2021-11-23 ENCOUNTER — TELEPHONE (OUTPATIENT)
Dept: CARDIOLOGY CLINIC | Age: 81
End: 2021-11-23

## 2021-11-23 ENCOUNTER — OFFICE VISIT (OUTPATIENT)
Dept: CARDIOLOGY CLINIC | Age: 81
End: 2021-11-23
Payer: MEDICARE

## 2021-11-23 VITALS
WEIGHT: 133 LBS | SYSTOLIC BLOOD PRESSURE: 122 MMHG | OXYGEN SATURATION: 86 % | BODY MASS INDEX: 24.48 KG/M2 | HEIGHT: 62 IN | DIASTOLIC BLOOD PRESSURE: 84 MMHG | HEART RATE: 90 BPM

## 2021-11-23 DIAGNOSIS — I48.0 PAF (PAROXYSMAL ATRIAL FIBRILLATION) (HCC): Primary | ICD-10-CM

## 2021-11-23 PROCEDURE — 93000 ELECTROCARDIOGRAM COMPLETE: CPT | Performed by: INTERNAL MEDICINE

## 2021-11-23 PROCEDURE — 99214 OFFICE O/P EST MOD 30 MIN: CPT | Performed by: INTERNAL MEDICINE

## 2021-11-23 NOTE — PATIENT INSTRUCTIONS
Patient Education        Left Atrial Appendage Closure: Before Your Procedure  What is a left atrial appendage closure? The left atrial appendage (AIDAN) is a small sac or pouch in the left atrium. The atrium is an upper chamber of the heart. When you have atrial fibrillation (say \"AY-tree-eladia pbm-qfyt-OWN-shun\"), a type of irregular heartbeat, the heart's upper chambers quiver, or fibrillate. This can lead to blood clots in the left atrium. Most of these clots form in the AIDAN, where the blood pools. If a clot moves out of the heart and travels to the brain, it may cause a stroke. A left atrial appendage closure is a procedure to close off the AIDAN. The procedure can help prevent a blood clot from moving out of the AIDAN. But it can't prevent a clot from forming in other areas of the left atrium. Closing the AIDAN won't affect how your heart works. Your heart will still pump blood normally. You may be asleep for the procedure, or you may get a sedative to help you relax. Your doctor makes a small cut in your groin. A thin flexible tube (catheter) with tools inside it is put inside your blood vessel and carefully guided to your heart. Your doctor moves the tip of the catheter to the AIDAN and places a small device inside it. The device expands and closes off the opening. It stays inside your heart. The catheter is then removed. In time, your heart will heal around the device. A layer of heart tissue will help seal off the AIDAN. You may stay in the hospital for at least 1 night. How do you prepare for the procedure? Procedures can be stressful. This information will help you understand what you can expect. And it will help you safely prepare for your procedure. Preparing for the procedure    · Be sure you have someone to take you home.  Anesthesia and pain medicine will make it unsafe for you to drive or get home on your own.     · Understand exactly what procedure is planned, along with the risks, benefits, and other options.     · Tell your doctor ALL the medicines, vitamins, supplements, and herbal remedies you take. Some may increase the risk of problems during your procedure. Your doctor will tell you if you should stop taking any of them before the procedure and how soon to do it.     · If you take a blood thinner (other than aspirin), ask your doctor if you should stop taking it before your procedure. Make sure that you understand exactly what your doctor wants you to do. These medicines increase the risk of bleeding.     · You may have a test before the procedure. This may be a CT scan.     · Make sure your doctor and the hospital have a copy of your advance directive. If you don't have one, you may want to prepare one. It lets others know your health care wishes. It's a good thing to have before any type of surgery or procedure. What happens on the day of the procedure?    · Follow the instructions exactly about when to stop eating and drinking. If you don't, your procedure may be canceled. If your doctor told you to take your medicines on the day of the procedure, take them with only a sip of water.     · Follow your doctor's instructions about when to bathe or shower before your procedure. Do not apply lotions, perfumes, deodorants, or nail polish.     · Do not shave the surgical site yourself.     · Take off all jewelry and piercings. And take out contact lenses, if you wear them. At the hospital or surgery center    · Bring a picture ID.     · You will be kept comfortable and safe by your anesthesia provider. The anesthesia may make you sleep. Or it may just numb the area being worked on.     · The procedure will take about 1 hour. After the procedure    · Pressure will be applied to the groin area where the catheter was put in your blood vessel. Then the area may be covered with a bandage or a compression device.  This will prevent bleeding.     · Your care team will check your heart rate and blood pressure. They will also check the catheter site for bleeding.     · You will need to lie still and keep your leg straight for several hours. A weighted bag may be put on your leg.     · You may have a bruise or a small lump where the catheter was put in your blood vessel. This is normal and will go away. When should you call your doctor? · You have questions or concerns.     · You don't understand how to prepare for your procedure.     · You become ill before the procedure (such as fever, flu, or a cold).     · You need to reschedule or have changed your mind about having the procedure. Current as of: April 29, 2021               Content Version: 13.0  © 2006-2021 [employer]. Care instructions adapted under license by [facility] [place] ([facility]). If you have questions about a medical condition or this instruction, always ask your healthcare professional. [place] [address] any warranty or liability for your use of this information.

## 2021-11-23 NOTE — TELEPHONE ENCOUNTER
Patient seen in [facility]. Referral placed for pulmonary call [facility] [phone] patient caregiver with any updates/questions. She has not been on anticoagulation since 10/31/2021. Dr Jorge Alberto Amos said will not do JAYNE prior to procedure will do on the day of procedure.

## 2021-11-29 ENCOUNTER — TELEPHONE (OUTPATIENT)
Dept: CARDIOLOGY CLINIC | Age: 81
End: 2021-11-29

## 2021-11-29 NOTE — TELEPHONE ENCOUNTER
Spoke with Silvia Hernandez about patient getting a Pulmonary appointment. Silvia Hernandez informed me she is working with the [address] [place] [address] office to get an appointment scheduled in December or early Jan.  I asked patient to let me know when appointment as been scheduled.

## 2021-11-29 NOTE — TELEPHONE ENCOUNTER
[address] [facility],[address] please advise. Pt's last OV 8/26/21AGK. Pt's  Omid Landin, is concerned about his wife taking Multaq 400 mg. Pt is having symptoms of dizziness,  and SOB when taking current medication. [address] [facility],[address] Please advise. Spouse would like to discuss with [facility],[address]. Omid Landin (pt's ) can be reached at [phone].     TY

## 2021-11-29 NOTE — TELEPHONE ENCOUNTER
Pt's spouse sts he is concerned about his wife taking dronedarone hcl (MULTAQ) 400 MG TABS. Pt has been having SOB, dizziness amoug other side effects since starting this medication. Spouse wants to discuss with [address] [facility],[address].  TY

## 2021-11-30 ENCOUNTER — TELEPHONE (OUTPATIENT)
Dept: PULMONOLOGY | Age: 81
End: 2021-11-30

## 2021-11-30 NOTE — TELEPHONE ENCOUNTER
11/30/21 Tried calling Randy twice at [phone], call rings and rings and then sounds like someone picks up and then hangs up. Will try again at another time for the 3rd time. [address] OPERATIVE RECORD  ATTENDING SURGEON: Howie Avina MD  ASSISTANT(S):  ERICA Kaur  PRELIMINARY DIAGNOSIS:  1. Left displaced intertrochanteric femur fracture     POSTOPERATIVE DIAGNOSIS:  1.   Left displaced intertrochanteric femu good understanding of treatment options, risks and benefits, and alternatives to surgery. She was given the opportunity to ask questions, which were all answered to the best of my ability and to her satisfaction. Rob Sharif wished to proceed.    On the day of maguire this time, the wounds were copiously irrigated and deep fascial closure was performed with a 0 Vicryl suture, skin was closed with interrupted 3-0 nylon and intervening Steri-Strips were applied.  A dry gauze and 4x4 and Tegaderm dressing was placed.      J

## 2021-11-30 NOTE — TELEPHONE ENCOUNTER
Appointment canceled for Ellen Camarena ([id number])   Visit Type: NEW PATIENT   Date        Time      Length    Provider                  Department   6/1/2022    10:30 AM  15 mins.  Dr. Presley Garcia MD       [facility] PULM [facility] SLEEP      Reason for Cancellation: Patient preference

## 2021-11-30 NOTE — TELEPHONE ENCOUNTER
Was able to get ahold of pt's  Claude Koroma and scheduled pt to see [address] [facility] [address] on 12-7-21 at 115pm. I mailed the appt info, which will be mailed out by tomorrow 12/1 per Randy's request. I tried to leave a vm with the appt info and address at the phone number he requested to but it did not have a vm setup.

## 2021-11-30 NOTE — TELEPHONE ENCOUNTER
Per [address] pt needs a routine follow up with [address],[address] and  needs to be present. Please advise. Pt's  Levar Iverson can be reached at [phone].     TY Robert E Schamberger is a 86 y.o. male who is being evaluated via a billable telephone visit.      What phone number would you like to be contacted at? [phone]  How would you like to obtain your AVS? AVS Preference: Mail a copy.    Assessment & Plan     Sepsis secondary to UTI (H)  Hospitalized with sepsis secondary to UTI in January.  Hospital records reviewed.  Discharged with oral antibiotics to TCU.  Unfortunately had a fall and readmitted in early February.  Found to have recurrent UTI and has now completed 30-day course of antibiotics for acute prostatitis taking Omnicef.    Type 2 diabetes mellitus with peripheral neuropathy (H)  Continues on Basaglar 65 units at night and NovoLog 15 units before meals.  His wife reports that his morning sugars have been running lower than normal.  He has had readings in the 50s.  Sugars later in the day sound reasonably well controlled.  I am recommending that he decrease his dose of Basaglar to 60 units and if any blood sugars are found below 70, he should further decrease his dose to 55 units.  He will continue 15 units of NovoLog before meals but I am asking his wife to check his 2-hour postprandial sugar following lunch and dinner at least once per week and let me know if any hyperglycemia is developing.  We will make sure that he continues to see his ophthalmologist every year for diabetic eye exam.  We will recheck A1c at his appointment in 2 months.  - insulin glargine (BASAGLAR KWIKPEN) 100 unit/mL (3 mL) pen; Refill: 0    Dementia without behavioral disturbance, unspecified dementia type (H)  Not participating in most of conversation with concerns for progression of dementia.  Believed to be vascular dementia from recurrent TIA/CVA.  Continues on aspirin and statin.  Good blood pressure control.    Recurrent falls  Unsteady gait and weakness with recurrent falls requiring admission to hospital in early February.  Also found to have recurrent UTI.  Now receiving  home health care and benefiting from PT/OT.  Up walking with his walker without any recurrent falls.    Essential hypertension  Nurses are checking his blood pressure and finding it generally well controlled with systolic around 140.  Amlodipine was added during hospitalization and he is now taking 5 mg daily.  This is in addition to his losartan and HCTZ.  Slight edema but nothing significant.    Stage 3a chronic kidney disease  Renal function has been stable and will monitor at his return        32 minutes spent on the date of the encounter doing chart review, history and exam, documentation and further activities as noted above       BMI:   Estimated body mass index is 43.4 kg/m  as calculated from the following:    Height as of 11/9/20: 6' (1.829 m).    Weight as of 11/9/20: 320 lb (145.2 kg).       Return in about 2 months (around 5/5/2021) for Recheck.    Ld Moy MD  [facility]   Robert E Schamberger is 86 y.o. and presents today for the following health issues   HPI 86-year-old with type 2 diabetes, morbid obesity, hypertension and osteoarthritis with recent hospitalization with urosepsis and recurrent falls.  Telephone visit was completed today to address health concerns.  See assessment and plan for details.            Review of Systems  No chest pain.  No dyspnea      Objective       Vitals:  No vitals were obtained today due to virtual visit.    Physical Exam  Unable to perform any physical exam during today's telephone visit            Phone call duration: 21 minutes

## 2021-12-07 ENCOUNTER — OFFICE VISIT (OUTPATIENT)
Dept: CARDIOLOGY CLINIC | Age: 81
End: 2021-12-07
Payer: MEDICARE

## 2021-12-07 VITALS
DIASTOLIC BLOOD PRESSURE: 74 MMHG | WEIGHT: 131 LBS | OXYGEN SATURATION: 94 % | HEART RATE: 103 BPM | SYSTOLIC BLOOD PRESSURE: 122 MMHG | HEIGHT: 62 IN | BODY MASS INDEX: 24.11 KG/M2

## 2021-12-07 DIAGNOSIS — I48.0 PAF (PAROXYSMAL ATRIAL FIBRILLATION) (HCC): Primary | ICD-10-CM

## 2021-12-07 DIAGNOSIS — I35.9 AORTIC VALVE DISEASE: ICD-10-CM

## 2021-12-07 DIAGNOSIS — I50.33 ACUTE ON CHRONIC DIASTOLIC (CONGESTIVE) HEART FAILURE (HCC): ICD-10-CM

## 2021-12-07 DIAGNOSIS — Z95.2 S/P TAVR (TRANSCATHETER AORTIC VALVE REPLACEMENT): ICD-10-CM

## 2021-12-07 DIAGNOSIS — I50.43 CHF (CONGESTIVE HEART FAILURE), NYHA CLASS I, ACUTE ON CHRONIC, COMBINED (HCC): ICD-10-CM

## 2021-12-07 PROCEDURE — 99214 OFFICE O/P EST MOD 30 MIN: CPT | Performed by: INTERNAL MEDICINE

## 2021-12-07 PROCEDURE — 93000 ELECTROCARDIOGRAM COMPLETE: CPT | Performed by: INTERNAL MEDICINE

## 2021-12-07 RX ORDER — METOPROLOL SUCCINATE 25 MG/1
25 TABLET, EXTENDED RELEASE ORAL 2 TIMES DAILY
Qty: 180 TABLET | Refills: 2 | Status: ON HOLD | OUTPATIENT
Start: 2021-12-07 | End: 2021-12-28 | Stop reason: SDUPTHER

## 2021-12-07 NOTE — PROGRESS NOTES
[address]   Electrophysiology Consult Note            Date: 12/7/21  Patient Name: Marian Cooks  YOB: 1940    Primary Care Physician: Patience Lowry MD    CHIEF COMPLAINT:   Chief Complaint   Patient presents with    Follow-up    Atrial Fibrillation     HISTORY OF PRESENT ILLNESS: Marian Cooks is a 80 y.o. female with a PMH for AF. She follows with cardiology for AF, HTN, and HLD. On 8/26/2021, her ECG demonstrated AF (100). She states that she has been in AF recently since about 3 weeks ago, and was restarted on diltiazem. She presents today on oxygen. Interval History since last office visit: Patient was in the hospital in 9/2021 with CHF. Patient was in the ED on 10/8/2021 with a fall. Patient has been evaluated for Watchman in 11/2021. Today, 12/7/2021, ECG demonstrates AF (116). She presents with her  and son. She states that she uses her oxygen a few times per day. She states that her energy level has improved since iron infusions. She has stopped taking her dronedarone and feels more energy. She is not taking her blood thinner. She is interested in the watchman procedure and is scheduled for upcoming PFT's. Patient denies current edema, chest pain, sob, palpitations, dizziness or syncope. Past Medical History:   has a past medical history of Acute on chronic diastolic (congestive) heart failure ([address] Aortic stenosis, Arthritis, Cancer ([address] Chronic respiratory failure with hypoxia ([address] COPD exacerbation ([address] Hyperlipidemia, Hypertension, Paroxysmal atrial fibrillation ([address] and Thyroid disease. Past Surgical History:   has a past surgical history that includes Thyroidectomy, partial; Tonsillectomy; Tubal ligation; Knee arthroscopy (Left); other surgical history (10/09/2015); Upper gastrointestinal endoscopy (N/A, 8/13/2019); Colonoscopy (N/A, 8/13/2019); Esophagus dilation (8/13/2019); Cardiac catheterization (09/2019);  Aortic valve replacement (N/A, 10/15/2019); Cystoscopy (N/A, 9/25/2020); eye surgery; and eye surgery (Right, 08/19/2020). Allergies: Iodides, Shellfish-derived products, and Sulfa antibiotics    Social History:   reports that she quit smoking about 38 years ago. Her smoking use included cigarettes. She has a 20.00 pack-year smoking history. She has never used smokeless tobacco. She reports that she does not drink alcohol and does not use drugs. Family History: family history includes Colon Cancer in her mother; Heart Disease in her brother and father. Home Medications:    Prior to Admission medications    Medication Sig Start Date End Date Taking? Authorizing Provider   dilTIAZem (CARTIA XT) 120 MG extended release capsule Take 1 capsule by mouth daily 10/13/21  Yes Taqueria Godfrey MD   potassium chloride (KLOR-CON M) 10 MEQ extended release tablet Take 1 tablet by mouth daily 10/13/21  Yes Taqueria Godfrey MD   torsemide [facility]) 20 MG tablet Take 1 tablet by mouth 2 times daily 10/7/21  Yes Taqueria Godfrey MD   omeprazole (PRILOSEC) 20 MG delayed release capsule Take 20 mg by mouth daily   Yes Historical Provider, MD   atorvastatin (LIPITOR) 40 MG tablet TAKE ONE TABLET BY MOUTH ONCE NIGHTLY 1/19/21  Yes Taqueria Godfrey MD   DULoxetine (CYMBALTA) 30 MG extended release capsule Take 30 mg by mouth daily   Yes Historical Provider, MD   metoprolol succinate (TOPROL XL) 25 MG extended release tablet Take 25 mg by mouth daily   Yes Historical Provider, MD   levothyroxine (SYNTHROID) 125 MCG tablet Take 125 mcg by mouth Daily   Yes Historical Provider, MD   OXYGEN Inhale 2 L into the lungs   Yes Historical Provider, MD   Cyanocobalamin (B-12) 2500 MCG TABS Take 2,500 mcg by mouth once a week Weekly on Wednesdays.    Yes Historical Provider, MD   melatonin 5 MG TABS tablet Take 5 mg by mouth nightly   Yes Historical Provider, MD   HYDROcodone-acetaminophen (NORCO) 5-325 MG per tablet Take 1 tablet by mouth every 6 hours as needed for Pain. Takes one pill @ 8am, one pill @ 3pm, one pill @ 6p, and one pill at bedtime. Yes Historical Provider, MD   Butalbital-Acetaminophen  MG TABS Take 1 tablet by mouth as needed (as needed for headaches) Vary rarely. Yes Historical Provider, MD   dronedarone hcl (MULTAQ) 400 MG TABS Take 1 tablet by mouth 2 times daily (with meals)  Patient not taking: Reported on 12/7/2021 8/26/21   GRACIE Mendoza MD   metaxalone (SKELAXIN) 800 MG tablet Take 800 mg by mouth 2 times daily as needed (as needed for spasms) Do not take with vicodin. Patient not taking: Reported on 12/7/2021    Historical Provider, MD       REVIEW OF SYSTEMS:    All 14-point review of systems are completed and  pertinent positives are mentioned in the history of present illness. Other  systems are reviewed and are negative. Physical Examination:    /74   Pulse 103   Ht 5' 2\" (1.575 m)   Wt 131 lb (59.4 kg)   SpO2 94%   BMI 23.96 kg/m²      Constitutional and General Appearance:    alert, cooperative, no distress and appears stated age  [de-identified]:    PERRLA, no cervical lymphadenopathy. No masses palpable. Normal oral  mucosa  Respiratory:  · Normal excursion and expansion without use of accessory muscles  · Resp Auscultation: Normal breath sounds without dullness or wheezing  Cardiovascular:  · The apical impulse is not displaced  · Tachycardic. Irregular rate and rhythm w/o M/G/R  Abdomen:  · No masses or tenderness  · Bowel sounds present  Extremities:  ·  No Cyanosis or Clubbing  ·  Lower extremity edema: No  · Skin: Warm and dry  Neurological:  · Alert and oriented. · Moves all extremities well  · No abnormalities of mood, affect, memory, mentation, or behavior are noted    DATA:    ECG 12/7/21: Personally reviewed. Echo 8/31/2021   Summary   -- Left ventricular systolic function is normal with a visually estimated   ejection fraction of 55%.  The left ventricle is normal in size with mild   concentric hypertrophy. No obvious regional wall motion abnormalities noted. Elevated left atrial pressures with a septal E/e' ratio of 33.1.   -- Right ventricular systolic function is reduced. -- The left atrium appears severely enlarged. The right atrium is moderately   enlarged. -- Mild mitral and pulmonic regurgitation. -- Mild mitral stenosis. -- A 23 mm Garcia Joni 3 bioprosthetic aortic valve appears well seated   with mildly abnormal Doppler values; however the values are stable since one   month s/p TAVR. There is mild perivalvular aortic valve regurgitation. -- Moderate tricuspid regurgitation. Systolic pulmonary artery pressure (SPAP) is elevated and estimated at 55   mmHg (right atrial pressure 15 mmHg) consistent with moderate pulmonary   hypertension. The IVC is dilated in size (>2.1 cm) and collapses <50% with respiration   consistent with markedly elevated right atrial pressures (15 mmHg) . Compared with the previous exam on 8/20/20, RV function is down and PHTN and   tricuspid regurgitation have significantly increased. IMPRESSION:    1. Paroxysmal atrial fibrillation ([id number] score 4).  8/26/2021  Patient is a pleasant 80-year-old female with a medical history significant for persistent atrial fibrillation, hypothyroidism, COPD on oxygen, GI bleeding and a history of digoxin toxicity who presents from home for follow-up. Patient continues to be mildly anemic. She is also tachycardic in atrial fibrillation with borderline rate control. We discussed options including further rate control (is limited by her blood pressure), antiarrhythmic therapy, ablation, and pace and ablate strategy. Patient and family would prefer rhythm control at this point given the fact that her heart rates are not well controlled. We discussed antiarrhythmic therapy and patient decided on dronedarone (we discussed cost).   We will plan for cardioversion when caregiver is back in town.  We will need to discuss Watchman device in future with patient given anemia. I agree with lower dose apixaban for now given her weight of nearly 60 kg and age greater than [de-identified].    - Discuss Watchman after cardioversion.  - Start dronedarone. - Schedule cardioversion. 12/7/2021  Patient presents for follow-up. There is a lot of education she done with her . Likely both she her  and her son are present today. We discussed atrial fibrillation again in detail including stroke risk and risk for developing heart failure. She is interested in moving forward with a watchman. Her heart rates are currently not controlled we cannot convert her into normal sinus rhythm due to not being able to tolerate anticoagulation due to hematuria. We will therefore increase her metoprolol. If she develops symptoms as she did with dronedarone we will increase her diltiazem. Patient family understand plan. I will reach out to [facility] team.  - Increase metoprolol to 25 mg BID.  - Continue diltiazem. - Follow up with Watchman team.    RECOMMENDATIONS:  1. Continue to work with Dr Tyler Georges office regarding Watchman procedure (to prevent stroke risk). 2. Increase metoprolol to 25 mg twice daily (to decrease HR). 3. Stop multaq- patient did not tolerate. 4. Follow up in 6 months with EP NP.      QUALITY MEASURES  1. Tobacco Cessation Counseling: NA  2. Retake of BP if >140/90:   NA  3. Documentation to PCP/referring for new patient:  Sent to PCP at close of office visit  4. CAD patient on anti-platelet: NA  5. CAD patient on STATIN therapy:  NA  6. Patient with CHF and aFib on anticoagulation:  Yes     All questions and concerns were addressed to the patient/family. Alternatives to my treatment were discussed. Dr. Trey Smart MD  Electrophysiology  [facility], [place], operated by [facility]. [address]. [address].   [address]  Phone: [phone]  Fax: [phone]     NOTE: This report was transcribed using voice recognition software. Every effort was made to ensure accuracy, however, inadvertent computerized transcription errors may be present. Rogers Colindres RN, am scribing for and in the presence of Dr. Hong Mark. 12/07/21 1:38 PM   Jigna Kenyon RN    The scribe's documentation has been prepared under my direction and personally reviewed by me in its entirety. I confirm that the note above accurately reflects all work, physical examination, the discussion of treatments and procedures, and medical decision making performed by me. Luis Villa MD personally performed the services described in this documentation as scribed by nurse in my presence, and is both accurate and complete.     Electronically signed by Lisa Ritter MD on 12/7/2021 at 2:09 PM

## 2021-12-07 NOTE — LETTER
Lisa Abraham  1940    [address]   Electrophysiology Consult Note            Date: 12/7/21  Patient Name: Lisa Abraham  YOB: 1940    Primary Care Physician: Philip Vila MD    CHIEF COMPLAINT:   Chief Complaint   Patient presents with    Follow-up    Atrial Fibrillation     HISTORY OF PRESENT ILLNESS: Lisa Abraham is a 80 y.o. female with a PMH for AF. She follows with cardiology for AF, HTN, and HLD. On 8/26/2021, her ECG demonstrated AF (100). She states that she has been in AF recently since about 3 weeks ago, and was restarted on diltiazem. She presents today on oxygen. Interval History since last office visit: Patient was in the hospital in 9/2021 with CHF. Patient was in the ED on 10/8/2021 with a fall. Patient has been evaluated for Watchman in 11/2021. Today, 12/7/2021, ECG demonstrates AF (116). She presents with her  and son. She states that she uses her oxygen a few times per day. She states that her energy level has improved since iron infusions. She has stopped taking her dronedarone and feels more energy. She is not taking her blood thinner. She is interested in the watchman procedure and is scheduled for upcoming PFT's. Patient denies current edema, chest pain, sob, palpitations, dizziness or syncope. Past Medical History:   has a past medical history of Acute on chronic diastolic (congestive) heart failure ([address] Aortic stenosis, Arthritis, Cancer ([address] Chronic respiratory failure with hypoxia ([address] COPD exacerbation ([address] Hyperlipidemia, Hypertension, Paroxysmal atrial fibrillation ([address] and Thyroid disease. Past Surgical History:   has a past surgical history that includes Thyroidectomy, partial; Tonsillectomy; Tubal ligation; Knee arthroscopy (Left); other surgical history (10/09/2015); Upper gastrointestinal endoscopy (N/A, 8/13/2019); Colonoscopy (N/A, 8/13/2019); Esophagus dilation (8/13/2019);  Cardiac catheterization (09/2019); Aortic valve replacement (N/A, 10/15/2019); Cystoscopy (N/A, 9/25/2020); eye surgery; and eye surgery (Right, 08/19/2020). Allergies: Iodides, Shellfish-derived products, and Sulfa antibiotics    Social History:   reports that she quit smoking about 38 years ago. Her smoking use included cigarettes. She has a 20.00 pack-year smoking history. She has never used smokeless tobacco. She reports that she does not drink alcohol and does not use drugs. Family History: family history includes Colon Cancer in her mother; Heart Disease in her brother and father. Home Medications:    Prior to Admission medications    Medication Sig Start Date End Date Taking? Authorizing Provider   dilTIAZem (CARTIA XT) 120 MG extended release capsule Take 1 capsule by mouth daily 10/13/21  Yes Gina Mark MD   potassium chloride (KLOR-CON M) 10 MEQ extended release tablet Take 1 tablet by mouth daily 10/13/21  Yes Gina Mark MD   torsemide [facility]) 20 MG tablet Take 1 tablet by mouth 2 times daily 10/7/21  Yes Gina Mark MD   omeprazole (PRILOSEC) 20 MG delayed release capsule Take 20 mg by mouth daily   Yes Historical Provider, MD   atorvastatin (LIPITOR) 40 MG tablet TAKE ONE TABLET BY MOUTH ONCE NIGHTLY 1/19/21  Yes Gina Mark MD   DULoxetine (CYMBALTA) 30 MG extended release capsule Take 30 mg by mouth daily   Yes Historical Provider, MD   metoprolol succinate (TOPROL XL) 25 MG extended release tablet Take 25 mg by mouth daily   Yes Historical Provider, MD   levothyroxine (SYNTHROID) 125 MCG tablet Take 125 mcg by mouth Daily   Yes Historical Provider, MD   OXYGEN Inhale 2 L into the lungs   Yes Historical Provider, MD   Cyanocobalamin (B-12) 2500 MCG TABS Take 2,500 mcg by mouth once a week Weekly on Wednesdays.    Yes Historical Provider, MD   melatonin 5 MG TABS tablet Take 5 mg by mouth nightly   Yes Historical Provider, MD   HYDROcodone-acetaminophen (NORCO) 5-325 MG per tablet Take 1 tablet by mouth every 6 hours as needed for Pain. Takes one pill @ 8am, one pill @ 3pm, one pill @ 6p, and one pill at bedtime. Yes Historical Provider, MD   Butalbital-Acetaminophen  MG TABS Take 1 tablet by mouth as needed (as needed for headaches) Vary rarely. Yes Historical Provider, MD   dronedarone hcl (MULTAQ) 400 MG TABS Take 1 tablet by mouth 2 times daily (with meals)  Patient not taking: Reported on 12/7/2021 8/26/21   GRACIE Murray MD   metaxalone (SKELAXIN) 800 MG tablet Take 800 mg by mouth 2 times daily as needed (as needed for spasms) Do not take with vicodin. Patient not taking: Reported on 12/7/2021    Historical Provider, MD       REVIEW OF SYSTEMS:    All 14-point review of systems are completed and  pertinent positives are mentioned in the history of present illness. Other  systems are reviewed and are negative. Physical Examination:    /74   Pulse 103   Ht 5' 2\" (1.575 m)   Wt 131 lb (59.4 kg)   SpO2 94%   BMI 23.96 kg/m²      Constitutional and General Appearance:    alert, cooperative, no distress and appears stated age  [de-identified]:    PERRLA, no cervical lymphadenopathy. No masses palpable. Normal oral  mucosa  Respiratory:  · Normal excursion and expansion without use of accessory muscles  · Resp Auscultation: Normal breath sounds without dullness or wheezing  Cardiovascular:  · The apical impulse is not displaced  · Tachycardic. Irregular rate and rhythm w/o M/G/R  Abdomen:  · No masses or tenderness  · Bowel sounds present  Extremities:  ·  No Cyanosis or Clubbing  ·  Lower extremity edema: No  · Skin: Warm and dry  Neurological:  · Alert and oriented. · Moves all extremities well  · No abnormalities of mood, affect, memory, mentation, or behavior are noted    DATA:    ECG 12/7/21: Personally reviewed.      Echo 8/31/2021   Summary   -- Left ventricular systolic function is normal with a visually estimated   ejection fraction of 55%. The left ventricle is normal in size with mild   concentric hypertrophy. No obvious regional wall motion abnormalities noted. Elevated left atrial pressures with a septal E/e' ratio of 33.1.   -- Right ventricular systolic function is reduced. -- The left atrium appears severely enlarged. The right atrium is moderately   enlarged. -- Mild mitral and pulmonic regurgitation. -- Mild mitral stenosis. -- A 23 mm Garcia Joni 3 bioprosthetic aortic valve appears well seated   with mildly abnormal Doppler values; however the values are stable since one   month s/p TAVR. There is mild perivalvular aortic valve regurgitation. -- Moderate tricuspid regurgitation. Systolic pulmonary artery pressure (SPAP) is elevated and estimated at 55   mmHg (right atrial pressure 15 mmHg) consistent with moderate pulmonary   hypertension. The IVC is dilated in size (>2.1 cm) and collapses <50% with respiration   consistent with markedly elevated right atrial pressures (15 mmHg) . Compared with the previous exam on 8/20/20, RV function is down and PHTN and   tricuspid regurgitation have significantly increased. IMPRESSION:    1. Paroxysmal atrial fibrillation ([id number] score 4).  8/26/2021  Patient is a pleasant 75-year-old female with a medical history significant for persistent atrial fibrillation, hypothyroidism, COPD on oxygen, GI bleeding and a history of digoxin toxicity who presents from home for follow-up. Patient continues to be mildly anemic. She is also tachycardic in atrial fibrillation with borderline rate control. We discussed options including further rate control (is limited by her blood pressure), antiarrhythmic therapy, ablation, and pace and ablate strategy. Patient and family would prefer rhythm control at this point given the fact that her heart rates are not well controlled. We discussed antiarrhythmic therapy and patient decided on dronedarone (we discussed cost).   We will plan for cardioversion when caregiver is back in town. We will need to discuss Watchman device in future with patient given anemia. I agree with lower dose apixaban for now given her weight of nearly 60 kg and age greater than [de-identified].    - Discuss Watchman after cardioversion.  - Start dronedarone. - Schedule cardioversion. 12/7/2021  Patient presents for follow-up. There is a lot of education she done with her . Likely both she her  and her son are present today. We discussed atrial fibrillation again in detail including stroke risk and risk for developing heart failure. She is interested in moving forward with a watchman. Her heart rates are currently not controlled we cannot convert her into normal sinus rhythm due to not being able to tolerate anticoagulation due to hematuria. We will therefore increase her metoprolol. If she develops symptoms as she did with dronedarone we will increase her diltiazem. Patient family understand plan. I will reach out to [facility] team.  - Increase metoprolol to 25 mg BID.  - Continue diltiazem. - Follow up with Watchman team.    RECOMMENDATIONS:  1. Continue to work with Dr Milly Schwab office regarding Watchman procedure (to prevent stroke risk). 2. Increase metoprolol to 25 mg twice daily (to decrease HR). 3. Stop multaq- patient did not tolerate. 4. Follow up in 6 months with EP NP.      QUALITY MEASURES  1. Tobacco Cessation Counseling: NA  2. Retake of BP if >140/90:   NA  3. Documentation to PCP/referring for new patient:  Sent to PCP at close of office visit  4. CAD patient on anti-platelet: NA  5. CAD patient on STATIN therapy:  NA  6. Patient with CHF and aFib on anticoagulation:  Yes     All questions and concerns were addressed to the patient/family. Alternatives to my treatment were discussed. Dr. Mary Diaz MD  Electrophysiology  [facility]. [address].   [place], [address]  Phone: [phone]  Fax: [phone]     NOTE: This report was transcribed using voice recognition software. Every effort was made to ensure accuracy, however, inadvertent computerized transcription errors may be present. Johnny Raphael RN, am scribing for and in the presence of Dr. Ly Calzada. 12/07/21 1:38 PM   rAely Hines RN    The scribe's documentation has been prepared under my direction and personally reviewed by me in its entirety. I confirm that the note above accurately reflects all work, physical examination, the discussion of treatments and procedures, and medical decision making performed by me. Travis Mercedes MD personally performed the services described in this documentation as scribed by nurse in my presence, and is both accurate and complete.     Electronically signed by Dior Sampson MD on 12/7/2021 at 2:09 PM

## 2021-12-08 ENCOUNTER — HOSPITAL ENCOUNTER (OUTPATIENT)
Age: 81
Discharge: HOME | End: 2021-12-08
Payer: MEDICARE

## 2021-12-08 ENCOUNTER — HOSPITAL ENCOUNTER (OUTPATIENT)
Dept: PULMONOLOGY | Age: 81
Discharge: HOME | End: 2021-12-08
Payer: MEDICARE

## 2021-12-08 ENCOUNTER — HOSPITAL ENCOUNTER (OUTPATIENT)
Dept: GENERAL RADIOLOGY | Age: 81
Discharge: HOME | End: 2021-12-08
Payer: MEDICARE

## 2021-12-08 VITALS — OXYGEN SATURATION: 93 %

## 2021-12-08 DIAGNOSIS — R73.9 HYPERGLYCEMIA: ICD-10-CM

## 2021-12-08 DIAGNOSIS — I50.9 CONGESTIVE HEART FAILURE, UNSPECIFIED HF CHRONICITY, UNSPECIFIED HEART FAILURE TYPE (HCC): ICD-10-CM

## 2021-12-08 PROCEDURE — 94760 N-INVAS EAR/PLS OXIMETRY 1: CPT

## 2021-12-08 PROCEDURE — 6370000000 HC RX 637 (ALT 250 FOR IP): Performed by: INTERNAL MEDICINE

## 2021-12-08 PROCEDURE — 94060 EVALUATION OF WHEEZING: CPT

## 2021-12-08 PROCEDURE — 71046 X-RAY EXAM CHEST 2 VIEWS: CPT

## 2021-12-08 PROCEDURE — 94640 AIRWAY INHALATION TREATMENT: CPT

## 2021-12-08 RX ORDER — ALBUTEROL SULFATE 90 UG/1
4 AEROSOL, METERED RESPIRATORY (INHALATION) ONCE
Status: COMPLETED | OUTPATIENT
Start: 2021-12-08 | End: 2021-12-08

## 2021-12-08 RX ADMIN — Medication 4 PUFF: at 11:26

## 2021-12-14 NOTE — TELEPHONE ENCOUNTER
Parris can you please schedule a Pre/ JAYNE and a shared decision with Dr. Vinay Mejia for [address].

## 2021-12-14 NOTE — TELEPHONE ENCOUNTER
Patient was seen by Dr. Koki Bynum on 11/23/21 and referred to see a Pulmonology MD.  Dr. Narinder Kaiser saw patient and ordered PFT and chest x-ray. According to patients care taker Dr. Narinder Kaiser doesn't feel she needs to see a Pulmonologist.  I will reach out to Dr. Koki Bynum to see if we can scheduled her PRE JAYNE ans shared decision.

## 2021-12-17 NOTE — TELEPHONE ENCOUNTER
Spoke with patient. Patient is scheduled with Dr. Tyler Tolbert for [address] with anesthesia on 12/28/21 at [facility], arrival time of 7:30am to the Cath Lab. Please have patient arrive to the main entrance of [facility] and check in with the registration desk. Please call patient regarding medication instructions. Remind patient to be NPO after midnight (8 hours prior). Do not apply lotions/creams on skin the day of procedure.     COVID testing - RAPID or   Pre-Procedure Process   We are asking the physicians' offices to encourage all patients to obtain a COVID test prior to their procedure (within 6 days of their scheduled date)   The PAT RN will encourage the patient to obtain a COVID test prior to their procedure (within 6 days of their scheduled date)   This can be done at their Primary Care Doctor, [address] [place] [address], Urgent Care, [place], [facility], etc.   A PCR or Rapid test result will be accepted        [facility]Walk in ([address] main entrance of hospital)  Monday - Friday 8am-5pm

## 2021-12-28 ENCOUNTER — ANESTHESIA EVENT (OUTPATIENT)
Dept: NON INVASIVE DIAGNOSTICS | Age: 81
End: 2021-12-28
Payer: MEDICARE

## 2021-12-28 ENCOUNTER — HOSPITAL ENCOUNTER (OUTPATIENT)
Dept: CARDIAC CATH/INVASIVE PROCEDURES | Age: 81
Discharge: HOME | End: 2021-12-28
Attending: INTERNAL MEDICINE | Admitting: INTERNAL MEDICINE
Payer: MEDICARE

## 2021-12-28 ENCOUNTER — TELEPHONE (OUTPATIENT)
Dept: CARDIOLOGY | Age: 81
End: 2021-12-28

## 2021-12-28 ENCOUNTER — ANESTHESIA (OUTPATIENT)
Dept: NON INVASIVE DIAGNOSTICS | Age: 81
End: 2021-12-28
Payer: MEDICARE

## 2021-12-28 VITALS
OXYGEN SATURATION: 98 % | SYSTOLIC BLOOD PRESSURE: 105 MMHG | RESPIRATION RATE: 16 BRPM | DIASTOLIC BLOOD PRESSURE: 58 MMHG

## 2021-12-28 VITALS — HEIGHT: 63 IN | WEIGHT: 130 LBS | BODY MASS INDEX: 23.04 KG/M2

## 2021-12-28 DIAGNOSIS — I48.0 PAF (PAROXYSMAL ATRIAL FIBRILLATION) (HCC): ICD-10-CM

## 2021-12-28 DIAGNOSIS — I51.89 CARDIAC MASS: ICD-10-CM

## 2021-12-28 DIAGNOSIS — I82.90 THROMBUS: Primary | ICD-10-CM

## 2021-12-28 LAB
ANION GAP SERPL CALCULATED.3IONS-SCNC: 11 MMOL/L (ref 3–16)
BUN BLDV-MCNC: 20 MG/DL (ref 7–20)
CALCIUM SERPL-MCNC: 9 MG/DL (ref 8.3–10.6)
CHLORIDE BLD-SCNC: 99 MMOL/L (ref 99–110)
CO2: 27 MMOL/L (ref 21–32)
CREAT SERPL-MCNC: 1.2 MG/DL (ref 0.6–1.2)
EKG ATRIAL RATE: 107 BPM
EKG DIAGNOSIS: NORMAL
EKG Q-T INTERVAL: 336 MS
EKG QRS DURATION: 68 MS
EKG QTC CALCULATION (BAZETT): 452 MS
EKG R AXIS: 7 DEGREES
EKG T AXIS: 0 DEGREES
EKG VENTRICULAR RATE: 109 BPM
GFR AFRICAN AMERICAN: 52
GFR NON-AFRICAN AMERICAN: 43
GLUCOSE BLD-MCNC: 137 MG/DL (ref 70–99)
HCT VFR BLD CALC: 35.4 % (ref 36–48)
HEMOGLOBIN: 11.4 G/DL (ref 12–16)
MCH RBC QN AUTO: 29.6 PG (ref 26–34)
MCHC RBC AUTO-ENTMCNC: 32.3 G/DL (ref 31–36)
MCV RBC AUTO: 91.6 FL (ref 80–100)
PDW BLD-RTO: 16.7 % (ref 12.4–15.4)
PLATELET # BLD: 220 K/UL (ref 135–450)
PMV BLD AUTO: 7.5 FL (ref 5–10.5)
POTASSIUM REFLEX MAGNESIUM: 4 MMOL/L (ref 3.5–5.1)
RBC # BLD: 3.86 M/UL (ref 4–5.2)
SODIUM BLD-SCNC: 137 MMOL/L (ref 136–145)
WBC # BLD: 7.2 K/UL (ref 4–11)

## 2021-12-28 PROCEDURE — 93325 DOPPLER ECHO COLOR FLOW MAPG: CPT

## 2021-12-28 PROCEDURE — 93315 ECHO TRANSESOPHAGEAL: CPT

## 2021-12-28 PROCEDURE — 93312 ECHO TRANSESOPHAGEAL: CPT | Performed by: INTERNAL MEDICINE

## 2021-12-28 PROCEDURE — 85027 COMPLETE CBC AUTOMATED: CPT

## 2021-12-28 PROCEDURE — 93005 ELECTROCARDIOGRAM TRACING: CPT | Performed by: INTERNAL MEDICINE

## 2021-12-28 PROCEDURE — 6360000002 HC RX W HCPCS: Performed by: NURSE ANESTHETIST, CERTIFIED REGISTERED

## 2021-12-28 PROCEDURE — 93010 ELECTROCARDIOGRAM REPORT: CPT | Performed by: INTERNAL MEDICINE

## 2021-12-28 PROCEDURE — 93320 DOPPLER ECHO COMPLETE: CPT

## 2021-12-28 PROCEDURE — 2580000003 HC RX 258: Performed by: NURSE ANESTHETIST, CERTIFIED REGISTERED

## 2021-12-28 PROCEDURE — 6370000000 HC RX 637 (ALT 250 FOR IP)

## 2021-12-28 PROCEDURE — 99213 OFFICE O/P EST LOW 20 MIN: CPT | Performed by: INTERNAL MEDICINE

## 2021-12-28 PROCEDURE — 80048 BASIC METABOLIC PNL TOTAL CA: CPT

## 2021-12-28 PROCEDURE — 93325 DOPPLER ECHO COLOR FLOW MAPG: CPT | Performed by: INTERNAL MEDICINE

## 2021-12-28 RX ORDER — PROPOFOL 10 MG/ML
INJECTION, EMULSION INTRAVENOUS PRN
Status: DISCONTINUED | OUTPATIENT
Start: 2021-12-28 | End: 2021-12-28 | Stop reason: SDUPTHER

## 2021-12-28 RX ORDER — SODIUM CHLORIDE 9 MG/ML
25 INJECTION, SOLUTION INTRAVENOUS PRN
Status: DISCONTINUED | OUTPATIENT
Start: 2021-12-28 | End: 2021-12-28 | Stop reason: HOSPADM

## 2021-12-28 RX ORDER — SODIUM CHLORIDE, SODIUM LACTATE, POTASSIUM CHLORIDE, CALCIUM CHLORIDE 600; 310; 30; 20 MG/100ML; MG/100ML; MG/100ML; MG/100ML
INJECTION, SOLUTION INTRAVENOUS CONTINUOUS PRN
Status: DISCONTINUED | OUTPATIENT
Start: 2021-12-28 | End: 2021-12-28 | Stop reason: SDUPTHER

## 2021-12-28 RX ORDER — SODIUM CHLORIDE 0.9 % (FLUSH) 0.9 %
5-40 SYRINGE (ML) INJECTION PRN
Status: DISCONTINUED | OUTPATIENT
Start: 2021-12-28 | End: 2021-12-28 | Stop reason: HOSPADM

## 2021-12-28 RX ORDER — SODIUM CHLORIDE 0.9 % (FLUSH) 0.9 %
5-40 SYRINGE (ML) INJECTION EVERY 12 HOURS SCHEDULED
Status: DISCONTINUED | OUTPATIENT
Start: 2021-12-28 | End: 2021-12-28 | Stop reason: HOSPADM

## 2021-12-28 RX ORDER — METOPROLOL SUCCINATE 50 MG/1
50 TABLET, EXTENDED RELEASE ORAL 2 TIMES DAILY
Qty: 60 TABLET | Refills: 2 | Status: SHIPPED | OUTPATIENT
Start: 2021-12-28 | End: 2022-03-24

## 2021-12-28 RX ADMIN — SODIUM CHLORIDE, SODIUM LACTATE, POTASSIUM CHLORIDE, AND CALCIUM CHLORIDE: .6; .31; .03; .02 INJECTION, SOLUTION INTRAVENOUS at 09:50

## 2021-12-28 RX ADMIN — PROPOFOL 250 MG: 10 INJECTION, EMULSION INTRAVENOUS at 09:56

## 2021-12-28 NOTE — PROGRESS NOTES
CARDIOLOGY CONSULTATION         Patient Name: Marian Cooks  Primary Care physician: Patience Lowry MD    Reason for Referral/Chief Complaint: Marian Cooks is a 80 y.o. patient who is referred to cardiology clinic today for evaluation for candidacy for Watchman Procedure. History of Present Illness:   Marian Cooks is a 80 y.o. patient with a prior medical history significant for HFpEF, aortic stenosis status post number 23 mm SapienTAVR, COPD, hyperlipidemia, hypertension, thyroid disease and persistent atrial fibrillation (follows with Dr. Yaritza Gomez). She has a history of recurrent hematuria. She has had cystoscopy in the past. She has not been on the Eliquis since 10/31/2021. Recently seen with Dr. Camacho Russell of [facility] clinic 11/23/2021 and felt to be candidate for left atrial appendage closure. Presents today for transesophageal echocardiogram as well as second opinion/shared decision. Today, patient states she has been doing well. Confirms history of recurrent hematuria. She denies any evidence of hematemesis, hemoptysis, melena, hematochezia. Presently taking no blood thinner including aspirin. She confirms history of falls related to imbalance. 1-2 x per month. No recent head trauma/ER visits or history of brain bleed. The patient denies chest pain, shortness of breath at rest and dyspnea with exertion. Denies palpitations despite HR >100. Reports she never feels her AF. Denies mali syncope. Denies paroxysmal nocturnal dyspnea, orthopnea, bendopnea, increasing lower extremity edema or weight gain. Patient last seen with electrophysiology 12/7 at which time her metoprolol was increased to 25 mg twice daily dosing. She was intolerant of Multaq and this was stopped. The patient is compliant with medications. Cost of medications is affordable. No endorsed side effects.         Past Medical History:   has a past medical history of Acute on chronic diastolic (congestive) DULoxetine (CYMBALTA) 30 MG extended release capsule Take 30 mg by mouth daily    Historical Provider, MD   levothyroxine (SYNTHROID) 125 MCG tablet Take 125 mcg by mouth Daily    Historical Provider, MD   OXYGEN Inhale 2 L into the lungs    Historical Provider, MD   Cyanocobalamin (B-12) 2500 MCG TABS Take 2,500 mcg by mouth once a week Weekly on Wednesdays. Historical Provider, MD   melatonin 5 MG TABS tablet Take 5 mg by mouth nightly    Historical Provider, MD   HYDROcodone-acetaminophen (NORCO) 5-325 MG per tablet Take 1 tablet by mouth every 6 hours as needed for Pain. Takes one pill @ 8am, one pill @ 3pm, one pill @ 6p, and one pill at bedtime. Historical Provider, MD   metaxalone (SKELAXIN) 800 MG tablet Take 800 mg by mouth 2 times daily as needed (as needed for spasms) Do not take with vicodin. Patient not taking: Reported on 12/7/2021    Historical Provider, MD   Butalbital-Acetaminophen  MG TABS Take 1 tablet by mouth as needed (as needed for headaches) Vary rarely. Historical Provider, MD        CURRENT Medications:  No current facility-administered medications for this encounter. Allergies: Iodides, Shellfish-derived products, Sulfa antibiotics, and Dronedarone     Review of Systems:   A 14 point review of symptoms completed. Pertinent positives identified in the HPI, all other review of symptoms negative as below. Objective:     VS reviewed. PHYSICAL EXAM:    General:  Elderly woman NAD   Head:  Normocephalic, atraumatic   Eyes:  Conjunctiva/corneas clear, anicteric sclerae    Nose: Nares normal, no drainage or sinus tenderness   Throat: No abnormalities of the lips, oral mucosa or tongue. Dry membranes. Neck: Trachea midline.  Neck supple with no lymphadenopathy, thyroid not enlarged, symmetric, no tenderness/mass/nodules, EJ distention    Lungs:   Clear to auscultation bilaterally, no wheezes, no rales, no respiratory distress   Chest Wall:  No deformity or tenderness to palpation   Heart:  Irregularly irregular, variable intensity S1, normal S2, III/VI HCRISTO heard across the precordium, no rub, no S3/S4, PMI non-displaced. Abdomen:   Soft, non-tender, with normoactive bowel sounds. No masses, no hepatosplenomegaly   Extremities: No cyanosis, clubbing or pitting edema. Vascular: 2+ radial, 2+ dorsalis pedis and posterior tibial pulses bilaterally. Brisk carotid upstrokes without carotid bruit. Skin: Skin color, texture, turgor are normal with no rashes or ulceration. Pysch: Euthymic mood, appropriate affect   Neurologic: Oriented to person, place and time. No slurred speech or facial asymmetry. No motor or sensory deficits on gross examination. Labs:   CBC:   Lab Results   Component Value Date    WBC 7.0 09/09/2021    RBC 3.39 09/09/2021    HGB 10.2 09/09/2021    HCT 30.9 10/05/2021    MCV 91.6 09/09/2021    RDW 18.5 09/09/2021     09/09/2021     CMP:  Lab Results   Component Value Date     10/12/2021    K 3.7 10/12/2021    K 4.3 09/09/2021     10/12/2021    CO2 30 10/12/2021    BUN 25 10/12/2021    CREATININE 1.84 10/12/2021    CREATININE 1.7 10/05/2021    GFRAA 28 10/12/2021    AGRATIO 1.3 09/09/2021    LABGLOM 25 10/12/2021    GLUCOSE 126 10/12/2021    PROT 6.0 09/09/2021    CALCIUM 8.5 10/12/2021    BILITOT 0.3 10/04/2021    ALKPHOS 93 10/04/2021    AST 17 10/04/2021    ALT 14 10/04/2021     PT/INR:  No results found for: PTINR  HgBA1c:  Lab Results   Component Value Date    LABA1C 6.3 11/01/2019     Lab Results   Component Value Date    TROPONINI 0.01 09/05/2021         Cardiac Data:     EKG: Reviewed with my interpretation: Atrial fibrillation,  bpm, prior anteroseptal infarct. Echo: 8/31/21   Conclusions      Summary   -- Left ventricular systolic function is normal with a visually estimated   ejection fraction of 55%. The left ventricle is normal in size with mild   concentric hypertrophy.  No obvious regional wall motion abnormalities noted. Elevated left atrial pressures with a septal E/e' ratio of 33.1.   -- Right ventricular systolic function is reduced. -- The left atrium appears severely enlarged. The right atrium is moderately   enlarged. -- Mild mitral and pulmonic regurgitation. -- Mild mitral stenosis. -- A 23 mm Garcia Joni 3 bioprosthetic aortic valve appears well seated   with mildly abnormal Doppler values; however the values are stable since one   month s/p TAVR. There is mild perivalvular aortic valve regurgitation. -- Moderate tricuspid regurgitation. Systolic pulmonary artery pressure (SPAP) is elevated and estimated at 55   mmHg (right atrial pressure 15 mmHg) consistent with moderate pulmonary   hypertension. The IVC is dilated in size (>2.1 cm) and collapses <50% with respiration   consistent with markedly elevated right atrial pressures (15 mmHg) . Compared with the previous exam on 8/20/20, RV function is down and PHTN and   tricuspid regurgitation have significantly increased. Impression and Plan:   Becca Story is being referred for Left Atrial Appendage Closure with WATCHMAN device for management of stroke risk resulting from non-valvular atrial fibrillation. Based on their past history, it has been determined that they are poor candidates for long-term oral-anticoagulation, however may be tolerant of short term treatment with anti-thrombotic therapy as necessary. Patient is high risk for stroke or systemic thromboembolism. Patient [id number]  is calculated:      Risk   Factors  Component Value   C CHF Yes 1   H HTN Yes 1   A2 Age >= 76 Yes,  (80 y.o.) 2   D DM No 0   S2 Prior Stroke/TIA No 0   V Vascular Disease No 0   A Age 74-69 No,  (80 y.o.) 0   Sc Sex female 1    [id number]  Score  5       Specifically regarding risk of anticoagulation they have demonstrated:  ? X History of bleeding (eg. Intracerebral, subdural, GI, retro-peritoneal)  ?  X Intolerance oral anticoagulation  ? X Increased bleeding risk (e.g. Thrombocytopenia, anemia)  ? X High risk of recurrent falls  ? Occupation related high bleeding risk  ? Need for prolonged dual anti platelet therapy  ? Severe renal failure  ? Poor compliance with anticoagulant therapy        HAS-BLED Score                            Risk Factors      Points   Hypertension  Uncontrolled, >994 mmHg systolic   Yes=1   Renal disease  Dialysis, transplant, Cr>2.26 mg/dL or >200 µmol/L   No=0   Liver disease   Cirrhosis or bilirubin >2x normal with AST/ALT/AP >3x normal   No=0   Stroke history   No=0   Prior major bleeding or predisposition to bleeding     Yes=1   Labile  INR  Unstable/high INRs, time in therapeutic range <60%   No=0   Age >71   Yes=1   Medication usage predisposing to bleeding   Aspirin, clopidogrel, NSAIDs   Yes=1   Alcohol use   >7 drinks/week   No=0       HAS-BLED Score:    4:  Risk was 8.9% in one validation study and 8.70 bleeds per 100 patient-years in another validation study. We have discussed their unique stroke and bleeding risk both on and off oral-anticoagulation, and the rationale for this referral.  Based on both stroke and bleeding risk, a shared decision has been made to pursue closure of the left atrial appendage as an alternative to oral anticoagulant therapy for stroke prophylaxis and to reduce their long term risk of incidence of bleeding.     Signed:    Iesha Lopez MD, 12/28/2021, 8:27 AM   Patient Active Problem List   Diagnosis    Lumbar radiculopathy    Lumbar spine pain    Hip pain, left    Aortic stenosis    Hypercholesteremia    Essential hypertension    Loss of appetite    Weight loss    Anemia    Positive colorectal cancer screening using Cologuard test    Schatzki's ring    Nonrheumatic aortic valve stenosis    Digoxin toxicity, accidental or unintentional, initial encounter    S/P TAVR (transcatheter aortic valve replacement)    PAF (paroxysmal atrial fibrillation) ([address]    FLORENCE (acute kidney injury) ([address]    Digoxin overdose, accidental or unintentional, initial encounter    Acute on chronic diastolic (congestive) heart failure (HCC)    Aortic valve disease    Acute on chronic congestive heart failure (HCC)    Acute on chronic respiratory failure with hypoxia (HCC)    CHF (congestive heart failure), NYHA class I, acute on chronic, combined (HCC)    Chronic respiratory failure with hypoxia ([address]    COPD exacerbation ([address]           I will address the patient's cardiac risk factors and adjusted pharmacologic treatment as needed. In addition, I have reinforced the need for patient directed risk factor modification. All questions and concerns were addressed to the patient/family. Alternatives to my treatment were discussed. Thank you for allowing us to participate in the care of Alise Merlin. Please call me with any questions [phone].     Batool Clayton MD, [facility]  Cardiovascular Disease  [facility]  [phone] [facility]  [phone] [address] [place] [address]  12/28/2021 8:27 AM

## 2021-12-28 NOTE — ANESTHESIA PRE PROCEDURE
Department of Anesthesiology  Preprocedure Note       Name:  Marian Cooks   Age:  80 y.o.  :  1940                                          MRN:  [id number]         Date:  2021      Surgeon: * Surgery not found *    Procedure:     Medications prior to admission:   Prior to Admission medications    Medication Sig Start Date End Date Taking? Authorizing Provider   metoprolol succinate (TOPROL XL) 25 MG extended release tablet Take 1 tablet by mouth 2 times daily 21  Yes Rolando Vallecillo MD   dilTIAZem (CARTIA XT) 120 MG extended release capsule Take 1 capsule by mouth daily 10/13/21  Yes Maye Yao MD   potassium chloride (KLOR-CON M) 10 MEQ extended release tablet Take 1 tablet by mouth daily 10/13/21  Yes Maye Yao MD   torsemide [facility]) 20 MG tablet Take 1 tablet by mouth 2 times daily 10/7/21  Yes Maye Yao MD   omeprazole (PRILOSEC) 20 MG delayed release capsule Take 20 mg by mouth daily   Yes Historical Provider, MD   atorvastatin (LIPITOR) 40 MG tablet TAKE ONE TABLET BY MOUTH ONCE NIGHTLY 21  Yes Maye Yao MD   DULoxetine (CYMBALTA) 30 MG extended release capsule Take 30 mg by mouth daily   Yes Historical Provider, MD   levothyroxine (SYNTHROID) 125 MCG tablet Take 125 mcg by mouth Daily   Yes Historical Provider, MD   HYDROcodone-acetaminophen (NORCO) 5-325 MG per tablet Take 1 tablet by mouth every 6 hours as needed for Pain. Takes one pill @ 8am, one pill @ 3pm, one pill @ 6p, and one pill at bedtime. Yes Historical Provider, MD   Butalbital-Acetaminophen  MG TABS Take 1 tablet by mouth as needed (as needed for headaches) Vary rarely. Yes Historical Provider, MD   OXYGEN Inhale 2 L into the lungs    Historical Provider, MD   Cyanocobalamin (B-12) 2500 MCG TABS Take 2,500 mcg by mouth once a week Weekly on .     Historical Provider, MD   melatonin 5 MG TABS tablet Take 5 mg by mouth nightly    Historical Provider, MD   metaxalone (SKELAXIN) 800 MG tablet Take 800 mg by mouth 2 times daily as needed (as needed for spasms) Do not take with vicodin. Patient not taking: Reported on 12/7/2021    Historical Provider, MD       Current medications:    No current facility-administered medications for this encounter. Allergies:     Allergies   Allergen Reactions    Iodides     Shellfish-Derived Products      Unknown what happens    Sulfa Antibiotics     Dronedarone      Fatigue and shortness of breath       Problem List:    Patient Active Problem List   Diagnosis Code    Lumbar radiculopathy M54.16    Lumbar spine pain M54.50    Hip pain, left M25.552    Aortic stenosis I35.0    Hypercholesteremia E78.00    Essential hypertension I10    Loss of appetite R63.0    Weight loss R63.4    Anemia D64.9    Positive colorectal cancer screening using Cologuard test R19.5    Schatzki's ring K22.2    Nonrheumatic aortic valve stenosis I35.0    Digoxin toxicity, accidental or unintentional, initial encounter T46.0X1A    S/P TAVR (transcatheter aortic valve replacement) Z95.2    PAF (paroxysmal atrial fibrillation) ([facility]) I48.0    FLORENCE (acute kidney injury) ([address] N17.9    Digoxin overdose, accidental or unintentional, initial encounter T46.0X1A    Acute on chronic diastolic (congestive) heart failure ([facility]) I50.33    Aortic valve disease I35.9    Acute on chronic congestive heart failure ([facility]) I50.9    Acute on chronic respiratory failure with hypoxia ([facility]) J96.21    CHF (congestive heart failure), NYHA class I, acute on chronic, combined ([facility]) I50.43    Chronic respiratory failure with hypoxia ([facility]) J96.11    COPD exacerbation ([address] J44.1       Past Medical History:        Diagnosis Date    Acute on chronic diastolic (congestive) heart failure ([address] 8/19/2020    Aortic stenosis     Arthritis     Cancer ([address]     skin basal    Chronic respiratory failure with hypoxia ([address] 9/7/2021    COPD exacerbation ([address] 2021    Hyperlipidemia     Hypertension     Paroxysmal atrial fibrillation (HCC)     Thyroid disease        Past Surgical History:        Procedure Laterality Date    AORTIC VALVE REPLACEMENT N/A 10/15/2019    TRANSCATHETER AORTIC VALVE REPLACEMENT FEMORAL APPROACH performed by Norma Rainey MD at [address]  2019    COLONOSCOPY N/A 2019    COLON W/ANES. performed by Evans Bedolla MD at [address] N/A 2020    CYSTOSCOPY performed by Chris Mittal MD at [address]  2019    ESOPHAGEAL DILATION Author Lashay performed by Evans Bedolla MD at [address] Right 2020    KNEE ARTHROSCOPY Left     Torn meniscus    OTHER SURGICAL HISTORY  10/09/2015    phacoemulsification of cataract with intraocular implant right eye    THYROIDECTOMY, PARTIAL      TONSILLECTOMY      TUBAL LIGATION      UPPER GASTROINTESTINAL ENDOSCOPY N/A 2019    EGD W/ANES. (9:30) performed by Evans Bedolla MD at [facility] History:    Social History     Tobacco Use    Smoking status: Former Smoker     Packs/day: 1.00     Years: 20.00     Pack years: 20.00     Types: Cigarettes     Quit date: 1983     Years since quittin.0    Smokeless tobacco: Never Used   Substance Use Topics    Alcohol use:  No                                Counseling given: Not Answered      Vital Signs (Current):   Vitals:    21 0915   Weight: 130 lb (59 kg)   Height: 5' 3\" (1.6 m)                                              BP Readings from Last 3 Encounters:   21 122/74   21 122/84   21 124/72       NPO Status:                                                                                 BMI:   Wt Readings from Last 3 Encounters:   21 130 lb (59 kg)   21 131 lb (59.4 kg)   21 133 lb (60.3 kg)     Body mass index is 23.03 kg/m². CBC:   Lab Results   Component Value Date    WBC 7.2 12/28/2021    RBC 3.86 12/28/2021    HGB 11.4 12/28/2021    HCT 35.4 12/28/2021    MCV 91.6 12/28/2021    RDW 16.7 12/28/2021     12/28/2021       CMP:   Lab Results   Component Value Date     12/28/2021    K 4.0 12/28/2021    CL 99 12/28/2021    CO2 27 12/28/2021    BUN 20 12/28/2021    CREATININE 1.2 12/28/2021    CREATININE 1.7 10/05/2021    GFRAA 52 12/28/2021    AGRATIO 1.3 09/09/2021    LABGLOM 43 12/28/2021    GLUCOSE 137 12/28/2021    PROT 6.0 09/09/2021    CALCIUM 9.0 12/28/2021    BILITOT 0.3 10/04/2021    ALKPHOS 93 10/04/2021    AST 17 10/04/2021    ALT 14 10/04/2021       POC Tests: No results for input(s): POCGLU, POCNA, POCK, POCCL, POCBUN, POCHEMO, POCHCT in the last 72 hours. Coags:   Lab Results   Component Value Date    PROTIME 11.2 08/19/2020    INR 0.97 08/19/2020    APTT 30.6 08/19/2020       HCG (If Applicable): No results found for: PREGTESTUR, PREGSERUM, HCG, HCGQUANT     ABGs:   Lab Results   Component Value Date    PHART 7.334 09/05/2021    PO2ART 67.1 09/05/2021    MPA6OXH 45.2 09/05/2021    TDU7KNH 23.5 09/05/2021    BEART -2.4 09/05/2021    Q4DGNIHN 92.1 09/05/2021        Type & Screen (If Applicable):  No results found for: LABABO, LABRH    Drug/Infectious Status (If Applicable):  No results found for: HIV, HEPCAB    COVID-19 Screening (If Applicable):   Lab Results   Component Value Date    COVID19 NOT DETECTED 09/05/2021           Anesthesia Evaluation  Patient summary reviewed and Nursing notes reviewed no history of anesthetic complications:   Airway: Mallampati: II     Neck ROM: full   Dental:          Pulmonary:                              Cardiovascular:    (+) hypertension:, CHF:,                   Neuro/Psych:   (+) neuromuscular disease:,             GI/Hepatic/Renal:             Endo/Other:                     Abdominal:             Vascular:           Other Findings:             Anesthesia Plan      MAC     ASA 3     (Medications & allergies reviewed  All available lab & EKG data reviewed)  Induction: intravenous. Anesthetic plan and risks discussed with patient. Plan discussed with CRNA.                   Kate Sam MD   12/28/2021

## 2021-12-28 NOTE — TELEPHONE ENCOUNTER
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 Dr. Khanh Mckoy out of town. Dr. Cornel Gonzalez is out, back tomorrow. I attempted to contact family contact Lalo Lloyd as listed was unable to reach. Voicemail left. Essentially would like to discuss findings by exam further today and repeat visit anticoagulation for her. She was hesitant to consider this again given previous history of hematuria. In the interim until CT/MR further clarifies this, I do not think she can proceed with watchman safely and i'm Concerned about her stroke risk moving forward. Left VM for Ms. Russ to call back when able. [address] [facility] who cares for the patient in office, is back in hospital tomorrow and i've messaged him to make aware of this new finding.        Skye Grove MD, [address] [facility]  [phone] [facility]  [phone] [address] [place] [address]

## 2021-12-28 NOTE — H&P
Brief Pre-Op Note/Sedation Assessment      Sloane Tolbert  1940  Cath Pool Rm/NONE      [id number]  8:26 AM    Planned Procedure: Cardiac Catheterization Procedure    Post Procedure Plan: Return to same level of care    Consent: I have discussed with the patient and/or the patient representative the indication, alternatives, and the possible risks and/or complications of the planned procedure and the anesthesia methods. The patient and/or patient representative appear to understand and agree to proceed. Chief Complaint: WATCHMAN evaluation     Presents today for JAYNE pre-watchman. She denies any evidence of hematemesis, hemoptysis, melena, hematochezia or hematuria with blood thinner. No blood thinners at this time. No difficulty swallowing, choking, history of PUD nor gastric surgery. Vital Signs:  VSS    Allergies: Allergies   Allergen Reactions    Iodides     Shellfish-Derived Products      Unknown what happens    Sulfa Antibiotics     Dronedarone      Fatigue and shortness of breath       Past Medical History:  Past Medical History:   Diagnosis Date    Acute on chronic diastolic (congestive) heart failure (HCC) 8/19/2020    Aortic stenosis     Arthritis     Cancer (HCC)     skin basal    Chronic respiratory failure with hypoxia (HCC) 9/7/2021    COPD exacerbation ([facility] 9/7/2021    Hyperlipidemia     Hypertension     Paroxysmal atrial fibrillation ([facility]     Thyroid disease          Surgical History:  Past Surgical History:   Procedure Laterality Date    AORTIC VALVE REPLACEMENT N/A 10/15/2019    TRANSCATHETER AORTIC VALVE REPLACEMENT FEMORAL APPROACH performed by Carla Dee MD at [address]  09/2019    COLONOSCOPY N/A 8/13/2019    COLON W/ANES.  performed by Marychuy Stokes MD at [facility] N/A 9/25/2020    CYSTOSCOPY performed by Toy Farfan MD at [address]  8/13/2019    ESOPHAGEAL DILATION HUBER performed by Brandon Coleman MD at [address] Right 08/19/2020    KNEE ARTHROSCOPY Left     Torn meniscus    OTHER SURGICAL HISTORY  10/09/2015    phacoemulsification of cataract with intraocular implant right eye    THYROIDECTOMY, PARTIAL      TONSILLECTOMY      TUBAL LIGATION      UPPER GASTROINTESTINAL ENDOSCOPY N/A 8/13/2019    EGD W/ANES. (9:30) performed by Brandon Coleman MD at [facility]         Medications:  No current facility-administered medications for this encounter. Pre-Sedation:    Pre-Sedation Documentation and Exam:  I have personally completed a history, physical exam & review of systems for this patient (see notes). Prior History of Anesthesia Complications:   none    Modified Mallampati:  I (soft palate, uvula, fauces, tonsillar pillars visible)    ASA Classification:  Class 3 - A patient with severe systemic disease that limits activity but is not incapacitating      Penny Scale: Activity:  2 - Able to move 4 extremities voluntarily on command  Respiration:  2 - Able to breathe deeply and cough freely  Circulation:  2 - BP+/- 20mmHg of normal  Consciousness:  2 - Fully awake  Oxygen Saturation (color):  2 - Able to maintain oxygen saturation >92% on room air    Sedation/Anesthesia Plan:  Guard the patient's safety and welfare. Minimize physical discomfort and pain. Minimize negative psychological responses to treatment by providing sedation and analgesia and maximize the potential amnesia. Patient to meet pre-procedure discharge plan.     Medication Planned:  Per anesthesia    Patient is an appropriate candidate for plan of sedation: yes      Electronically signed by Brady Ching MD on 12/28/2021 at 8:26 AM

## 2021-12-28 NOTE — ANESTHESIA POSTPROCEDURE EVALUATION
Department of Anesthesiology  Postprocedure Note    Patient: Sang Wren  MRN: [id number]  YOB: 1940  Date of evaluation: 12/28/2021  Time:  3:33 PM     Procedure Summary     Date: 12/28/21 Room / Location: [facility] Echocardiogram    Anesthesia Start: 0950 Anesthesia Stop: 1050    Procedure: TRANSESOPHAGEAL ECHOCARDIOGRAM Diagnosis:       Paroxysmal atrial fibrillation      Paroxysmal atrial fibrillation    Scheduled Providers:  Responsible Provider: Penny Wilkes MD    Anesthesia Type: MAC ASA Status: 3          Anesthesia Type: MAC    Penny Phase I:      Penny Phase II:      Last vitals: Reviewed and per EMR flowsheets.        Anesthesia Post Evaluation    Comments: Postoperative Anesthesia Note    Name:    Sang Wren  MRN:      [id number]    Patient Vitals in the past 12 hrs:  12/28/21 0915, Height:5' 3\" (1.6 m), Weight:130 lb (59 kg)     LABS:    CBC  Lab Results       Component                Value               Date/Time                  WBC                      7.2                 12/28/2021 08:44 AM        HGB                      11.4 (L)            12/28/2021 08:44 AM        HCT                      35.4 (L)            12/28/2021 08:44 AM        PLT                      220                 12/28/2021 08:44 AM   RENAL  Lab Results       Component                Value               Date/Time                  NA                       137                 12/28/2021 08:44 AM        K                        4.0                 12/28/2021 08:44 AM        CL                       99                  12/28/2021 08:44 AM        CO2                      27                  12/28/2021 08:44 AM        BUN                      20                  12/28/2021 08:44 AM        CREATININE               1.2                 12/28/2021 08:44 AM        CREATININE               1.7                 10/05/2021 12:00 AM        GLUCOSE                  137 (H)             12/28/2021 08:44 AM   COAGS  Lab Results       Component                Value               Date/Time                  PROTIME                  11.2                08/19/2020 07:18 PM        INR                      0.97                08/19/2020 07:18 PM        APTT                     30.6                08/19/2020 07:18 PM     Intake & Output: In: 100 (I.V.:100)  Out: -     Nausea & Vomiting:  No    Level of Consciousness:  Awake    Pain Assessment:  Adequate analgesia    Anesthesia Complications:  No apparent anesthetic complications    SUMMARY      Vital signs stable  OK to discharge from Stage I post anesthesia care.   Care transferred from Anesthesiology department on discharge from perioperative area

## 2021-12-29 ENCOUNTER — TELEPHONE (OUTPATIENT)
Dept: CARDIOLOGY CLINIC | Age: 81
End: 2021-12-29

## 2021-12-29 NOTE — TELEPHONE ENCOUNTER
Relayed results to patient's emergency contact, [facility]. V/u. States as long as she doesn't have to drink contrast and its through her IV for CT she would be willing to do CT w/ contrast if recommended. Also ok with MRI is needed. [facility] is OOT today. AGK, what would you recommend moving forward? See [facility] message below for more details.

## 2021-12-29 NOTE — TELEPHONE ENCOUNTER
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 Informed patient's caregiver of recommendations and gave instructions for scheduling. Testing ordered.

## 2021-12-29 NOTE — TELEPHONE ENCOUNTER
----- Message from iWlma Johnson MD sent at 12/29/2021  8:59 AM EST -----  Please let patient know her blood sugar is a little elevated otherwise her renal function has improved since 10/12/2021. Thanks.

## 2022-01-01 ENCOUNTER — TELEPHONE (OUTPATIENT)
Dept: CARDIOLOGY CLINIC | Age: 82
End: 2022-01-01

## 2022-01-05 ENCOUNTER — TELEPHONE (OUTPATIENT)
Dept: CARDIOLOGY CLINIC | Age: 82
End: 2022-01-05

## 2022-01-06 RX ORDER — ATORVASTATIN CALCIUM 40 MG/1
TABLET, FILM COATED ORAL
Qty: 90 TABLET | Refills: 3 | Status: SHIPPED | OUTPATIENT
Start: 2022-01-06

## 2022-01-14 ENCOUNTER — HOSPITAL ENCOUNTER (OUTPATIENT)
Dept: MRI IMAGING | Age: 82
Discharge: HOME | End: 2022-01-14
Payer: MEDICARE

## 2022-01-14 DIAGNOSIS — I51.89 CARDIAC MASS: ICD-10-CM

## 2022-01-14 DIAGNOSIS — I82.90 THROMBUS: ICD-10-CM

## 2022-01-14 LAB
GFR AFRICAN AMERICAN: 52
GFR NON-AFRICAN AMERICAN: 43
PERFORMED ON: ABNORMAL
POC CREATININE: 1.2 MG/DL (ref 0.6–1.2)
POC SAMPLE TYPE: ABNORMAL

## 2022-01-14 PROCEDURE — 82565 ASSAY OF CREATININE: CPT

## 2022-01-25 NOTE — TELEPHONE ENCOUNTER
patient unable to get MRI on 1/14/22 because she was claustrophobic needs to alan. MRI. I  spoke with patients daughter Marcio Mahajan she informs me patient to see Dr. Amber Mann regarding the inability to get MRI do to patients being Claustrophobic.  will review with Dr. Amber Mann and make a decision what to do next.

## 2022-01-27 ENCOUNTER — TELEPHONE (OUTPATIENT)
Dept: CARDIOLOGY | Age: 82
End: 2022-01-27

## 2022-01-27 ENCOUNTER — OFFICE VISIT (OUTPATIENT)
Dept: CARDIOLOGY CLINIC | Age: 82
End: 2022-01-27
Payer: MEDICARE

## 2022-01-27 VITALS
OXYGEN SATURATION: 96 % | BODY MASS INDEX: 24.27 KG/M2 | WEIGHT: 137 LBS | HEIGHT: 63 IN | HEART RATE: 80 BPM | SYSTOLIC BLOOD PRESSURE: 116 MMHG | DIASTOLIC BLOOD PRESSURE: 62 MMHG

## 2022-01-27 DIAGNOSIS — I50.43 CHF (CONGESTIVE HEART FAILURE), NYHA CLASS I, ACUTE ON CHRONIC, COMBINED (HCC): ICD-10-CM

## 2022-01-27 DIAGNOSIS — I48.0 PAF (PAROXYSMAL ATRIAL FIBRILLATION) (HCC): Primary | ICD-10-CM

## 2022-01-27 DIAGNOSIS — Z95.2 S/P TAVR (TRANSCATHETER AORTIC VALVE REPLACEMENT): ICD-10-CM

## 2022-01-27 PROCEDURE — 93000 ELECTROCARDIOGRAM COMPLETE: CPT | Performed by: INTERNAL MEDICINE

## 2022-01-27 PROCEDURE — 99214 OFFICE O/P EST MOD 30 MIN: CPT | Performed by: INTERNAL MEDICINE

## 2022-01-27 NOTE — LETTER
John Benavidez  1940    [address]   Electrophysiology Consult Note            Date: 1/27/22  Patient Name: John Benavidez  YOB: 1940    Primary Care Physician: Woodrow Humphreys MD    CHIEF COMPLAINT:   Chief Complaint   Patient presents with    Follow-up    Atrial Fibrillation     HISTORY OF PRESENT ILLNESS: John Benavidez is a 80 y.o. female with a PMH for AF. She follows with cardiology for AF, HTN, and HLD. On 8/26/2021, her ECG demonstrated AF (100). She states that she has been in AF recently since about 3 weeks ago, and was restarted on diltiazem. She presents today on oxygen. Patient was in the hospital in 9/2021 with CHF. Patient was in the ED on 10/8/2021 with a fall. Patient has been evaluated for Watchman in 11/2021. On 12/7/2021, ECG demonstrates AF (116). She presents with her  and son. She states that she uses her oxygen a few times per day. She states that her energy level has improved since iron infusions. She has stopped taking her dronedarone and feels more energy. She is not taking her blood thinner. She is interested in the watchman procedure and is scheduled for upcoming PFT's. Interval History since last office visit: Patient was ordered a cardiac MRI as part of Watchman evaluation, but has been unable to proceed due to claustrophobia. Today, 1/27/2022, ECG demonstrates AF (96). She presents today with her family members. She is taking her medications as prescribed. Patient denies current edema, chest pain, sob, palpitations, dizziness or syncope. Past Medical History:   has a past medical history of Acute on chronic diastolic (congestive) heart failure ([address] Aortic stenosis, Arthritis, Cancer ([address] Chronic respiratory failure with hypoxia ([address] COPD exacerbation ([address] Hyperlipidemia, Hypertension, Paroxysmal atrial fibrillation ([address] and Thyroid disease.     Past Surgical History:   has a past surgical history that includes Thyroidectomy, partial; Tonsillectomy; Tubal ligation; Knee arthroscopy (Left); other surgical history (10/09/2015); Upper gastrointestinal endoscopy (N/A, 8/13/2019); Colonoscopy (N/A, 8/13/2019); Esophagus dilation (8/13/2019); Cardiac catheterization (09/2019); Aortic valve replacement (N/A, 10/15/2019); Cystoscopy (N/A, 9/25/2020); eye surgery; and eye surgery (Right, 08/19/2020). Allergies: Iodides, Shellfish-derived products, Sulfa antibiotics, and Dronedarone    Social History:   reports that she quit smoking about 39 years ago. Her smoking use included cigarettes. She has a 20.00 pack-year smoking history. She has never used smokeless tobacco. She reports that she does not drink alcohol and does not use drugs. Family History: family history includes Colon Cancer in her mother; Heart Disease in her brother and father. Home Medications:    Prior to Admission medications    Medication Sig Start Date End Date Taking?  Authorizing Provider   atorvastatin (LIPITOR) 40 MG tablet TAKE ONE TABLET BY MOUTH ONCE NIGHTLY 1/6/22  Yes Torres Silvestre MD   metoprolol succinate (TOPROL XL) 50 MG extended release tablet Take 1 tablet by mouth 2 times daily 12/28/21 3/28/22 Yes Almyra Holstein, MD   dilTIAZem (CARTIA XT) 120 MG extended release capsule Take 1 capsule by mouth daily 10/13/21  Yes Torres Silvestre MD   potassium chloride (KLOR-CON M) 10 MEQ extended release tablet Take 1 tablet by mouth daily 10/13/21  Yes Torres Silvestre MD   torsemide [facility]) 20 MG tablet Take 1 tablet by mouth 2 times daily 10/7/21  Yes Torres Silvestre MD   omeprazole (PRILOSEC) 20 MG delayed release capsule Take 20 mg by mouth daily   Yes Historical Provider, MD   DULoxetine (CYMBALTA) 30 MG extended release capsule Take 30 mg by mouth daily   Yes Historical Provider, MD   levothyroxine (SYNTHROID) 125 MCG tablet Take 125 mcg by mouth Daily   Yes Historical Provider, MD   OXYGEN Inhale 2 L into the lungs   Yes Historical Provider, MD   Butalbital-Acetaminophen  MG TABS Take 1 tablet by mouth as needed (as needed for headaches) Vary rarely. Yes Historical Provider, MD   Cyanocobalamin (B-12) 2500 MCG TABS Take 2,500 mcg by mouth once a week Weekly on Wednesdays. Patient not taking: Reported on 1/27/2022    Historical Provider, MD   melatonin 5 MG TABS tablet Take 5 mg by mouth nightly  Patient not taking: Reported on 1/27/2022    Historical Provider, MD   HYDROcodone-acetaminophen (NORCO) 5-325 MG per tablet Take 1 tablet by mouth every 6 hours as needed for Pain. Takes one pill @ 8am, one pill @ 3pm, one pill @ 6p, and one pill at bedtime. Patient not taking: Reported on 1/27/2022    Historical Provider, MD   metaxalone (SKELAXIN) 800 MG tablet Take 800 mg by mouth 2 times daily as needed (as needed for spasms) Do not take with vicodin. Patient not taking: Reported on 12/7/2021    Historical Provider, MD       REVIEW OF SYSTEMS:    All 14-point review of systems are completed and  pertinent positives are mentioned in the history of present illness. Other  systems are reviewed and are negative. Physical Examination:    /62   Pulse 80   Ht 5' 3\" (1.6 m)   Wt 137 lb (62.1 kg)   SpO2 96%   BMI 24.27 kg/m²      Constitutional and General Appearance:    alert, cooperative, no distress and appears stated age  [de-identified]:    PERRLA, no cervical lymphadenopathy. No masses palpable. Normal oral  mucosa  Respiratory:  · Normal excursion and expansion without use of accessory muscles  · Resp Auscultation: Normal breath sounds without dullness or wheezing  Cardiovascular:  · The apical impulse is not displaced  · Tachycardic. Irregular rate and rhythm w/o M/G/R  Abdomen:  · No masses or tenderness  · Bowel sounds present  Extremities:  ·  No Cyanosis or Clubbing  ·  Lower extremity edema: No  · Skin: Warm and dry  Neurological:  · Alert and oriented.   · Moves all extremities well  · No abnormalities of mood, affect, memory, mentation, or behavior are noted    DATA:    ECG 1/27/22: Personally reviewed. Echo 8/31/2021   Summary   -- Left ventricular systolic function is normal with a visually estimated   ejection fraction of 55%. The left ventricle is normal in size with mild   concentric hypertrophy. No obvious regional wall motion abnormalities noted. Elevated left atrial pressures with a septal E/e' ratio of 33.1.   -- Right ventricular systolic function is reduced. -- The left atrium appears severely enlarged. The right atrium is moderately   enlarged. -- Mild mitral and pulmonic regurgitation. -- Mild mitral stenosis. -- A 23 mm Garcia Joni 3 bioprosthetic aortic valve appears well seated   with mildly abnormal Doppler values; however the values are stable since one   month s/p TAVR. There is mild perivalvular aortic valve regurgitation. -- Moderate tricuspid regurgitation. Systolic pulmonary artery pressure (SPAP) is elevated and estimated at 55   mmHg (right atrial pressure 15 mmHg) consistent with moderate pulmonary   hypertension. The IVC is dilated in size (>2.1 cm) and collapses <50% with respiration   consistent with markedly elevated right atrial pressures (15 mmHg) . Compared with the previous exam on 8/20/20, RV function is down and PHTN and   tricuspid regurgitation have significantly increased. IMPRESSION:    1. Paroxysmal atrial fibrillation ([id number] score 4).  8/26/2021  Patient is a pleasant 41-year-old female with a medical history significant for persistent atrial fibrillation, hypothyroidism, COPD on oxygen, GI bleeding and a history of digoxin toxicity who presents from home for follow-up. Patient continues to be mildly anemic. She is also tachycardic in atrial fibrillation with borderline rate control. We discussed options including further rate control (is limited by her blood pressure), antiarrhythmic therapy, ablation, and pace and ablate strategy. Again patient would like to hold off. I will send a message to Dr. Layne Wheeler who wanted the cardiac MRI. I will send a message to Mr. Perez Antonio who helps run the [employer] [facility]. We will have her follow-up with EP NP in 6 months.  - No OAC per patient and family. - Continue metoprolol succinate 50 mg BID with low threshold to increase.  - Continue diltiazem. - Strongly consider Watchman.  - Follow up with Dr. Layne Wheelre. - Follow up with EP NP in 6 months unless/until procedure/discussion required or PRN. RECOMMENDATIONS:  1. Patient would like to think about her options at this time, and hold off on Watchman workup and procedure at this time. We will update entire team of this decision. 2. Continue current medications for now. 3. Follow up in 6 months, or sooner if needed. QUALITY MEASURES  1. Tobacco Cessation Counseling: NA  2. Retake of BP if >140/90:   NA  3. Documentation to PCP/referring for new patient:  Sent to PCP at close of office visit  4. CAD patient on anti-platelet: NA  5. CAD patient on STATIN therapy:  NA  6. Patient with CHF and aFib on anticoagulation:  Yes     All questions and concerns were addressed to the patient/family. Alternatives to my treatment were discussed. Dr. Cliff Tolentino MD  Electrophysiology  [facility]. [address]. [address]. [place] [address]  Phone: [phone]  Fax: [phone]     NOTE: This report was transcribed using voice recognition software. Every effort was made to ensure accuracy, however, inadvertent computerized transcription errors may be present. Roula Cavazos, RN, am scribing for and in the presence of Dr. Chavez Layne. 01/27/22 11:26 AM   Kenton Sanchez RN    The scribe's documentation has been prepared under my direction and personally reviewed by me in its entirety.  I confirm that the note above accurately reflects all work, physical examination, the discussion of treatments and procedures, and medical decision making performed by me. Nikole Braun MD personally performed the services described in this documentation as scribed by nurse in my presence, and is both accurate and complete.     Electronically signed by Rebecca Scanlon MD on 1/27/2022 at 3:05 PM

## 2022-01-27 NOTE — PROGRESS NOTES
[address]   Electrophysiology Consult Note            Date: 1/27/22  Patient Name: Dajuan Bazan  YOB: 1940    Primary Care Physician: Danny Joyce MD    CHIEF COMPLAINT:   Chief Complaint   Patient presents with    Follow-up    Atrial Fibrillation     HISTORY OF PRESENT ILLNESS: Dajuan Bazan is a 80 y.o. female with a PMH for AF. She follows with cardiology for AF, HTN, and HLD. On 8/26/2021, her ECG demonstrated AF (100). She states that she has been in AF recently since about 3 weeks ago, and was restarted on diltiazem. She presents today on oxygen. Patient was in the hospital in 9/2021 with CHF. Patient was in the ED on 10/8/2021 with a fall. Patient has been evaluated for Watchman in 11/2021. On 12/7/2021, ECG demonstrates AF (116). She presents with her  and son. She states that she uses her oxygen a few times per day. She states that her energy level has improved since iron infusions. She has stopped taking her dronedarone and feels more energy. She is not taking her blood thinner. She is interested in the watchman procedure and is scheduled for upcoming PFT's. Interval History since last office visit: Patient was ordered a cardiac MRI as part of Watchman evaluation, but has been unable to proceed due to claustrophobia. Today, 1/27/2022, ECG demonstrates AF (96). She presents today with her family members. She is taking her medications as prescribed. Patient denies current edema, chest pain, sob, palpitations, dizziness or syncope. Past Medical History:   has a past medical history of Acute on chronic diastolic (congestive) heart failure ([address] Aortic stenosis, Arthritis, Cancer ([address] Chronic respiratory failure with hypoxia ([address] COPD exacerbation ([address] Hyperlipidemia, Hypertension, Paroxysmal atrial fibrillation ([address] and Thyroid disease.     Past Surgical History:   has a past surgical history that includes Thyroidectomy, partial; Tonsillectomy; Tubal ligation; Knee arthroscopy (Left); other surgical history (10/09/2015); Upper gastrointestinal endoscopy (N/A, 8/13/2019); Colonoscopy (N/A, 8/13/2019); Esophagus dilation (8/13/2019); Cardiac catheterization (09/2019); Aortic valve replacement (N/A, 10/15/2019); Cystoscopy (N/A, 9/25/2020); eye surgery; and eye surgery (Right, 08/19/2020). Allergies: Iodides, Shellfish-derived products, Sulfa antibiotics, and Dronedarone    Social History:   reports that she quit smoking about 39 years ago. Her smoking use included cigarettes. She has a 20.00 pack-year smoking history. She has never used smokeless tobacco. She reports that she does not drink alcohol and does not use drugs. Family History: family history includes Colon Cancer in her mother; Heart Disease in her brother and father. Home Medications:    Prior to Admission medications    Medication Sig Start Date End Date Taking?  Authorizing Provider   atorvastatin (LIPITOR) 40 MG tablet TAKE ONE TABLET BY MOUTH ONCE NIGHTLY 1/6/22  Yes Felix Contreras MD   metoprolol succinate (TOPROL XL) 50 MG extended release tablet Take 1 tablet by mouth 2 times daily 12/28/21 3/28/22 Yes Jarad Gibbs MD   dilTIAZem (CARTIA XT) 120 MG extended release capsule Take 1 capsule by mouth daily 10/13/21  Yes Felix Contreras MD   potassium chloride (KLOR-CON M) 10 MEQ extended release tablet Take 1 tablet by mouth daily 10/13/21  Yes Felix Contreras MD   torsemide [facility]) 20 MG tablet Take 1 tablet by mouth 2 times daily 10/7/21  Yes Felix Contreras MD   omeprazole (PRILOSEC) 20 MG delayed release capsule Take 20 mg by mouth daily   Yes Historical Provider, MD   DULoxetine (CYMBALTA) 30 MG extended release capsule Take 30 mg by mouth daily   Yes Historical Provider, MD   levothyroxine (SYNTHROID) 125 MCG tablet Take 125 mcg by mouth Daily   Yes Historical Provider, MD   OXYGEN Inhale 2 L into the lungs   Yes Historical Provider, MD Butalbital-Acetaminophen  MG TABS Take 1 tablet by mouth as needed (as needed for headaches) Vary rarely. Yes Historical Provider, MD   Cyanocobalamin (B-12) 2500 MCG TABS Take 2,500 mcg by mouth once a week Weekly on Wednesdays. Patient not taking: Reported on 1/27/2022    Historical Provider, MD   melatonin 5 MG TABS tablet Take 5 mg by mouth nightly  Patient not taking: Reported on 1/27/2022    Historical Provider, MD   HYDROcodone-acetaminophen (NORCO) 5-325 MG per tablet Take 1 tablet by mouth every 6 hours as needed for Pain. Takes one pill @ 8am, one pill @ 3pm, one pill @ 6p, and one pill at bedtime. Patient not taking: Reported on 1/27/2022    Historical Provider, MD   metaxalone (SKELAXIN) 800 MG tablet Take 800 mg by mouth 2 times daily as needed (as needed for spasms) Do not take with vicodin. Patient not taking: Reported on 12/7/2021    Historical Provider, MD       REVIEW OF SYSTEMS:    All 14-point review of systems are completed and  pertinent positives are mentioned in the history of present illness. Other  systems are reviewed and are negative. Physical Examination:    /62   Pulse 80   Ht 5' 3\" (1.6 m)   Wt 137 lb (62.1 kg)   SpO2 96%   BMI 24.27 kg/m²      Constitutional and General Appearance:    alert, cooperative, no distress and appears stated age  [de-identified]:    PERRLA, no cervical lymphadenopathy. No masses palpable. Normal oral  mucosa  Respiratory:  · Normal excursion and expansion without use of accessory muscles  · Resp Auscultation: Normal breath sounds without dullness or wheezing  Cardiovascular:  · The apical impulse is not displaced  · Tachycardic. Irregular rate and rhythm w/o M/G/R  Abdomen:  · No masses or tenderness  · Bowel sounds present  Extremities:  ·  No Cyanosis or Clubbing  ·  Lower extremity edema: No  · Skin: Warm and dry  Neurological:  · Alert and oriented.   · Moves all extremities well  · No abnormalities of mood, affect, memory, mentation, or behavior are noted    DATA:    ECG 1/27/22: Personally reviewed. Echo 8/31/2021   Summary   -- Left ventricular systolic function is normal with a visually estimated   ejection fraction of 55%. The left ventricle is normal in size with mild   concentric hypertrophy. No obvious regional wall motion abnormalities noted. Elevated left atrial pressures with a septal E/e' ratio of 33.1.   -- Right ventricular systolic function is reduced. -- The left atrium appears severely enlarged. The right atrium is moderately   enlarged. -- Mild mitral and pulmonic regurgitation. -- Mild mitral stenosis. -- A 23 mm Garcia Joni 3 bioprosthetic aortic valve appears well seated   with mildly abnormal Doppler values; however the values are stable since one   month s/p TAVR. There is mild perivalvular aortic valve regurgitation. -- Moderate tricuspid regurgitation. Systolic pulmonary artery pressure (SPAP) is elevated and estimated at 55   mmHg (right atrial pressure 15 mmHg) consistent with moderate pulmonary   hypertension. The IVC is dilated in size (>2.1 cm) and collapses <50% with respiration   consistent with markedly elevated right atrial pressures (15 mmHg) . Compared with the previous exam on 8/20/20, RV function is down and PHTN and   tricuspid regurgitation have significantly increased. IMPRESSION:    1. Paroxysmal atrial fibrillation ([id number] score 4).  8/26/2021  Patient is a pleasant 20-year-old female with a medical history significant for persistent atrial fibrillation, hypothyroidism, COPD on oxygen, GI bleeding and a history of digoxin toxicity who presents from home for follow-up. Patient continues to be mildly anemic. She is also tachycardic in atrial fibrillation with borderline rate control. We discussed options including further rate control (is limited by her blood pressure), antiarrhythmic therapy, ablation, and pace and ablate strategy.   Patient and family would prefer rhythm control at this point given the fact that her heart rates are not well controlled. We discussed antiarrhythmic therapy and patient decided on dronedarone (we discussed cost). We will plan for cardioversion when caregiver is back in town. We will need to discuss Watchman device in future with patient given anemia. I agree with lower dose apixaban for now given her weight of nearly 60 kg and age greater than [de-identified].    - Discuss Watchman after cardioversion.  - Start dronedarone. - Schedule cardioversion. 12/7/2021  Patient presents for follow-up. There is a lot of education she done with her . Likely both she her  and her son are present today. We discussed atrial fibrillation again in detail including stroke risk and risk for developing heart failure. She is interested in moving forward with a watchman. Her heart rates are currently not controlled we cannot convert her into normal sinus rhythm due to not being able to tolerate anticoagulation due to hematuria. We will therefore increase her metoprolol. If she develops symptoms as she did with dronedarone we will increase her diltiazem. Patient family understand plan. I will reach out to [facility] team.  - Increase metoprolol to 25 mg BID.  - Continue diltiazem. - Follow up with Watchman team.    1/27/2022  Patient presents for follow-up. She continues to no longer be on anticoagulation. She is unable to tolerate MRI as part of watchman and cardiac evaluation due to claustrophobia. She feels much improved with the increase in the metoprolol that we change during her last visit. At this point she and her  would like to consider whether or not they like to move forward with watchman in the future. I discussed with them that it is best to move forward sooner rather than later while she is doing well as she will need to be on blood thinners for at least 45 days post watchman implantation.   Again patient would like to hold off.  I will send a message to Dr. Antwon Kelly who wanted the cardiac MRI. I will send a message to . Mary Cabral who helps run the [employer] service. We will have her follow-up with EP NP in 6 months.  - No OAC per patient and family. - Continue metoprolol succinate 50 mg BID with low threshold to increase.  - Continue diltiazem. - Strongly consider Watchman.  - Follow up with Dr. Antwon Kelly. - Follow up with EP NP in 6 months unless/until procedure/discussion required or PRN. RECOMMENDATIONS:  1. Patient would like to think about her options at this time, and hold off on Watchman workup and procedure at this time. We will update entire team of this decision. 2. Continue current medications for now. 3. Follow up in 6 months, or sooner if needed. QUALITY MEASURES  1. Tobacco Cessation Counseling: NA  2. Retake of BP if >140/90:   NA  3. Documentation to PCP/referring for new patient:  Sent to PCP at close of office visit  4. CAD patient on anti-platelet: NA  5. CAD patient on STATIN therapy:  NA  6. Patient with CHF and aFib on anticoagulation:  Yes     All questions and concerns were addressed to the patient/family. Alternatives to my treatment were discussed. Dr. Mason Hardy MD  Electrophysiology  [facility]. [facility]. [address]. [place] [address]  Phone: [phone]  Fax: [phone]     NOTE: This report was transcribed using voice recognition software. Every effort was made to ensure accuracy, however, inadvertent computerized transcription errors may be present. Oswald Cat RN, am scribing for and in the presence of Dr. Princess See. 01/27/22 11:26 AM   Laurie Fu RN    The scribe's documentation has been prepared under my direction and personally reviewed by me in its entirety. I confirm that the note above accurately reflects all work, physical examination, the discussion of treatments and procedures, and medical decision making performed by me.       GRACIE DIAZ Charlie Elder MD personally performed the services described in this documentation as scribed by nurse in my presence, and is both accurate and complete.     Electronically signed by Lorraine Daley MD on 1/27/2022 at 3:05 PM

## 2022-01-27 NOTE — PATIENT INSTRUCTIONS
RECOMMENDATIONS:  1. Patient would like to think about her options at this time, and hold off on Watchman workup and procedure at this time. We will update entire team of this decision. 2. Continue current medications for now. 3. Follow up in 6 months, or sooner if needed.

## 2022-01-28 NOTE — TELEPHONE ENCOUNTER
Patient has decided to hold off on Watchman.  Dr. David Pena discussed CT scan with her and family however as she feels well she would like to hold off for now and will let us know if she reconsiders.

## 2022-03-22 DIAGNOSIS — I48.0 PAF (PAROXYSMAL ATRIAL FIBRILLATION) (HCC): ICD-10-CM

## 2022-03-22 RX ORDER — TORSEMIDE 20 MG/1
TABLET ORAL
Qty: 60 TABLET | Refills: 5 | Status: ON HOLD | OUTPATIENT
Start: 2022-03-22 | End: 2022-06-29 | Stop reason: SDUPTHER

## 2022-03-22 NOTE — TELEPHONE ENCOUNTER
JAYNE/CV 12/28/21 with EARL  LV with [address] [place],[address] 1/27/2022  Upcoming OV 6/7/2022 with AM NP

## 2022-03-22 NOTE — TELEPHONE ENCOUNTER
Received refill request for Torsemide from [employer]     Last ov: 01/27/2022 CHUCK Aguilar)    Last labs: 12/28/2021 BMP    Last Refill: 10/07/2021 #60 w/ 5 refills    Next appointment: 05/15/2022 DCE                                         (recall list)

## 2022-03-24 RX ORDER — METOPROLOL SUCCINATE 50 MG/1
TABLET, EXTENDED RELEASE ORAL
Qty: 60 TABLET | Refills: 2 | Status: SHIPPED | OUTPATIENT
Start: 2022-03-24 | End: 2022-07-11 | Stop reason: SDUPTHER

## 2022-05-11 ENCOUNTER — TELEPHONE (OUTPATIENT)
Dept: CARDIOLOGY | Age: 82
End: 2022-05-11

## 2022-05-11 DIAGNOSIS — Z95.2 S/P TAVR (TRANSCATHETER AORTIC VALVE REPLACEMENT): Primary | ICD-10-CM

## 2022-05-11 NOTE — TELEPHONE ENCOUNTER
Can you schedule pt for an echo and OV with DCE on 10/11/22 at [facility]? It will be for her 3 year post TAVR check.  Maritza BLOUNT

## 2022-06-24 ENCOUNTER — APPOINTMENT (OUTPATIENT)
Dept: CT IMAGING | Age: 82
End: 2022-06-24
Payer: MEDICARE

## 2022-06-24 ENCOUNTER — APPOINTMENT (OUTPATIENT)
Dept: GENERAL RADIOLOGY | Age: 82
End: 2022-06-24
Payer: MEDICARE

## 2022-06-24 ENCOUNTER — HOSPITAL ENCOUNTER (EMERGENCY)
Age: 82
Discharge: HOME | End: 2022-06-24
Payer: MEDICARE

## 2022-06-24 ENCOUNTER — TELEPHONE (OUTPATIENT)
Dept: ORTHOPEDIC SURGERY | Age: 82
End: 2022-06-24

## 2022-06-24 VITALS
DIASTOLIC BLOOD PRESSURE: 88 MMHG | HEIGHT: 60 IN | BODY MASS INDEX: 26.5 KG/M2 | RESPIRATION RATE: 16 BRPM | HEART RATE: 86 BPM | TEMPERATURE: 98.6 F | WEIGHT: 135 LBS | OXYGEN SATURATION: 95 % | SYSTOLIC BLOOD PRESSURE: 121 MMHG

## 2022-06-24 DIAGNOSIS — S52.532A CLOSED COLLES' FRACTURE OF LEFT RADIUS, INITIAL ENCOUNTER: ICD-10-CM

## 2022-06-24 DIAGNOSIS — S09.90XA CLOSED HEAD INJURY, INITIAL ENCOUNTER: ICD-10-CM

## 2022-06-24 DIAGNOSIS — S80.00XA CONTUSION OF KNEE, UNSPECIFIED LATERALITY, INITIAL ENCOUNTER: ICD-10-CM

## 2022-06-24 DIAGNOSIS — S00.83XA CONTUSION OF FACE, INITIAL ENCOUNTER: ICD-10-CM

## 2022-06-24 DIAGNOSIS — S52.124A CLOSED NONDISPLACED FRACTURE OF HEAD OF RIGHT RADIUS, INITIAL ENCOUNTER: ICD-10-CM
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
## 2022-06-24 LAB
A/G RATIO: 1.3 (ref 1.1–2.2)
ALBUMIN SERPL-MCNC: 3.6 G/DL (ref 3.4–5)
ALP BLD-CCNC: 124 U/L (ref 40–129)
ALT SERPL-CCNC: 8 U/L (ref 10–40)
ANION GAP SERPL CALCULATED.3IONS-SCNC: 14 MMOL/L (ref 3–16)
AST SERPL-CCNC: 20 U/L (ref 15–37)
BASOPHILS ABSOLUTE: 0.1 K/UL (ref 0–0.2)
BASOPHILS RELATIVE PERCENT: 1.4 %
BILIRUB SERPL-MCNC: 0.5 MG/DL (ref 0–1)
BUN BLDV-MCNC: 20 MG/DL (ref 7–20)
CALCIUM SERPL-MCNC: 8.4 MG/DL (ref 8.3–10.6)
CHLORIDE BLD-SCNC: 100 MMOL/L (ref 99–110)
CO2: 23 MMOL/L (ref 21–32)
CREAT SERPL-MCNC: 1.2 MG/DL (ref 0.6–1.2)
EKG ATRIAL RATE: 92 BPM
EKG DIAGNOSIS: NORMAL
EKG Q-T INTERVAL: 378 MS
EKG QRS DURATION: 76 MS
EKG QTC CALCULATION (BAZETT): 459 MS
EKG R AXIS: 3 DEGREES
EKG T AXIS: 62 DEGREES
EKG VENTRICULAR RATE: 89 BPM
EOSINOPHILS ABSOLUTE: 0.2 K/UL (ref 0–0.6)
EOSINOPHILS RELATIVE PERCENT: 3 %
GFR AFRICAN AMERICAN: 52
GFR NON-AFRICAN AMERICAN: 43
GLUCOSE BLD-MCNC: 106 MG/DL (ref 70–99)
HCT VFR BLD CALC: 35.8 % (ref 36–48)
HEMOGLOBIN: 10.9 G/DL (ref 12–16)
INR BLD: 1.05 (ref 0.87–1.14)
LYMPHOCYTES ABSOLUTE: 0.7 K/UL (ref 1–5.1)
LYMPHOCYTES RELATIVE PERCENT: 12.8 %
MCH RBC QN AUTO: 27.9 PG (ref 26–34)
MCHC RBC AUTO-ENTMCNC: 30.6 G/DL (ref 31–36)
MCV RBC AUTO: 91.1 FL (ref 80–100)
MONOCYTES ABSOLUTE: 0.9 K/UL (ref 0–1.3)
MONOCYTES RELATIVE PERCENT: 15.6 %
NEUTROPHILS ABSOLUTE: 3.8 K/UL (ref 1.7–7.7)
NEUTROPHILS RELATIVE PERCENT: 67.2 %
PDW BLD-RTO: 17.2 % (ref 12.4–15.4)
PLATELET # BLD: 251 K/UL (ref 135–450)
PMV BLD AUTO: 9.9 FL (ref 5–10.5)
POTASSIUM REFLEX MAGNESIUM: 4.6 MMOL/L (ref 3.5–5.1)
PROTHROMBIN TIME: 13.5 SEC (ref 11.7–14.5)
RBC # BLD: 3.92 M/UL (ref 4–5.2)
SODIUM BLD-SCNC: 137 MMOL/L (ref 136–145)
TOTAL PROTEIN: 6.4 G/DL (ref 6.4–8.2)
WBC # BLD: 5.7 K/UL (ref 4–11)

## 2022-06-24 PROCEDURE — 36415 COLL VENOUS BLD VENIPUNCTURE: CPT

## 2022-06-24 PROCEDURE — 71046 X-RAY EXAM CHEST 2 VIEWS: CPT

## 2022-06-24 PROCEDURE — 73130 X-RAY EXAM OF HAND: CPT

## 2022-06-24 PROCEDURE — 70450 CT HEAD/BRAIN W/O DYE: CPT

## 2022-06-24 PROCEDURE — 90471 IMMUNIZATION ADMIN: CPT | Performed by: PHYSICIAN ASSISTANT

## 2022-06-24 PROCEDURE — 73521 X-RAY EXAM HIPS BI 2 VIEWS: CPT

## 2022-06-24 PROCEDURE — 73080 X-RAY EXAM OF ELBOW: CPT

## 2022-06-24 PROCEDURE — 73562 X-RAY EXAM OF KNEE 3: CPT

## 2022-06-24 PROCEDURE — 85025 COMPLETE CBC W/AUTO DIFF WBC: CPT

## 2022-06-24 PROCEDURE — 29105 APPLICATION LONG ARM SPLINT: CPT

## 2022-06-24 PROCEDURE — 80053 COMPREHEN METABOLIC PANEL: CPT

## 2022-06-24 PROCEDURE — 70486 CT MAXILLOFACIAL W/O DYE: CPT

## 2022-06-24 PROCEDURE — 72125 CT NECK SPINE W/O DYE: CPT

## 2022-06-24 PROCEDURE — 73110 X-RAY EXAM OF WRIST: CPT

## 2022-06-24 PROCEDURE — 90715 TDAP VACCINE 7 YRS/> IM: CPT | Performed by: PHYSICIAN ASSISTANT

## 2022-06-24 PROCEDURE — 93010 ELECTROCARDIOGRAM REPORT: CPT | Performed by: INTERNAL MEDICINE

## 2022-06-24 PROCEDURE — 6370000000 HC RX 637 (ALT 250 FOR IP): Performed by: PHYSICIAN ASSISTANT

## 2022-06-24 PROCEDURE — 96374 THER/PROPH/DIAG INJ IV PUSH: CPT

## 2022-06-24 PROCEDURE — 99285 EMERGENCY DEPT VISIT HI MDM: CPT

## 2022-06-24 PROCEDURE — 85610 PROTHROMBIN TIME: CPT

## 2022-06-24 PROCEDURE — 6360000002 HC RX W HCPCS: Performed by: PHYSICIAN ASSISTANT

## 2022-06-24 PROCEDURE — 93005 ELECTROCARDIOGRAM TRACING: CPT | Performed by: PHYSICIAN ASSISTANT

## 2022-06-24 RX ORDER — ASPIRIN 81 MG/1
81 TABLET ORAL PRN
Status: ON HOLD | COMMUNITY
End: 2022-06-28

## 2022-06-24 RX ORDER — MORPHINE SULFATE 4 MG/ML
4 INJECTION, SOLUTION INTRAMUSCULAR; INTRAVENOUS ONCE
Status: COMPLETED | OUTPATIENT
Start: 2022-06-24 | End: 2022-06-24

## 2022-06-24 RX ORDER — METHOCARBAMOL 500 MG/1
500 TABLET, FILM COATED ORAL 4 TIMES DAILY
Qty: 40 TABLET | Refills: 0 | Status: SHIPPED | OUTPATIENT
Start: 2022-06-24 | End: 2022-06-24

## 2022-06-24 RX ORDER — METHOCARBAMOL 500 MG/1
750 TABLET, FILM COATED ORAL ONCE
Status: COMPLETED | OUTPATIENT
Start: 2022-06-24 | End: 2022-06-24

## 2022-06-24 RX ADMIN — METHOCARBAMOL TABLETS 750 MG: 500 TABLET, COATED ORAL at 13:59

## 2022-06-24 RX ADMIN — TETANUS TOXOID, REDUCED DIPHTHERIA TOXOID AND ACELLULAR PERTUSSIS VACCINE, ADSORBED 0.5 ML: 5; 2.5; 8; 8; 2.5 SUSPENSION INTRAMUSCULAR at 13:59

## 2022-06-24 RX ADMIN — MORPHINE SULFATE 4 MG: 4 INJECTION, SOLUTION INTRAMUSCULAR; INTRAVENOUS at 13:57

## 2022-06-24 ASSESSMENT — PAIN SCALES - GENERAL
PAINLEVEL_OUTOF10: 8
PAINLEVEL_OUTOF10: 8

## 2022-06-24 ASSESSMENT — PAIN - FUNCTIONAL ASSESSMENT: PAIN_FUNCTIONAL_ASSESSMENT: 0-10

## 2022-06-24 NOTE — LETTER
[address] [facility]  Surgery Scheduling Form:      DEMOGRAPHICS:                                                                                                                  Patient Name:  Mayco Cavanaugh  Patient :  1940   Patient SS#:      Patient Phone:  [phone] (home) [phone] (work) Alt. Patient Phone:    Patient Address:  [place]   [address] [address]    PCP:  Angelica Carbajal MD    Insurance ID Number:  Payor/Plan Subscr  Sex Relation Sub. Ins. ID Effective Group Num   1. BCBS MEDICARETaunya Daryn 1940 Female Self [id number] [facility]                                   [address]         DIAGNOSIS & PROCEDURE:                                                                                                Diagnosis:   Left Distal radius fracture S52.502A  Operation:  Open reduction internal fixation Left distal radius [address]7  Location: [place]  Provider:  Ryan Diaz MD      SCHEDULING INFORMATION:                                                                                         .    Requested Date:   2022    OR Time:  1000                      Patient Arrival Time:  0800  OR Time Required:  30 Minutes  Anesthesia:  General  Equipment:  13th Lab  Mini C-Arm:  Yes   Standard C-Arm:  No  Status:  Outpatient  PAT Required:  Yes  Comments:             Brianna Diaz MD     22         Pre Operative Physician Prophylaxis Orders - SCIP Protocols      Patient: Mayco Cavanaugh  :    1940    Procedure: [id number] - Open reduction internal fixation Left distal radius [address]3915      HEIGHT:  Ht Readings from Last 1 Encounters:   22 5' (1.524 m)                      WEIGHT:  Wt Readings from Last 1 Encounters:   22 135 lb (61.2 kg)         History & Physical:  By Surgeon/ ER report     Pre-Operative Antibiotic Order:     []  ----  No Antibiotic Ordered       [x]  ----  Give the following Antibiotic within 1 hour prior to start time:                                                      Cefazolin 2g IV <119.9kg (264lbs) OR 3g if >120kg (169GHN)       or      Clindamycin 900 mg IV (over 1 hour) if patient is allergic to           PENICILLINS or CAPHALOSPORIN       VTE prophylaxis [ ] Thigh hi  TEDS  [ ]  Knee Hi TEDS [ ] Foot Pumps [ ] Compression Devices      Physician Signature:     Date: 6/24/22  Time: 2:58 PM

## 2022-06-24 NOTE — TELEPHONE ENCOUNTER
Per [facility] patient has agreed to have [address] done on his Left Distal Radius Monday 6.27.2022 @ 10 am @ [facility] AND SPINE [facility] AT [place].     [facility] has requested me to place a surgical letter and make arrangements for that to go on the schedule. Surgery letter was placed and routed to to the [facility] surgery scheduling team and [facility] ARCHER

## 2022-06-24 NOTE — PROGRESS NOTES
[address] [facility] time______0800______        Surgery time_1000___________    Take the following medications with a sip of water: Follow your MD/Surgeons pre-procedure instructions regarding your medications     Do not eat or drink anything after 12:00 midnight prior to your surgery. This includes water chewing gum, mints and ice chips. You may brush your teeth and gargle the morning of your surgery, but do not swallow the water     Please see your family doctor/pediatrician for a history and physical and/or concerning medications. Bring any test results/reports from your physicians office. If you are under the care of a heart doctor or specialist doctor, please be aware that you may be asked to them for clearance    You may be asked to stop blood thinners such as Coumadin, Plavix, Fragmin, Lovenox, etc., or any anti-inflammatories such as:  Aspirin, Ibuprofen, Advil, Naproxen prior to your surgery. We also ask that you stop any OTC medications such as fish oil, vitamin E, glucosamine, garlic, Multivitamins, COQ 10, etc.    We ask that you do not smoke 24 hours prior to surgery  We ask that you do not  drink any alcoholic beverages 24 hours prior to surgery     You must make arrangements for a responsible adult to take you home after your surgery. For your safety you will not be allowed to leave alone or drive yourself home. Your surgery will be cancelled if you do not have a ride home. Also for your safety, it is strongly suggested that someone stay with you the first 24 hours after your surgery. A parent or legal guardian must accompany a child scheduled for surgery and plan to stay at the hospital until the child is discharged. Please do not bring other children with you. For your comfort, please wear simple loose fitting clothing to the hospital.  Please do not bring valuables.     Do not wear any make-up or nail polish on your fingers or toes      For your safety, please do not wear any jewelry or body piercing's on the day of surgery. All jewelry must be removed. If you have dentures, they will be removed before going to operating room. For your convenience, we will provide you with a container. If you wear contact lenses or glasses, they will be removed, please bring a case for them. If you have a living will and a durable power of  for healthcare, please bring in a copy. As part of our patient safety program to minimize surgical site infections, we ask you to do the following:    · Please notify your surgeon if you develop any illness between         now and the  day of your surgery. · This includes a cough, cold, fever, sore throat, nausea,         or vomiting, and diarrhea, etc.  ·  Please notify your surgeon if you experience dizziness, shortness         of breath or blurred vision between now and the time of your surgery. Do not shave your operative site 96 hours prior to surgery. For face and neck surgery, men may use an electric razor 48 hours   prior to surgery. You may shower the night before surgery or the morning of   your surgery with an antibacterial soap. You will need to bring a photo ID and insurance card    [facility] has an onsite pharmacy, would you like to utilize our pharmacy     If you will be staying overnight and use a C-pap machine, please bring   your C-pap to hospital     Our goal is to provide you with excellent care, therefore, visitors will be limited to two(2) in the room at a time so that we may focus on providing this care for you. Please contact pre-admission testing if you have any further questions. [facility] phone number:  [phone] [facility] fax number:  [phone]  Please note these are generalized instructions for all surgical cases, you may be provided with more specific instructions according to your surgery.     C-Difficile admission screening and protocol:       * Admitted with diarrhea? [] YES    [x]  NO     *Prior history of C-Diff. In last 3 months? [] YES    [x]  NO     *Antibiotic use in the past 6-8 weeks? [x]  NO    []  YES                 If yes, which ANTIBIOTIC AND REASON______     *Prior hospitalization or nursing home in the last month? []  YES    [x]  NO        SAFETY FIRST. .call before you fall

## 2022-06-24 NOTE — LETTER
[address] Orth[facility]  Surgery Scheduling Form:  DEMOGRAPHICS:                                                                                                                 Patient Name:  Cassius Pandya  Patient :  1940   Patient SS#:      Patient Phone:  [phone] (home) [phone] (work) Alt. Patient Phone:    Patient Address:  [address]  [address] [address]    PCP:  Sushma Ang MD  Insurance:  Payor: Zoila Weathers / Plan: López Meg ESSENTIAL/PLUS / Product Type: *No Product type* /   Insurance ID Number:  Payor/Plan Subscr  Sex Relation Sub. Ins. ID Effective Group Num   1. BCBS MEDICAREWest [place] 1940 Female Self [id number] 20 [id number]                                   [address]   DIAGNOSIS & PROCEDURE:                                                                                             Diagnosis:   Left Distal Radius Fracture S52.502A  Operation:  Open reduction internal fixation Left Distal Radius [address]1251  Location:  [place]  Surgeon:  Bere Motta MD    SCHEDULING INFORMATION:                                                                                       .  Surgeon's Scheduling Instruction:   Requested Date:   2022 OR Time:  1000 Patient Arrival Time:  0800  OR Time Required:  30  Minutes  Anesthesia:  General    SA Required:  Yes  Equipment:  [place]  Mini C-Arm:  Yes    Standard C-Arm:  No  Status:  Outpatient    PAT Required:  Yes  Latex Allergy:  unknown    Defibrilator or Pacemaker:  unknown  Isolation Precautions:  unknown  Comments:                       Brianna New MD 22   BILLING INFORMATION:                                                                                                     Procedure:       CPT Code Modifier          Pre-Certification:

## 2022-06-24 NOTE — ED NOTES
Attempted to give pt her meds ordered per [facility], pt at radiology at this time.       Maynard Brunner, RN  06/24/22 9033

## 2022-06-24 NOTE — ED PROVIDER NOTES
[address] ED  EMERGENCY DEPARTMENT ENCOUNTER        Pt Name: Erenest Romberg  MRN: [id number]  [place] 1940  Date of evaluation: 6/24/2022  Provider: NONA Zavala  PCP: Kathleen Lowry MD    This patient was not seen and evaluated by the attending physician No att. providers found. I have evaluated this patient. My supervising physician was available for consultation. CHIEF COMPLAINT       Chief Complaint   Patient presents with    Fall     mechanical fall from car. Pt c/o right knee left arm pain and noted to have abrasions to face       HISTORY OF PRESENT ILLNESS   (Location/Symptom, Timing/Onset, Context/Setting, Quality, Duration, Modifying Factors, Severity)  Note limiting factors. Erenest Romberg is a 80 y.o. female with past medical history of nonrheumatic aortic valve stenosis, status post TAVR, atrial fibrillation, COPD, CHF, hypertension, hyperlipidemia, who presents via private vehicle from her home for evaluation after a fall. Patient was actually getting out of her car to go to a doctor's office appointment at this hospital when she tripped falling out of the car, falling with her hand outstretched in front of her and hitting her face. She denies loss of consciousness. She endorses pain to the left wrist, she is right-hand dominant and pain to the right elbow. She denies back hip pain neck pain abdominal pain chest pain difficulty breathing. She endorses some discomfort around her facial abrasions but denies significant head pain. She also endorses bilateral knee pain, right greater than left. Nursing Notes were all reviewed and agreed with or any disagreements were addressed  in the HPI. Pt was seen during the [place] 19 pandemic. Appropriate PPE worn by ME during patient encounters.  Pt seen during a time with constrained hospital bed capacity and other potential inpatient and outpatient resources were constrained due to the viral pandemic. REVIEW OF SYSTEMS    (2-9 systems for level 4, 10 or more for level 5)     Review of Systems    Positives and Pertinent negatives as per HPI. Except as noted abovein the ROS, all other systems were reviewed and negative. PAST MEDICAL HISTORY     Past Medical History:   Diagnosis Date    Acute on chronic diastolic (congestive) heart failure ([facility] 8/19/2020    Aortic stenosis     Arthritis     Cancer (HCC)     skin basal    Chronic respiratory failure with hypoxia ([facility] 9/7/2021    COPD exacerbation ([facility] 9/7/2021    Hyperlipidemia     Hypertension     Paroxysmal atrial fibrillation ([facility]     Thyroid disease          SURGICAL HISTORY     Past Surgical History:   Procedure Laterality Date    AORTIC VALVE REPLACEMENT N/A 10/15/2019    TRANSCATHETER AORTIC VALVE REPLACEMENT FEMORAL APPROACH performed by Carlee Dejesus MD at [address]  09/2019    COLONOSCOPY N/A 8/13/2019    COLON W/ANES. performed by Donavon Hancock MD at [address] [facility] N/A 9/25/2020    CYSTOSCOPY performed by Joan Cabral MD at [facility]  8/13/2019    ESOPHAGEAL DILATION Olvin Ponce performed by Donavon Hancock MD at [address] Right 08/19/2020    KNEE ARTHROSCOPY Left     Torn meniscus    OTHER SURGICAL HISTORY  10/09/2015    phacoemulsification of cataract with intraocular implant right eye    THYROIDECTOMY, PARTIAL      TONSILLECTOMY      TUBAL LIGATION      UPPER GASTROINTESTINAL ENDOSCOPY N/A 8/13/2019    EGD W/ANES. (9:30) performed by Donavon Hancock MD at [address]       Previous Medications    ATORVASTATIN (LIPITOR) 40 MG TABLET    TAKE ONE TABLET BY MOUTH ONCE NIGHTLY    BUTALBITAL-ACETAMINOPHEN  MG TABS    Take 1 tablet by mouth as needed (as needed for headaches) Vary rarely.     CYANOCOBALAMIN (B-12) 2500 MCG TABS    Take 2,500 mcg by mouth once a week Weekly on . DILTIAZEM (CARTIA XT) 120 MG EXTENDED RELEASE CAPSULE    Take 1 capsule by mouth daily    DULOXETINE (CYMBALTA) 30 MG EXTENDED RELEASE CAPSULE    Take 30 mg by mouth daily    HYDROCODONE-ACETAMINOPHEN (NORCO) 5-325 MG PER TABLET    Take 1 tablet by mouth every 6 hours as needed for Pain. Takes one pill @ 8am, one pill @ 3pm, one pill @ 6p, and one pill at bedtime. LEVOTHYROXINE (SYNTHROID) 125 MCG TABLET    Take 125 mcg by mouth Daily    MELATONIN 5 MG TABS TABLET    Take 5 mg by mouth nightly    METAXALONE (SKELAXIN) 800 MG TABLET    Take 800 mg by mouth 2 times daily as needed (as needed for spasms) Do not take with vicodin.     METOPROLOL SUCCINATE (TOPROL XL) 50 MG EXTENDED RELEASE TABLET    TAKE ONE TABLET BY MOUTH TWICE A DAY    OMEPRAZOLE (PRILOSEC) 20 MG DELAYED RELEASE CAPSULE    Take 20 mg by mouth daily    OXYGEN    Inhale 2 L into the lungs    POTASSIUM CHLORIDE (KLOR-CON M) 10 MEQ EXTENDED RELEASE TABLET    Take 1 tablet by mouth daily    TORSEMIDE (DEMADEX) 20 MG TABLET    TAKE ONE TABLET BY MOUTH TWICE A DAY         ALLERGIES     Iodides, Shellfish-derived products, Sulfa antibiotics, and Dronedarone    FAMILYHISTORY       Family History   Problem Relation Age of Onset    Colon Cancer Mother     Heart Disease Father     Heart Disease Brother           SOCIAL HISTORY       Social History     Socioeconomic History    Marital status:      Spouse name: Not on file    Number of children: Not on file    Years of education: Not on file    Highest education level: Not on file   Occupational History    Not on file   Tobacco Use    Smoking status: Former Smoker     Packs/day: 1.00     Years: 20.00     Pack years: 20.00     Types: Cigarettes     Quit date: 1983     Years since quittin.5    Smokeless tobacco: Never Used   Vaping Use    Vaping Use: Never used   Substance and Sexual Activity    Alcohol use: No    Drug use: Never    Sexual activity: Not Currently     Partners: Male   Other Topics Concern    Not on file   Social History Narrative    Not on file     Social Determinants of Health     Financial Resource Strain:     Difficulty of Paying Living Expenses: Not on file   Food Insecurity:     Worried About Running Out of Food in the Last Year: Not on file    Khurram of Food in the Last Year: Not on file   Transportation Needs:     Lack of Transportation (Medical): Not on file    Lack of Transportation (Non-Medical): Not on file   Physical Activity:     Days of Exercise per Week: Not on file    Minutes of Exercise per Session: Not on file   Stress:     Feeling of Stress : Not on file   Social Connections:     Frequency of Communication with Friends and Family: Not on file    Frequency of Social Gatherings with Friends and Family: Not on file    Attends Evangelical Services: Not on file    Active Member of [address] [place] [address] wishkicker or Organizations: Not on file    Attends Club or Organization Meetings: Not on file    Marital Status: Not on file   Intimate Partner Violence:     Fear of Current or Ex-Partner: Not on file    Emotionally Abused: Not on file    Physically Abused: Not on file    Sexually Abused: Not on file   Housing Stability:     Unable to Pay for Housing in the Last Year: Not on file    Number of [place] in the Last Year: Not on file    Unstable Housing in the Last Year: Not on file       SCREENINGS    Dalton Coma Scale  Eye Opening: Spontaneous  Best Verbal Response: Oriented  Best Motor Response: Obeys commands  Amanuel Coma Scale Score: 15        PHYSICAL EXAM    (up to 7 for level 4, 8 or more for level 5)     ED Triage Vitals [06/24/22 1125]   BP Temp Temp Source Heart Rate Resp SpO2 Height Weight   128/73 98.6 °F (37 °C) Oral 95 18 95 % 5' (1.524 m) 135 lb (61.2 kg)       Physical Exam  Vitals and nursing note reviewed. Constitutional:       General: She is awake. She is not in acute distress.      Appearance: Normal appearance. She is well-developed and normal weight. She is not ill-appearing, toxic-appearing or diaphoretic. HENT:      Head: Normocephalic. Abrasion and contusion present. No raccoon eyes, Salmon's sign, right periorbital erythema, left periorbital erythema or laceration. Jaw: There is normal jaw occlusion. No trismus. Right Ear: Hearing, tympanic membrane, ear canal and external ear normal. No hemotympanum. Left Ear: Hearing, tympanic membrane, ear canal and external ear normal. No hemotympanum. Nose: Nose normal. No signs of injury. Right Nostril: No epistaxis or septal hematoma. Left Nostril: No epistaxis or septal hematoma. Mouth/Throat:      Lips: Pink. Mouth: Mucous membranes are moist. No injury. Pharynx: Oropharynx is clear. Uvula midline. Eyes:      General:         Right eye: No discharge. Left eye: No discharge. Extraocular Movements: Extraocular movements intact. Right eye: No nystagmus. Left eye: No nystagmus. Conjunctiva/sclera: Conjunctivae normal.      Pupils: Pupils are equal, round, and reactive to light. Neck:      Trachea: Trachea and phonation normal.   Cardiovascular:      Rate and Rhythm: Normal rate and regular rhythm. Pulses: Normal pulses. Heart sounds: Normal heart sounds. No murmur heard. No friction rub. No gallop. Pulmonary:      Effort: Pulmonary effort is normal. No respiratory distress. Breath sounds: Normal breath sounds. No wheezing or rales. Chest:      Chest wall: No lacerations, deformity, swelling, tenderness, crepitus or edema. Abdominal:      General: Bowel sounds are normal. There is no abdominal bruit. There are no signs of injury. Palpations: Abdomen is soft. Tenderness: There is no abdominal tenderness.    Musculoskeletal:      Right shoulder: Normal.      Left shoulder: Normal.      Right upper arm: Normal.      Left upper arm: Normal.      Right elbow: Tenderness present in radial head. Left elbow: Normal.      Right forearm: Normal.      Right wrist: Normal.      Left wrist: Swelling, deformity, tenderness, bony tenderness and snuff box tenderness present. No effusion, lacerations or crepitus. Decreased range of motion. Right hand: Normal.      Left hand: Tenderness and bony tenderness (Middle finger) present. Cervical back: Full passive range of motion without pain, normal range of motion and neck supple. No tenderness. No pain with movement, spinous process tenderness or muscular tenderness. Thoracic back: Normal.      Lumbar back: Normal.      Right hip: Normal.      Left hip: Normal.      Right upper leg: Normal.      Left upper leg: Normal.      Right knee: Swelling and bony tenderness present. No deformity, effusion, erythema or ecchymosis. Normal range of motion. Tenderness present. No medial joint line tenderness. Left knee: Swelling and bony tenderness present. No deformity, effusion, erythema or ecchymosis. Normal range of motion. Tenderness present. Right lower leg: Normal.      Left lower leg: Normal.      Right ankle: Normal.      Left ankle: Normal.      Right foot: Normal.      Left foot: Normal.   Skin:     General: Skin is warm and dry. Capillary Refill: Capillary refill takes less than 2 seconds. Coloration: Skin is not pale. Findings: No bruising or ecchymosis. Neurological:      General: No focal deficit present. Mental Status: She is alert, oriented to person, place, and time and easily aroused. GCS: GCS eye subscore is 4. GCS verbal subscore is 5. GCS motor subscore is 6. Cranial Nerves: Cranial nerves are intact. Sensory: Sensation is intact. Motor: Motor function is intact. Coordination: Coordination is intact. Psychiatric:         Behavior: Behavior normal. Behavior is cooperative.            DIAGNOSTIC RESULTS   LABS:    Labs Reviewed - No data to display    All other labs were within normal range or not returned as of this dictation. EKG: All EKG's are interpreted by the Emergency Department Physician who either signs orCo-signs this chart in the absence of a cardiologist.  Please see their note for interpretation of EKG. RADIOLOGY:   Non-plain film images such as CT, Ultrasound and MRI are read by the radiologist. Plain radiographic images are visualized andpreliminarily interpreted by the  ED Provider with the below findings:        Interpretation perthe Radiologist below, if available at the time of this note:    No orders to display     No results found. PROCEDURES   Unless otherwise noted below, none     Procedures    CRITICAL CARE TIME   N/A    CONSULTS:  None      EMERGENCY DEPARTMENT COURSE and DIFFERENTIALDIAGNOSIS/MDM:   Vitals:    Vitals:    06/24/22 1125   BP: 128/73   Pulse: 95   Resp: 18   Temp: 98.6 °F (37 °C)   TempSrc: Oral   SpO2: 95%   Weight: 135 lb (61.2 kg)   Height: 5' (1.524 m)       Patient was given thefollowing medications:  Medications - No data to display    PDMP Monitoring:    Last PDMP Ayaz as Reviewed [facility]):  Review User Review Instant Review Result            Urine Drug Screenings (1 yr)    No resulted procedures found. Medication Contract and Consent for Opioid Use Documents Filed      No documents found                MDM:   Patient seen and evaluated. Old records reviewed. Diagnostic testing reviewed and results discussed. I have independently evaluated this patient based upon my scope of practice. Supervising physician was in the department for consultation as needed. 80-year-old female presents for evaluation after a fall. Work-up in the department included imaging, basic blood work. Coags within normal limits. CBC reveals chronic appearing anemia. Metabolic panel within normal limits. CT head is negative for acute intracranial abnormality.   No acute cervical spine abnormality appreciated on CT head. CT max face is negative for acute abnormality or injury. Bilateral hip x-rays are without acute findings. X-ray of the right knee is negative for acute fracture there is moderate nonspecific moderate sized knee joint effusion without discrete fracture plane evident and left x-ray of the knee is remarkable for arthritic changes with suggestion of intra-articular body. Bones appear diffusely demineralized. X-ray of the right elbow reveals underlying elbow joint effusion suggestive of radial head occult fracture. Patient placed in a splint. X-ray of the left wrist and hand reveals a displaced fracture of the distal radial metaphysis diaphysis with minimally displaced fracture of the ulnar diaphysis and nondisplaced fracture of the ulnar styloid. Patient was placed in a splint. Chest x-ray negative for acute process. Patient remains distally neurovascularly intact status post splint applications. I offered the patient admission as she is in bilateral upper extremity splints with arm slings however patient and her spouse at bedside notes that they do not want her to be admitted as she has all the equipment needed at home to accommodate and her  notes that he will be at her bedside to assist with activities of daily living until she has surgery. I discussed patient's presentation with Dr. Tita Torres who notes patient can be scheduled for surgery for the left wrist with him on Monday. Patient is in agreement with this plan. She will be discharged home with close outpatient follow-up and strict ER return precautions. Pt is in agreement with the current plan and all questions were addressed. Is this patient to be included in the SEP-1 Core Measure due to severe sepsis or septic shock? No   Exclusion criteria - the patient is NOT to be included for SEP-1 Core Measure due to:   Infection is not suspected    Discharge Time out:  CC Reviewed Yes   Test Results Yes     Vitals:    06/24/22 1125

## 2022-06-24 NOTE — TELEPHONE ENCOUNTER
Surgery letter's were faxed successfully to [facility] scheduling and [employer]. Patient was placed on our schedule for 1005 06.27.2022. Spoke to Rosaura Butcher - patients caregiver and POA. Informed her of the patient's surgical time/arrival time and location. Address was given. Informed NPO after midnight. Also scheduled 2 week post op with [facility] @  location for 0945 on 7.12.2022. address was given for that as well and informed to try to arrive early for any paperwork that might be needed.

## 2022-06-24 NOTE — ED PROVIDER NOTES
Care assumed of the patient at this time. I was asked to follow-up on laboratory results and evaluate the final splint placement. PT/INR within normal limits. CBC grossly negative reveals RBC 3.92, hemoglobin 10.9, hematocrit 35.8. CMP reveals mild hyperglycemia of 106, GFR of 43. Kathrene Bolk A  splint was placed bilateral upper extremities by the emergency department technician, it was applied appropriately and the patient was neurovascularly intact as observed by myself. I discussed the plan for discharge with the patient including following up with orthopedics for scheduled procedure on Monday. Patient verbalized understanding of all discharge teaching and was ultimately discharged in a stable condition with all questions answered.        Jolene Rollins, RABIA - CNP  06/24/22 1450 Hypertension.

## 2022-06-27 ENCOUNTER — ANESTHESIA (OUTPATIENT)
Dept: OPERATING ROOM | Age: 82
DRG: 511 | End: 2022-06-27
Payer: MEDICARE

## 2022-06-27 ENCOUNTER — APPOINTMENT (OUTPATIENT)
Dept: GENERAL RADIOLOGY | Age: 82
DRG: 511 | End: 2022-06-27
Attending: ORTHOPAEDIC SURGERY
Payer: MEDICARE

## 2022-06-27 ENCOUNTER — TELEPHONE (OUTPATIENT)
Dept: ORTHOPEDIC SURGERY | Age: 82
End: 2022-06-27

## 2022-06-27 ENCOUNTER — HOSPITAL ENCOUNTER (INPATIENT)
Age: 82
LOS: 2 days | Discharge: NURSING, REHAB OR LONG-TERM CARE | DRG: 511 | End: 2022-06-29
Attending: ORTHOPAEDIC SURGERY | Admitting: INTERNAL MEDICINE
Payer: MEDICARE

## 2022-06-27 ENCOUNTER — ANESTHESIA EVENT (OUTPATIENT)
Dept: OPERATING ROOM | Age: 82
DRG: 511 | End: 2022-06-27
Payer: MEDICARE

## 2022-06-27 DIAGNOSIS — S52.592A OTHER CLOSED FRACTURE OF DISTAL END OF LEFT RADIUS, INITIAL ENCOUNTER: Primary | ICD-10-CM

## 2022-06-27 PROBLEM — S62.102A WRIST FRACTURE, CLOSED, LEFT, INITIAL ENCOUNTER: Status: ACTIVE | Noted: 2022-06-27

## 2022-06-27 PROBLEM — S62.102A: Status: ACTIVE | Noted: 2022-06-27

## 2022-06-27 PROCEDURE — 2500000003 HC RX 250 WO HCPCS: Performed by: ORTHOPAEDIC SURGERY

## 2022-06-27 PROCEDURE — 3700000001 HC ADD 15 MINUTES (ANESTHESIA): Performed by: ORTHOPAEDIC SURGERY

## 2022-06-27 PROCEDURE — 7100000000 HC PACU RECOVERY - FIRST 15 MIN: Performed by: ORTHOPAEDIC SURGERY

## 2022-06-27 PROCEDURE — 2700000000 HC OXYGEN THERAPY PER DAY

## 2022-06-27 PROCEDURE — 3600000004 HC SURGERY LEVEL 4 BASE: Performed by: ORTHOPAEDIC SURGERY

## 2022-06-27 PROCEDURE — 99222 1ST HOSP IP/OBS MODERATE 55: CPT | Performed by: ORTHOPAEDIC SURGERY

## 2022-06-27 PROCEDURE — 94760 N-INVAS EAR/PLS OXIMETRY 1: CPT

## 2022-06-27 PROCEDURE — A4217 STERILE WATER/SALINE, 500 ML: HCPCS | Performed by: ORTHOPAEDIC SURGERY

## 2022-06-27 PROCEDURE — 3209999900 FLUORO FOR SURGICAL PROCEDURES

## 2022-06-27 PROCEDURE — 73100 X-RAY EXAM OF WRIST: CPT

## 2022-06-27 PROCEDURE — 2709999900 HC NON-CHARGEABLE SUPPLY: Performed by: ORTHOPAEDIC SURGERY

## 2022-06-27 PROCEDURE — 3600000014 HC SURGERY LEVEL 4 ADDTL 15MIN: Performed by: ORTHOPAEDIC SURGERY

## 2022-06-27 PROCEDURE — 2500000003 HC RX 250 WO HCPCS: Performed by: NURSE ANESTHETIST, CERTIFIED REGISTERED

## 2022-06-27 PROCEDURE — 3700000000 HC ANESTHESIA ATTENDED CARE: Performed by: ORTHOPAEDIC SURGERY

## 2022-06-27 PROCEDURE — 6360000002 HC RX W HCPCS: Performed by: ORTHOPAEDIC SURGERY

## 2022-06-27 PROCEDURE — 2720000010 HC SURG SUPPLY STERILE: Performed by: ORTHOPAEDIC SURGERY

## 2022-06-27 PROCEDURE — 2060000000 HC ICU INTERMEDIATE R&B

## 2022-06-27 PROCEDURE — 6370000000 HC RX 637 (ALT 250 FOR IP): Performed by: INTERNAL MEDICINE

## 2022-06-27 PROCEDURE — C1713 ANCHOR/SCREW BN/BN,TIS/BN: HCPCS | Performed by: ORTHOPAEDIC SURGERY

## 2022-06-27 PROCEDURE — 2580000003 HC RX 258: Performed by: NURSE ANESTHETIST, CERTIFIED REGISTERED

## 2022-06-27 PROCEDURE — 25608 OPTX DST RD XART FX/EPI SEP2: CPT | Performed by: ORTHOPAEDIC SURGERY

## 2022-06-27 PROCEDURE — 6360000002 HC RX W HCPCS: Performed by: NURSE PRACTITIONER

## 2022-06-27 PROCEDURE — 6360000002 HC RX W HCPCS: Performed by: INTERNAL MEDICINE

## 2022-06-27 PROCEDURE — 2580000003 HC RX 258: Performed by: ORTHOPAEDIC SURGERY

## 2022-06-27 PROCEDURE — 25608 OPTX DST RD XART FX/EPI SEP2: CPT | Performed by: NURSE PRACTITIONER

## 2022-06-27 PROCEDURE — 0PSJ04Z REPOSITION LEFT RADIUS WITH INTERNAL FIXATION DEVICE, OPEN APPROACH: ICD-10-PCS | Performed by: ORTHOPAEDIC SURGERY

## 2022-06-27 PROCEDURE — 6360000002 HC RX W HCPCS: Performed by: NURSE ANESTHETIST, CERTIFIED REGISTERED

## 2022-06-27 PROCEDURE — 7100000001 HC PACU RECOVERY - ADDTL 15 MIN: Performed by: ORTHOPAEDIC SURGERY

## 2022-06-27 PROCEDURE — 2580000003 HC RX 258: Performed by: INTERNAL MEDICINE

## 2022-06-27 PROCEDURE — 2580000003 HC RX 258: Performed by: NURSE PRACTITIONER

## 2022-06-27 DEVICE — LOCKING SCREW, FULLY THREADED,T8
Type: IMPLANTABLE DEVICE | Site: WRIST | Status: FUNCTIONAL
Brand: VARIAX

## 2022-06-27 DEVICE — VOLAR PLATE INTERMEDIATE LEFT, X-SHORT
Type: IMPLANTABLE DEVICE | Site: WRIST | Status: FUNCTIONAL
Brand: VARIAX

## 2022-06-27 DEVICE — BONE SCREW, FULLY THREADED, T8
Type: IMPLANTABLE DEVICE | Site: WRIST | Status: FUNCTIONAL
Brand: VARIAX

## 2022-06-27 RX ORDER — LANOLIN ALCOHOL/MO/W.PET/CERES
6 CREAM (GRAM) TOPICAL NIGHTLY
Status: DISCONTINUED | OUTPATIENT
Start: 2022-06-27 | End: 2022-06-29 | Stop reason: HOSPADM

## 2022-06-27 RX ORDER — FENTANYL CITRATE 50 UG/ML
INJECTION, SOLUTION INTRAMUSCULAR; INTRAVENOUS PRN
Status: DISCONTINUED | OUTPATIENT
Start: 2022-06-27 | End: 2022-06-27 | Stop reason: SDUPTHER

## 2022-06-27 RX ORDER — SODIUM CHLORIDE 9 MG/ML
INJECTION, SOLUTION INTRAVENOUS CONTINUOUS
Status: DISCONTINUED | OUTPATIENT
Start: 2022-06-27 | End: 2022-06-28

## 2022-06-27 RX ORDER — SODIUM CHLORIDE 0.9 % (FLUSH) 0.9 %
5-40 SYRINGE (ML) INJECTION EVERY 12 HOURS SCHEDULED
Status: DISCONTINUED | OUTPATIENT
Start: 2022-06-27 | End: 2022-06-29 | Stop reason: HOSPADM

## 2022-06-27 RX ORDER — MORPHINE SULFATE 2 MG/ML
2 INJECTION, SOLUTION INTRAMUSCULAR; INTRAVENOUS EVERY 6 HOURS PRN
Status: DISCONTINUED | OUTPATIENT
Start: 2022-06-27 | End: 2022-06-29 | Stop reason: HOSPADM

## 2022-06-27 RX ORDER — METOPROLOL SUCCINATE 50 MG/1
50 TABLET, EXTENDED RELEASE ORAL 2 TIMES DAILY
Status: DISCONTINUED | OUTPATIENT
Start: 2022-06-27 | End: 2022-06-29 | Stop reason: HOSPADM

## 2022-06-27 RX ORDER — GLYCOPYRROLATE 0.2 MG/ML
INJECTION INTRAMUSCULAR; INTRAVENOUS PRN
Status: DISCONTINUED | OUTPATIENT
Start: 2022-06-27 | End: 2022-06-27 | Stop reason: SDUPTHER

## 2022-06-27 RX ORDER — ONDANSETRON 4 MG/1
4 TABLET, ORALLY DISINTEGRATING ORAL EVERY 8 HOURS PRN
Status: DISCONTINUED | OUTPATIENT
Start: 2022-06-27 | End: 2022-06-29 | Stop reason: HOSPADM

## 2022-06-27 RX ORDER — SODIUM CHLORIDE 9 MG/ML
INJECTION, SOLUTION INTRAVENOUS PRN
Status: DISCONTINUED | OUTPATIENT
Start: 2022-06-27 | End: 2022-06-27 | Stop reason: HOSPADM

## 2022-06-27 RX ORDER — MAGNESIUM HYDROXIDE 1200 MG/15ML
LIQUID ORAL CONTINUOUS PRN
Status: DISCONTINUED | OUTPATIENT
Start: 2022-06-27 | End: 2022-06-27 | Stop reason: HOSPADM

## 2022-06-27 RX ORDER — BUPIVACAINE HYDROCHLORIDE 5 MG/ML
INJECTION, SOLUTION EPIDURAL; INTRACAUDAL
Status: COMPLETED | OUTPATIENT
Start: 2022-06-27 | End: 2022-06-27

## 2022-06-27 RX ORDER — SODIUM CHLORIDE 9 MG/ML
INJECTION, SOLUTION INTRAVENOUS PRN
Status: DISCONTINUED | OUTPATIENT
Start: 2022-06-27 | End: 2022-06-29 | Stop reason: HOSPADM

## 2022-06-27 RX ORDER — ONDANSETRON 2 MG/ML
4 INJECTION INTRAMUSCULAR; INTRAVENOUS
Status: DISCONTINUED | OUTPATIENT
Start: 2022-06-27 | End: 2022-06-27 | Stop reason: HOSPADM

## 2022-06-27 RX ORDER — LEVOTHYROXINE SODIUM 0.12 MG/1
125 TABLET ORAL DAILY
Status: DISCONTINUED | OUTPATIENT
Start: 2022-06-28 | End: 2022-06-29 | Stop reason: HOSPADM

## 2022-06-27 RX ORDER — LIDOCAINE HYDROCHLORIDE 20 MG/ML
INJECTION, SOLUTION EPIDURAL; INFILTRATION; INTRACAUDAL; PERINEURAL PRN
Status: DISCONTINUED | OUTPATIENT
Start: 2022-06-27 | End: 2022-06-27 | Stop reason: SDUPTHER

## 2022-06-27 RX ORDER — LORAZEPAM 2 MG/ML
0.5 INJECTION INTRAMUSCULAR
Status: DISCONTINUED | OUTPATIENT
Start: 2022-06-27 | End: 2022-06-27 | Stop reason: HOSPADM

## 2022-06-27 RX ORDER — HALOPERIDOL 5 MG/ML
1 INJECTION INTRAMUSCULAR
Status: DISCONTINUED | OUTPATIENT
Start: 2022-06-27 | End: 2022-06-27 | Stop reason: HOSPADM

## 2022-06-27 RX ORDER — POTASSIUM CHLORIDE 20 MEQ/1
10 TABLET, EXTENDED RELEASE ORAL DAILY
Status: DISCONTINUED | OUTPATIENT
Start: 2022-06-28 | End: 2022-06-29 | Stop reason: HOSPADM

## 2022-06-27 RX ORDER — DILTIAZEM HYDROCHLORIDE 120 MG/1
120 CAPSULE, COATED, EXTENDED RELEASE ORAL DAILY
Status: DISCONTINUED | OUTPATIENT
Start: 2022-06-28 | End: 2022-06-29 | Stop reason: HOSPADM

## 2022-06-27 RX ORDER — OXYCODONE HYDROCHLORIDE 5 MG/1
5 TABLET ORAL
Status: DISCONTINUED | OUTPATIENT
Start: 2022-06-27 | End: 2022-06-27 | Stop reason: HOSPADM

## 2022-06-27 RX ORDER — ACETAMINOPHEN 325 MG/1
650 TABLET ORAL EVERY 6 HOURS PRN
Status: DISCONTINUED | OUTPATIENT
Start: 2022-06-27 | End: 2022-06-29 | Stop reason: HOSPADM

## 2022-06-27 RX ORDER — FENTANYL CITRATE 50 UG/ML
50 INJECTION, SOLUTION INTRAMUSCULAR; INTRAVENOUS EVERY 5 MIN PRN
Status: DISCONTINUED | OUTPATIENT
Start: 2022-06-27 | End: 2022-06-27 | Stop reason: HOSPADM

## 2022-06-27 RX ORDER — PANTOPRAZOLE SODIUM 40 MG/1
40 TABLET, DELAYED RELEASE ORAL
Status: DISCONTINUED | OUTPATIENT
Start: 2022-06-28 | End: 2022-06-29 | Stop reason: HOSPADM

## 2022-06-27 RX ORDER — DEXAMETHASONE SODIUM PHOSPHATE 4 MG/ML
INJECTION, SOLUTION INTRA-ARTICULAR; INTRALESIONAL; INTRAMUSCULAR; INTRAVENOUS; SOFT TISSUE PRN
Status: DISCONTINUED | OUTPATIENT
Start: 2022-06-27 | End: 2022-06-27 | Stop reason: SDUPTHER

## 2022-06-27 RX ORDER — DIPHENHYDRAMINE HYDROCHLORIDE 50 MG/ML
12.5 INJECTION INTRAMUSCULAR; INTRAVENOUS
Status: DISCONTINUED | OUTPATIENT
Start: 2022-06-27 | End: 2022-06-27 | Stop reason: HOSPADM

## 2022-06-27 RX ORDER — PROPOFOL 10 MG/ML
INJECTION, EMULSION INTRAVENOUS PRN
Status: DISCONTINUED | OUTPATIENT
Start: 2022-06-27 | End: 2022-06-27 | Stop reason: SDUPTHER

## 2022-06-27 RX ORDER — ACETAMINOPHEN 650 MG/1
650 SUPPOSITORY RECTAL EVERY 6 HOURS PRN
Status: DISCONTINUED | OUTPATIENT
Start: 2022-06-27 | End: 2022-06-29 | Stop reason: HOSPADM

## 2022-06-27 RX ORDER — DULOXETIN HYDROCHLORIDE 30 MG/1
30 CAPSULE, DELAYED RELEASE ORAL DAILY
Status: DISCONTINUED | OUTPATIENT
Start: 2022-06-28 | End: 2022-06-29 | Stop reason: HOSPADM

## 2022-06-27 RX ORDER — MORPHINE SULFATE 2 MG/ML
2 INJECTION, SOLUTION INTRAMUSCULAR; INTRAVENOUS EVERY 6 HOURS
Status: DISCONTINUED | OUTPATIENT
Start: 2022-06-27 | End: 2022-06-27

## 2022-06-27 RX ORDER — ENOXAPARIN SODIUM 100 MG/ML
30 INJECTION SUBCUTANEOUS DAILY
Status: DISCONTINUED | OUTPATIENT
Start: 2022-06-28 | End: 2022-06-29 | Stop reason: HOSPADM

## 2022-06-27 RX ORDER — ASPIRIN 81 MG/1
81 TABLET ORAL PRN
Status: DISCONTINUED | OUTPATIENT
Start: 2022-06-27 | End: 2022-06-29 | Stop reason: HOSPADM

## 2022-06-27 RX ORDER — ONDANSETRON 2 MG/ML
INJECTION INTRAMUSCULAR; INTRAVENOUS PRN
Status: DISCONTINUED | OUTPATIENT
Start: 2022-06-27 | End: 2022-06-27 | Stop reason: SDUPTHER

## 2022-06-27 RX ORDER — SODIUM CHLORIDE 0.9 % (FLUSH) 0.9 %
5-40 SYRINGE (ML) INJECTION EVERY 12 HOURS SCHEDULED
Status: DISCONTINUED | OUTPATIENT
Start: 2022-06-27 | End: 2022-06-27 | Stop reason: HOSPADM

## 2022-06-27 RX ORDER — SODIUM CHLORIDE 9 MG/ML
INJECTION, SOLUTION INTRAVENOUS CONTINUOUS PRN
Status: DISCONTINUED | OUTPATIENT
Start: 2022-06-27 | End: 2022-06-27 | Stop reason: SDUPTHER

## 2022-06-27 RX ORDER — ATORVASTATIN CALCIUM 40 MG/1
40 TABLET, FILM COATED ORAL NIGHTLY
Status: DISCONTINUED | OUTPATIENT
Start: 2022-06-27 | End: 2022-06-29 | Stop reason: HOSPADM

## 2022-06-27 RX ORDER — SODIUM CHLORIDE 0.9 % (FLUSH) 0.9 %
5-40 SYRINGE (ML) INJECTION PRN
Status: DISCONTINUED | OUTPATIENT
Start: 2022-06-27 | End: 2022-06-29 | Stop reason: HOSPADM

## 2022-06-27 RX ORDER — HYDROCODONE BITARTRATE AND ACETAMINOPHEN 5; 325 MG/1; MG/1
1 TABLET ORAL EVERY 6 HOURS PRN
Status: DISCONTINUED | OUTPATIENT
Start: 2022-06-27 | End: 2022-06-29 | Stop reason: HOSPADM

## 2022-06-27 RX ORDER — POLYETHYLENE GLYCOL 3350 17 G/17G
17 POWDER, FOR SOLUTION ORAL DAILY PRN
Status: DISCONTINUED | OUTPATIENT
Start: 2022-06-27 | End: 2022-06-29 | Stop reason: HOSPADM

## 2022-06-27 RX ORDER — SODIUM CHLORIDE 0.9 % (FLUSH) 0.9 %
5-40 SYRINGE (ML) INJECTION PRN
Status: DISCONTINUED | OUTPATIENT
Start: 2022-06-27 | End: 2022-06-27 | Stop reason: HOSPADM

## 2022-06-27 RX ORDER — ONDANSETRON 2 MG/ML
4 INJECTION INTRAMUSCULAR; INTRAVENOUS EVERY 6 HOURS PRN
Status: DISCONTINUED | OUTPATIENT
Start: 2022-06-27 | End: 2022-06-29 | Stop reason: HOSPADM

## 2022-06-27 RX ADMIN — HYDROCODONE BITARTRATE AND ACETAMINOPHEN 1 TABLET: 5; 325 TABLET ORAL at 22:15

## 2022-06-27 RX ADMIN — ONDANSETRON 4 MG: 2 INJECTION INTRAMUSCULAR; INTRAVENOUS at 11:30

## 2022-06-27 RX ADMIN — Medication 6 MG: at 22:16

## 2022-06-27 RX ADMIN — CEFAZOLIN 2 MG: 10 INJECTION, POWDER, FOR SOLUTION INTRAVENOUS at 11:14

## 2022-06-27 RX ADMIN — GLYCOPYRROLATE 0.1 MG: 0.2 INJECTION, SOLUTION INTRAMUSCULAR; INTRAVENOUS at 11:14

## 2022-06-27 RX ADMIN — METOPROLOL SUCCINATE 50 MG: 50 TABLET, EXTENDED RELEASE ORAL at 22:14

## 2022-06-27 RX ADMIN — FENTANYL CITRATE 25 MCG: 50 INJECTION INTRAMUSCULAR; INTRAVENOUS at 11:33

## 2022-06-27 RX ADMIN — LIDOCAINE HYDROCHLORIDE 60 MG: 20 INJECTION, SOLUTION EPIDURAL; INFILTRATION; INTRACAUDAL; PERINEURAL at 11:20

## 2022-06-27 RX ADMIN — SODIUM CHLORIDE: 9 INJECTION, SOLUTION INTRAVENOUS at 11:14

## 2022-06-27 RX ADMIN — FENTANYL CITRATE 25 MCG: 50 INJECTION INTRAMUSCULAR; INTRAVENOUS at 11:56

## 2022-06-27 RX ADMIN — MORPHINE SULFATE 2 MG: 2 INJECTION, SOLUTION INTRAMUSCULAR; INTRAVENOUS at 18:40

## 2022-06-27 RX ADMIN — SODIUM CHLORIDE: 9 INJECTION, SOLUTION INTRAVENOUS at 16:18

## 2022-06-27 RX ADMIN — DEXAMETHASONE SODIUM PHOSPHATE 6 MG: 4 INJECTION, SOLUTION INTRAMUSCULAR; INTRAVENOUS at 11:30

## 2022-06-27 RX ADMIN — PROPOFOL 150 MG: 10 INJECTION, EMULSION INTRAVENOUS at 11:20

## 2022-06-27 RX ADMIN — ATORVASTATIN CALCIUM 40 MG: 40 TABLET, FILM COATED ORAL at 22:15

## 2022-06-27 RX ADMIN — FENTANYL CITRATE 25 MCG: 50 INJECTION INTRAMUSCULAR; INTRAVENOUS at 11:20

## 2022-06-27 RX ADMIN — CEFAZOLIN 2000 MG: 2 INJECTION, POWDER, FOR SOLUTION INTRAMUSCULAR; INTRAVENOUS at 18:00

## 2022-06-27 RX ADMIN — FENTANYL CITRATE 25 MCG: 50 INJECTION INTRAMUSCULAR; INTRAVENOUS at 11:14

## 2022-06-27 ASSESSMENT — PAIN SCALES - GENERAL
PAINLEVEL_OUTOF10: 10
PAINLEVEL_OUTOF10: 6
PAINLEVEL_OUTOF10: 4
PAINLEVEL_OUTOF10: 0
PAINLEVEL_OUTOF10: 9

## 2022-06-27 ASSESSMENT — PAIN DESCRIPTION - PAIN TYPE
TYPE: ACUTE PAIN
TYPE: SURGICAL PAIN

## 2022-06-27 ASSESSMENT — PAIN DESCRIPTION - FREQUENCY
FREQUENCY: CONTINUOUS
FREQUENCY: CONTINUOUS

## 2022-06-27 ASSESSMENT — PAIN DESCRIPTION - ORIENTATION
ORIENTATION: LEFT
ORIENTATION: LEFT

## 2022-06-27 ASSESSMENT — PAIN - FUNCTIONAL ASSESSMENT
PAIN_FUNCTIONAL_ASSESSMENT: PREVENTS OR INTERFERES SOME ACTIVE ACTIVITIES AND ADLS
PAIN_FUNCTIONAL_ASSESSMENT: PREVENTS OR INTERFERES WITH ALL ACTIVE AND SOME PASSIVE ACTIVITIES

## 2022-06-27 ASSESSMENT — PAIN DESCRIPTION - ONSET
ONSET: ON-GOING

## 2022-06-27 ASSESSMENT — PAIN DESCRIPTION - DESCRIPTORS
DESCRIPTORS: THROBBING
DESCRIPTORS: SHARP

## 2022-06-27 ASSESSMENT — LIFESTYLE VARIABLES: SMOKING_STATUS: 0

## 2022-06-27 ASSESSMENT — PAIN DESCRIPTION - LOCATION
LOCATION: ARM
LOCATION: ARM;LEG

## 2022-06-27 NOTE — PROGRESS NOTES
PACU Transfer Note    Vitals:    06/27/22 1318   BP: 120/85   Pulse: (!) 111   Resp: (!) 33   Temp:    SpO2: 92%       In: 800 [I.V.:800]  Out: -     Pain assessment:  none  Pain Level: 0    Report given to Receiving unit JOSE ALEJANDRO Clements  Pt sent with all belongings including dentures    6/27/2022 1:22 PM

## 2022-06-27 NOTE — PLAN OF CARE
Problem: Chronic Conditions and Co-morbidities  Goal: Patient's chronic conditions and co-morbidity symptoms are monitored and maintained or improved  Outcome: Progressing  Flowsheets  Taken 6/27/2022 1618  Care Plan - Patient's Chronic Conditions and Co-Morbidity Symptoms are Monitored and Maintained or Improved: Monitor and assess patient's chronic conditions and comorbid symptoms for stability, deterioration, or improvement  Taken 6/27/2022 1416  Care Plan - Patient's Chronic Conditions and Co-Morbidity Symptoms are Monitored and Maintained or Improved: Monitor and assess patient's chronic conditions and comorbid symptoms for stability, deterioration, or improvement     Problem: Discharge Planning  Goal: Discharge to home or other facility with appropriate resources  Outcome: Progressing  Flowsheets  Taken 6/27/2022 1618  Discharge to home or other facility with appropriate resources: Identify barriers to discharge with patient and caregiver  Taken 6/27/2022 1551  Discharge to home or other facility with appropriate resources: Identify barriers to discharge with patient and caregiver  Taken 6/27/2022 1416  Discharge to home or other facility with appropriate resources: Identify barriers to discharge with patient and caregiver     Problem: Pain  Goal: Verbalizes/displays adequate comfort level or baseline comfort level  Outcome: Progressing  Flowsheets (Taken 6/27/2022 1618)  Verbalizes/displays adequate comfort level or baseline comfort level:   Encourage patient to monitor pain and request assistance   Assess pain using appropriate pain scale     Problem: Safety - Adult  Goal: Free from fall injury  Outcome: Progressing  Flowsheets (Taken 6/27/2022 1618)  Free From Fall Injury: Instruct family/caregiver on patient safety     Problem: ABCDS Injury Assessment  Goal: Absence of physical injury  Outcome: Progressing  Flowsheets (Taken 6/27/2022 1618)  Absence of Physical Injury: Implement safety measures based on patient assessment     Problem: Skin/Tissue Integrity  Goal: Absence of new skin breakdown  Description: 1. Monitor for areas of redness and/or skin breakdown  2. Assess vascular access sites hourly  3. Every 4-6 hours minimum:  Change oxygen saturation probe site  4. Every 4-6 hours:  If on nasal continuous positive airway pressure, respiratory therapy assess nares and determine need for appliance change or resting period. Outcome: Progressing  Note: Pt assessed for skin break down. Skin was warm and dry to touch. Pt cannot regulate head of bed without assistance. Pt being turned or repositioned at least every 2 hours to prevent skin breakdown. Will continue to monitor and assess.

## 2022-06-27 NOTE — TELEPHONE ENCOUNTER
Margi Jackson called from [facility].  She states this patient was admitted following surgery and she needs IP orders placed.  She was contacting Ankur Gunter CNP

## 2022-06-27 NOTE — PROGRESS NOTES
This RN (charge) approached by patient's son and  prior to patient arrival to the unit from PACU.  very anxious and upset regarding patient's admission to the hospital.  states that he has changed his mind regarding admission and would like to take the patient home today as he \"has a wheelchair at home and can take care of her just fine\". This RN educated  ans son on the admission process, the availability of PT/OT, and pain medication orders for management of post-op pain during admission.  remains agitated. Call placed to PACU JOSE ALEJANDRO Nixon for update regarding the possibility of patient discharging home prior to admission. JOSE ALEJANDRO Sierra states that patient is not safe to d/c home at this time and requires admission. Patient arrived to the unit during this conversation.  and son directed back to patient's room ([phone]). JOSE ALEJANDRO Sierra states that she will be up to the room to provide update to  and son regarding the importance of admission for medical management post-op. This RN to update patient's primary JOSE ALEJANDRO Clements w/ family interactions prior to patient arrival on the unit.

## 2022-06-27 NOTE — PROGRESS NOTES
Pt remains lethargic but arouses to voice. IV fluid infusing and pt  at bedside. Call light within reach, able to make needs known, fall precautions in place. Dressing remains in place on L forearm and is clean, dry and intact. Checking on pt q2h needs. Will continue to monitor.  Electronically signed by Magdaleno Leonard RN on 6/27/2022 at 6:28 PM

## 2022-06-27 NOTE — ANESTHESIA POSTPROCEDURE EVALUATION
[facility] Department of Anesthesiology  Post-Anesthesia Note       Name:  Zenaida Espinoza                                  Age:  80 y.o. MRN:  [id number]     Last Vitals & Oxygen Saturation: /82   Pulse (!) 113   Temp 97.7 °F (36.5 °C) (Axillary)   Resp 30   Ht 5' (1.524 m)   Wt 142 lb (64.4 kg)   SpO2 (!) 85%   BMI 27.73 kg/m²   Patient Vitals for the past 4 hrs:   BP Temp Temp src Pulse Resp SpO2   06/27/22 1440 124/82 97.7 °F (36.5 °C) Axillary (!) 113 30 (!) 85 %   06/27/22 1330 126/89 98.1 °F (36.7 °C) -- 95 (!) 34 96 %   06/27/22 1318 120/85 -- -- (!) 111 (!) 33 92 %   06/27/22 1300 127/83 -- -- (!) 106 23 95 %   06/27/22 1258 111/82 -- -- (!) 101 16 97 %   06/27/22 1245 (!) 91/59 -- -- (!) 109 22 90 %   06/27/22 1242 -- -- -- -- -- 94 %   06/27/22 1230 (!) 110/95 -- -- 96 17 95 %   06/27/22 1220 117/79 -- -- (!) 107 17 95 %   06/27/22 1216 112/79 -- -- 96 15 96 %   06/27/22 1210 (!) 97/44 -- -- (!) 103 16 --   06/27/22 1207 105/69 97.1 °F (36.2 °C) Temporal 83 15 96 %       Level of consciousness:  Awake, alert    Respiratory: Respirations easy, no distress. Stable. Cardiovascular: Hemodynamically stable. Hydration: Adequate. PONV: Adequately managed. Post-op pain: Adequately controlled. Post-op assessment: Tolerated anesthetic well without complication. Complications:  None.     Danni Antonio MD  June 27, 2022   2:56 PM

## 2022-06-27 NOTE — H&P
Hospital Medicine History & Physical      PCP: Dmitri Elam MD    Date of Admission: 6/27/2022    Date of Service: Pt seen/examined on 6/27/22 and Admitted to Inpatient     Chief Complaint: left wrist pain    History Of Present Illness: The patient is a 80 y.o. female who presents to [facility] with left wrist fracture s/p fall. Pt presented to OR for elective repair of left wrist fracture, admitted since pt having difficulty returning home and planning on SNF for dc      Past Medical History:        Diagnosis Date    Acute on chronic diastolic (congestive) heart failure (HCC) 8/19/2020    Aortic stenosis     Arthritis     Cancer (HCC)     skin basal    Chronic respiratory failure with hypoxia ([facility] [address] 9/7/2021    COPD exacerbation ([facility] [address] 9/7/2021    Hyperlipidemia     Hypertension     Migraine     Paroxysmal atrial fibrillation ([facility] [address]     Thyroid disease        Past Surgical History:        Procedure Laterality Date    AORTIC VALVE REPLACEMENT N/A 10/15/2019    TRANSCATHETER AORTIC VALVE REPLACEMENT FEMORAL APPROACH performed by Vivienne Garner MD at [address]  09/2019    COLONOSCOPY N/A 8/13/2019    COLON W/ANES. performed by Nicol Hampton MD at [facility] N/A 9/25/2020    CYSTOSCOPY performed by Jimi Hannon MD at [facility]  8/13/2019    ESOPHAGEAL DILATION Ibis Sake performed by Nicol Hampton MD at [facility] Right 08/19/2020    KNEE ARTHROSCOPY Left     Torn meniscus    OTHER SURGICAL HISTORY  10/09/2015    phacoemulsification of cataract with intraocular implant right eye    THYROIDECTOMY, PARTIAL      TONSILLECTOMY      TUBAL LIGATION      UPPER GASTROINTESTINAL ENDOSCOPY N/A 8/13/2019    EGD W/ANES. (9:30) performed by Corrina Phalen, MD at [address] [place] [address]       Medications Prior to Admission:    Prior to Admission medications    Medication Sig Start Date End Date Taking? Authorizing Provider   aspirin 81 MG EC tablet Take 81 mg by mouth as needed for Pain   Yes Historical Provider, MD   metoprolol succinate (TOPROL XL) 50 MG extended release tablet TAKE ONE TABLET BY MOUTH TWICE A DAY 3/24/22   J Lajuanda Cranker., MD   torsemide (DEMADEX) 20 MG tablet TAKE ONE TABLET BY MOUTH TWICE A DAY 3/22/22   Andressa Hanna MD   atorvastatin (LIPITOR) 40 MG tablet TAKE ONE TABLET BY MOUTH ONCE NIGHTLY 1/6/22   Andressa Hanna MD   dilTIAZem (CARTIA XT) 120 MG extended release capsule Take 1 capsule by mouth daily 10/13/21   Andressa Hanna MD   potassium chloride (KLOR-CON M) 10 MEQ extended release tablet Take 1 tablet by mouth daily 10/13/21   Andressa Hanna MD   omeprazole (PRILOSEC) 20 MG delayed release capsule Take 20 mg by mouth daily    Historical Provider, MD   DULoxetine (CYMBALTA) 30 MG extended release capsule Take 30 mg by mouth daily    Historical Provider, MD   levothyroxine (SYNTHROID) 125 MCG tablet Take 125 mcg by mouth Daily    Historical Provider, MD   OXYGEN Inhale 3 L into the lungs continuous     Historical Provider, MD   Cyanocobalamin (B-12) 2500 MCG TABS Take 2,500 mcg by mouth once a week Weekly on Wednesdays. Patient not taking: Reported on 1/27/2022    Historical Provider, MD   melatonin 5 MG TABS tablet Take 5 mg by mouth nightly     Historical Provider, MD   HYDROcodone-acetaminophen (NORCO) 5-325 MG per tablet Take 1 tablet by mouth every 6 hours as needed for Pain. Takes one pill @ 8am, one pill @ 3pm, one pill @ 6p, and one pill at bedtime. Historical Provider, MD   metaxalone (SKELAXIN) 800 MG tablet Take 800 mg by mouth 2 times daily as needed (as needed for spasms) Do not take with vicodin.   Patient not taking: Reported on 12/7/2021    Historical Provider, MD   Butalbital-Acetaminophen  MG TABS Take 1 tablet by mouth as needed (as needed for headaches) Vary rarely. Historical Provider, MD       Allergies:  Sulfa antibiotics, Dronedarone, Iodides, and Shellfish-derived products    Social History:  The patient currently lives at home    TOBACCO:   reports that she quit smoking about 39 years ago. Her smoking use included cigarettes. She has a 20.00 pack-year smoking history. She has never used smokeless tobacco.  ETOH:   reports no history of alcohol use. Family History:  Reviewed in detail and negative for DM, Early CAD, Cancer, CVA. Positive as follows:        Problem Relation Age of Onset    Colon Cancer Mother     Heart Disease Father     Heart Disease Brother        REVIEW OF SYSTEMS:   Positive for left wrist pain and as noted in the HPI. All other systems reviewed and negative. PHYSICAL EXAM:    /82   Pulse (!) 113   Temp 97.7 °F (36.5 °C) (Axillary)   Resp 20   Ht 5' (1.524 m)   Wt 142 lb (64.4 kg)   SpO2 92%   BMI 27.73 kg/m²     General appearance: No apparent distress appears stated age and cooperative. HEENT Normal cephalic, atraumatic without obvious deformity. Pupils equal, round, and reactive to light. Extra ocular muscles intact. Conjunctivae/corneas clear. Neck: Supple, No jugular venous distention/bruits. Trachea midline without thyromegaly or adenopathy with full range of motion. Lungs: Clear to auscultation, bilaterally without Rales/Wheezes/Rhonchi with good respiratory effort. Heart: Regular rate and rhythm with Normal S1/S2   Abdomen: Soft, non-tender or non-distended without rigidity or guarding and positive bowel sounds all four quadrants. Extremities: No clubbing, cyanosis, or edema bilaterally. Left wrist acewrapped  Skin: Skin color, texture, turgor normal.  No rashes or lesions.   Neurologic: Asleep, neurovascularly intact with sensory/motor intact upper extremities/lower extremities, bilaterally. Cranial nerves: II-XII intact, grossly non-focal.  Mental status: Asleep  Capillary Refill: Acceptable  < 3 seconds  Peripheral Pulses: +3 Easily felt, not easily obliterated with pressure        CBC   No results for input(s): WBC, HGB, HCT, PLT in the last 72 hours. RENAL  No results for input(s): NA, K, CL, CO2, PHOS, BUN, CREATININE, CA in the last 72 hours. LFT'S  No results for input(s): AST, ALT, ALB, BILIDIR, BILITOT, ALKPHOS in the last 72 hours. COAG  No results for input(s): INR in the last 72 hours. CARDIAC ENZYMES  No results for input(s): CKTOTAL, CKMB, CKMBINDEX, TROPONINI in the last 72 hours. U/A:    Lab Results   Component Value Date    COLORU Yellow 09/15/2020    WBCUA 3-5 09/15/2020    RBCUA >100 09/15/2020    MUCUS 1+ 08/20/2020    BACTERIA 1+ 09/15/2020    CLARITYU SL CLOUDY 09/15/2020    SPECGRAV 1.015 09/15/2020    LEUKOCYTESUR TRACE 09/15/2020    BLOODU LARGE 09/15/2020    GLUCOSEU Negative 09/15/2020    AMORPHOUS 1+ 08/20/2020       ABG    Lab Results   Component Value Date    TQV4NDO 23.5 09/05/2021    BEART -2.4 09/05/2021    I9DQYSRE 92.1 09/05/2021    PHART 7.334 09/05/2021    XSM4WUX 45.2 09/05/2021    PO2ART 67.1 09/05/2021    EJL8PGG 24.9 09/05/2021           Active Hospital Problems    Diagnosis Date Noted    Fx. left wrist, closed, initial encounter [S62.102A] 06/27/2022     Priority: Medium    Wrist fracture, closed, left, initial encounter [S62.102A] 06/27/2022     Priority: Medium         PHYSICIANS CERTIFICATION:    I certify that Lisa Abraham is expected to be hospitalized for > than 2 midnights based on the following assessment and plan:      ASSESSMENT/PLAN:    Left wrist fracture - s/p OR for ORIF. ortho consulted. Pain control with IV/PO pain meds, PT/OT consulted    Par a fib - cont cardizem/BB    Hypothyroidism - cont synthroid      DVT Prophylaxis: lovenox  Diet: ADULT DIET;  Regular  Code Status: Full Code  PT/OT Eval Status: ordered    Dispo - [facility] care       Remy Liu MD    Thank you Gabriela Vegas MD for the opportunity to be involved in this patient's care. If you have any questions or concerns please feel free to contact me at [phone].

## 2022-06-27 NOTE — PROGRESS NOTES
Patient admitted to PACU # 5 from OR at 1205 post OPEN REDUCTION INTERNAL FIXATION LEFT DISTAL RADIUS - Left  per Arebi. Attached to PACU monitoring system and report received from anesthesia provider. Patient was reported to be hemodynamically stable during procedure. Patient arrives to pacu with oral airway in place, sleepy from anesthesia. VSS.  Will continue to monitor

## 2022-06-27 NOTE — ANESTHESIA PRE PROCEDURE
Department of Anesthesiology  Preprocedure Note       Name:  Renzo Snow   Age:  80 y.o.  :  1940                                          MRN:  [id number]         Date:  2022      Surgeon: Doreen Salas):  Nadeen Alonso MD    Procedure:     Medications prior to admission:   Prior to Admission medications    Medication Sig Start Date End Date Taking? Authorizing Provider   aspirin 81 MG EC tablet Take 81 mg by mouth as needed for Pain    Historical Provider, MD   metoprolol succinate (TOPROL XL) 50 MG extended release tablet TAKE ONE TABLET BY MOUTH TWICE A DAY 3/24/22   GRACIE Boyd MD   torsemide (DEMADEX) 20 MG tablet TAKE ONE TABLET BY MOUTH TWICE A DAY 3/22/22   Lemuel Waggoner MD   atorvastatin (LIPITOR) 40 MG tablet TAKE ONE TABLET BY MOUTH ONCE NIGHTLY 22   Lemuel Waggoner MD   dilTIAZem (CARTIA XT) 120 MG extended release capsule Take 1 capsule by mouth daily 10/13/21   Lemuel Waggoner MD   potassium chloride (KLOR-CON M) 10 MEQ extended release tablet Take 1 tablet by mouth daily 10/13/21   Lemuel Waggoner MD   omeprazole (PRILOSEC) 20 MG delayed release capsule Take 20 mg by mouth daily    Historical Provider, MD   DULoxetine (CYMBALTA) 30 MG extended release capsule Take 30 mg by mouth daily    Historical Provider, MD   levothyroxine (SYNTHROID) 125 MCG tablet Take 125 mcg by mouth Daily    Historical Provider, MD   OXYGEN Inhale 3 L into the lungs continuous     Historical Provider, MD   Cyanocobalamin (B-12) 2500 MCG TABS Take 2,500 mcg by mouth once a week Weekly on . Patient not taking: Reported on 2022    Historical Provider, MD   melatonin 5 MG TABS tablet Take 5 mg by mouth nightly     Historical Provider, MD   HYDROcodone-acetaminophen (NORCO) 5-325 MG per tablet Take 1 tablet by mouth every 6 hours as needed for Pain. Takes one pill @ 8am, one pill @ 3pm, one pill @ 6p, and one pill at bedtime.     Historical Provider, MD metaxalone (SKELAXIN) 800 MG tablet Take 800 mg by mouth 2 times daily as needed (as needed for spasms) Do not take with vicodin. Patient not taking: Reported on 12/7/2021    Historical Provider, MD   Butalbital-Acetaminophen  MG TABS Take 1 tablet by mouth as needed (as needed for headaches) Vary rarely. Historical Provider, MD       Current medications:    No current facility-administered medications for this visit. No current outpatient medications on file. Facility-Administered Medications Ordered in Other Visits   Medication Dose Route Frequency Provider Last Rate Last Admin    ceFAZolin (ANCEF) 2000 mg in dextrose 5 % 100 mL IVPB  2,000 mg IntraVENous Once April MD Merritt           Allergies:     Allergies   Allergen Reactions    Sulfa Antibiotics Hives and Swelling    Dronedarone      Fatigue and shortness of breath    Iodides Rash    Shellfish-Derived Products Rash       Problem List:    Patient Active Problem List   Diagnosis Code    Lumbar radiculopathy M54.16    Lumbar spine pain M54.50    Hip pain, left M25.552    Aortic stenosis I35.0    Hypercholesteremia E78.00    Essential hypertension I10    Loss of appetite R63.0    Weight loss R63.4    Anemia D64.9    Positive colorectal cancer screening using Cologuard test R19.5    Schatzki's ring K22.2    Nonrheumatic aortic valve stenosis I35.0    Digoxin toxicity, accidental or unintentional, initial encounter T46.0X1A    S/P TAVR (transcatheter aortic valve replacement) Z95.2    PAF (paroxysmal atrial fibrillation) ([facility]) I48.0    FLORENCE (acute kidney injury) ([facility] [address] N17.9    Digoxin overdose, accidental or unintentional, initial encounter T46.0X1A    Acute on chronic diastolic (congestive) heart failure (HCC) I50.33    Aortic valve disease I35.9    Acute on chronic congestive heart failure (HCC) I50.9    Acute on chronic respiratory failure with hypoxia ([facility]) J96.21    CHF (congestive heart failure), NYHA class I, acute on chronic, combined ([facility]) I50.43    Chronic respiratory failure with hypoxia ([facility]) J96.11    COPD exacerbation ([facility]) J44.1    Persistent atrial fibrillation ([facility]) I48.19       Past Medical History:        Diagnosis Date    Acute on chronic diastolic (congestive) heart failure ([facility]) 2020    Aortic stenosis     Arthritis     Cancer (HCC)     skin basal    Chronic respiratory failure with hypoxia ([address] 2021    COPD exacerbation ([facility] [address] 2021    Hyperlipidemia     Hypertension     Migraine     Paroxysmal atrial fibrillation ([facility] [address]     Thyroid disease        Past Surgical History:        Procedure Laterality Date    AORTIC VALVE REPLACEMENT N/A 10/15/2019    TRANSCATHETER AORTIC VALVE REPLACEMENT FEMORAL APPROACH performed by Dimple Spangler MD at [address]  2019    COLONOSCOPY N/A 2019    COLON W/ANES. performed by Jean Wynn MD at [facility] N/A 2020    CYSTOSCOPY performed by Nirmal Modi MD at [address]  2019    ESOPHAGEAL DILATION [place] [facility]    KNEE ARTHROSCOPY Left     Torn meniscus    OTHER SURGICAL HISTORY  10/09/2015    phacoemulsification of cataract with intraocular implant right eye    THYROIDECTOMY, PARTIAL      TONSILLECTOMY      TUBAL LIGATION      UPPER GASTROINTESTINAL ENDOSCOPY N/A 2019    EGD W/ANES. (9:30) performed by Jean Wynn MD at [address] History:    Social History     Tobacco Use    Smoking status: Former Smoker     Packs/day: 1.00     Years: 20.00     Pack years: 20.00     Types: Cigarettes     Quit date: 1983     Years since quittin.5    Smokeless tobacco: Never Used   Substance Use Topics    Alcohol use:  No                                Counseling given: Not Answered      Vital Signs (Current): There were no vitals filed for this visit. BP Readings from Last 3 Encounters:   06/27/22 (!) 143/98   06/24/22 121/88   01/27/22 116/62       NPO Status:                                                                                 BMI:   Wt Readings from Last 3 Encounters:   06/27/22 142 lb (64.4 kg)   06/24/22 135 lb (61.2 kg)   01/27/22 137 lb (62.1 kg)     There is no height or weight on file to calculate BMI.    CBC:   Lab Results   Component Value Date    WBC 5.7 06/24/2022    RBC 3.92 06/24/2022    HGB 10.9 06/24/2022    HCT 35.8 06/24/2022    MCV 91.1 06/24/2022    RDW 17.2 06/24/2022     06/24/2022       CMP:   Lab Results   Component Value Date     06/24/2022    K 4.6 06/24/2022     06/24/2022    CO2 23 06/24/2022    BUN 20 06/24/2022    CREATININE 1.2 06/24/2022    CREATININE 1.7 10/05/2021    GFRAA 52 06/24/2022    AGRATIO 1.3 06/24/2022    LABGLOM 43 06/24/2022    GLUCOSE 106 06/24/2022    PROT 6.4 06/24/2022    CALCIUM 8.4 06/24/2022    BILITOT 0.5 06/24/2022    ALKPHOS 124 06/24/2022    AST 20 06/24/2022    ALT 8 06/24/2022       POC Tests: No results for input(s): POCGLU, POCNA, POCK, POCCL, POCBUN, POCHEMO, POCHCT in the last 72 hours.     Coags:   Lab Results   Component Value Date    PROTIME 13.5 06/24/2022    INR 1.05 06/24/2022    APTT 30.6 08/19/2020       HCG (If Applicable): No results found for: PREGTESTUR, PREGSERUM, HCG, HCGQUANT     ABGs:   Lab Results   Component Value Date    PHART 7.334 09/05/2021    PO2ART 67.1 09/05/2021    BBY1AJP 45.2 09/05/2021    IZA2BWS 23.5 09/05/2021    BEART -2.4 09/05/2021    U4KFNGTT 92.1 09/05/2021        Type & Screen (If Applicable):  No results found for: LABABO, LABRH    Drug/Infectious Status (If Applicable):  No results found for: HIV, HEPCAB    COVID-19 Screening (If Applicable):   Lab Results   Component Value Date    COVID19 NOT DETECTED 09/05/2021           Anesthesia Evaluation  Patient summary reviewed and Nursing notes reviewed no history of anesthetic complications:   Airway: Mallampati: II     Neck ROM: full     Dental:    (+) upper dentures      Pulmonary:   (+) COPD:      (-) not a current smoker                           Cardiovascular:    (+) hypertension:, valvular problems/murmurs (s/p TAVR): AS, dysrhythmias (Not on anticoagulation): atrial fibrillation, CHF:,     (-) past MI, CABG/stent and  angina       Beta Blocker:  Dose within 24 Hrs      ROS comment: ECHO 12/21: EF 55%     Neuro/Psych:   (+) neuromuscular disease:,             GI/Hepatic/Renal:             Endo/Other:                     Abdominal:             Vascular: Other Findings: Multiple skin abrasions and bruising            Anesthesia Plan      general     ASA 3       Induction: intravenous. Anesthetic plan and risks discussed with patient. Plan discussed with CRNA. This pre-anesthesia assessment may be used as a history and physical.    DOS STAFF ADDENDUM:    Pt seen and examined, chart reviewed (including anesthesia, drug and allergy history). No interval changes to history and physical examination. Anesthetic plan, risks, benefits, alternatives, and personnel involved discussed with patient. Patient verbalized an understanding and agrees to proceed.       Kina Munoz MD  June 27, 2022  9:09 AM

## 2022-06-28 ENCOUNTER — APPOINTMENT (OUTPATIENT)
Dept: GENERAL RADIOLOGY | Age: 82
DRG: 511 | End: 2022-06-28
Attending: ORTHOPAEDIC SURGERY
Payer: MEDICARE

## 2022-06-28 LAB
ANION GAP SERPL CALCULATED.3IONS-SCNC: 12 MMOL/L (ref 3–16)
BASOPHILS ABSOLUTE: 0 K/UL (ref 0–0.2)
BASOPHILS RELATIVE PERCENT: 0.1 %
BUN BLDV-MCNC: 35 MG/DL (ref 7–20)
CALCIUM SERPL-MCNC: 8.3 MG/DL (ref 8.3–10.6)
CHLORIDE BLD-SCNC: 100 MMOL/L (ref 99–110)
CO2: 23 MMOL/L (ref 21–32)
CREAT SERPL-MCNC: 1.8 MG/DL (ref 0.6–1.2)
CREAT SERPL-MCNC: 1.9 MG/DL (ref 0.6–1.2)
EOSINOPHILS ABSOLUTE: 0 K/UL (ref 0–0.6)
EOSINOPHILS RELATIVE PERCENT: 0 %
GFR AFRICAN AMERICAN: 31
GFR AFRICAN AMERICAN: 33
GFR NON-AFRICAN AMERICAN: 25
GFR NON-AFRICAN AMERICAN: 27
GLUCOSE BLD-MCNC: 158 MG/DL (ref 70–99)
GLUCOSE BLD-MCNC: 211 MG/DL (ref 70–99)
GLUCOSE BLD-MCNC: 366 MG/DL (ref 70–99)
HCT VFR BLD CALC: 30.9 % (ref 36–48)
HEMOGLOBIN: 10.1 G/DL (ref 12–16)
LYMPHOCYTES ABSOLUTE: 0.3 K/UL (ref 1–5.1)
LYMPHOCYTES RELATIVE PERCENT: 4 %
MCH RBC QN AUTO: 29.5 PG (ref 26–34)
MCHC RBC AUTO-ENTMCNC: 32.8 G/DL (ref 31–36)
MCV RBC AUTO: 90 FL (ref 80–100)
MONOCYTES ABSOLUTE: 0.5 K/UL (ref 0–1.3)
MONOCYTES RELATIVE PERCENT: 6.2 %
NEUTROPHILS ABSOLUTE: 7.5 K/UL (ref 1.7–7.7)
NEUTROPHILS RELATIVE PERCENT: 89.7 %
PDW BLD-RTO: 17.1 % (ref 12.4–15.4)
PERFORMED ON: ABNORMAL
PERFORMED ON: ABNORMAL
PLATELET # BLD: 207 K/UL (ref 135–450)
PMV BLD AUTO: 8.2 FL (ref 5–10.5)
POTASSIUM REFLEX MAGNESIUM: 4.7 MMOL/L (ref 3.5–5.1)
RBC # BLD: 3.43 M/UL (ref 4–5.2)
SODIUM BLD-SCNC: 135 MMOL/L (ref 136–145)
WBC # BLD: 8.4 K/UL (ref 4–11)

## 2022-06-28 PROCEDURE — 85025 COMPLETE CBC W/AUTO DIFF WBC: CPT

## 2022-06-28 PROCEDURE — 36415 COLL VENOUS BLD VENIPUNCTURE: CPT

## 2022-06-28 PROCEDURE — 51798 US URINE CAPACITY MEASURE: CPT

## 2022-06-28 PROCEDURE — 94761 N-INVAS EAR/PLS OXIMETRY MLT: CPT

## 2022-06-28 PROCEDURE — 6370000000 HC RX 637 (ALT 250 FOR IP): Performed by: INTERNAL MEDICINE

## 2022-06-28 PROCEDURE — 97530 THERAPEUTIC ACTIVITIES: CPT

## 2022-06-28 PROCEDURE — 2700000000 HC OXYGEN THERAPY PER DAY

## 2022-06-28 PROCEDURE — 6360000002 HC RX W HCPCS: Performed by: NURSE PRACTITIONER

## 2022-06-28 PROCEDURE — 6360000002 HC RX W HCPCS: Performed by: INTERNAL MEDICINE

## 2022-06-28 PROCEDURE — 97166 OT EVAL MOD COMPLEX 45 MIN: CPT

## 2022-06-28 PROCEDURE — 97162 PT EVAL MOD COMPLEX 30 MIN: CPT

## 2022-06-28 PROCEDURE — 51702 INSERT TEMP BLADDER CATH: CPT

## 2022-06-28 PROCEDURE — 82565 ASSAY OF CREATININE: CPT

## 2022-06-28 PROCEDURE — 99024 POSTOP FOLLOW-UP VISIT: CPT | Performed by: NURSE PRACTITIONER

## 2022-06-28 PROCEDURE — 71046 X-RAY EXAM CHEST 2 VIEWS: CPT

## 2022-06-28 PROCEDURE — 83036 HEMOGLOBIN GLYCOSYLATED A1C: CPT

## 2022-06-28 PROCEDURE — 2060000000 HC ICU INTERMEDIATE R&B

## 2022-06-28 PROCEDURE — 80048 BASIC METABOLIC PNL TOTAL CA: CPT

## 2022-06-28 PROCEDURE — APPNB45 APP NON BILLABLE 31-45 MINUTES: Performed by: NURSE PRACTITIONER

## 2022-06-28 PROCEDURE — 2580000003 HC RX 258: Performed by: INTERNAL MEDICINE

## 2022-06-28 PROCEDURE — 2580000003 HC RX 258: Performed by: NURSE PRACTITIONER

## 2022-06-28 RX ORDER — INSULIN LISPRO 100 [IU]/ML
0-3 INJECTION, SOLUTION INTRAVENOUS; SUBCUTANEOUS NIGHTLY
Status: DISCONTINUED | OUTPATIENT
Start: 2022-06-28 | End: 2022-06-29 | Stop reason: HOSPADM

## 2022-06-28 RX ORDER — BUTALBITAL, ACETAMINOPHEN AND CAFFEINE 50; 325; 40 MG/1; MG/1; MG/1
1 TABLET ORAL EVERY 6 HOURS PRN
COMMUNITY
Start: 2022-05-15

## 2022-06-28 RX ORDER — DEXTROSE MONOHYDRATE 50 MG/ML
100 INJECTION, SOLUTION INTRAVENOUS PRN
Status: DISCONTINUED | OUTPATIENT
Start: 2022-06-28 | End: 2022-06-29 | Stop reason: HOSPADM

## 2022-06-28 RX ORDER — SODIUM CHLORIDE 9 MG/ML
INJECTION, SOLUTION INTRAVENOUS CONTINUOUS
Status: DISCONTINUED | OUTPATIENT
Start: 2022-06-28 | End: 2022-06-29

## 2022-06-28 RX ORDER — METHOCARBAMOL 500 MG/1
500 TABLET, FILM COATED ORAL 4 TIMES DAILY PRN
COMMUNITY
Start: 2022-06-24

## 2022-06-28 RX ORDER — INSULIN LISPRO 100 [IU]/ML
0-6 INJECTION, SOLUTION INTRAVENOUS; SUBCUTANEOUS
Status: DISCONTINUED | OUTPATIENT
Start: 2022-06-28 | End: 2022-06-29 | Stop reason: HOSPADM

## 2022-06-28 RX ORDER — HYDROCODONE BITARTRATE AND ACETAMINOPHEN 5; 325 MG/1; MG/1
1 TABLET ORAL EVERY 4 HOURS PRN
Qty: 42 TABLET | Refills: 0 | Status: SHIPPED | OUTPATIENT
Start: 2022-06-28 | End: 2022-07-05

## 2022-06-28 RX ADMIN — HYDROCODONE BITARTRATE AND ACETAMINOPHEN 1 TABLET: 5; 325 TABLET ORAL at 20:18

## 2022-06-28 RX ADMIN — INSULIN LISPRO 2 UNITS: 100 INJECTION, SOLUTION INTRAVENOUS; SUBCUTANEOUS at 16:56

## 2022-06-28 RX ADMIN — ATORVASTATIN CALCIUM 40 MG: 40 TABLET, FILM COATED ORAL at 20:21

## 2022-06-28 RX ADMIN — SODIUM CHLORIDE: 9 INJECTION, SOLUTION INTRAVENOUS at 11:25

## 2022-06-28 RX ADMIN — LEVOTHYROXINE SODIUM 125 MCG: 0.12 TABLET ORAL at 06:54

## 2022-06-28 RX ADMIN — SODIUM CHLORIDE, PRESERVATIVE FREE 10 ML: 5 INJECTION INTRAVENOUS at 09:17

## 2022-06-28 RX ADMIN — METOPROLOL SUCCINATE 50 MG: 50 TABLET, EXTENDED RELEASE ORAL at 08:34

## 2022-06-28 RX ADMIN — HYDROCODONE BITARTRATE AND ACETAMINOPHEN 1 TABLET: 5; 325 TABLET ORAL at 11:24

## 2022-06-28 RX ADMIN — Medication 6 MG: at 21:39

## 2022-06-28 RX ADMIN — DILTIAZEM HYDROCHLORIDE 120 MG: 120 CAPSULE, COATED, EXTENDED RELEASE ORAL at 08:38

## 2022-06-28 RX ADMIN — CEFAZOLIN 2000 MG: 2 INJECTION, POWDER, FOR SOLUTION INTRAMUSCULAR; INTRAVENOUS at 03:53

## 2022-06-28 RX ADMIN — METOPROLOL SUCCINATE 50 MG: 50 TABLET, EXTENDED RELEASE ORAL at 20:21

## 2022-06-28 RX ADMIN — HYDROCODONE BITARTRATE AND ACETAMINOPHEN 1 TABLET: 5; 325 TABLET ORAL at 04:00

## 2022-06-28 RX ADMIN — MORPHINE SULFATE 2 MG: 2 INJECTION, SOLUTION INTRAMUSCULAR; INTRAVENOUS at 08:38

## 2022-06-28 RX ADMIN — DULOXETINE HYDROCHLORIDE 30 MG: 30 CAPSULE, DELAYED RELEASE ORAL at 08:38

## 2022-06-28 RX ADMIN — ENOXAPARIN SODIUM 30 MG: 100 INJECTION SUBCUTANEOUS at 08:38

## 2022-06-28 RX ADMIN — PANTOPRAZOLE SODIUM 40 MG: 40 TABLET, DELAYED RELEASE ORAL at 06:54

## 2022-06-28 RX ADMIN — SODIUM CHLORIDE, PRESERVATIVE FREE 10 ML: 5 INJECTION INTRAVENOUS at 20:22

## 2022-06-28 RX ADMIN — POTASSIUM CHLORIDE 10 MEQ: 1500 TABLET, EXTENDED RELEASE ORAL at 08:38

## 2022-06-28 ASSESSMENT — PAIN DESCRIPTION - PAIN TYPE
TYPE: SURGICAL PAIN
TYPE: ACUTE PAIN
TYPE: SURGICAL PAIN
TYPE: ACUTE PAIN

## 2022-06-28 ASSESSMENT — PAIN - FUNCTIONAL ASSESSMENT
PAIN_FUNCTIONAL_ASSESSMENT: PREVENTS OR INTERFERES SOME ACTIVE ACTIVITIES AND ADLS
PAIN_FUNCTIONAL_ASSESSMENT: PREVENTS OR INTERFERES SOME ACTIVE ACTIVITIES AND ADLS
PAIN_FUNCTIONAL_ASSESSMENT: PREVENTS OR INTERFERES WITH ALL ACTIVE AND SOME PASSIVE ACTIVITIES
PAIN_FUNCTIONAL_ASSESSMENT: PREVENTS OR INTERFERES SOME ACTIVE ACTIVITIES AND ADLS
PAIN_FUNCTIONAL_ASSESSMENT: ACTIVITIES ARE NOT PREVENTED

## 2022-06-28 ASSESSMENT — PAIN SCALES - GENERAL
PAINLEVEL_OUTOF10: 6
PAINLEVEL_OUTOF10: 8

## 2022-06-28 ASSESSMENT — PAIN DESCRIPTION - ONSET
ONSET: ON-GOING
ONSET: ON-GOING
ONSET: GRADUAL
ONSET: ON-GOING

## 2022-06-28 ASSESSMENT — PAIN SCALES - WONG BAKER
WONGBAKER_NUMERICALRESPONSE: 10
WONGBAKER_NUMERICALRESPONSE: 0

## 2022-06-28 ASSESSMENT — PAIN DESCRIPTION - LOCATION
LOCATION: WRIST
LOCATION: HEAD
LOCATION: ARM
LOCATION: ARM
LOCATION: WRIST;KNEE

## 2022-06-28 ASSESSMENT — PAIN DESCRIPTION - FREQUENCY
FREQUENCY: CONTINUOUS
FREQUENCY: INTERMITTENT
FREQUENCY: CONTINUOUS
FREQUENCY: CONTINUOUS

## 2022-06-28 ASSESSMENT — PAIN DESCRIPTION - DESCRIPTORS
DESCRIPTORS: ACHING
DESCRIPTORS: THROBBING
DESCRIPTORS: ACHING

## 2022-06-28 ASSESSMENT — PAIN DESCRIPTION - ORIENTATION
ORIENTATION: LEFT
ORIENTATION: MID
ORIENTATION: LEFT

## 2022-06-28 NOTE — PROGRESS NOTES
Patient is alert & oriented x4 but is a little forgetful at times, 2/4 bed rails up, bed in lowest position, fall precautions in place, call light within reach.  remains at bedside. Morning medications given without complications. Pt given PRN pain medication for surgical pain in L arm. Dressing on L arm remains clean, dry and intact. White catheter remains in place and draining clear, yellow urine. IV fluid infusing. Will cont to monitor and reassess.  Electronically signed by Fay Roy RN on 6/28/2022 at 9:16 AM

## 2022-06-28 NOTE — PLAN OF CARE
Problem: Chronic Conditions and Co-morbidities  Goal: Patient's chronic conditions and co-morbidity symptoms are monitored and maintained or improved  Outcome: Progressing  Flowsheets  Taken 6/28/2022 7505 by Tez Snyder - Patient's Chronic Conditions and Co-Morbidity Symptoms are Monitored and Maintained or Improved: Monitor and assess patient's chronic conditions and comorbid symptoms for stability, deterioration, or improvement  Taken 6/27/2022 2020 by Tez Garcia - Patient's Chronic Conditions and Co-Morbidity Symptoms are Monitored and Maintained or Improved:   Monitor and assess patient's chronic conditions and comorbid symptoms for stability, deterioration, or improvement   Collaborate with multidisciplinary team to address chronic and comorbid conditions and prevent exacerbation or deterioration   Update acute care plan with appropriate goals if chronic or comorbid symptoms are exacerbated and prevent overall improvement and discharge     Problem: Discharge Planning  Goal: Discharge to home or other facility with appropriate resources  Outcome: Progressing  Flowsheets  Taken 6/28/2022 0722 by Susie León RN  Discharge to home or other facility with appropriate resources: Identify barriers to discharge with patient and caregiver  Taken 6/27/2022 2020 by Cuate Patel RN  Discharge to home or other facility with appropriate resources: Identify barriers to discharge with patient and caregiver     Problem: Pain  Goal: Verbalizes/displays adequate comfort level or baseline comfort level  Outcome: Progressing  Flowsheets (Taken 6/28/2022 0722)  Verbalizes/displays adequate comfort level or baseline comfort level:   Encourage patient to monitor pain and request assistance   Assess pain using appropriate pain scale     Problem: Safety - Adult  Goal: Free from fall injury  Outcome: Progressing  Flowsheets (Taken 6/28/2022 0722)  Free From Fall Injury: Instruct family/caregiver on patient safety     Problem: ABCDS Injury Assessment  Goal: Absence of physical injury  Outcome: Progressing  Flowsheets (Taken 6/28/2022 2040)  Absence of Physical Injury: Implement safety measures based on patient assessment     Problem: Skin/Tissue Integrity  Goal: Absence of new skin breakdown  Description: 1. Monitor for areas of redness and/or skin breakdown  2. Assess vascular access sites hourly  3. Every 4-6 hours minimum:  Change oxygen saturation probe site  4. Every 4-6 hours:  If on nasal continuous positive airway pressure, respiratory therapy assess nares and determine need for appliance change or resting period. Outcome: Progressing  Note: Pt assessed for skin break down. Skin was warm and dry to touch. Pt cannot regulate head of bed without assistance. Pt being turned or repositioned at least every 2 hours to prevent skin breakdown. Will continue to monitor and assess.

## 2022-06-28 NOTE — PROGRESS NOTES
Physical Therapy  Facility/Department: [address] ORTHOPEDICS  Physical Therapy Initial Assessment    Name: Long Razo  : 1940  MRN: [id number]  Date of Service: 2022    Discharge Recommendations:  24 hour supervision or assist,Home with Home health PT   PT Equipment Recommendations  Equipment Needed: Yes  Mobility Devices: Wheelchair  Wheelchair: Light Weight      Patient Diagnosis(es): The encounter diagnosis was Other closed fracture of distal end of left radius, initial encounter. Past Medical History:  has a past medical history of Acute on chronic diastolic (congestive) heart failure ([address] Aortic stenosis, Arthritis, Cancer ([facility] Chronic respiratory failure with hypoxia ([facility] COPD exacerbation ([facility] Hyperlipidemia, Hypertension, Migraine, Paroxysmal atrial fibrillation ([address] and Thyroid disease. Past Surgical History:  has a past surgical history that includes Thyroidectomy, partial; Tonsillectomy; Tubal ligation; Knee arthroscopy (Left); other surgical history (10/09/2015); Upper gastrointestinal endoscopy (N/A, 2019); Colonoscopy (N/A, 2019); Esophagus dilation (2019); Cardiac catheterization (2019); Aortic valve replacement (N/A, 10/15/2019); Cystoscopy (N/A, 2020); eye surgery; eye surgery (Right, 2020); and Forearm surgery (Left, 2022). Assessment   Body Structures, Functions, Activity Limitations Requiring Skilled Therapeutic Intervention: Decreased functional mobility   Assessment: Pt presents with decreased functional mobility after admisison, per H&P, \"The patient is a 80 y.o. female who presents to [facility] with left wrist fracture s/p fall. Pt presented to OR for elective repair of left wrist fracture, admitted since pt having difficulty returning home and planning on SNF for dc\"   Pt had ORIF L wrist 22, now NWB L UE.  Prior to this admit, pt had actually sustained the fall last Friday and was at home briefly, with larry assist of  performing sit<>stand pivot transfers since accident. Pt with B knee contusions, of Right knee ? Left LE which limited any ambulation at this time. Today, pt needed Min A for supine to sit bed mobility , that p's  assisted with, and was Min-Mod A stand-pivot bed to recliner- again with 's assist to demo what he has been doing with pt at home the past several days.  reproted they were going Home today 'no matter what\" and that he felt he had all necessary equipment for pt to use. Offered possible new W/C (as they utilized an old family one), which pt declined. Discussed gait belt/pants belt as an assist for him to aide in helping pt up/down with transfers--which  was very receptive to. At this point, pt/ are planning Home today with cont 24 hr S/A and Home PT for ongoing education and training of pt at home setting. Will cont to follow up and progress as able while hospitalized, work with  on family education and training. Will follow while hosptilized. Harper Box scored a 14/24 on the AM-PAC short mobility form. Current research shows that an AM-PAC score of 18 or greater is typically associated with a discharge to the patient's home setting. Based on the patient's AM-PAC score and their current functional mobility deficits, it is recommended that the patient have 2-3 sessions per week of Physical Therapy at d/c to increase the patient's independence. Please see assessment section for further patient specific details. If patient discharges prior to next session this note will serve as a discharge summary. Please see below for the latest assessment towards goals.      Therapy Prognosis: Good  Decision Making: Medium Complexity  History: as noted  Exam: as above  Clinical Presentation: evolving  Barriers to Learning: none  Requires PT Follow-Up: Yes  Activity Tolerance  Activity Tolerance: Patient tolerated evaluation without incident Plan   Plan  Plan: 3-5 times per week  Current Treatment Recommendations: Functional mobility training,Transfer training,Wheelchair mobility training,Equipment evaluation, education, & procurement,Patient/Caregiver education & training  Safety Devices  Type of Devices: Patient at risk for falls,Left in chair,Nurse notified,Gait belt,Chair alarm in place,Call light within reach     Restrictions  Restrictions/Precautions  Restrictions/Precautions: Fall Risk,Weight Bearing  Upper Extremity Weight Bearing Restrictions  Left Upper Extremity Weight Bearing: Non Weight Bearing  Position Activity Restriction  Other position/activity restrictions: Knee contusions ( Right > Left LE pain)     Subjective   General  Chart Reviewed: Yes  Patient assessed for rehabilitation services?: Yes  Additional Pertinent Hx: Per H&P, \"The patient is a 80 y.o. female who presents to [facility] with left wrist fracture s/p fall. Pt presented to OR for elective repair of left wrist fracture, admitted since pt having difficulty returning home and planning on SNF for dc\"   Pt had ORIF L wrist 6/27/22, now NWB L UE. Family / Caregiver Present: Yes ( at bedside)  Subjective  Subjective: Pt in supine, reported Right knee pain was more severe than anything else. Agrees to therapy session.  reported Home today, \"no matter what\".  reported and eventually demo'd to therapists what he has been typically doing since pt fell last Friday.      Social/Functional History  Social/Functional History  Lives With: Spouse (cousin lives next door who is pt's caregiver but currently unable to help pt due to her own medical issues)  Type of Home: [facility] [place]: Two level,Able to Live on Main level with bedroom/bathroom  Home Access: Stairs to enter without rails  Entrance Stairs - Number of Steps: 2 GAVIOTA  Bathroom Shower/Tub: Walk-in shower  Bathroom Toilet: Handicap height  Bathroom Equipment: Commode,Grab bars in [facility] chair,Toilet raiser  Bathroom Accessibility: Wheelchair accessible  Home Equipment: Cane,Cane, quad,Wheelchair-manual,Oxygen (transport chair; 3 L O2)  Has the patient had two or more falls in the past year or any fall with injury in the past year?: Yes  Receives Help From: Family  ADL Assistance: Independent (supv for bathing)  Homemaking Responsibilities: No ( and caregiver perform)  Ambulation Assistance: Independent (with cane but recently  has been using w/c due to R knee pain)  Transfer Assistance: Independent  Vision/Hearing  Vision  Vision: Impaired  Vision Exceptions: Wears glasses at all times  Hearing  Hearing: Within functional limits    Cognition   Orientation  Overall Orientation Status: Within Functional Limits (slight decreased recall of full events)     Objective   AROM RLE (degrees)  RLE AROM: WFL  RLE General AROM: pain/noted contusion R knee area  AROM LLE (degrees)  LLE AROM : WFL  Strength RLE  Comment: gossly functional , but pain/knee contusion limiting  Strength LLE  Comment: grossly functional, but pain/knee contusion limiting     Bed mobility  Supine to Sit: Minimal assistance ([facility], from flattened bed surface.)  Sit to Supine: Unable to assess (nt--pt up in recliner at end of session.)  Transfers  Sit to Stand: Minimal Assistance; Moderate Assistance  Stand to sit: Minimal Assistance; Moderate Assistance  Bed to Chair: Minimal assistance; Moderate assistance  Comment:  assisted pt for initial sit<>stnad-pivot x 3-4 steps bed ro recliner.  performed transfer with fair technique. After pt had seated rest, PT then educated pt on use of gait belt/pants belt to help in performing transfers with pt at home.  receptive to education provided.  further reproted pt having W/C and commode for use at home after d/c.    reported he has been assisting pt since after fall last Friday with all transfers in/out of W/C, including using W/C to 'bump' pt in/out of home. Discussed Home PT with pt/, whom was receptive--but also uncertain due to their Home set up/more remote living area. Ambulation  WB Status: NO--limited to stand-pivot only. Pt/isaacband have been limited to just this activity since fall last Friday.  reported he plans to go Home today \"no matter what\" and cont with assisting pt with any/all transfers at home as needed. Discussed benefit of new W/C for home at d/c,  may decline. Balance  Sitting - Static: Good;-  Sitting - Dynamic: Good;-  Standing - Static: Fair  Standing - Dynamic:  (TIFFANY)  Comments: SB/CGA sitting eob, Min-Mod A for stand-pivot bed to recliner only. AM-PAC Score  AM-PAC Inpatient Mobility Raw Score : 14 (06/28/22 1207)  AM-PAC Inpatient T-Scale Score : 38.1 (06/28/22 1207)  Mobility Inpatient CMS 0-100% Score: 61.29 (06/28/22 1207)  Mobility Inpatient CMS G-Code Modifier : CL (06/28/22 1207)     Goals  Short Term Goals  Time Frame for Short term goals: will cont until d/c from actue setting:  Short term goal 1: bed mobility with CG/Min A from flattened bed surface. Short term goal 2: sit<>stand-pivot transfers wtih CG/Min A x 1 and/or  to demo with pt in prep for d/c to Home  Short term goal 3: assess W/C mobility as able, pt using R UE, LEs if/as able. Patient Goals   Patient goals : pt/ goal is Home today. Education  Patient Education  Education Given To: Patient; Family  Education Provided: Role of Therapy; Family Education;Equipment;Precautions; ADL Adaptive Strategies  Education Method: Demonstration;Verbal  Barriers to Learning: None  Education Outcome: Verbalized understanding;Continued education needed;Demonstrated understanding  Therapy Time   Individual Concurrent Group Co-treatment   Time In 1030         Time Out 1120         Minutes 50          1 eval, 2 act charges   Jesika Carrera PT Electronically signed by Jesika Carrera PT on 6/28/2022 at 12:10 PM

## 2022-06-28 NOTE — OP NOTE
[address] [place] [address] [address] Sands [address]                                OPERATIVE REPORT    PATIENT NAME: Zain Lujan                  :        1940  MED REC NO:   [id number]                          ROOM:       89  ACCOUNT NO:   [de-identified]                           ADMIT DATE: 2022  PROVIDER:     Tash Vargas MD    DATE OF PROCEDURE:  2022    PRIMARY CARE:  Ana Singer MD    PREOPERATIVE DIAGNOSIS:  Left distal radius two-part intra-articular  displaced fracture. POSTOPERATIVE DIAGNOSIS:  Left distal radius two-part intra-articular  displaced fracture. OPERATION PERFORMED:  Open treatment of left distal radius two-part  intra-articular fracture with open reduction and internal fixation. SURGEON:  Tash Vargas MD    ASSISTANT:  Michele Singer CNP    ANESTHESIA:  General anesthesia. ESTIMATED BLOOD LOSS:  Minimal.    COMPLICATIONS:  None. TOURNIQUET:  Left upper arm, 250 mmHg. IMPLANT USED:  [place] titanium intermediate volar locking plate with a  total of seven distal 2.7 locking screws and three proximal screws. INDICATIONS:  This is an 80-year-old white female, right-hand dominant,  who sustained a fall with bilateral upper and lower extremity injuries. No fractures in the lower extremities or the right upper extremity, but  she had a completely displaced distal radius and ulna fracture. All  risks, benefits, and alternatives were discussed with the patient and  her family and they agreed to proceed with surgical fixation. OPERATIVE PROCEDURE:  The patient's left wrist was marked. She received  2 gm of Ancef IV preoperatively. The patient was then brought to the  operating room, underwent general anesthesia. A well-padded tourniquet  was placed, left upper arm. The left upper extremity was then prepped  and draped in regular sterile routine fashion.   A time-out was and  bilateral lower extremity injuries, even though there is no fracture in  the lower extremity, but she has significant arthritis and inability to  bear weight. She likely will need a rehab placement.   She is  nonweightbearing on the left upper extremity, but she can start  immediate range of motion of the fingers and then in two weeks, we can  start range of motion of the wrist.        Alicia Guallpa MD    D: 06/28/2022 7:29:02       T: 06/28/2022 10:59:38     /[email or web address]  Job#: [id number]     Doc#: [id number]    CC:  Ivy Homans, MD

## 2022-06-28 NOTE — PROGRESS NOTES
Hospitalist Progress Note      PCP: Espinoza Kent MD    Date of Admission: 6/27/2022    Subjective: pain fairly controlled, spouse wants to take pt home    Medications:  Reviewed    Infusion Medications    sodium chloride 75 mL/hr at 06/28/22 1125    sodium chloride       Scheduled Medications    atorvastatin  40 mg Oral Nightly    dilTIAZem  120 mg Oral Daily    DULoxetine  30 mg Oral Daily    levothyroxine  125 mcg Oral Daily    melatonin  6 mg Oral Nightly    metoprolol succinate  50 mg Oral BID    pantoprazole  40 mg Oral QAM AC    potassium chloride  10 mEq Oral Daily    sodium chloride flush  5-40 mL IntraVENous 2 times per day    enoxaparin  30 mg SubCUTAneous Daily     PRN Meds: aspirin, HYDROcodone-acetaminophen, sodium chloride flush, sodium chloride, ondansetron **OR** ondansetron, polyethylene glycol, acetaminophen **OR** acetaminophen, morphine      Intake/Output Summary (Last 24 hours) at 6/28/2022 1632  Last data filed at 6/28/2022 0900  Gross per 24 hour   Intake 480 ml   Output 625 ml   Net -145 ml       Physical Exam Performed:    /68   Pulse (!) 101   Temp 97.6 °F (36.4 °C) (Oral)   Resp 16   Ht 5' (1.524 m)   Wt 144 lb 6.4 oz (65.5 kg)   SpO2 92%   BMI 28.20 kg/m²        General appearance: No apparent distress appears stated age and cooperative. HEENT Normal cephalic, atraumatic without obvious deformity. Pupils equal, round, and reactive to light. Extra ocular muscles intact. Conjunctivae/corneas clear. Neck: Supple, No jugular venous distention/bruits. Trachea midline without thyromegaly or adenopathy with full range of motion. Lungs: Clear to auscultation, bilaterally without Rales/Wheezes/Rhonchi with good respiratory effort. Heart: Regular rate and rhythm with Normal S1/S2   Abdomen: Soft, non-tender or non-distended without rigidity or guarding and positive bowel sounds all four quadrants.   Extremities: No clubbing, cyanosis, or edema bilaterally. Left wrist acewrapped  Skin: Skin color, texture, turgor normal.  No rashes or lesions. Neurologic: Asleep, neurovascularly intact with sensory/motor intact upper extremities/lower extremities, bilaterally. Cranial nerves: II-XII intact, grossly non-focal.  Mental status: Asleep  Capillary Refill: Acceptable  < 3 seconds  Peripheral Pulses: +3 Easily felt, not easily obliterated with pressure       Labs:   Recent Labs     06/28/22  0719   WBC 8.4   HGB 10.1*   HCT 30.9*        Recent Labs     06/28/22  0719   *   K 4.7      CO2 23   BUN 35*   CREATININE 1.8*   CALCIUM 8.3     No results for input(s): AST, ALT, BILIDIR, BILITOT, ALKPHOS in the last 72 hours. No results for input(s): INR in the last 72 hours. No results for input(s): Assunta Cronin in the last 72 hours. Urinalysis:      Lab Results   Component Value Date    NITRU Negative 09/15/2020    WBCUA 3-5 09/15/2020    BACTERIA 1+ 09/15/2020    RBCUA >100 09/15/2020    BLOODU LARGE 09/15/2020    SPECGRAV 1.015 09/15/2020    GLUCOSEU Negative 09/15/2020       Radiology:  XR CHEST (2 VW)   Final Result   Stable bibasilar opacities, atelectasis favored over pneumonia. XR WRIST LEFT (2 VIEWS)   Final Result      FLUORO FOR SURGICAL PROCEDURES   Final Result              Assessment/Plan:    Active Hospital Problems    Diagnosis     Fx. left wrist, closed, initial encounter [S62.102A]      Priority: Medium    Wrist fracture, closed, left, initial encounter [S62.102A]      Priority: Medium    Closed fracture of left distal radius [S52.502A]      Priority: Medium           Left wrist fracture - s/p OR for ORIF on 6/27/22. ortho consulted. Pain control with IV/PO pain meds, PT/OT consulted    ARF - creat 1.8, baseline 1.1-1.2 last year.  Suspect re-renal. Started on IVF, recheck creat later today, hold nephrotoxic meds     Par a fib - cont cardizem/BB     Hypothyroidism - cont synthroid     Acute hypoxic resp failure - suspect 2/2 atelectasis, IS ordered, CXR ordered       DVT Prophylaxis: lovenox  Diet: ADULT DIET; Regular;  Low Sodium (2 gm); 2000 ml  Code Status: Full Code    PT/OT Eval Status: ordered    Eunice Sandhu MD

## 2022-06-28 NOTE — PROGRESS NOTES
[facility]   Progress Note    CHIEF COMPLAINT/DIAGNOSIS: S/p LEFT distal radius ORIF     SUBJECTIVE: Patient is sitting up in the bed with her  at bedside; describes minimal wrist pain, improved since preoperative. She denies new issues. She has not yet seen therapy. She is taking Norco for pain control and takes these 4 times a day at home regarding some chronic back issues. OBJECTIVE  Physical    VITALS:  /83   Pulse 98   Temp 98.3 °F (36.8 °C) (Oral)   Resp 15   Ht 5' (1.524 m)   Wt 144 lb 6.4 oz (65.5 kg)   SpO2 90%   BMI 28.20 kg/m²     GENERAL: Alert and oriented x3, in no acute distress. MUSCULOSKELETAL: Able to flex and extend the elbow without issue. INCISION:  Covered with post-op splint and dressing which is clean, dry and intact. ROM: left wrist remain in splint. Sensory:  Intact to light touch in all five digits. Vascular:   brisk cap refill in all fingers. Data    ALL MEDICATIONS HAVE BEEN REVIEWED    CBC: Recent Labs     06/28/22  0719   WBC 8.4   HGB 10.1*   HCT 30.9*        BMP:   Recent Labs     06/28/22  0719   *   K 4.7      CO2 23   BUN 35*   CREATININE 1.8*     INR: No results for input(s): INR in the last 72 hours. ASSESSMENT:  S/p LEF distal radius ORIF (6/27/22), POD#1  Paroxysmal A. Fib, not on home AC  Hypothyroidism  Dementia? PLAN:   - WB status:  NO weight bearing through operative wrist; okay for platform walker continue splint. Reviewed post op precautions  - DVT prophylaxis: Lovenox as inpatient  - PT/OT  - Pain Control: Norco script in chart Due to orthopaedic surgical procedure/condition, patient may require pain medication for up to 6-8 weeks. - Expected post op acute blood loss anemia: H/H today: 10.1/30.9  - ID: post-op Ancef x 2 completed    - Dispo: per primary team; ok to d/c from our end once medically stable and d/c needs met. Follow-up with Dr. Katerina Jones in 2 weeks.   Office # [phone]  Future Appointments   Date Time Provider Gilma Nichols   7/12/2022  9:45 AM RABIA Duke CNP [facility]   10/11/2022  9:00 AM A ECHO ROOM [facility] AND IMAN Pereira   10/11/2022 10:00 AM Jaja Quintanilla MD [address] [place], APRN - CNP  6/28/2022  9:08 AM

## 2022-06-28 NOTE — PROGRESS NOTES
Patient A&O. No complaints at this time. ACE wrap on L forearm remains in place, clean, dry and intact. IV fluid infusing. 3L of O2 via nasal cannula remains in place. Pt family members are at bedside. Call light within reach, able to make needs knows, fall precautions in place. Will continue to monitor.  Electronically signed by Margie Myrick RN on 6/28/2022 at 6:27 PM

## 2022-06-28 NOTE — CONSULTS
[facility] Orthopedic Surgery  Consult Note         This patient is seen in consultation at the request of Joey Dowd    Reason for Consult:  Left wrist pain/ markedly displaced distal radius fracture. CHIEF COMPLAINT:  Left wrist pain/ fall. History Obtained From:  patient, spouse, family member - son and daughter, electronic medical record    HISTORY OF PRESENT ILLNESS:    Ms. Jordan Perez is a 80 y.o.  female right handed who presents today for evaluation of a left wrist injury. The patient reports that this injury occurred when she fell. She was first seen and evaluated in [facility], when she was x-rayed and splinted, and I was consulted. The patient denies any other injuries. Rates pain a 9/10 VAS moderate, sharp, constant and show no change. Movement makes the pain worse, the splint and resting makes the pain better. Alleviating factors elevation and rest. No numbness or tingling sensation. Past Medical History:        Diagnosis Date    Acute on chronic diastolic (congestive) heart failure (HCC) 8/19/2020    Aortic stenosis     Arthritis     Cancer (HCC)     skin basal    Chronic respiratory failure with hypoxia ([address] 9/7/2021    COPD exacerbation ([address] 9/7/2021    Hyperlipidemia     Hypertension     Migraine     Paroxysmal atrial fibrillation ([address]     Thyroid disease        Past Surgical History:        Procedure Laterality Date    AORTIC VALVE REPLACEMENT N/A 10/15/2019    TRANSCATHETER AORTIC VALVE REPLACEMENT FEMORAL APPROACH performed by Kathleen Ruth MD at [address]  09/2019    COLONOSCOPY N/A 8/13/2019    COLON W/ANES.  performed by Serge Weber MD at [address] [facility] N/A 9/25/2020    CYSTOSCOPY performed by Ana Cary MD at [address]  8/13/2019    ESOPHAGEAL DILATION Pepe Sanchez performed by Serge Weber MD at [facility] 2020    FOREARM SURGERY Left 2022    OPEN REDUCTION INTERNAL FIXATION LEFT DISTAL RADIUS performed by Alfred Flores MD at [facility] ARTHROSCOPY Left     Torn meniscus    OTHER SURGICAL HISTORY  10/09/2015    phacoemulsification of cataract with intraocular implant right eye    THYROIDECTOMY, PARTIAL      TONSILLECTOMY      TUBAL LIGATION      UPPER GASTROINTESTINAL ENDOSCOPY N/A 2019    EGD W/ANES. (9:30) performed by Lay Tafoya MD at [id number] [place] [address]       Medications prior to admission:   Prior to Admission medications    Medication Sig Start Date End Date Taking? Authorizing Provider   aspirin 81 MG EC tablet Take 81 mg by mouth as needed for Pain   Yes Historical Provider, MD   metoprolol succinate (TOPROL XL) 50 MG extended release tablet TAKE ONE TABLET BY MOUTH TWICE A DAY 3/24/22   GRACIE Adam MD   torsemide (DEMADEX) 20 MG tablet TAKE ONE TABLET BY MOUTH TWICE A DAY 3/22/22   Manny Apgar, MD   atorvastatin (LIPITOR) 40 MG tablet TAKE ONE TABLET BY MOUTH ONCE NIGHTLY 22   Manny Apgar, MD   dilTIAZem (CARTIA XT) 120 MG extended release capsule Take 1 capsule by mouth daily 10/13/21   Manny Apgar, MD   potassium chloride (KLOR-CON M) 10 MEQ extended release tablet Take 1 tablet by mouth daily 10/13/21   Manny Apgar, MD   omeprazole (PRILOSEC) 20 MG delayed release capsule Take 20 mg by mouth daily    Historical Provider, MD   DULoxetine (CYMBALTA) 30 MG extended release capsule Take 30 mg by mouth daily    Historical Provider, MD   levothyroxine (SYNTHROID) 125 MCG tablet Take 125 mcg by mouth Daily    Historical Provider, MD   OXYGEN Inhale 3 L into the lungs continuous     Historical Provider, MD   Cyanocobalamin (B-12) 2500 MCG TABS Take 2,500 mcg by mouth once a week Weekly on .   Patient not taking: Reported on 2022    Historical Provider, MD   melatonin 5 MG TABS tablet Take 5 mg by mouth nightly of Exercise per Session: Not on file   Stress:     Feeling of Stress : Not on file   Social Connections:     Frequency of Communication with Friends and Family: Not on file    Frequency of Social Gatherings with Friends and Family: Not on file    Attends [employer]ism Services: Not on file    Active Member of Clubs or Organizations: Not on file    Attends Club or Organization Meetings: Not on file    Marital Status: Not on file   Intimate Partner Violence:     Fear of Current or Ex-Partner: Not on file    Emotionally Abused: Not on file    Physically Abused: Not on file    Sexually Abused: Not on file   Housing Stability:     Unable to Pay for Housing in the Last Year: Not on file    Number of [place] in the Last Year: Not on file    Unstable Housing in the Last Year: Not on file       Family History:  Family History   Problem Relation Age of Onset    Colon Cancer Mother     Heart Disease Father     Heart Disease Brother          REVIEW OF SYSTEMS:   CONSTITUTIONAL: Denies unexplained weight loss, fevers, chills or fatigue  NEUROLOGICAL: Denies unsteady gait or progressive weakness    PSYCHOLOGICAL: Denies anxiety, depression   SKIN: Denies skin changes, delayed healing, rash, itching   HEMATOLOGIC: Denies easy bleeding or bruising  ENDOCRINE: Denies excessive thirst, urination, heat/cold  RESPIRATORY: Denies current dyspnea, cough  CARDIOVASCULAR: Negative for chest pain at this time. EYES: Negative for photophobia and visual disturbance. ENT:  Negative for rhinorrhea, epistaxis, sore throat, or hearing loss. GI: Denies nausea, vomiting, diarrhea   : Denies bowel or bladder issues   MUSCULOSKELETAL: Left wrist pain. All other ROS reviewed in chart or with patient or family and are grossly negative. PHYSICAL EXAMINATION:  Ms. Chris Plata is a very pleasant 80 y.o. female who seen today in no acute distress, awake, alert, and oriented. She is well nourished and groomed.   Patient with normal affect. Body mass index is 27.73 kg/m². . Skin warm and dry. Resting respiratory rate is 16. Resp deep and easy. Pulse is with regular rate and rhythm    /80   Pulse 76   Temp 97.9 °F (36.6 °C) (Oral)   Resp 20   Ht 5' (1.524 m)   Wt 142 lb (64.4 kg)   SpO2 95%   BMI 27.73 kg/m²        Airway is intact  Chest: chest clear, no wheezing, rales, normal symmetric air entry, no tachypnea, retractions or cyanosis  Heart: regular rate and rhythm ; heart sounds normal   Hearing intact, pupil equal and reactive bilateral  Lymphatics; No groin or axillary enlarged lymph nodes. Neck; No swelling  Abdomen; soft, non distended. MUSCULOSKELETAL:   On evaluation of her left upper extremity, there is moderate deformity. There is moderate swelling and moderate ecchymosis. She is tender to palpation over the distal radius, and otherwise nontender over the remainder of the extremity. Range of motion is decreased secondary to pain over the left wrist, but no mechanical block. The skin overlying the left wrist is intact without evidence of lesion, laceration or abrasion. Distal pulses are 2+ and symmetric bilaterally. Sensation is grossly intact to light touch and symmetric bilaterally.     NEUROLOGIC:   Sensory:    Touch:                     Right Upper Extremity:  normal                   Left Upper Extremity:  normal                  Right Lower Extremity:  normal                  Left Lower Extremity:  normal        DATA:    CBC:   Lab Results   Component Value Date    WBC 5.7 06/24/2022    RBC 3.92 06/24/2022    HGB 10.9 06/24/2022    HCT 35.8 06/24/2022    MCV 91.1 06/24/2022    MCH 27.9 06/24/2022    MCHC 30.6 06/24/2022    RDW 17.2 06/24/2022     06/24/2022    MPV 9.9 06/24/2022     WBC:    Lab Results   Component Value Date    WBC 5.7 06/24/2022     PT/INR:    Lab Results   Component Value Date    PROTIME 13.5 06/24/2022    INR 1.05 06/24/2022     PTT:    Lab Results   Component Value Date APTT 30.6 08/19/2020   [APTT    IMAGING: Xrays dated 6/24/2022, 3 views of left wrist were reviewed, and showed markedly displaced distal radius fracture. IMPRESSION: Left wrist pain/ markedly displaced distal radius fracture. PLAN:  I discussed with Sang Ceel the overall alignment of the fracture and treatment options including both surgical and non-surgical treatment, and that my recommendation is an open reduction and internal fixation given the amount of displacement and comminution of the fracture. I discussed the risks and benefits of surgery with the patient, including but not limited to infection, bleeding, pain, injury to nerves or blood vessels failure of the surgery and need for additional surgery. All the patient's questions were answered. We discussed an expected post-operative course. She  is understanding of this and wishes to proceed. Thank you very much for the kind consultation and allowing me to participate in this patient's care. I will continue to keep you apprised of her progress.          Teo Fish MD   6/27/2022  10:00 AM

## 2022-06-28 NOTE — PLAN OF CARE
Problem: Chronic Conditions and Co-morbidities  Goal: Patient's chronic conditions and co-morbidity symptoms are monitored and maintained or improved  6/28/2022 0756 by Danyell Dunn RN  Outcome: Progressing  Flowsheets (Taken 6/28/2022 0756)  Care Plan - Patient's Chronic Conditions and Co-Morbidity Symptoms are Monitored and Maintained or Improved:   Collaborate with multidisciplinary team to address chronic and comorbid conditions and prevent exacerbation or deterioration   Update acute care plan with appropriate goals if chronic or comorbid symptoms are exacerbated and prevent overall improvement and discharge   Monitor and assess patient's chronic conditions and comorbid symptoms for stability, deterioration, or improvement  6/28/2022 0722 by Ellie Christensen RN  Outcome: Progressing  Flowsheets  Taken 6/28/2022 0722 by Tez Wilkerson 34 - Patient's Chronic Conditions and Co-Morbidity Symptoms are Monitored and Maintained or Improved: Monitor and assess patient's chronic conditions and comorbid symptoms for stability, deterioration, or improvement  Taken 6/27/2022 2020 by Tez Ibarra 34 - Patient's Chronic Conditions and Co-Morbidity Symptoms are Monitored and Maintained or Improved:   Monitor and assess patient's chronic conditions and comorbid symptoms for stability, deterioration, or improvement   Collaborate with multidisciplinary team to address chronic and comorbid conditions and prevent exacerbation or deterioration   Update acute care plan with appropriate goals if chronic or comorbid symptoms are exacerbated and prevent overall improvement and discharge     Problem: Discharge Planning  Goal: Discharge to home or other facility with appropriate resources  6/28/2022 0756 by Danyell Dunn RN  Outcome: Progressing  Flowsheets (Taken 6/28/2022 0756)  Discharge to home or other facility with appropriate resources:   Identify barriers to discharge with patient and caregiver Arrange for needed discharge resources and transportation as appropriate   Identify discharge learning needs (meds, wound care, etc)  6/28/2022 0722 by Marvin Massey RN  Outcome: Progressing  Flowsheets  Taken 6/28/2022 0722 by Marvin Massey RN  Discharge to home or other facility with appropriate resources: Identify barriers to discharge with patient and caregiver  Taken 6/27/2022 2020 by Colleen Lopez RN  Discharge to home or other facility with appropriate resources: Identify barriers to discharge with patient and caregiver     Problem: Pain  Goal: Verbalizes/displays adequate comfort level or baseline comfort level  6/28/2022 0756 by Colleen Lopez RN  Outcome: Progressing  Flowsheets (Taken 6/28/2022 0756)  Verbalizes/displays adequate comfort level or baseline comfort level:   Encourage patient to monitor pain and request assistance   Administer analgesics based on type and severity of pain and evaluate response   Assess pain using appropriate pain scale   Implement non-pharmacological measures as appropriate and evaluate response  6/28/2022 0722 by Marvin Massey RN  Outcome: Progressing  Flowsheets (Taken 6/28/2022 1424)  Verbalizes/displays adequate comfort level or baseline comfort level:   Encourage patient to monitor pain and request assistance   Assess pain using appropriate pain scale     Problem: Safety - Adult  Goal: Free from fall injury  6/28/2022 0756 by Colleen Lopez RN  Outcome: Progressing  Flowsheets (Taken 6/28/2022 0756)  Free From Fall Injury: Based on caregiver fall risk screen, instruct family/caregiver to ask for assistance with transferring infant if caregiver noted to have fall risk factors  6/28/2022 0722 by Marvin Massey RN  Outcome: Progressing  Flowsheets (Taken 6/28/2022 0722)  Free From Fall Injury: Instruct family/caregiver on patient safety     Problem: ABCDS Injury Assessment  Goal: Absence of physical injury  6/28/2022 0756 by Colleen Lopez RN  Outcome: Progressing  Flowsheets (Taken 6/28/2022 0756)  Absence of Physical Injury: Implement safety measures based on patient assessment  6/28/2022 7798 by Margie Myrick RN  Outcome: Progressing  Flowsheets (Taken 6/28/2022 8922)  Absence of Physical Injury: Implement safety measures based on patient assessment     Problem: Skin/Tissue Integrity  Goal: Absence of new skin breakdown  Description: 1. Monitor for areas of redness and/or skin breakdown  2. Assess vascular access sites hourly  3. Every 4-6 hours minimum:  Change oxygen saturation probe site  4. Every 4-6 hours:  If on nasal continuous positive airway pressure, respiratory therapy assess nares and determine need for appliance change or resting period. 6/28/2022 0756 by Isael Flowers RN  Outcome: Progressing  Note: Travis score assessed. Patient able to  turn self. Repositioned patient Q2H and assessed skin. Educated patient on importance of repositioning to prevent skin issues. 6/28/2022 0722 by Margie Myrick RN  Outcome: Progressing  Note: Pt assessed for skin break down. Skin was warm and dry to touch. Pt cannot regulate head of bed without assistance. Pt being turned or repositioned at least every 2 hours to prevent skin breakdown. Will continue to monitor and assess.

## 2022-06-28 NOTE — H&P
Preoperative H&P Update    The patient's History and Physical in the medical record from 6/27/2022 was reviewed by me today. Past Medical History:   Diagnosis Date    Acute on chronic diastolic (congestive) heart failure (HCC) 8/19/2020    Aortic stenosis     Arthritis     Cancer (HCC)     skin basal    Chronic respiratory failure with hypoxia ([facility] 9/7/2021    COPD exacerbation ([facility] 9/7/2021    Hyperlipidemia     Hypertension     Migraine     Paroxysmal atrial fibrillation ([facility]     Thyroid disease      Past Surgical History:   Procedure Laterality Date    AORTIC VALVE REPLACEMENT N/A 10/15/2019    TRANSCATHETER AORTIC VALVE REPLACEMENT FEMORAL APPROACH performed by Farhan Coppola MD at [address]  09/2019    COLONOSCOPY N/A 8/13/2019    COLON W/ANES. performed by Lay Tafoya MD at [address] [facility] N/A 9/25/2020    CYSTOSCOPY performed by Clarice Diaz MD at [facility] [facility]  8/13/2019    ESOPHAGEAL DILATION Arleene Cleaves [facility] 08/19/[facility]    KNEE ARTHROSCOPY Left     Torn meniscus    OTHER SURGICAL HISTORY  10/09/2015    phacoemulsification of cataract with intraocular implant right eye    THYROIDECTOMY, PARTIAL      TONSILLECTOMY      TUBAL LIGATION      UPPER GASTROINTESTINAL ENDOSCOPY N/A 8/13/2019    EGD W/ANES. (9:30) performed by Lay Tafoya MD at [address]     No current facility-administered medications on file prior to encounter.      Current Outpatient Medications on File Prior to Encounter   Medication Sig Dispense Refill    aspirin 81 MG EC tablet Take 81 mg by mouth as needed for Pain      metoprolol succinate (TOPROL XL) 50 MG extended release tablet TAKE ONE TABLET BY MOUTH TWICE A DAY 60 tablet 2    torsemide (DEMADEX) 20 MG tablet TAKE ONE TABLET BY MOUTH TWICE A DAY 60 tablet 5    atorvastatin (LIPITOR) 40 MG tablet TAKE ONE TABLET BY MOUTH ONCE NIGHTLY 90 tablet 3    dilTIAZem (CARTIA XT) 120 MG extended release capsule Take 1 capsule by mouth daily 90 capsule 3    potassium chloride (KLOR-CON M) 10 MEQ extended release tablet Take 1 tablet by mouth daily 30 tablet 0    omeprazole (PRILOSEC) 20 MG delayed release capsule Take 20 mg by mouth daily      DULoxetine (CYMBALTA) 30 MG extended release capsule Take 30 mg by mouth daily      levothyroxine (SYNTHROID) 125 MCG tablet Take 125 mcg by mouth Daily      OXYGEN Inhale 3 L into the lungs continuous       Cyanocobalamin (B-12) 2500 MCG TABS Take 2,500 mcg by mouth once a week Weekly on Wednesdays. (Patient not taking: Reported on 1/27/2022)      melatonin 5 MG TABS tablet Take 5 mg by mouth nightly       HYDROcodone-acetaminophen (NORCO) 5-325 MG per tablet Take 1 tablet by mouth every 6 hours as needed for Pain. Takes one pill @ 8am, one pill @ 3pm, one pill @ 6p, and one pill at bedtime.  metaxalone (SKELAXIN) 800 MG tablet Take 800 mg by mouth 2 times daily as needed (as needed for spasms) Do not take with vicodin. (Patient not taking: Reported on 12/7/2021)      Butalbital-Acetaminophen  MG TABS Take 1 tablet by mouth as needed (as needed for headaches) Vary rarely. Allergies   Allergen Reactions    Sulfa Antibiotics Hives and Swelling    Dronedarone      Fatigue and shortness of breath    Iodides Rash    Shellfish-Derived Products Rash      I reviewed the HPI, medications, allergies, reason for surgery, diagnosis and treatment plan and there has been no change. The patient was evaluated by me today.  Physical exam findings for this update include:    Vitals:    06/27/22 1933   BP: 124/80   Pulse: 76   Resp: 20   Temp: 97.9 °F (36.6 °C)   SpO2: 95%     Airway is intact  Chest: chest clear, no wheezing, rales, normal symmetric air entry, no tachypnea, retractions or cyanosis  Heart: regular rate and rhythm ; heart sounds normal  Findings on exam of the body region where surgery is to be performed include:  Left distal radius fracture.     Electronically signed by Jo-Ann Shafer MD on 6/27/2022 at 10:57 PM

## 2022-06-28 NOTE — PLAN OF CARE
Problem: Chronic Conditions and Co-morbidities  Goal: Patient's chronic conditions and co-morbidity symptoms are monitored and maintained or improved  6/28/2022 1957 by Tao Parker RN  Outcome: Progressing  Flowsheets  Taken 6/28/2022 1957 by Tez Woods 34 - Patient's Chronic Conditions and Co-Morbidity Symptoms are Monitored and Maintained or Improved: Monitor and assess patient's chronic conditions and comorbid symptoms for stability, deterioration, or improvement  Taken 6/28/2022 0852 by Susie León RN  Care Plan - Patient's Chronic Conditions and Co-Morbidity Symptoms are Monitored and Maintained or Improved: Monitor and assess patient's chronic conditions and comorbid symptoms for stability, deterioration, or improvement     Problem: Discharge Planning  Goal: Discharge to home or other facility with appropriate resources  6/28/2022 1957 by Tao Parker RN  Outcome: Progressing  Flowsheets  Taken 6/28/2022 1957 by Tao Parker RN  Discharge to home or other facility with appropriate resources:   Identify barriers to discharge with patient and caregiver   Arrange for needed discharge resources and transportation as appropriate  Taken 6/28/2022 0852 by Susie León RN  Discharge to home or other facility with appropriate resources: Identify barriers to discharge with patient and caregiver     Problem: Pain  Goal: Verbalizes/displays adequate comfort level or baseline comfort level  6/28/2022 1957 by Tao Parker RN  Outcome: Progressing  Flowsheets (Taken 6/28/2022 1957)  Verbalizes/displays adequate comfort level or baseline comfort level:   Encourage patient to monitor pain and request assistance   Assess pain using appropriate pain scale   Implement non-pharmacological measures as appropriate and evaluate response   Administer analgesics based on type and severity of pain and evaluate response     Problem: Safety - Adult  Goal: Free from fall injury  6/28/2022 1957 by Adele De La Cruz RN  Outcome: Progressing  Flowsheets  Taken 6/28/2022 1957 by Adele De La Cruz RN  Free From Fall Injury: Instruct family/caregiver on patient safety  Taken 6/28/2022 1955 by Adele De La Cruz RN  Free From Fall Injury: Instruct family/caregiver on patient safety  Taken 6/28/2022 1134 by Ellie Christensen RN  Free From Fall Injury: Instruct family/caregiver on patient safety     Problem: ABCDS Injury Assessment  Goal: Absence of physical injury  6/28/2022 1957 by Adele De La Cruz RN  Outcome: Progressing  Flowsheets  Taken 6/28/2022 1957 by Adele De La Cruz RN  Absence of Physical Injury: Implement safety measures based on patient assessment  Taken 6/28/2022 1955 by Adele De La Cruz RN  Absence of Physical Injury: Implement safety measures based on patient assessment  Taken 6/28/2022 1134 by Ellie Christensen RN  Absence of Physical Injury: Implement safety measures based on patient assessment     Problem: Skin/Tissue Integrity  Goal: Absence of new skin breakdown  Description: 1. Monitor for areas of redness and/or skin breakdown  2. Assess vascular access sites hourly  3. Every 4-6 hours minimum:  Change oxygen saturation probe site  4. Every 4-6 hours:  If on nasal continuous positive airway pressure, respiratory therapy assess nares and determine need for appliance change or resting period. 6/28/2022 1957 by Adele De La Cruz RN  Outcome: Progressing  Note: Skin assessment complete. No new signs of skin breakdown noted. Repositioning patient with pillows at two hour intervals. Heels elevated off bed.

## 2022-06-28 NOTE — DISCHARGE INSTR - COC
KNEE ARTHROSCOPY Left     Torn meniscus    OTHER SURGICAL HISTORY  10/09/2015    phacoemulsification of cataract with intraocular implant right eye    THYROIDECTOMY, PARTIAL      TONSILLECTOMY      TUBAL LIGATION      UPPER GASTROINTESTINAL ENDOSCOPY N/A 8/13/2019    EGD W/ANES.  (9:30) performed by Roula Good MD at [address] [facility]       Immunization History:   Immunization History   Administered Date(s) Administered    COVID-19, Moderna, Booster, PF, 50mcg/0.25ml 11/05/2021, 06/02/2022    COVID-19, Valentino Flattery, Primary or Immunocompromised, PF, 100mcg/0.5mL 02/12/2021, 03/12/2021    Influenza, Quadv, IM, PF (6 mo and older Fluzone, Flulaval, Fluarix, and 3 yrs and older Afluria) 10/16/2019    Tdap (Boostrix, Adacel) 06/24/2022       Active Problems:  Patient Active Problem List   Diagnosis Code    Lumbar radiculopathy M54.16    Lumbar spine pain M54.50    Hip pain, left M25.552    Aortic stenosis I35.0    Hypercholesteremia E78.00    Essential hypertension I10    Loss of appetite R63.0    Weight loss R63.4    Anemia D64.9    Positive colorectal cancer screening using Cologuard test R19.5    Schatzki's ring K22.2    Nonrheumatic aortic valve stenosis I35.0    Digoxin toxicity, accidental or unintentional, initial encounter T46.0X1A    S/P TAVR (transcatheter aortic valve replacement) Z95.2    PAF (paroxysmal atrial fibrillation) ([facility]) I48.0    [place] (acute kidney injury) ([facility] [address] N17.9    Digoxin overdose, accidental or unintentional, initial encounter T46.0X1A    Acute on chronic diastolic (congestive) heart failure (HCC) I50.33    Aortic valve disease I35.9    Acute on chronic congestive heart failure (HCC) I50.9    Acute on chronic respiratory failure with hypoxia ([facility]) J96.21    CHF (congestive heart failure), NYHA class I, acute on chronic, combined ([address] I50.43    Chronic respiratory failure with hypoxia ([facility]) J96.11    COPD exacerbation ([facility]) J44.1    Persistent atrial fibrillation ([address] I48.19 Fx. left wrist, closed, initial encounter S62.102A    Wrist fracture, closed, left, initial encounter S62.102A    Closed fracture of left distal radius S52.502A       Isolation/Infection:   Isolation            No Isolation          Patient Infection Status       Infection Onset Added Last Indicated Last Indicated By Review Planned Expiration Resolved Resolved By    None active    Resolved    COVID-19 (Rule Out) 09/06/21 09/06/21 09/06/21 Respiratory Panel, Molecular, with COVID-19 (Restricted: peds pts or suitable admitted adults) (Ordered)   09/07/21 Rule-Out Test Resulted    COVID-19 (Rule Out) 09/05/21 09/05/21 09/05/21 COVID-19 & Influenza Combo (Ordered)   09/05/21 Rule-Out Test Resulted            Nurse Assessment:  Last Vital Signs: /68   Pulse 96   Temp 97.7 °F (36.5 °C) (Oral)   Resp 17   Ht 5' (1.524 m)   Wt 144 lb 6.4 oz (65.5 kg)   SpO2 96%   BMI 28.20 kg/m²     Last documented pain score (0-10 scale): Pain Level: 8  Last Weight:   Wt Readings from Last 1 Encounters:   06/28/22 144 lb 6.4 oz (65.5 kg)     Mental Status:  oriented, alert, coherent, and logical with intermittent confusion    IV Access:  - None    Nursing Mobility/ADLs:  Walking   Assisted  Transfer  Assisted  Bathing  Assisted  Dressing  Assisted  Toileting  Assisted  Feeding  Assisted  Med Admin  Assisted  Med Delivery   whole    Wound Care Documentation and Therapy:  Incision 06/27/22 Radial Distal;Left (Active)   Dressing Status Clean;Dry; Intact 06/28/22 0852   Incision Cleansed Not Cleansed 06/28/22 0852   Dressing/Treatment Ace wrap;Cast padding 06/28/22 0852   Closure Other (Comment) 06/28/22 0852   Margins Other (Comment) 06/28/22 0852   Incision Assessment Other (Comment) 06/28/22 0852   Drainage Amount Other (Comment) 06/28/22 0852   Drainage Description Other (Comment) 06/28/22 0852   Odor Other (comment) 06/28/22 0852   Norah-incision Assessment Cool;Ecchymosis 06/28/22 0852   Number of days: 0 Elimination:  Continence: Bowel: Yes  Bladder: Yes  Urinary Catheter: None   Colostomy/Ileostomy/Ileal Conduit: No       Date of Last BM: 6/26/2022    Intake/Output Summary (Last 24 hours) at 6/28/2022 1046  Last data filed at 6/28/2022 0546  Gross per 24 hour   Intake 1040 ml   Output 625 ml   Net 415 ml     I/O last 3 completed shifts: In: 1681 [P.O.:240; I.V.:800]  Out: 625 [Urine:625]    Safety Concerns:     History of Falls (last 30 days) and At Risk for Falls    Impairments/Disabilities:      None    Nutrition Therapy:  Current Nutrition Therapy:   - Oral Diet:  General and Low Sodium (2gm)    Routes of Feeding: Oral  Liquids: Thin Liquids  Daily Fluid Restriction: yes - amount 2000ml  Last Modified Barium Swallow with Video (Video Swallowing Test): not done    Treatments at the Time of Hospital Discharge:   Respiratory Treatments:     Oxygen Therapy:  is on oxygen at 3 L/min per nasal cannula. Ventilator:    - No ventilator support    Rehab Therapies: Physical Therapy and Occupational Therapy  Weight Bearing Status/Restrictions: Non weight bearing to left arm/wrist  Other Medical Equipment (for information only, NOT a DME order):  walker  Other Treatments:     Heart Failure Instructions for Daily Management  Patient was treated for chronic diastolic heart failure. she  will require the following:    Please weigh daily on the same scale and approximately the same time of day. Report weight gain of 3 pounds/day or 5 pounds/week to : elieser COHEN and Tana Mcmahon [phone]. Please use hospital discharge weight as baseline reference. Please monitor for signs and symptoms of and report to MD:  Worsening Heart Failure: sudden weight gain, shortness of breath, lower extremity or general edema/swelling, abdominal bloating/swelling, inability to lie flat, intolerance to usual activity, or cough (especially at night). Report these finding even if no increase in weight.   Dehydration:  having difficulty or a decrease in urination, dizziness, worsening fatigue, or new onset/worsening of generalized weakness. Please continue a LOW SODIUM diet and LIMIT fluid intake to 48 - 64 ounces ( 1.5 - 2 liters) per day. Call  Cariology [phone] option 3, Vladislav Kaplan [phone], and facility MD and/or Lolis Joshi @ [phone] with any questions or concerns. Please continue heart failure education to patient and family/support system. See After Visit Summary for hospital follow up appointment details. Consider spiritual care referral for support and/or completion of advance directives [phone]. Consider: having the facility MD complete required 7 day follow up and palliative care consult for ongoing goals of care, end of life, and/or chronic disease management discussions. Patient's personal belongings (please select all that are sent with patient):  None    RN SIGNATURE:  Electronically signed by Ti Trorez RN on 6/29/22 at 3:48 PM EDT    CASE MANAGEMENT/SOCIAL WORK SECTION    Inpatient Status Date: 6/27/22    Readmission Risk Assessment Score:  Readmission Risk              Risk of Unplanned Readmission:  17           Discharging to Facility/ Agency   Discharging to Facility/ Agency   Discharging to Facility/ Agency   Name: [facility]   Address:  [facility] [address], [address] [place] [address] [address]  Phone: [phone]  Fax:  [phone]      / signature: Electronically signed by Orville Jimenez on 6/28/22 at 11:52 AM EDT    PHYSICIAN SECTION    Prognosis: Fair    Condition at Discharge: Stable    Rehab Potential (if transferring to Rehab):  Fair    Recommended Labs or Other Treatments After Discharge: NA    Physician Certification: I certify the above information and transfer of Suly Izaguirre  is necessary for the continuing treatment of the diagnosis listed and that she requires Skilled Nursing Facility for less 30 days.      Update Admission H&P: Changes in H&P as follows - refer to dc summary    PHYSICIAN SIGNATURE:  Electronically signed by Remy Liu MD on 6/29/22 at 2:28 PM EDT

## 2022-06-28 NOTE — CARE COORDINATION
[facility]    Referral received from  to follow for home care services. I will follow for needs, and speak with patient to verify demos. I have submitted for approval, and will update when I'm able.       Jaspal Xie RN, BSN CTN  [facility] [phone]

## 2022-06-28 NOTE — PROGRESS NOTES
Occupational Therapy  Facility/Department: [facility]TH[facility]ICS  Occupational Therapy Initial Assessment    Name: Skylar Ly  : 1940  MRN: [id number]  Date of Service: 2022    Discharge Recommendations:  2-3 sessions per week,24 hour supervision or assist,Home with Home health OT  OT Equipment Recommendations  Other:  reports they have all needed equipment   Skylar Ly scored a 10/24 on the AM-PAC ADL Inpatient form. Current research shows that an AM-PAC score of 18 or greater is typically associated with a discharge to the patient's home setting. Pt's  adament on pt returning home today, and reports he has al the equipment needed. He has been assisting pt for past several days since her initial injury and did demo ability to assist her with transfers to/from w/c during today's therapy session. Based on the patient's AM-PAC score, and their current ADL deficits, it is recommended that the patient have 2-3 sessions per week of Occupational Therapy at d/c to increase the patient's independence. At this time, this patient demonstrates the endurance and safety to discharge home with home and a follow up treatment frequency of 2-3x/wk. Please see assessment section for further patient specific details. If patient discharges prior to next session this note will serve as a discharge summary. Please see below for the latest assessment towards goals. Patient Diagnosis(es): The encounter diagnosis was Other closed fracture of distal end of left radius, initial encounter. Past Medical History:  has a past medical history of Acute on chronic diastolic (congestive) heart failure ([address] Aortic stenosis, Arthritis, Cancer ([address] Chronic respiratory failure with hypoxia ([address] COPD exacerbation ([address] Hyperlipidemia, Hypertension, Migraine, Paroxysmal atrial fibrillation ([address] and Thyroid disease.   Past Surgical History:  has a past surgical history that includes Thyroidectomy, independent with ADLs and mod I mobility with cane prior to fall  Exam: decreased balance, activity tolerance, ROM, strength, endurance, cognition, coordination  Assistance / Modification: min/mod A bed mobility, transfers  REQUIRES OT FOLLOW-UP: Yes  Activity Tolerance  Activity Tolerance: Patient limited by pain; Patient Tolerated treatment well  Activity Tolerance Comments: R knee pain most limiting factor this session        Plan   Plan  Times per Week: 3-5  Times per Day: Daily  Current Treatment Recommendations: Strengthening,ROM,Functional mobility training,Endurance training,Wheelchair mobility training,Positioning,Equipment evaluation, education, & procurement,Patient/Caregiver education & training,Safety education & training,Self-Care / ADL     Restrictions  Restrictions/Precautions  Restrictions/Precautions: Fall Risk,Weight Bearing  Upper Extremity Weight Bearing Restrictions  Left Upper Extremity Weight Bearing: Non Weight Bearing  Position Activity Restriction  Other position/activity restrictions: Knee contusions ( Right > Left LE pain)    Subjective   General  Chart Reviewed: Yes  Patient assessed for rehabilitation services?: Yes  Additional Pertinent Hx: Pt is an 80 y.o. female admitted from home after a fall. Pt sustained a L distal radius fx and underwent ORIF L distal radius fx. PMH includes: nonrheumatic aortic valve stenosis, status post TAVR, atrial fibrillation, COPD, CHF, hypertension, hyperlipidemia. Family / Caregiver Present: Yes (spouse)  Referring Practitioner: Remy Liu MD  Diagnosis: left distal radius fx s/p ORIF  Subjective  Subjective: Pt met bedside for OT eval/tx with PT. Pt in bed upon entering, spouse at bedside. Pt agreeable to therapy but complains of R knee pain > L wrist pain.  insistent on pt d/c'ing to home today regardless of medical status.      Social/Functional History  Social/Functional History  Lives With: Spouse (cousin lives next door who is pt's caregiver but currently unable to help pt due to her own medical issues)  Type of Home: House  Home Layout: Two level,Able to Live on Main level with bedroom/bathroom  Home Access: Stairs to enter without rails  Entrance Stairs - Number of Steps: 2 [place]AV[place]  Bathroom Shower/Tub: Walk-in shower  Bathroom Toilet: Handicap height  Bathroom Equipment: Commode,Grab bars in shower,Shower chair,Toilet raiser  Bathroom Accessibility: Wheelchair accessible  Home Equipment: Cane,Cane, quad,Wheelchair-manual,Oxygen (transport chair; 3 L O2)  Has the patient had two or more falls in the past year or any fall with injury in the past year?: Yes  Receives Help From: Family  ADL Assistance: Independent (supv for bathing)  Homemaking Responsibilities: No ( and caregiver perform)  Ambulation Assistance: Independent (with cane but recently  has been using w/c due to R knee pain)  Transfer Assistance: Independent       Objective   Vision Exceptions: Wears glasses at all times  Hearing: Within functional limits          Safety Devices  Type of Devices: Patient at risk for falls; Left in chair;Nurse notified;Gait belt; Chair alarm in place;Call light within reach  Balance  Sitting: Intact  Standing: With support  Transfer Training  Transfer Training: Yes  Overall Level of Assistance: Minimum assistance; Moderate assistance  Sit to Stand: Minimum assistance; Moderate assistance ( assisted with stance from bed; PT assisted with min/mod A from chair)  Stand to Sit: Minimum assistance; Moderate assistance ( assisted on initial transfer, PT assisted with 2nd transfer)  Bed to Chair: Minimum assistance; Moderate assistance (very short distance -  assisted to show therapists how he has been assisting pt with transfers to/from w/c @ home)     Strength: Grossly decreased, non-functional (L UE NWB)  ADL  Toileting:  (cantu catheter)  Additional Comments: Anticipate pt to be mod A feeding, grooming, max A UE bathing/dressing, total A LE Bathing/dressing and toileting based on ROM, balance, endurance, and NWB L UE     Activity Tolerance  Activity Tolerance: Patient tolerated evaluation without incident  Bed mobility  Supine to Sit: Minimal assistance (husbnad provided assist, from flattened bed surface.)  Sit to Supine: Unable to assess (nt--pt up in recliner at end of session.)        Cognition  Overall Cognitive Status: Exceptions  Memory: Decreased recall of recent events  Insights: Decreased awareness of deficits     Education Given To: Patient; Family  Education Provided: Role of Therapy;Plan of Care;Family Education;Precautions; Equipment;ADL Adaptive Strategies;Transfer Training  Education Method: Demonstration  Barriers to Learning: Cognition  Education Outcome: Continued education needed  LUE AROM (degrees)  LUE AROM : Exceptions  LUE General AROM: elbow WFL but guarded; wrist splinted; could slightly flex fingers but limited range due to wrist splint extendeding to fingers, shoulder not tested due to pain  RUE AROM (degrees)  RUE AROM : WFL     AM-PAC Score        AM-[facility] Inpatient Daily Activity Raw Score: 10 (06/28/22 [facility])  AM-PAC Inpatient ADL T-Scale Score : 27.31 (06/28/22 [facility])  ADL Inpatient CMS 0-100% Score: 74.7 (06/28/22 [facility])  ADL Inpatient CMS G-Code Modifier : CL (06/28/22 [facility])    Goals  Short Term Goals  Time Frame for Short term goals: By d/c:  Short Term Goal 1: Pt will complete fxl transfers to/from ADL surfaces with min A  Short Term Goal 2: Pt will complete UB bathing/dressing with mod A  Short Term Goal 3: Pt will complete toileting with mod/max A  Short Term Goal 4: Pt will tolerate gentle ROM to L UE shoulder, elbow and fingers for 5-10 min to assist with transfers and ADLs  Patient Goals   Patient goals : \"to go home\"       Therapy Time   Individual Concurrent Group Co-treatment   Time In 1030         Time Out 1120         Minutes 50         Timed Code Treatment Minutes: 35 Minutes Sommer Herrera, OTR/L 1083

## 2022-06-28 NOTE — PROGRESS NOTES
PT Alert per this shift and encouraged to eat. Pt able to tolerate pudding and turkey. Pt tolerating well. Pt c/o of ongoing high pain levels to left wrist and right knee. Manged with elevation, ice, and PRN medication per MD orders. Stedy used to get Pt to bathroom. Pt tolerating poorly. Pt unable to void. Pt's  stating this has been ongoing at home. Pt bladder scanned for 861 ml. MD DAVIS Huffman 21 notified. Order to place White given. White place with sterile technique per MD orders. Pt placed on 4 L of oxygen d/t SP02 being in 80s when resting. No further needs voiced at this time. Fall precautions in place. Bed alarm on. Call light within reach. Will continue to round.  Electronically signed by Patrick Zhou RN on 6/28/2022 at 7:56 AM

## 2022-06-28 NOTE — ACP (ADVANCE CARE PLANNING)
Advance Care Planning     Advance Care Planning Activator (Inpatient)  Conversation Note      Date of ACP Conversation: 6/28/2022     Conversation Conducted with: Patient with Decision Making Capacity    ACP Activator: [address] Decision Maker:     Current Designated Health Care Decision Maker:     Primary Decision Maker: Sharon Machuca Spouse - [phone]    Secondary Decision Maker: Criss Jean - [phone]    Supplemental (Other) Decision Maker: Irina Alexander - Other - [phone]  Click here to complete Healthcare Decision Makers including section of the Healthcare Decision Maker Relationship (ie \"Primary\")      Care Preferences    Ventilation: \"If you were in your present state of health and suddenly became very ill and were unable to breathe on your own, what would your preference be about the use of a ventilator (breathing machine) if it were available to you? \"      Would the patient desire the use of ventilator (breathing machine)?: no    \"If your health worsens and it becomes clear that your chance of recovery is unlikely, what would your preference be about the use of a ventilator (breathing machine) if it were available to you? \"     Would the patient desire the use of ventilator (breathing machine)?: No      Resuscitation  \"CPR works best to restart the heart when there is a sudden event, like a heart attack, in someone who is otherwise healthy. Unfortunately, CPR does not typically restart the heart for people who have serious health conditions or who are very sick. \"    \"In the event your heart stopped as a result of an underlying serious health condition, would you want attempts to be made to restart your heart (answer \"yes\" for attempt to resuscitate) or would you prefer a natural death (answer \"no\" for do not attempt to resuscitate)? \" yes       [] Yes   [] No   Educated Patient / Alexi Yancey regarding differences between Advance Directives and portable DNR orders.     Length of ACP Conversation in minutes:      Conversation Outcomes:  [x] ACP discussion completed  [] Existing advance directive reviewed with patient; no changes to patient's previously recorded wishes  [] New Advance Directive completed  [] Portable Do Not Rescitate prepared for Provider review and signature  [] POLST/POST/MOLST/MOST prepared for Provider review and signature      Follow-up plan:    [] Schedule follow-up conversation to continue planning  [] Referred individual to Provider for additional questions/concerns   [] Advised patient/agent/surrogate to review completed ACP document and update if needed with changes in condition, patient preferences or care setting    [x] This note routed to one or more involved healthcare providers        Electronically signed by Keron Tobin on 6/28/2022 at 11:54 AM  #[phone]

## 2022-06-28 NOTE — PROGRESS NOTES
Pharmacy Medication Reconciliation Note     List of medications patient is currently taking is complete. Source of information:   1. Conversation with patient and patient's family at bedside  2. EMR    Notes regarding home medications:   1. Patient no longer taking a baby aspirin at home  2.  Removed metaxalone from home med list and replaced it with methocarbamol as needed      Addie Baeza PharmZAINAB  6/28/2022 9:13 AM

## 2022-06-29 ENCOUNTER — APPOINTMENT (OUTPATIENT)
Dept: CT IMAGING | Age: 82
DRG: 511 | End: 2022-06-29
Attending: ORTHOPAEDIC SURGERY
Payer: MEDICARE

## 2022-06-29 VITALS
SYSTOLIC BLOOD PRESSURE: 120 MMHG | TEMPERATURE: 97.6 F | WEIGHT: 147.05 LBS | BODY MASS INDEX: 28.87 KG/M2 | OXYGEN SATURATION: 100 % | HEART RATE: 71 BPM | HEIGHT: 60 IN | DIASTOLIC BLOOD PRESSURE: 63 MMHG | RESPIRATION RATE: 16 BRPM

## 2022-06-29 LAB
ANION GAP SERPL CALCULATED.3IONS-SCNC: 10 MMOL/L (ref 3–16)
BUN BLDV-MCNC: 32 MG/DL (ref 7–20)
CALCIUM SERPL-MCNC: 8 MG/DL (ref 8.3–10.6)
CHLORIDE BLD-SCNC: 103 MMOL/L (ref 99–110)
CO2: 24 MMOL/L (ref 21–32)
CREAT SERPL-MCNC: 1.3 MG/DL (ref 0.6–1.2)
ESTIMATED AVERAGE GLUCOSE: 157.1 MG/DL
GFR AFRICAN AMERICAN: 47
GFR NON-AFRICAN AMERICAN: 39
GLUCOSE BLD-MCNC: 120 MG/DL (ref 70–99)
GLUCOSE BLD-MCNC: 140 MG/DL (ref 70–99)
GLUCOSE BLD-MCNC: 227 MG/DL (ref 70–99)
GLUCOSE BLD-MCNC: 90 MG/DL (ref 70–99)
HBA1C MFR BLD: 7.1 %
PERFORMED ON: ABNORMAL
POTASSIUM SERPL-SCNC: 4.1 MMOL/L (ref 3.5–5.1)
SODIUM BLD-SCNC: 137 MMOL/L (ref 136–145)

## 2022-06-29 PROCEDURE — 6370000000 HC RX 637 (ALT 250 FOR IP): Performed by: INTERNAL MEDICINE

## 2022-06-29 PROCEDURE — 2700000000 HC OXYGEN THERAPY PER DAY

## 2022-06-29 PROCEDURE — 2580000003 HC RX 258: Performed by: INTERNAL MEDICINE

## 2022-06-29 PROCEDURE — 80048 BASIC METABOLIC PNL TOTAL CA: CPT

## 2022-06-29 PROCEDURE — 97530 THERAPEUTIC ACTIVITIES: CPT

## 2022-06-29 PROCEDURE — 36415 COLL VENOUS BLD VENIPUNCTURE: CPT

## 2022-06-29 PROCEDURE — 6360000002 HC RX W HCPCS: Performed by: INTERNAL MEDICINE

## 2022-06-29 PROCEDURE — 71250 CT THORAX DX C-: CPT

## 2022-06-29 PROCEDURE — 94760 N-INVAS EAR/PLS OXIMETRY 1: CPT

## 2022-06-29 RX ORDER — TORSEMIDE 20 MG/1
TABLET ORAL
Qty: 60 TABLET | Refills: 5 | Status: SHIPPED | OUTPATIENT
Start: 2022-07-01

## 2022-06-29 RX ADMIN — DULOXETINE HYDROCHLORIDE 30 MG: 30 CAPSULE, DELAYED RELEASE ORAL at 08:34

## 2022-06-29 RX ADMIN — ENOXAPARIN SODIUM 30 MG: 100 INJECTION SUBCUTANEOUS at 08:35

## 2022-06-29 RX ADMIN — HYDROCODONE BITARTRATE AND ACETAMINOPHEN 1 TABLET: 5; 325 TABLET ORAL at 17:58

## 2022-06-29 RX ADMIN — DILTIAZEM HYDROCHLORIDE 120 MG: 120 CAPSULE, COATED, EXTENDED RELEASE ORAL at 08:35

## 2022-06-29 RX ADMIN — INSULIN LISPRO 3 UNITS: 100 INJECTION, SOLUTION INTRAVENOUS; SUBCUTANEOUS at 01:10

## 2022-06-29 RX ADMIN — HYDROCODONE BITARTRATE AND ACETAMINOPHEN 1 TABLET: 5; 325 TABLET ORAL at 05:01

## 2022-06-29 RX ADMIN — METOPROLOL SUCCINATE 50 MG: 50 TABLET, EXTENDED RELEASE ORAL at 08:34

## 2022-06-29 RX ADMIN — SODIUM CHLORIDE, PRESERVATIVE FREE 10 ML: 5 INJECTION INTRAVENOUS at 08:37

## 2022-06-29 RX ADMIN — INSULIN LISPRO 2 UNITS: 100 INJECTION, SOLUTION INTRAVENOUS; SUBCUTANEOUS at 08:39

## 2022-06-29 RX ADMIN — POTASSIUM CHLORIDE 10 MEQ: 1500 TABLET, EXTENDED RELEASE ORAL at 08:35

## 2022-06-29 RX ADMIN — LEVOTHYROXINE SODIUM 125 MCG: 0.12 TABLET ORAL at 05:03

## 2022-06-29 RX ADMIN — INSULIN LISPRO 1 UNITS: 100 INJECTION, SOLUTION INTRAVENOUS; SUBCUTANEOUS at 17:57

## 2022-06-29 RX ADMIN — SODIUM CHLORIDE: 9 INJECTION, SOLUTION INTRAVENOUS at 04:55

## 2022-06-29 RX ADMIN — PANTOPRAZOLE SODIUM 40 MG: 40 TABLET, DELAYED RELEASE ORAL at 05:03

## 2022-06-29 ASSESSMENT — PAIN SCALES - GENERAL
PAINLEVEL_OUTOF10: 5
PAINLEVEL_OUTOF10: 7
PAINLEVEL_OUTOF10: 4
PAINLEVEL_OUTOF10: 0

## 2022-06-29 ASSESSMENT — PAIN DESCRIPTION - FREQUENCY
FREQUENCY: CONTINUOUS

## 2022-06-29 ASSESSMENT — PAIN DESCRIPTION - LOCATION
LOCATION: ARM;WRIST
LOCATION: WRIST
LOCATION: ARM;WRIST
LOCATION: WRIST

## 2022-06-29 ASSESSMENT — PAIN DESCRIPTION - PAIN TYPE
TYPE: ACUTE PAIN;SURGICAL PAIN
TYPE: ACUTE PAIN
TYPE: ACUTE PAIN

## 2022-06-29 ASSESSMENT — PAIN DESCRIPTION - ONSET
ONSET: ON-GOING
ONSET: ON-GOING

## 2022-06-29 ASSESSMENT — PAIN - FUNCTIONAL ASSESSMENT
PAIN_FUNCTIONAL_ASSESSMENT: PREVENTS OR INTERFERES SOME ACTIVE ACTIVITIES AND ADLS

## 2022-06-29 ASSESSMENT — PAIN DESCRIPTION - ORIENTATION
ORIENTATION: LEFT

## 2022-06-29 ASSESSMENT — PAIN DESCRIPTION - DESCRIPTORS
DESCRIPTORS: ACHING
DESCRIPTORS: ACHING;SORE
DESCRIPTORS: ACHING;SORE
DESCRIPTORS: ACHING

## 2022-06-29 NOTE — CARE COORDINATION
DISCHARGE SUMMARY     DATE OF DISCHARGE: 6/29/22    ISCHARGE DESTINATION: [facility]      FACILITY    Level of Care: Skilled    Report Number: [phone]    Fax Number:  N/A    Precert Obtained: N/A - [place] [facility] to SNF    Hens Completed: yes    PASARR: N/A    Notified: RN, Family and Facility/Agency    TRANSPORTATION: [place] [address] Name: Esther Rose up Time: 8:00PM    Phone Number: [phone]    NEW DME ORDERED: N/A    COMMENTS: no COVID test needed    Electronically signed by Keron Tobin on 6/29/2022 at 2:36 PM  #[phone]

## 2022-06-29 NOTE — PROGRESS NOTES
Occupational Therapy  Facility/Department: [address] ORTHOPEDICS  Occupational Therapy Daily Note    Name: Martha Meier  : 1940  MRN: [id number]  Date of Service: 2022    Discharge Recommendations:  Patient would benefit from continued therapy after discharge,3-5 sessions per week    Martha Meier scored a 10/24 on the AM-PAC ADL Inpatient form. Current research shows that an AM-PAC score of 17 or less is typically not associated with a discharge to the patient's home setting. Based on the patient's AM-PAC score and their current ADL deficits, it is recommended that the patient have 3-5 sessions per week of Occupational Therapy at d/c to increase the patient's independence. Please see assessment section for further patient specific details. If patient discharges prior to next session this note will serve as a discharge summary. Please see below for the latest assessment towards goals. Patient Diagnosis(es): The encounter diagnosis was Other closed fracture of distal end of left radius, initial encounter. Past Medical History:  has a past medical history of Acute on chronic diastolic (congestive) heart failure ([address] Aortic stenosis, Arthritis, Cancer ([address] Chronic respiratory failure with hypoxia ([address] COPD exacerbation ([address] Hyperlipidemia, Hypertension, Migraine, Paroxysmal atrial fibrillation ([address] and Thyroid disease. Past Surgical History:  has a past surgical history that includes Thyroidectomy, partial; Tonsillectomy; Tubal ligation; Knee arthroscopy (Left); other surgical history (10/09/2015); Upper gastrointestinal endoscopy (N/A, 2019); Colonoscopy (N/A, 2019); Esophagus dilation (2019); Cardiac catheterization (2019); Aortic valve replacement (N/A, 10/15/2019); Cystoscopy (N/A, 2020); eye surgery; eye surgery (Right, 2020); and Forearm surgery (Left, 2022).            Assessment   Performance deficits / Impairments: Decreased functional mobility ; Decreased ADL status; Decreased strength;Decreased cognition;Decreased fine motor control;Decreased ROM; Decreased balance;Decreased endurance;Decreased coordination  Assessment: Discussed with OTR: Pt tolerated session fairly well, appearing groggy initially,yet WFL in cognition. Pt required up to 2 person assit for bed mob(Max A x2), sit><stands(Min/Mod A x2) and pivoting bed to chair (Min A x2). Pt trialed standing 2x at thony walker with Min/CG A needed for static balance. ADL's not addressed and anticipate pt would require overall Max/dep for bathing, dressing and toileting tasks. Pt limited by pain (R knee, L arm), decreased activity tolerance/endurance. Pt unsafe to return home, as requiring 2 person assistance at this time for mobility. Pt would benefit from cont therapy at d/c to maximize safety, independence in ADL's, transfers, mob and lessen caregiver burden. Cont poc. Prognosis: Good;Fair  History: pt from home where she lives with spouse. was independent with ADLs and mod I mobility with cane prior to fall  REQUIRES OT FOLLOW-UP: Yes  Activity Tolerance  Activity Tolerance: Patient limited by pain; Patient Tolerated treatment well  Activity Tolerance Comments: R knee pain most limiting factor        Plan   Plan  Times per Week: 3-5  Times per Day: Daily  Current Treatment Recommendations: Strengthening,ROM,Functional mobility training,Endurance training,Wheelchair mobility training,Positioning,Equipment evaluation, education, & procurement,Patient/Caregiver education & training,Safety education & training,Self-Care / ADL     Restrictions  Restrictions/Precautions  Restrictions/Precautions: Fall Risk,Weight Bearing  Upper Extremity Weight Bearing Restrictions  Left Upper Extremity Weight Bearing: Non Weight Bearing  Position Activity Restriction  Other position/activity restrictions: Knee contusions ( Right > Left LE pain)    Subjective   General  Chart Reviewed: Sh[facility] Notes,Labs  Patient assessed for rehabilitation services?: Yes  Additional Pertinent Hx: Pt is an 80 y.o. female admitted from home after a fall. Pt sustained a L distal radius fx and underwent ORIF L distal radius fx. PMH includes: nonrheumatic aortic valve stenosis, status post TAVR, atrial fibrillation, COPD, CHF, hypertension, hyperlipidemia. Family / Caregiver Present: Yes (spouse and son arrived during tx.)  Referring Practitioner: Ricky Corral MD  Diagnosis: left distal radius fx s/p ORIF  Subjective  Subjective: Pt met BS, in bed and appearing groggy. Pt agreeable to OT/PT co-tx. Pt reporting pain, R knee, 7/10 (R knee appearing increased edema, redness around calf-RN notified and in to exam pt). Social/Functional History  Social/Functional History  Lives With: Spouse (cousin lives next door who is pt's caregiver but currently unable to help pt due to her own medical issues)  Type of Home: [address] [place], [place] [address],[address]: Two level,Able to Live on Main level with bedroom/bathroom  Home Access: Stairs to enter without rails  Entrance Stairs - Number of Steps: 2 GAVIOTA  Bathroom Shower/Tub: Walk-in shower  Bathroom Toilet: Handicap height  Bathroom Equipment: Commode,Grab bars in shower,Shower chair,Toilet raiser  Bathroom Accessibility: Wheelchair accessible  Home Equipment: Cane,Cane, quad,Wheelchair-manual,Oxygen (transport chair; 3 L O2)  Has the patient had two or more falls in the past year or any fall with injury in the past year?: Yes  Receives Help From: Family  ADL Assistance: Independent (supv for bathing)  Homemaking Responsibilities: No ( and caregiver perform)  Ambulation Assistance: Independent (with cane but recently  has been using w/c due to R knee pain)  Transfer Assistance: Independent       Objective      ADL  Additional Comments: Not addressed.  Anticipate pt to be mod A feeding, grooming, max A UE bathing/dressing, total A LE Bathing/dressing and toileting based on ROM, balance, endurance, and NWB L UE     Activity Tolerance  Activity Tolerance: Patient tolerated treatment well;Patient limited by pain  Activity Tolerance Comments: R knee pain limited standing. Bed mobility  Supine to Sit: Maximum assistance;2 Person assistance (HOb elevated)  Sit to Supine: Unable to assess  Transfers  Stand Pivot Transfers: Minimal assistance;2 Person assistance (hand held assist (R hand/forearm), bed to chair.)  Sit to stand: Minimal assistance; Moderate assistance;2 Person assistance  Stand to sit: Minimal assistance; Moderate assistance;2 Person assistance  Transfer Comments: from EOB, x2 hand held, w/ assist to hold R hand. Sit><stand off recliner 2x using thony walker, Min/Mod A x2, cues, assist for R hand placement. Cognition  Overall Cognitive Status: WFL  Cognition Comment: Appeared WFL despite pt being min groggy. Cont to assess. Orientation  Overall Orientation Status: Within Functional Limits       Static Standing Balance: at thony walker, with Min/CGA x2 and up to 1 min or less, x2 bouts. Education Given To: Patient; Family  Education Provided: Role of Therapy;Plan of Care;Family Education;Precautions;Transfer Training  Education Provided Comments: Recommendation for further therapy at d/c.  Education Method: Verbal;Demonstration  Education Outcome: Continued education needed;Verbalized understanding (Pt verb understanding and agreeable to [address] [place] [address]. Spouse not agreeable (Case management to talk with pt and spouse)          Safety Devices  Type of Devices: Patient at risk for falls; Left in chair;Nurse notified;Gait belt; Chair alarm in place;Call light within reach              AM-PAC Score        AM-PAC Inpatient Daily Activity Raw Score: 10 (06/29/22 1212)  AM-PAC Inpatient ADL T-Scale Score : 27.31 (06/29/22 1212)  ADL Inpatient CMS 0-100% Score: 74.7 (06/29/22 1212)  ADL Inpatient CMS G-Code Modifier : CL (06/29/22 1212)    Goals  Short Term Goals  Time Frame for Short term goals: By d/c.

## 2022-06-29 NOTE — CARE COORDINATION
6/29 met with patient and her  and her son Susana Jacobsen to discuss DC needs. We discussed PT/OT recommendation for continued therapy in a skilled facility--  is reluctant to SNF placement but patient is agreeable to a SNF. SNF list provided for review and she and her son prefer [email or web address] -- referral made per Epic. Spoke w/ Sanya Roach w/ admissions--she will review case and let us know about acceptance     The Plan for Transition of Care is related to the following treatment goals: SNF    The Patient and/or patient representative  was provided with a choice of provider and agrees   with the discharge plan. [x] Yes [] No    Freedom of choice list was provided with basic dialogue that supports the patient's individualized plan of care/goals, treatment preferences and shares the quality data associated with the providers.  [x] Yes [] No  Electronically signed by Natty Gan on 6/29/2022 at 11:25 [phone]

## 2022-06-29 NOTE — PROGRESS NOTES
Patient discharged to [facility] via [facility]. Son has belongings and will go to nursing home. Report called by day shift RN. No IV site at this time. Report to EMS by day shift RN.

## 2022-06-29 NOTE — PROGRESS NOTES
Physical Therapy  Facility/Department: [facility] 0T ORTHOPEDICS  Physical Therapy Treatment Session    Name: Selma Nichole  : 1940  MRN: [id number]  Date of Service: 2022    Discharge Recommendations:  Patient would benefit from continued therapy after discharge (3-5 days/wk)   PT Equipment Recommendations  Equipment Needed:  (defer to facility)      Selma Nichole scored a 12/ on the AM-PAC short mobility form. Current research shows that an AM-PAC score of 17 or less is typically not associated with a discharge to the patient's home setting. Based on the patient's AM-PAC score and their current functional mobility deficits, it is recommended that the patient have 3-5 sessions per week of Physical Therapy at d/c to increase the patient's independence. Please see assessment section for further patient specific details. If patient discharges prior to next session this note will serve as a discharge summary. Please see below for the latest assessment towards goals. Patient Diagnosis(es): The encounter diagnosis was Other closed fracture of distal end of left radius, initial encounter. Past Medical History:  has a past medical history of Acute on chronic diastolic (congestive) heart failure ([address] Aortic stenosis, Arthritis, Cancer ([address] Chronic respiratory failure with hypoxia ([address] COPD exacerbation ([address] Hyperlipidemia, Hypertension, Migraine, Paroxysmal atrial fibrillation ([address] and Thyroid disease. Past Surgical History:  has a past surgical history that includes Thyroidectomy, partial; Tonsillectomy; Tubal ligation; Knee arthroscopy (Left); other surgical history (10/09/2015); Upper gastrointestinal endoscopy (N/A, 2019); Colonoscopy (N/A, 2019); Esophagus dilation (2019); Cardiac catheterization (2019); Aortic valve replacement (N/A, 10/15/2019);  Cystoscopy (N/A, 2020); eye surgery; eye surgery (Right, 2020); and Forearm surgery (Left, 6/27/2022). Assessment   Body Structures, Functions, Activity Limitations Requiring Skilled Therapeutic Intervention: Decreased functional mobility   Assessment: Pt presents with decreased functional mobility after admisison, per H&P, \"The patient is a 80 y.o. female who presents to [facility] with left wrist fracture s/p fall. Pt presented to OR for elective repair of left wrist fracture, admitted since pt having difficulty returning home and planning on SNF for dc\"   Pt had ORIF L wrist 6/27/22, now NWB L UE. Prior to this admit, pt had actually sustained the fall last Friday and was at home briefly, with constand assist of  performing sit<>stand pivot transfers since accident. Pt with B knee contusions, of Right knee ? Left LE which limited any ambulation at this time. Today, pt needed maxA x2 for bed mobility. Pt transfered stand pivot to chair with Jg x2. Pt was able to stand at thony-walker x2 for less than 1 minute Jg x2. Pt was limited and c/o R knee/leg pain. R leg appeared red and swollen compared to the left; nurse is aware. Discussed possible continued skilled therapy at a moderate intensity; pt agreeable but  still saying no and planning to take her home. Will continue to assess. Therapy Prognosis: Good  Decision Making: Medium Complexity  Requires PT Follow-Up: Yes  Activity Tolerance  Activity Tolerance: Patient tolerated treatment well;Patient limited by pain  Activity Tolerance Comments: R knee pain limited standing.      Plan   Plan  Plan: 3-5 times per week  Current Treatment Recommendations: Functional mobility training,Transfer training,Wheelchair mobility training,Equipment evaluation, education, & procurement,Patient/Caregiver education & training  Safety Devices  Type of Devices: Patient at risk for falls,Left in chair,Nurse notified,Gait belt,Chair alarm in place,Call light within reach  Restraints  Restraints Initially in Place: No Restrictions  Restrictions/Precautions  Restrictions/Precautions: Fall Risk,Weight Bearing  Upper Extremity Weight Bearing Restrictions  Left Upper Extremity Weight Bearing: Non Weight Bearing  Position Activity Restriction  Other position/activity restrictions: Knee contusions ( Right > Left LE pain)     Subjective   General  Chart Reviewed: Yes  Patient assessed for rehabilitation services?: Yes  Additional Pertinent Hx: Per H&P, \"The patient is a 80 y.o. female who [facility] with left wrist fracture s/p fall. Pt presented to OR for elective repair of left wrist fracture, admitted since pt having difficulty returning home and planning on SNF for dc\"   Pt had ORIF L wrist 6/27/22, now NWB L UE. Family / Caregiver Present: Yes ( and son present towards end of session.)  Subjective  Subjective: Pt was supine in bed upon arrival. She states wrist pain was less than R knee pain; which was 7/10. R leg appeared swollen and red. She was agreeable to PT treatment and continuing on to continued skilled nursing; although  disagrees and wants her to go home.          Social/Functional History  Social/Functional History  Lives With: Spouse (cousin lives next door who is pt's caregiver but currently unable to help pt due to her own medical issues)  Type of Home: [address] [place] [address],[address]: Two level,Able to Live on Main level with bedroom/bathroom  Home Access: Stairs to enter without rails  Entrance Stairs - Number of Steps: 2 GAVIOTA  Bathroom Shower/Tub: Walk-in shower  Bathroom Toilet: Handicap height  Bathroom Equipment: Commode,Grab bars in shower,Shower chair,Toilet raiser  Bathroom Accessibility: Wheelchair accessible  Home Equipment: Cane,Cane, quad,Wheelchair-manual,Oxygen (transport chair; 3 L O2)  Has the patient had two or more falls in the past year or any fall with injury in the past year?: Yes  Receives Help From: Family  ADL Assistance: Independent (supv for bathing)  Homemaking 6/29/2022 at 12:23 PM  Therapist was present, directed the patient's care, made skilled judgement, and was responsible for assessment and treatment of the patient.       Electronically signed by Elisa Medarno, [place] [id number] on 6/29/2022 at 12:27 PM

## 2022-06-29 NOTE — PROGRESS NOTES
Report called to [address] [place] [address] and report given to [facility]. Patient IV removed. Dressing applied. Patient discharge packet at . Family and patient updated on  time transport to facility. Number left with floor nurse in case any questions or concerns arise. Patient and family denies any questions at this time.  Electronically signed by Urszula Kaiser RN on 6/29/2022 at 6:33 PM

## 2022-06-29 NOTE — PLAN OF CARE
Problem: Chronic Conditions and Co-morbidities  Goal: Patient's chronic conditions and co-morbidity symptoms are monitored and maintained or improved  6/29/2022 1113 by Damion Read RN  Outcome: Progressing  Flowsheets (Taken 6/29/2022 1212)  Care Plan - Patient's Chronic Conditions and Co-Morbidity Symptoms are Monitored and Maintained or Improved: Monitor and assess patient's chronic conditions and comorbid symptoms for stability, deterioration, or improvement     Problem: Discharge Planning  Goal: Discharge to home or other facility with appropriate resources  6/29/2022 1113 by Damion Read RN  Outcome: Progressing  Flowsheets (Taken 6/29/2022 0981)  Discharge to home or other facility with appropriate resources: Identify barriers to discharge with patient and caregiver     Problem: Pain  Goal: Verbalizes/displays adequate comfort level or baseline comfort level  6/29/2022 1113 by Damion Read RN  Outcome: Progressing     Problem: Safety - Adult  Goal: Free from fall injury  6/29/2022 1113 by Damion Read RN  Outcome: Progressing  Flowsheets (Taken 6/29/2022 0750)  Free From Fall Injury: Instruct family/caregiver on patient safety     Problem: ABCDS Injury Assessment  Goal: Absence of physical injury  6/29/2022 1113 by Damion Read RN  Outcome: Progressing  Flowsheets (Taken 6/29/2022 0750)  Absence of Physical Injury: Implement safety measures based on patient assessment     Problem: Skin/Tissue Integrity  Goal: Absence of new skin breakdown  Description: 1. Monitor for areas of redness and/or skin breakdown  2. Assess vascular access sites hourly  3. Every 4-6 hours minimum:  Change oxygen saturation probe site  4. Every 4-6 hours:  If on nasal continuous positive airway pressure, respiratory therapy assess nares and determine need for appliance change or resting period.   6/29/2022 1113 by Damion Read RN  Outcome: Progressing

## 2022-06-29 NOTE — PROGRESS NOTES
Patient in bed, awake, a&ox4. Patient took pills whole, no complications. Patient morning assessment completed. Patient moan and groans when moved. Patient left wrist have +1 edema, left arm NWB, elevated on pillow and iced. Patient set up by PCA and redirected to eat. Patient kind of drowsy. Patient IV infusing as ordered. Patient tolerating PO intake well, no n/v noted. White patent and draining. Assessed and addressed patient, no needs noted at this time. Call light and bedside table within reach. Will continue to monitor and reassess.   Electronically signed by Demetris Rodriguez RN on 6/29/2022 at 8:59 AM

## 2022-06-30 NOTE — PROGRESS NOTES
Physician Progress Note      PATIENT:               Shelia Patterson  CSN #:                  [id number]  :                       1940  ADMIT DATE:       2022 8:07 AM  DISCH DATE:        2022 7:40 PM  RESPONDING  PROVIDER #:        Trudy Veronica MD          QUERY TEXT:    Pt admitted with left wrist fracture. Per H&P, has hx COPD with chronic   respiratory failure and uses home O2 at 3L. Acute respiratory failure   documented in progress note on . Resp rate 15-20 since admission except   for increased rate in PACU. No respiratory distress documented. In order to   support the diagnosis of acute respiratory failure, please include additional   clinical indicators in your documentation. Or please document if the   diagnosis of acute respiratory failure has been ruled out after further study. The medical record reflects the following:  Risk Factors: COPD  Clinical Indicators: Uses home O2 @ 3L; O2 sat currently 90-96% on 3-4L ; Resp   rate 15-20 since admission except for increased rate in PACU. No respiratory   distress documented. I  Treatment: supplemental O2, monitoring O2 sat    Acute Respiratory Failure Clinical Indicators per [employer] Training Guide and   Quick Reference Guide:  pO2 < 60 mmHg or SpO2 (pulse oximetry) < 91% breathing room air  pCO2 > 50 and pH < 7.35  P/F ratio (pO2 / FIO2) < 300  pO2 decrease or pCO2 increase by 10 mmHg from baseline (if known)  Supplemental oxygen of 40% or more  Presence of respiratory distress, tachypnea, dyspnea, shortness of breath,   wheezing  Unable to speak in complete sentences  Use of accessory muscles to breathe  Extreme anxiety and feeling of impending doom  Tripod position  Confusion/altered mental status/obtunded    Thank you,  Freedom Mccullough RN, BSN, KARIES  [email or web address]  Options provided:  -- Chronic respiratory failure. Acute respiratory failure ruled out  -- Acute on chronic respiratory failure.   Acute respiratory failure evidenced   by, Please document evidence. -- Other - I will add my own diagnosis  -- Disagree - Not applicable / Not valid  -- Disagree - Clinically unable to determine / Unknown  -- Refer to Clinical Documentation Reviewer    PROVIDER RESPONSE TEXT:    This patient has chronic respiratory failure.  Acute respiratory failure ruled   out    Query created by: Dinora Cee on 6/29/2022 5:12 AM      Electronically signed by:  Miranda Tomlinson MD 6/30/2022 12:03 AM

## 2022-07-06 ENCOUNTER — APPOINTMENT (OUTPATIENT)
Dept: CT IMAGING | Age: 82
End: 2022-07-06
Payer: MEDICARE

## 2022-07-06 ENCOUNTER — APPOINTMENT (OUTPATIENT)
Dept: GENERAL RADIOLOGY | Age: 82
End: 2022-07-06
Payer: MEDICARE

## 2022-07-06 ENCOUNTER — HOSPITAL ENCOUNTER (EMERGENCY)
Age: 82
Discharge: HOME | End: 2022-07-06
Attending: EMERGENCY MEDICINE
Payer: MEDICARE

## 2022-07-06 VITALS
HEART RATE: 100 BPM | RESPIRATION RATE: 18 BRPM | OXYGEN SATURATION: 99 % | DIASTOLIC BLOOD PRESSURE: 85 MMHG | TEMPERATURE: 98.4 F | SYSTOLIC BLOOD PRESSURE: 128 MMHG

## 2022-07-06 DIAGNOSIS — S32.302A CLOSED FRACTURE OF LEFT ILIAC CREST, INITIAL ENCOUNTER (HCC): Primary | ICD-10-CM

## 2022-07-06 LAB
APTT: 30.5 SEC (ref 23–34.3)
BASOPHILS ABSOLUTE: 0.2 K/UL (ref 0–0.2)
BASOPHILS RELATIVE PERCENT: 1.1 %
EOSINOPHILS ABSOLUTE: 0.1 K/UL (ref 0–0.6)
EOSINOPHILS RELATIVE PERCENT: 0.4 %
HCT VFR BLD CALC: 37.7 % (ref 36–48)
HEMOGLOBIN: 11.8 G/DL (ref 12–16)
INR BLD: 1.03 (ref 0.87–1.14)
LYMPHOCYTES ABSOLUTE: 0.4 K/UL (ref 1–5.1)
LYMPHOCYTES RELATIVE PERCENT: 2.6 %
MCH RBC QN AUTO: 28.3 PG (ref 26–34)
MCHC RBC AUTO-ENTMCNC: 31.2 G/DL (ref 31–36)
MCV RBC AUTO: 90.8 FL (ref 80–100)
MONOCYTES ABSOLUTE: 0.9 K/UL (ref 0–1.3)
MONOCYTES RELATIVE PERCENT: 5.8 %
NEUTROPHILS ABSOLUTE: 13.6 K/UL (ref 1.7–7.7)
NEUTROPHILS RELATIVE PERCENT: 90.1 %
PDW BLD-RTO: 17.3 % (ref 12.4–15.4)
PLATELET # BLD: 274 K/UL (ref 135–450)
PMV BLD AUTO: 8 FL (ref 5–10.5)
PROTHROMBIN TIME: 13.4 SEC (ref 11.7–14.5)
RBC # BLD: 4.16 M/UL (ref 4–5.2)
SARS-COV-2, NAAT: NOT DETECTED
WBC # BLD: 15.1 K/UL (ref 4–11)

## 2022-07-06 PROCEDURE — 73502 X-RAY EXAM HIP UNI 2-3 VIEWS: CPT

## 2022-07-06 PROCEDURE — 85610 PROTHROMBIN TIME: CPT

## 2022-07-06 PROCEDURE — 85730 THROMBOPLASTIN TIME PARTIAL: CPT

## 2022-07-06 PROCEDURE — 93005 ELECTROCARDIOGRAM TRACING: CPT | Performed by: EMERGENCY MEDICINE

## 2022-07-06 PROCEDURE — 6370000000 HC RX 637 (ALT 250 FOR IP): Performed by: EMERGENCY MEDICINE

## 2022-07-06 PROCEDURE — 85025 COMPLETE CBC W/AUTO DIFF WBC: CPT

## 2022-07-06 PROCEDURE — 36415 COLL VENOUS BLD VENIPUNCTURE: CPT

## 2022-07-06 PROCEDURE — 73700 CT LOWER EXTREMITY W/O DYE: CPT

## 2022-07-06 PROCEDURE — 99285 EMERGENCY DEPT VISIT HI MDM: CPT

## 2022-07-06 PROCEDURE — 87635 SARS-COV-2 COVID-19 AMP PRB: CPT

## 2022-07-06 RX ORDER — OXYCODONE HYDROCHLORIDE AND ACETAMINOPHEN 5; 325 MG/1; MG/1
1 TABLET ORAL ONCE
Status: COMPLETED | OUTPATIENT
Start: 2022-07-06 | End: 2022-07-06

## 2022-07-06 RX ADMIN — OXYCODONE HYDROCHLORIDE AND ACETAMINOPHEN 1 TABLET: 5; 325 TABLET ORAL at 05:19

## 2022-07-06 NOTE — ED TRIAGE NOTES
patient fell and injured left hip. Fentanyl 50 mcg given IM per EMS. Injured left arm in recent past and got surgery. Was getting from bedside commode back to bed at home and fell.

## 2022-07-06 NOTE — ED PROVIDER NOTES
[address] ED  EMERGENCY DEPARTMENT ENCOUNTER      Pt Name: Mel Salvador  MRN: [id number]  [place] 1940  Date of evaluation: 7/6/2022  Provider: Tamiko Pickard MD    CHIEF COMPLAINT       Chief Complaint   Patient presents with    Hip Pain    Fall         HISTORY OF PRESENT ILLNESS   (Location/Symptom, Timing/Onset, Context/Setting, Quality, Duration, Modifying Factors, Severity)  Note limiting factors. Mel Salvador is a 80 y.o. female with past medical history of aortic stenosis prior TAVR, hypertension, hyperlipidemia, persistent atrial fibrillation, COPD and CHF here today after fall. Patient just recently had a fall and resultant left wrist fracture status post ORIF and tonight was getting up trying to go to the bathroom when she lost her balance fell again and injured her left hip. Was unable to get up or move. Complaining of a throbbing aching moderate to severe pain in the left hip worse with any attempted range of motion. Did not hit her head or lose consciousness. States she is on no blood thinners. No obvious alleviating factors    HPI    Nursing Notes were reviewed. REVIEW OF SYSTEMS    (2-9 systems for level 4, 10 or more for level 5)     Review of Systems    Please see HPI for pertinent positive and negative review of system findings. A full 10 system ROS was performed and otherwise negative.         PAST MEDICAL HISTORY     Past Medical History:   Diagnosis Date    Acute on chronic diastolic (congestive) heart failure ([address] 8/19/2020    Aortic stenosis     Arthritis     Cancer (HCC)     skin basal    Chronic respiratory failure with hypoxia ([address] 9/7/2021    COPD exacerbation ([address] 9/7/2021    Hyperlipidemia     Hypertension     Migraine     Paroxysmal atrial fibrillation ([address]     Thyroid disease          SURGICAL HISTORY       Past Surgical History:   Procedure Laterality Date    AORTIC VALVE REPLACEMENT N/A 10/15/2019    TRANSCATHETER AORTIC VALVE REPLACEMENT FEMORAL APPROACH performed by Kamala Rosenberg MD at [address]  09/2019    COLONOSCOPY N/A 8/13/2019    COLON W/ANES. performed by Ailyn Stallings MD at [facility] N/A 9/25/2020    CYSTOSCOPY performed by Nelda Tolbert MD at [facility] [address]  8/13/2019    [address] performed by Ailyn Stallings MD at [address] Right 08/19/2020    FOREARM SURGERY Left 6/27/2022    OPEN REDUCTION INTERNAL FIXATION LEFT DISTAL RADIUS performed by Jose Ford MD at [facility] ARTHROSCOPY Left     Torn meniscus    OTHER SURGICAL HISTORY  10/09/2015    phacoemulsification of cataract with intraocular implant right eye    THYROIDECTOMY, PARTIAL      TONSILLECTOMY      TUBAL LIGATION      UPPER GASTROINTESTINAL ENDOSCOPY N/A 8/13/2019    EGD W/ANES. (9:30) performed by Ailyn Stallings MD at [address] [place]       Previous Medications    ATORVASTATIN (LIPITOR) 40 MG TABLET    TAKE ONE TABLET BY MOUTH ONCE NIGHTLY    BUTALBITAL-ACETAMINOPHEN-CAFFEINE (FIORICET, ESGIC) -40 MG PER TABLET    Take 1 tablet by mouth every 6 hours as needed for Headaches or Migraine     CYANOCOBALAMIN (B-12) 2500 MCG TABS    Take 2,500 mcg by mouth once a week Weekly on Wednesdays.     DILTIAZEM (CARTIA XT) 120 MG EXTENDED RELEASE CAPSULE    Take 1 capsule by mouth daily    DULOXETINE (CYMBALTA) 30 MG EXTENDED RELEASE CAPSULE    Take 30 mg by mouth daily    LEVOTHYROXINE (SYNTHROID) 125 MCG TABLET    Take 125 mcg by mouth Daily    MELATONIN 5 MG TABS TABLET    Take 5 mg by mouth nightly     METHOCARBAMOL (ROBAXIN) 500 MG TABLET    Take 500 mg by mouth 4 times daily as needed (muscle spasms)     METOPROLOL SUCCINATE (TOPROL XL) 50 MG EXTENDED RELEASE TABLET    TAKE ONE TABLET BY MOUTH TWICE A DAY    OMEPRAZOLE (PRILOSEC) 20 MG DELAYED RELEASE CAPSULE Take 20 mg by mouth daily    OXYGEN    Inhale 3 L into the lungs continuous     POTASSIUM CHLORIDE (KLOR-CON M) 10 MEQ EXTENDED RELEASE TABLET    Take 1 tablet by mouth daily    TORSEMIDE (DEMADEX) 20 MG TABLET    TAKE ONE TABLET BY MOUTH TWICE A DAY       ALLERGIES     Sulfa antibiotics, Dronedarone, Iodides, and Shellfish-derived products    FAMILY HISTORY       Family History   Problem Relation Age of Onset    Colon Cancer Mother     Heart Disease Father     Heart Disease Brother           SOCIAL HISTORY       Social History     Socioeconomic History    Marital status:      Spouse name: None    Number of children: None    Years of education: None    Highest education level: None   Occupational History    None   Tobacco Use    Smoking status: Former Smoker     Packs/day: 1.00     Years: 20.00     Pack years: 20.00     Types: Cigarettes     Quit date: 1983     Years since quittin.5    Smokeless tobacco: Never Used   Vaping Use    Vaping Use: Never used   Substance and Sexual Activity    Alcohol use: No    Drug use: Never    Sexual activity: Not Currently     Partners: Male   Other Topics Concern    None   Social History Narrative    None     Social Determinants of Health     Financial Resource Strain:     Difficulty of Paying Living Expenses: Not on file   Food Insecurity:     Worried About Running Out of Food in the Last Year: Not on file    Khurram of Food in the Last Year: Not on file   Transportation Needs:     Lack of Transportation (Medical): Not on file    Lack of Transportation (Non-Medical):  Not on file   Physical Activity:     Days of Exercise per Week: Not on file    Minutes of Exercise per Session: Not on file   Stress:     Feeling of Stress : Not on file   Social Connections:     Frequency of Communication with Friends and Family: Not on file    Frequency of Social Gatherings with Friends and Family: Not on file    Attends Mandaeism Services: Not on file  Active Member of Clubs or Organizations: Not on file    Attends Club or Organization Meetings: Not on file    Marital Status: Not on file   Intimate Partner Violence:     Fear of Current or Ex-Partner: Not on file    Emotionally Abused: Not on file    Physically Abused: Not on file    Sexually Abused: Not on file   Housing Stability:     Unable to Pay for Housing in the Last Year: Not on file    Number of [place] in the Last Year: Not on file    Unstable Housing in the Last Year: Not on file       SCREENINGS    Amanuel Coma Scale  Eye Opening: Spontaneous  Best Verbal Response: Oriented  Best Motor Response: Obeys commands  [place] Coma Scale Score: 15          PHYSICAL EXAM    (up to 7 for level 4, 8 or more for level 5)     ED Triage Vitals [07/06/22 0344]   BP Temp Temp Source Heart Rate Resp SpO2 Height Weight   128/85 98.4 °F (36.9 °C) Oral 100 18 99 % -- --       Physical Exam    General appearance:  Cooperative. No acute distress. Skin:  Warm. Dry. Eye:  Extraocular movements intact. Ears, nose, mouth and throat:  Oral mucosa moist,  Neck:  Trachea midline. Heart:  Regular rate and rhythm  Perfusion:  intact  Respiratory:  Lungs clear to auscultation bilaterally. Respirations nonlabored. Abdominal:   Non distended. Nontender  Neurological:  Alert and oriented x 3. Moves all extremities spontaneously  Musculoskeletal:   Limited range of motion of the left hip secondary to pain with tenderness palpation of the left greater trochanter of pelvis. Normal flexion extension of the right hip with normal internal and external rotation of this hip. No other gross long bone deformities but the left wrist/upper extremity is currently in a splint.           Psychiatric:  Normal mood      DIAGNOSTIC RESULTS       Labs Reviewed   CBC WITH AUTO DIFFERENTIAL - Abnormal; Notable for the following components:       Result Value    WBC 15.1 (*)     Hemoglobin 11.8 (*)     RDW 17.3 (*) Neutrophils Absolute 13.6 (*)     Lymphocytes Absolute 0.4 (*)     All other components within normal limits   COVID-19, RAPID   PROTIME-INR   APTT       Interpretation per the Radiologist below, if obtained/available at the time of this note:    CT HIP LEFT WO CONTRAST   Preliminary Result   Acute nondisplaced left anterior superior iliac spine fracture. Osteopenia. Moderate to large amount of rectal stool, consider fecal impaction. XR HIP LEFT (2-3 VIEWS)   Preliminary Result   Osteopenia. No evident fracture. Given the degree of osteopenia, nondisplaced fractures may be   radiographically occult. If pain or concern for fracture persists, consider   MR imaging. All other labs/imaging were within normal range or not returned as of this dictation. EMERGENCY DEPARTMENT COURSE and DIFFERENTIAL DIAGNOSIS/MDM:   Vitals:    Vitals:    07/06/22 0344   BP: 128/85   Pulse: 100   Resp: 18   Temp: 98.4 °F (36.9 °C)   TempSrc: Oral   SpO2: 99%       EKG: Atrial fibrillation rapid ventricular response rate of 110 bpm.  Anterior Q waves. No ST elevation. No change from prior    Patient presented to the emergency department today complaining of pain in the left hip after a fall. Recent surgery to the left arm. Fall appears mechanical after losing balance. Awake and alert. Did not hit her head. X-ray of the left pelvis showed no obvious abnormality but she still had discomfort which prompted a CT scan which ultimately confirmed a left superior iliac crest fracture. This will be weightbearing as tolerated and is nonsurgical.  We were able to set the patient up at the bedside here and she actually can stand and transfer from a toilet to wheelchair and walk a few short steps without any difficulty unsupported. I do feel that she can be discharged home. CT scan also notable for fecal impaction with the patient has been passing bowel movements. Will be started on MiraLAX.   Otherwise

## 2022-07-07 LAB
EKG ATRIAL RATE: 288 BPM
EKG DIAGNOSIS: NORMAL
EKG Q-T INTERVAL: 334 MS
EKG QRS DURATION: 68 MS
EKG QTC CALCULATION (BAZETT): 452 MS
EKG R AXIS: 29 DEGREES
EKG T AXIS: 23 DEGREES
EKG VENTRICULAR RATE: 110 BPM

## 2022-07-08 DIAGNOSIS — I48.0 PAF (PAROXYSMAL ATRIAL FIBRILLATION) (HCC): ICD-10-CM

## 2022-07-08 NOTE — TELEPHONE ENCOUNTER
Medication Refill    Medication needing refilled:metoprolol succinate (TOPROL XL) 50 MG extended release tablet    Dosage of the medication: 50 mg     How are you taking this medication (QD, BID, TID, QID, PRN):   TAKE ONE TABLET BY MOUTH TWICE A DAY               30 or 90 day supply called in: 60    When will you run out of your medication: 1 week     Which Pharmacy are we sending the medication to?:  [facility] [id number] - [address] [phone] [address] [address] [place] [address] [address], [address] [facility] [facility]   Phone:  [phone]  Fax:  [phone]

## 2022-07-11 DIAGNOSIS — I48.0 PAF (PAROXYSMAL ATRIAL FIBRILLATION) (HCC): ICD-10-CM

## 2022-07-11 RX ORDER — METOPROLOL SUCCINATE 50 MG/1
TABLET, EXTENDED RELEASE ORAL
Qty: 120 TABLET | Refills: 2 | Status: SHIPPED | OUTPATIENT
Start: 2022-07-11 | End: 2022-09-30 | Stop reason: ALTCHOICE

## 2022-07-12 ENCOUNTER — TELEPHONE (OUTPATIENT)
Dept: ORTHOPEDIC SURGERY | Age: 82
End: 2022-07-12

## 2022-07-12 ENCOUNTER — OFFICE VISIT (OUTPATIENT)
Dept: ORTHOPEDIC SURGERY | Age: 82
End: 2022-07-12

## 2022-07-12 VITALS — BODY MASS INDEX: 28.86 KG/M2 | WEIGHT: 147 LBS | HEIGHT: 60 IN

## 2022-07-12 DIAGNOSIS — S32.302A CLOSED FRACTURE OF LEFT ILIAC CREST, INITIAL ENCOUNTER (HCC): ICD-10-CM

## 2022-07-12 DIAGNOSIS — Z98.890 STATUS POST OPEN REDUCTION AND INTERNAL FIXATION (ORIF) OF FRACTURE: ICD-10-CM

## 2022-07-12 DIAGNOSIS — Z87.81 STATUS POST OPEN REDUCTION AND INTERNAL FIXATION (ORIF) OF FRACTURE: ICD-10-CM

## 2022-07-12 DIAGNOSIS — S52.502D CLOSED FRACTURE OF DISTAL END OF LEFT RADIUS WITH ROUTINE HEALING, UNSPECIFIED FRACTURE MORPHOLOGY, SUBSEQUENT ENCOUNTER: Primary | ICD-10-CM

## 2022-07-12 PROCEDURE — 99024 POSTOP FOLLOW-UP VISIT: CPT | Performed by: NURSE PRACTITIONER

## 2022-07-12 PROCEDURE — L3908 WHO COCK-UP NONMOLDE PRE OTS: HCPCS | Performed by: NURSE PRACTITIONER

## 2022-07-12 NOTE — PROGRESS NOTES
DIAGNOSIS:    1-Left distal radius 2 parts intra articular comminuted fracture, status post ORIF. 2-Left hip pain/acute nondisplaced left anterior superior iliac spine fracture    DATE OF SURGERY: 6/27/2022. HISTORY OF PRESENT ILLNESS:  Ms. Dong Pean 80 y.o.  female right handed returns today  for 2 weeks postoperative visit. The patient denies any significant pain in the left wrist.  Rates pain a 6/10 VAS moderate, aching, intermittent and are improving. Aggravating factors movement. Alleviating factors rest.  No numbness or tingling sensation. No fever or Chills. She  is in a splint. She is also here with complaints of left hip pain. She had a new fall on 7/6/2022 and presented to [address] [place] [address] ER where she was x-rayed and had a CT scan which was consistent with an acute nondisplaced left anterior superior iliac spine fracture. She continues to complain of left hip pain. She rates her pain a 6/10 VAS. Pain is worse with any walking or standing and better with rest.  She has been spending most of her time in the wheelchair. She is now at home and home health PT and OT are starting tomorrow. She came in today in a wheelchair. Past Medical History:   Diagnosis Date    Acute on chronic diastolic (congestive) heart failure (HCC) 8/19/2020    Aortic stenosis     Arthritis     Cancer (HCC)     skin basal    Chronic respiratory failure with hypoxia ([address] 9/7/2021    COPD exacerbation ([address] 9/7/2021    Hyperlipidemia     Hypertension     Migraine     Paroxysmal atrial fibrillation ([address]     Thyroid disease      Past Surgical History:   Procedure Laterality Date    AORTIC VALVE REPLACEMENT N/A 10/15/2019    TRANSCATHETER AORTIC VALVE REPLACEMENT FEMORAL APPROACH performed by Will Ojeda MD at [facility] [address]  09/2019    COLONOSCOPY N/A 8/13/2019    COLON W/ANES.  performed by Brandon Coleman MD at [address] [place] [address] N/A 9/25/2020    CYSTOSCOPY performed by Heath Toribio MD at [place] [address]  2019    ESOPHAGEAL DILATION Arsalan Milton performed by Live Michel MD at [facility] [address] Right 2020    FOREARM SURGERY Left 2022    OPEN REDUCTION INTERNAL FIXATION LEFT DISTAL RADIUS performed by Kimmy Newman MD at [facility] ARTHROSCOPY Left     Torn meniscus    OTHER SURGICAL HISTORY  10/09/2015    phacoemulsification of cataract with intraocular implant right eye    THYROIDECTOMY, PARTIAL      TONSILLECTOMY      TUBAL LIGATION      UPPER GASTROINTESTINAL ENDOSCOPY N/A 2019    EGD W/ANES. (9:30) performed by Live Michel MD at [facility]     Family History   Problem Relation Age of Onset    Colon Cancer Mother     Heart Disease Father     Heart Disease Brother      Social History     Socioeconomic History    Marital status:      Spouse name: Not on file    Number of children: Not on file    Years of education: Not on file    Highest education level: Not on file   Occupational History    Not on file   Tobacco Use    Smoking status: Former Smoker     Packs/day: 1.00     Years: 20.00     Pack years: 20.00     Types: Cigarettes     Quit date: 1983     Years since quittin.5    Smokeless tobacco: Never Used   Vaping Use    Vaping Use: Never used   Substance and Sexual Activity    Alcohol use: No    Drug use: Never    Sexual activity: Not Currently     Partners: Male   Other Topics Concern    Not on file   Social History Narrative    Not on file     Social Determinants of Health     Financial Resource Strain:     Difficulty of Paying Living Expenses: Not on file   Food Insecurity:     Worried About [address] in the Last Year: Not on file    [address] in the Last Year: Not on file   Transportation Needs:     Lack of Transportation (Medical): Not on file    Lack of Transportation (Non-Medical):  Not on file Physical Activity:     Days of Exercise per Week: Not on file    Minutes of Exercise per Session: Not on file   Stress:     Feeling of Stress : Not on file   Social Connections:     Frequency of Communication with Friends and Family: Not on file    Frequency of Social Gatherings with Friends and Family: Not on file    Attends Sikh Services: Not on file    Active Member of [address] [place] [address] or Organizations: Not on file    Attends Club or Organization Meetings: Not on file    Marital Status: Not on file   Intimate Partner Violence:     Fear of Current or Ex-Partner: Not on file    Emotionally Abused: Not on file    Physically Abused: Not on file    Sexually Abused: Not on file   Housing Stability:     Unable to Pay for Housing in the Last Year: Not on file    Number of [place] in the Last Year: Not on file    Unstable Housing in the Last Year: Not on file     Current Outpatient Medications   Medication Sig Dispense Refill    metoprolol succinate (TOPROL XL) 50 MG extended release tablet TAKE ONE TABLET BY MOUTH TWICE A  tablet 2    torsemide (DEMADEX) 20 MG tablet TAKE ONE TABLET BY MOUTH TWICE A DAY 60 tablet 5    butalbital-acetaminophen-caffeine (FIORICET, ESGIC) -40 MG per tablet Take 1 tablet by mouth every 6 hours as needed for Headaches or Migraine       methocarbamol (ROBAXIN) 500 MG tablet Take 500 mg by mouth 4 times daily as needed (muscle spasms)       atorvastatin (LIPITOR) 40 MG tablet TAKE ONE TABLET BY MOUTH ONCE NIGHTLY 90 tablet 3    dilTIAZem (CARTIA XT) 120 MG extended release capsule Take 1 capsule by mouth daily 90 capsule 3    potassium chloride (KLOR-CON M) 10 MEQ extended release tablet Take 1 tablet by mouth daily 30 tablet 0    omeprazole (PRILOSEC) 20 MG delayed release capsule Take 20 mg by mouth daily      DULoxetine (CYMBALTA) 30 MG extended release capsule Take 30 mg by mouth daily      levothyroxine (SYNTHROID) 125 MCG tablet Take 125 mcg by mouth Daily      OXYGEN Inhale 3 L into the lungs continuous       Cyanocobalamin (B-12) 2500 MCG TABS Take 2,500 mcg by mouth once a week Weekly on Wednesdays.  melatonin 5 MG TABS tablet Take 5 mg by mouth nightly        No current facility-administered medications for this visit. Pertinent items are noted in HPI  Review of systems reviewed from Patient History Form and available in the patient's chart under the Media tab. No change noted. PHYSICAL EXAMINATION:  Ms. Zack Velarde is a very pleasant 80 y.o.  female who presents today in no acute distress, awake, alert, and oriented. She is well dressed, nourished and  groomed. Patient with normal affect. Height is  5' (1.524 m), weight is 147 lb (66.7 kg), Body mass index is 28.71 kg/m². Resting respiratory rate is 16. She ambulates today in a wheelchair. The incision is completely healed . No signs of any erythema or drainage. She has no pain with the active or passive range of motion of the left wrist, but decrease ROM. She  has intact sensation, distally, and she  is neurovascularly intact. On evaluation of her left hip, she has decreased range of motion of the left hip due to pain. Strength left quad 4/5. She is tender to palpation over the left iliac crest compared to the other side. Skin is intact left hip. IMAGING:  Three views left wrist taken today in the office showed anatomic alignment of left distal radius, plate and screws in good position, no loosening. CT scan of the left hip dated 7/6/2022 was reviewed and showed acute nondisplaced left anterior superior iliac spine fracture. Osteopenia. IMPRESSION:    1-2 weeks out from left distal radius ORIF and doing very well. 2-Left hip pain/acute nondisplaced left anterior superior iliac spine fracture    PLAN: For the wrist: I have told the patient to work on ROM, as well as strengthening exercises. A removable forearm brace applied. No heavy impact activities. worsening of the condition.          Melanie Luna, RABIA - CNP

## 2022-07-12 NOTE — TELEPHONE ENCOUNTER
General Question     Subject:ORDER FOR PLATFORM WALKER  Patient and /or Facility Request: Niko Lawson  Contact Number: [phone]    PATIENT'S SON YAYA IS REQ A RETURN CALL REGARDING WHERE THE ORDER WAS SENT FOR HIS [address]. PLEASE RETURN CALL TO YAYA AT THE ABOVE NUMBER.

## 2022-07-12 NOTE — TELEPHONE ENCOUNTER
Patient's caregiver answered phone. Was aware of Osvaldo's question. Notified her that the prescription was sent to [address]. Advised that they must process the information with the insurance before calling the patient for . Explained that there may be delays if insurance requires additional information from the provider.

## 2022-07-13 RX ORDER — METOPROLOL SUCCINATE 50 MG/1
TABLET, EXTENDED RELEASE ORAL
Qty: 60 TABLET | Refills: 0 | Status: SHIPPED | OUTPATIENT
Start: 2022-07-13 | End: 2022-09-30

## 2022-08-08 NOTE — DISCHARGE SUMMARY
Hospital Medicine Discharge Summary    Patient ID: Becca Story      Patient's PCP: Kirby Burns MD    Admit Date: 6/27/2022     Discharge Date: 6/29/2022      Admitting Provider: Ritika No MD     Discharge Provider: Ritika No MD     Discharge Diagnoses: Active Hospital Problems    Diagnosis     Fx. left wrist, closed, initial encounter [S62.102A]      Priority: Medium    Wrist fracture, closed, left, initial encounter [S62.102A]      Priority: Medium    Closed fracture of left distal radius [S52.502A]      Priority: Medium       The patient was seen and examined on day of discharge and this discharge summary is in conjunction with any daily progress note from day of discharge. Hospital Course: The patient is a 80 y.o. female who presents to [facility] [address] [facility] with left wrist fracture s/p fall. Pt presented to OR for elective repair of left wrist fracture, admitted since pt having difficulty returning home and planning on SNF for dc         Left wrist fracture - s/p OR for ORIF on 6/27/22. ortho consulted. Pain control with IV/PO pain meds, PT/OT consulted     ARF - creat 1.8, baseline 1.1-1.2 last year. Suspect re-renal. Started on IVF, recheck creat later today, hold nephrotoxic meds     Par a fib - cont cardizem/BB     Hypothyroidism - cont synthroid     Acute hypoxic resp failure - suspect 2/2 atelectasis, IS ordered, CXR reviewed       Physical Exam Performed:     /63   Pulse 71   Temp 97.6 °F (36.4 °C) (Oral)   Resp 16   Ht 5' (1.524 m)   Wt 147 lb 0.8 oz (66.7 kg)   SpO2 100%   BMI 28.72 kg/m²     General appearance: No apparent distress appears stated age and cooperative. HEENT Normal cephalic, atraumatic without obvious deformity. Pupils equal, round, and reactive to light. Extra ocular muscles intact. Conjunctivae/corneas clear. Neck: Supple, No jugular venous distention/bruits.   Trachea midline without thyromegaly or adenopathy with full range of motion. Lungs: Clear to auscultation, bilaterally without Rales/Wheezes/Rhonchi with good respiratory effort. Heart: Regular rate and rhythm with Normal S1/S2  Abdomen: Soft, non-tender or non-distended without rigidity or guarding and positive bowel sounds all four quadrants. Extremities: No clubbing, cyanosis, or edema bilaterally. Left wrist acewrapped  Skin: Skin color, texture, turgor normal.  No rashes or lesions. Neurologic: Asleep, neurovascularly intact with sensory/motor intact upper extremities/lower extremities, bilaterally. Cranial nerves: II-XII intact, grossly non-focal.  Mental status: Asleep  Capillary Refill: Acceptable  < 3 seconds  Peripheral Pulses: +3 Easily felt, not easily obliterated with pressure       Labs: For convenience and continuity at follow-up the following most recent labs are provided:      CBC:    Lab Results   Component Value Date/Time    WBC 15.1 07/06/2022 03:53 AM    HGB 11.8 07/06/2022 03:53 AM    HCT 37.7 07/06/2022 03:53 AM     07/06/2022 03:53 AM       Renal:    Lab Results   Component Value Date/Time     06/29/2022 11:31 AM    K 4.1 06/29/2022 11:31 AM    K 4.7 06/28/2022 07:19 AM     06/29/2022 11:31 AM    CO2 24 06/29/2022 11:31 AM    BUN 32 06/29/2022 11:31 AM    CREATININE 1.3 06/29/2022 11:31 AM    CREATININE 1.7 10/05/2021 12:00 AM    CALCIUM 8.0 06/29/2022 11:31 AM    PHOS 4.3 09/06/2021 04:50 AM         Significant Diagnostic Studies    Radiology:   CT CHEST WO CONTRAST   Final Result   Small bilateral pleural effusions with consolidative infiltrate or   atelectatic change involving the right lung base with some air bronchograms   call present. There is scarring identified or atelectatic changes at the   left lung base. 9 mm noncalcified nodule identified anteriorly within the right upper lobe in   which six-month follow-up is recommended for stability or resolution.       RECOMMENDATIONS:   Unavailable         XR CHEST (2 2,500 mcg by mouth once a week Weekly on Wednesdays. Historical Med      melatonin 5 MG TABS tablet Take 5 mg by mouth nightly Historical Med             Time Spent on discharge is more than 30 minutes in the examination, evaluation, counseling and review of medications and discharge plan. Signed:    Linda Verdin MD   8/7/2022      Thank you Alyce Myers MD for the opportunity to be involved in this patient's care. If you have any questions or concerns, please feel free to contact me at [phone].

## 2022-08-30 ENCOUNTER — OFFICE VISIT (OUTPATIENT)
Dept: ORTHOPEDIC SURGERY | Age: 82
End: 2022-08-30
Payer: MEDICARE

## 2022-08-30 VITALS — BODY MASS INDEX: 28.86 KG/M2 | HEIGHT: 60 IN | WEIGHT: 147 LBS

## 2022-08-30 DIAGNOSIS — S32.302A CLOSED FRACTURE OF LEFT ILIAC CREST, INITIAL ENCOUNTER (HCC): Primary | ICD-10-CM

## 2022-08-30 DIAGNOSIS — S52.502D CLOSED FRACTURE OF DISTAL END OF LEFT RADIUS WITH ROUTINE HEALING, UNSPECIFIED FRACTURE MORPHOLOGY, SUBSEQUENT ENCOUNTER: ICD-10-CM

## 2022-08-30 PROCEDURE — 99024 POSTOP FOLLOW-UP VISIT: CPT | Performed by: ORTHOPAEDIC SURGERY

## 2022-08-30 PROCEDURE — 99213 OFFICE O/P EST LOW 20 MIN: CPT | Performed by: ORTHOPAEDIC SURGERY

## 2022-08-30 PROCEDURE — 1123F ACP DISCUSS/DSCN MKR DOCD: CPT | Performed by: ORTHOPAEDIC SURGERY

## 2022-08-30 NOTE — PROGRESS NOTES
DIAGNOSIS:    1-Left distal radius 2 parts intra articular comminuted fracture, status post ORIF. 2-Left hip pain/acute nondisplaced left anterior superior iliac spine fracture    DATE OF SURGERY: 6/27/2022. HISTORY OF PRESENT ILLNESS:  Ms. Terri Dupree 80 y.o.  female right handed returns today  for 8 weeks postoperative visit. The patient denies any significant pain in the left wrist.  Rates pain a 0/10 VAS and is doing much better. No numbness or tingling sensation. No fever or Chills. She  is in a brace. She is also here with complaints of left hip pain. She had a new fall on 7/6/2022 and presented to [facility] ER where she was x-rayed and had a CT scan which was consistent with an acute nondisplaced left anterior superior iliac spine fracture. She continues to complain of left hip pain, but it is also much better. She rates her pain a 1/10 VAS. Pain is worse with any walking or standing and better with rest.  She has been spending most of her time in the wheelchair. She is now at home and completed home health PT and OT. She came in today in a wheelchair. Past Medical History:   Diagnosis Date    Acute on chronic diastolic (congestive) heart failure (HCC) 8/19/2020    Aortic stenosis     Arthritis     Cancer (HCC)     skin basal    Chronic respiratory failure with hypoxia ([address] 9/7/2021    COPD exacerbation ([facility] [address] 9/7/2021    Hyperlipidemia     Hypertension     Migraine     Paroxysmal atrial fibrillation (HCC)     Thyroid disease      Past Surgical History:   Procedure Laterality Date    AORTIC VALVE REPLACEMENT N/A 10/15/2019    TRANSCATHETER AORTIC VALVE REPLACEMENT FEMORAL APPROACH performed by Vanessa Reed MD at [facility]  09/2019    COLONOSCOPY N/A 8/13/2019    COLON W/ANES.  performed by Stef Cole MD at [facility] 9/25/2020    CYSTOSCOPY performed by Charlie Duggan MD at [address] [place] [address]  8/13/2019 ESOPHAGEAL DILATION NGO performed by Maribel Blanco MD at [facility] Right 2020    FOREARM SURGERY Left 2022    OPEN REDUCTION INTERNAL FIXATION LEFT DISTAL RADIUS performed by Lety Blue MD at [facility] ARTHROSCOPY Left     Torn meniscus    OTHER SURGICAL HISTORY  10/09/2015    phacoemulsification of cataract with intraocular implant right eye    THYROIDECTOMY, PARTIAL      TONSILLECTOMY      TUBAL LIGATION      UPPER GASTROINTESTINAL ENDOSCOPY N/A 2019    EGD W/ANES.  (9:30) [facility]     Family History   Problem Relation Age of Onset    Colon Cancer Mother     Heart Disease Father     Heart Disease Brother      Social History     Socioeconomic History    Marital status:      Spouse name: Not on file    Number of children: Not on file    Years of education: Not on file    Highest education level: Not on file   Occupational History    Not on file   Tobacco Use    Smoking status: Former     Packs/day: 1.00     Years: 20.00     Pack years: 20.00     Types: Cigarettes     Quit date: 1983     Years since quittin.6    Smokeless tobacco: Never   Vaping Use    Vaping Use: Never used   Substance and Sexual Activity    Alcohol use: No    Drug use: Never    Sexual activity: Not Currently     Partners: Male   Other Topics Concern    Not on file   Social History Narrative    Not on file     Social Determinants of Health     Financial Resource Strain: Not on file   Food Insecurity: Not on file   Transportation Needs: Not on file   Physical Activity: Not on file   Stress: Not on file   Social Connections: Not on file   Intimate Partner Violence: Not on file   Housing Stability: Not on file     Current Outpatient Medications   Medication Sig Dispense Refill    metoprolol succinate (TOPROL XL) 50 MG extended release tablet TAKE ONE TABLET BY MOUTH TWICE A DAY 60 tablet 0    metoprolol the left wrist, full ROM. She  has intact sensation, distally, and she  is neurovascularly intact. On evaluation of her left hip, she has decreased range of motion of the left hip due to pain. Strength left quad 4/5. She is tender to palpation over the left iliac crest compared to the other side. Skin is intact left hip. IMAGING:  Three views left wrist taken today in the office showed anatomic alignment of left distal radius, plate and screws in good position, no loosening. X-rays 3 views of the left hip taken today were reviewed and showed a left anterior superior iliac spine fracture. CT scan of the left hip dated 7/6/2022 was reviewed and showed acute nondisplaced left anterior superior iliac spine fracture. Osteopenia. IMPRESSION:    1-8 weeks out from left distal radius ORIF and doing very well. 2-Left hip pain/acute nondisplaced left anterior superior iliac spine fracture    PLAN: For the wrist: I have told the patient to work on ROM, as well as strengthening exercises. She can be weightbearing as tolerated. She can discontinue the forearm brace. No heavy impact activities. The patient will come back for a follow up in 6 weeks. At that time, we will take 3 views of the left wrist. PT if needed then. For the hip: I discussed with the patient the findings and reviewed the CT results and x-ray results. She can be weightbearing as tolerated using a walker . No heavy impact activities. I discussed with the patient that I think that she would really benefit from a course of physical therapy for further strengthening and stretching. .  Follow-up in 6 weeks and will repeat x-ray at that time.     As this patient has demonstrated risk factors for osteoporosis, such as age greater than [de-identified] years and evidence of a fracture, I have referred the patient back to the primary care physician for evaluation for osteoporosis, including consideration for DEXA scanning, if this is felt to be clinically indicated. The patient is advised to contact the primary care physician to follow-up for further evaluation.              Katherine Valdes MD

## 2022-09-29 NOTE — PROGRESS NOTES
[address]   Electrophysiology Outpatient Note              Date:  September 30, 2022  Patient name: Shelbie Moscoso  YOB: 1940    Primary Care physician: Reji Shook MD    HISTORY OF PRESENT ILLNESS: The patient is a 80 y.o.  female with a history of AF, HTN, HLD, AAS, CHF, GI bleed, hypothyroidism, COPD, anemia and frequent falls. In 8/2021, EKG showed AF. In 9/2021, she was admitted for CHF. In 10/2021, she was evaluated for fall. She was referred for Watchman evaluation but implant was deferred as she was unable to tolerate Cardiac MRI due to claustrophobia. In 12/2021, JAYNE showed an EF of 55%. Today she is being seen for AF. EKG shows AF with a HR of 100. She is accompanied by a family member today. She is in a wheelchair and on oxygen. She is unaware that her heart rate is elevated. Family member reports that patient's  has some mental health issues and has been very controlling regarding patient's health care. Patient continues to to decline further Watchman evaluation. She also reports recent weigh loss that is being attributed to poor dentition. Her dentist recently recommended that her bottom teeth be extracted however her  does not want her to go through with this. She complains of occasional dizziness but denies chest pain or palpitations. Past Medical History:   has a past medical history of Acute on chronic diastolic (congestive) heart failure ([address] Aortic stenosis, Arthritis, Cancer ([address] Chronic respiratory failure with hypoxia ([address] COPD exacerbation ([address] Hyperlipidemia, Hypertension, Migraine, Paroxysmal atrial fibrillation ([address] and Thyroid disease. Past Surgical History:   has a past surgical history that includes Thyroidectomy, partial; Tonsillectomy; Tubal ligation; Knee arthroscopy (Left); other surgical history (10/09/2015); Upper gastrointestinal endoscopy (N/A, 08/13/2019);  Colonoscopy (N/A, 08/13/2019); Esophagus dilation (08/13/2019); Cardiac catheterization (09/2019); Aortic valve replacement (N/A, 10/15/2019); Cystoscopy (N/A, 09/25/2020); eye surgery; eye surgery (Right, 08/19/2020); Forearm surgery (Left, 06/27/2022); and Wrist fusion (06/29/2022). Home Medications:    Prior to Admission medications    Medication Sig Start Date End Date Taking?  Authorizing Provider   metoprolol succinate (TOPROL XL) 50 MG extended release tablet TAKE ONE TABLET BY MOUTH TWICE A DAY 7/13/22  Yes RABIA Panchal - CNP   metoprolol succinate (TOPROL XL) 50 MG extended release tablet TAKE ONE TABLET BY MOUTH TWICE A DAY 7/11/22  Yes Minus Form., MD   torsemide (DEMADEX) 20 MG tablet TAKE ONE TABLET BY MOUTH TWICE A DAY 7/1/22  Yes Whitney Ruiz MD   butalbital-acetaminophen-caffeine (FIORICET, ESGIC) -40 MG per tablet Take 1 tablet by mouth every 6 hours as needed for Headaches or Migraine  5/15/22  Yes Historical Provider, MD   methocarbamol (ROBAXIN) 500 MG tablet Take 500 mg by mouth 4 times daily as needed (muscle spasms)  6/24/22  Yes Historical Provider, MD   atorvastatin (LIPITOR) 40 MG tablet TAKE ONE TABLET BY MOUTH ONCE NIGHTLY 1/6/22  Yes Shauna Shafer MD   dilTIAZem (CARTIA XT) 120 MG extended release capsule Take 1 capsule by mouth daily 10/13/21  Yes Shauna Shafer MD   potassium chloride (KLOR-CON M) 10 MEQ extended release tablet Take 1 tablet by mouth daily 10/13/21  Yes Shauna Shafer MD   omeprazole (PRILOSEC) 20 MG delayed release capsule Take 20 mg by mouth daily   Yes Historical Provider, MD   DULoxetine (CYMBALTA) 30 MG extended release capsule Take 30 mg by mouth daily   Yes Historical Provider, MD   levothyroxine (SYNTHROID) 125 MCG tablet Take 125 mcg by mouth Daily   Yes Historical Provider, MD   OXYGEN Inhale 3 L into the lungs continuous    Yes Historical Provider, MD   Cyanocobalamin (B-12) 2500 MCG TABS Take 2,500 mcg by mouth once a week Weekly on Wednesdays. Yes Historical Provider, MD   melatonin 5 MG TABS tablet Take 5 mg by mouth nightly    Yes Historical Provider, MD       Allergies:  Sulfa antibiotics, Dronedarone, Iodides, and Shellfish-derived products    Social History:   reports that she quit smoking about 39 years ago. Her smoking use included cigarettes. She has a 20.00 pack-year smoking history. She has never used smokeless tobacco. She reports that she does not drink alcohol and does not use drugs. Family History: family history includes Colon Cancer in her mother; Heart Disease in her brother and father. All 14 point review of systems are completed and pertinent positives are mentioned in the history of present illness. Other systems are reviewed and are negative. PHYSICAL EXAM:    Vital signs:    /80 (Site: Left Upper Arm, Position: Sitting)   Pulse 81   Ht 4' 8\" (1.422 m)   Wt 115 lb 3.2 oz (52.3 kg)   SpO2 94%   BMI 25.83 kg/m²      Constitutional and general appearance: alert, cooperative, no distress, and appears stated age  HEENT: PERRL, no cervical lymphadenopathy. No masses palpable. Normal oral mucosa  Respiratory:  Normal excursion and expansion without use of accessory muscles  Resp auscultation: right basilar crackles  Cardiovascular:   The apical impulse is not displaced  Heart tones are crisp and normal. irregular S1 and S2.  Jugular venous pulsation Normal  The carotid upstroke is normal in amplitude and contour without delay or bruit  Peripheral pulses are symmetrical and full   Abdomen:  No masses or tenderness  Bowel sounds present  Extremities:   No cyanosis or clubbing   No lower extremity edema   Skin: warm and dry  Neurological:  Alert and oriented  Moves all extremities well  No abnormalities of mood, affect, memory, mentation, or behavior are noted    DATA:    ECG 9/30/2022:   bpm     Echo 8/2021:   Conclusions      Summary   -- Left ventricular systolic function is normal with a visually estimated   ejection fraction of 55%. The left ventricle is normal in size with mild   concentric hypertrophy. No obvious regional wall motion abnormalities noted. Elevated left atrial pressures with a septal E/e' ratio of 33.1.   -- Right ventricular systolic function is reduced. -- The left atrium appears severely enlarged. The right atrium is moderately   enlarged. -- Mild mitral and pulmonic regurgitation. -- Mild mitral stenosis. -- A 23 mm Garcia Joni 3 bioprosthetic aortic valve appears well seated   with mildly abnormal Doppler values; however the values are stable since one   month s/p TAVR. There is mild perivalvular aortic valve regurgitation. -- Moderate tricuspid regurgitation. Systolic pulmonary artery pressure (SPAP) is elevated and estimated at 55   mmHg (right atrial pressure 15 mmHg) consistent with moderate pulmonary   hypertension. The IVC is dilated in size (>2.1 cm) and collapses <50% with respiration   consistent with markedly elevated right atrial pressures (15 mmHg) . Compared with the previous exam on 8/20/20, RV function is down and PHTN and   tricuspid regurgitation have significantly increased. All labs and testing reviewed. CARDIOLOGY LABS:   CBC: No results for input(s): WBC, HGB, HCT, PLT in the last 72 hours. BMP: No results for input(s): NA, K, CO2, BUN, CREATININE, LABGLOM, GLUCOSE in the last 72 hours. PT/INR: No results for input(s): PROTIME, INR in the last 72 hours. APTT:No results for input(s): APTT in the last 72 hours. FASTING LIPID PANEL:  Lab Results   Component Value Date/Time     09/25/2019 07:20 AM    LDLCALC 101 09/25/2019 07:20 AM    TRIG 52 09/25/2019 07:20 AM     LIVER PROFILE:No results for input(s): AST, ALT, ALB in the last 72 hours.     IMPRESSION:    Patient Active Problem List   Diagnosis    Lumbar radiculopathy    Lumbar spine pain    Hip pain, left    Aortic stenosis    Hypercholesteremia    Essential hypertension APRN-CNP  [facility]  [phone]

## 2022-09-30 ENCOUNTER — OFFICE VISIT (OUTPATIENT)
Dept: CARDIOLOGY CLINIC | Age: 82
End: 2022-09-30
Payer: MEDICARE

## 2022-09-30 VITALS
HEART RATE: 81 BPM | SYSTOLIC BLOOD PRESSURE: 128 MMHG | OXYGEN SATURATION: 94 % | WEIGHT: 115.2 LBS | HEIGHT: 56 IN | BODY MASS INDEX: 25.91 KG/M2 | DIASTOLIC BLOOD PRESSURE: 80 MMHG

## 2022-09-30 DIAGNOSIS — I10 ESSENTIAL HYPERTENSION: ICD-10-CM

## 2022-09-30 DIAGNOSIS — I48.19 PERSISTENT ATRIAL FIBRILLATION (HCC): Primary | ICD-10-CM

## 2022-09-30 DIAGNOSIS — I48.0 PAF (PAROXYSMAL ATRIAL FIBRILLATION) (HCC): ICD-10-CM

## 2022-09-30 PROCEDURE — 93000 ELECTROCARDIOGRAM COMPLETE: CPT | Performed by: NURSE PRACTITIONER

## 2022-09-30 PROCEDURE — 99214 OFFICE O/P EST MOD 30 MIN: CPT | Performed by: NURSE PRACTITIONER

## 2022-09-30 PROCEDURE — 1123F ACP DISCUSS/DSCN MKR DOCD: CPT | Performed by: NURSE PRACTITIONER

## 2022-09-30 RX ORDER — DILTIAZEM HYDROCHLORIDE 120 MG/1
CAPSULE, COATED, EXTENDED RELEASE ORAL
Qty: 90 CAPSULE | Refills: 3 | Status: SHIPPED | OUTPATIENT
Start: 2022-09-30

## 2022-09-30 RX ORDER — METOPROLOL SUCCINATE 50 MG/1
75 TABLET, EXTENDED RELEASE ORAL 2 TIMES DAILY
Qty: 90 TABLET | Refills: 3 | Status: SHIPPED | OUTPATIENT
Start: 2022-09-30

## 2022-09-30 NOTE — PATIENT INSTRUCTIONS
Increase metoprolol to 75 mg twice a day for improved heart rate control     Revisit dentist regarding need to have teeth pulled     Monitor BP and heart rate at home and call if consistently out of goal ranges    Would like heart rate less than 90 bpm at rest     Follow up in 4 months

## 2022-10-06 ENCOUNTER — HOSPITAL ENCOUNTER (OUTPATIENT)
Dept: GENERAL RADIOLOGY | Age: 82
Discharge: HOME | End: 2022-10-06
Payer: MEDICARE

## 2022-10-06 ENCOUNTER — HOSPITAL ENCOUNTER (OUTPATIENT)
Age: 82
Discharge: HOME | End: 2022-10-06
Payer: MEDICARE

## 2022-10-06 DIAGNOSIS — Z95.2 HEART VALVE REPLACED: ICD-10-CM

## 2022-10-06 DIAGNOSIS — E44.0: ICD-10-CM

## 2022-10-06 DIAGNOSIS — M79.641 RIGHT HAND PAIN: ICD-10-CM

## 2022-10-06 DIAGNOSIS — J06.9 ACUTE RESPIRATORY DISEASE: ICD-10-CM

## 2022-10-06 DIAGNOSIS — I48.91 ATRIAL FIBRILLATION, UNSPECIFIED TYPE (HCC): ICD-10-CM

## 2022-10-06 PROCEDURE — 73130 X-RAY EXAM OF HAND: CPT

## 2022-10-06 PROCEDURE — 73110 X-RAY EXAM OF WRIST: CPT

## 2022-10-06 PROCEDURE — 71046 X-RAY EXAM CHEST 2 VIEWS: CPT

## 2022-10-07 NOTE — PATIENT INSTRUCTIONS
Try to watch your salt intake as you have some swelling of your ankles/feet! Start aspirin 81mg daily.  Stop it only if you notice blood in your urine

## 2022-10-07 NOTE — PROGRESS NOTES
[address]  H+P  Consult  OP Visit  FU Visit   CC HX HPI   GEN  Doing well. No new concerns. AS TAVR Ø CP. SOB stable   HTN  Ambulatory BP in good range. Ø HA/dizziness. CHOL  Last lipid reviewed. On max dose/tolerated statin. MED  Compliant with CV meds. Ø reported SA. HISTORY/ALLERGY/ROS   MEDHx  has a past medical history of Acute on chronic diastolic (congestive) heart failure (HCC), Aortic stenosis, Arthritis, Cancer (HCC), Chronic respiratory failure with hypoxia ([address] COPD exacerbation ([facility] [address] Hyperlipidemia, Hypertension, Migraine, Paroxysmal atrial fibrillation ([facility] [address] and Thyroid disease. SURGHx  has a past surgical history that includes Thyroidectomy, partial; Tonsillectomy; Tubal ligation; Knee arthroscopy (Left); other surgical history (10/09/2015); Upper gastrointestinal endoscopy (N/A, 08/13/2019); Colonoscopy (N/A, 08/13/2019); Esophagus dilation (08/13/2019); Cardiac catheterization (09/2019); Aortic valve replacement (N/A, 10/15/2019); Cystoscopy (N/A, 09/25/2020); eye surgery; eye surgery (Right, 08/19/2020); Forearm surgery (Left, 06/27/2022); and Wrist fusion (06/29/2022). SOCHx  reports that she quit smoking about 39 years ago. Her smoking use included cigarettes. She has a 20.00 pack-year smoking history. She has never used smokeless tobacco. She reports that she does not drink alcohol and does not use drugs. FAMHx family history includes Colon Cancer in her mother; Heart Disease in her brother and father.    ALLERG Sulfa antibiotics, Dronedarone, Iodides, and Shellfish-derived products   ROS Full ROS obtained and negative except as mentioned in HPI   MEDICATIONS   Current Outpatient Medications   Medication Sig Dispense Refill    dilTIAZem (CARDIZEM CD) 120 MG extended release capsule TAKE ONE CAPSULE BY MOUTH DAILY 90 capsule 3    metoprolol succinate (TOPROL XL) 50 MG extended release tablet Take 1.5 tablets by mouth in the morning and at bedtime TAKE ONE TABLET BY MOUTH TWICE A DAY 90 tablet 3    torsemide (DEMADEX) 20 MG tablet TAKE ONE TABLET BY MOUTH TWICE A DAY 60 tablet 5    butalbital-acetaminophen-caffeine (FIORICET, ESGIC) -40 MG per tablet Take 1 tablet by mouth every 6 hours as needed for Headaches or Migraine       methocarbamol (ROBAXIN) 500 MG tablet Take 500 mg by mouth 4 times daily as needed (muscle spasms)       atorvastatin (LIPITOR) 40 MG tablet TAKE ONE TABLET BY MOUTH ONCE NIGHTLY 90 tablet 3    potassium chloride (KLOR-CON M) 10 MEQ extended release tablet Take 1 tablet by mouth daily 30 tablet 0    omeprazole (PRILOSEC) 20 MG delayed release capsule Take 20 mg by mouth daily      DULoxetine (CYMBALTA) 30 MG extended release capsule Take 30 mg by mouth daily      levothyroxine (SYNTHROID) 125 MCG tablet Take 125 mcg by mouth Daily      OXYGEN Inhale 3 L into the lungs continuous       Cyanocobalamin (B-12) 2500 MCG TABS Take 2,500 mcg by mouth once a week Weekly on Wednesdays. melatonin 5 MG TABS tablet Take 5 mg by mouth nightly        No current facility-administered medications for this visit.       PHYSICAL EXAM   Vitals /74   Pulse 76   Ht 5' (1.524 m)   Wt 121 lb 6.4 oz (55.1 kg)   BMI 23.71 kg/m²     Gen Alert, coop, no distress Heart  irreg, 1/6   Head NC, AT, no abnorm Abd  Soft, NT, +BS, no mass, no OM   Eyes PER, conj/corn clear Ext  Ext nl, AT, no C/C/E   Nose Nares nl, no drain, NT Pulse 2+ and symmetric   Throat Lips, mucosa, tongue nl Skin Col/text/turg nl, no vis rash/les   Neck S/S, TM, NT, no bruit/JVD Psych Nl mood and affect   Lung CTA-B, unlabored, no DTP Lymph   No cervical or axillary LA   Ch wall NT, no deform Neuro  Nl gross M/S exam      CODING   SCI (29374) - AS, AF, HTN, CHOL  30-39 minutes preparing to see pt including review hx, tests, consults, perf exam, , educating pt, fam, caregiver, ordering meds/tests/procedures, referring and comm with pcps and other consultants, documenting info in EMR, interpreting results and communicating to fam and coordination of pt care. SCRIBE   Nurse - [facility] [place] Erika Fajardo, am scribing for and in the presence of Ricci Cai MD.   Signed, Erika Willingham 10/07/22 9:56 AM    Doctor - Provider Armand Nixon is working as a scribe for and in the presence of me Ricci Cai MD). Working as a scribe, Erika Willingham may have prepopulated components of this note with my historical  intellectual property under my direct supervision. Any additions to this intellectual property were performed in my presence and at my direction. Furthermore, the content and accuracy of this note have been reviewed by me Ricci Cai MD).   10/11/2022 10:11 AM   ASSESSMENT AND PLAN     *AS   Date EF Detail   Sx   No concerning   Hx 10/19  TAVR 23 mm Garcia S3   TTE 6/19  10/19  11/19  8/20  8/21  10/22 60%  65%  65%  70%  55%  55% Severe AS MG 34 and , mild MR, mod TR, mod pulm HTN, mod PI  TAVR MG 14  TAVR MG 20, triv catracho AI, mild MR, mod TR  TAVR well seated MG 20, mild AI, mild MS   TAVR well seated, mild AI, mild MS  TAVR MG 12, mild AI   C 9/19  Mild nonobstructive CAD ([place])   Plan   Continued observation, annual echos  Take ASA 81 if tolerated   *AFIB/NSVT  Status Parox, MCOT 12/19 AF, NSVT   Plan NO AC due to hematuria, declined watchman   *HTN  Status Controlled   Plan Counseled on diet/salt/exercise/weight, continue meds at doses above   *CHOL   (9/19)   Plan Counseled on diet/exercise/weight, continue HI/MT statin   *COMPLIANCE  Status Compliant   Plan Discussed compliance with meds/diet/salt/exercise; avoid tob/alc/drugs   *FOLLOWUP  12 months with IC, interim with EP

## 2022-10-11 ENCOUNTER — HOSPITAL ENCOUNTER (OUTPATIENT)
Dept: CARDIOLOGY | Age: 82
Discharge: HOME | End: 2022-10-11
Payer: MEDICARE

## 2022-10-11 ENCOUNTER — OFFICE VISIT (OUTPATIENT)
Dept: CARDIOLOGY CLINIC | Age: 82
End: 2022-10-11
Payer: MEDICARE

## 2022-10-11 VITALS
DIASTOLIC BLOOD PRESSURE: 74 MMHG | HEIGHT: 60 IN | BODY MASS INDEX: 23.84 KG/M2 | SYSTOLIC BLOOD PRESSURE: 130 MMHG | WEIGHT: 121.4 LBS | HEART RATE: 76 BPM

## 2022-10-11 DIAGNOSIS — Z95.2 S/P TAVR (TRANSCATHETER AORTIC VALVE REPLACEMENT): ICD-10-CM

## 2022-10-11 DIAGNOSIS — I48.0 PAF (PAROXYSMAL ATRIAL FIBRILLATION) (HCC): ICD-10-CM

## 2022-10-11 DIAGNOSIS — I10 ESSENTIAL HYPERTENSION: ICD-10-CM

## 2022-10-11 DIAGNOSIS — E78.00 HYPERCHOLESTEREMIA: ICD-10-CM

## 2022-10-11 DIAGNOSIS — I35.0 NONRHEUMATIC AORTIC VALVE STENOSIS: Primary | ICD-10-CM

## 2022-10-11 LAB
LV EF: 55 %
LVEF MODALITY: NORMAL

## 2022-10-11 PROCEDURE — 93306 TTE W/DOPPLER COMPLETE: CPT

## 2022-10-11 PROCEDURE — 1123F ACP DISCUSS/DSCN MKR DOCD: CPT | Performed by: INTERNAL MEDICINE

## 2022-10-11 PROCEDURE — 99214 OFFICE O/P EST MOD 30 MIN: CPT | Performed by: INTERNAL MEDICINE

## 2022-10-11 RX ORDER — ASPIRIN 81 MG/1
81 TABLET ORAL DAILY
Qty: 90 TABLET | Refills: 3
Start: 2022-10-11

## 2022-10-12 ENCOUNTER — TELEPHONE (OUTPATIENT)
Dept: ORTHOPEDIC SURGERY | Age: 82
End: 2022-10-12

## 2022-10-12 NOTE — TELEPHONE ENCOUNTER
Spoke to [facility] care giver and rescheduled the patient for 10/13 1:45 pm. David Loaiza aware of time and location.

## 2022-10-12 NOTE — TELEPHONE ENCOUNTER
Appointment Request     Patient requesting earlier appointment: Yes  Appointment offered to patient: 10/19  Patient Contact Number:  Charles Bella [facility] APPT FOR PT MIDDLE INDEX FINGER ON R HAND. STATES THERE IS SOMETHING SERIOUSLY WRONG WITH IT AND NEEDS TO GET IT CHECKED RIGHT AWAY. PREFERS FF LOCATION BUT CAN GO ANYWHERE. CONTACT PT CAREGIVER YOSHI FELIX AT NUMBER LISTED TO [facility]ISE OF [facility] APPT DATE.

## 2022-10-13 ENCOUNTER — OFFICE VISIT (OUTPATIENT)
Dept: ORTHOPEDIC SURGERY | Age: 82
End: 2022-10-13
Payer: MEDICARE

## 2022-10-13 DIAGNOSIS — S32.302A CLOSED FRACTURE OF LEFT ILIAC CREST, INITIAL ENCOUNTER (HCC): Primary | ICD-10-CM

## 2022-10-13 DIAGNOSIS — S52.502D CLOSED FRACTURE OF DISTAL END OF LEFT RADIUS WITH ROUTINE HEALING, UNSPECIFIED FRACTURE MORPHOLOGY, SUBSEQUENT ENCOUNTER: ICD-10-CM

## 2022-10-13 DIAGNOSIS — M10.9 ACUTE GOUT OF RIGHT HAND, UNSPECIFIED CAUSE: ICD-10-CM

## 2022-10-13 PROCEDURE — 1123F ACP DISCUSS/DSCN MKR DOCD: CPT | Performed by: ORTHOPAEDIC SURGERY

## 2022-10-13 PROCEDURE — 99214 OFFICE O/P EST MOD 30 MIN: CPT | Performed by: ORTHOPAEDIC SURGERY

## 2022-10-13 RX ORDER — CEPHALEXIN 500 MG/1
500 CAPSULE ORAL 3 TIMES DAILY
Qty: 30 CAPSULE | Refills: 0 | Status: SHIPPED | OUTPATIENT
Start: 2022-10-13 | End: 2022-10-23

## 2022-10-13 RX ORDER — INDOMETHACIN 50 MG/1
50 CAPSULE ORAL 3 TIMES DAILY
Qty: 60 CAPSULE | Refills: 3 | Status: SHIPPED | OUTPATIENT
Start: 2022-10-13

## 2022-10-13 NOTE — PROGRESS NOTES
DIAGNOSIS:    1-Left distal radius 2 parts intra articular comminuted fracture, status post ORIF. 2-Left hip pain/acute nondisplaced left anterior superior iliac spine fracture  3-Right hand middle finger pain and swelling/gouty arthritis with cellulitis    DATE OF SURGERY: 6/27/2022. HISTORY OF PRESENT ILLNESS:  Ms. Lisa Dillard 80 y.o.  female right handed returns today  for urgent postoperative visit with worsening complaints of right hand middle finger DIP pain and swelling that started on 10/6/2022. Her primary care physician sent her for an x-ray and she is here for further evaluation. She stated that a swelling and erythema started and then turned into a wound that has been flaking crystals. She denies any history of gout. She rates the pain a 7/10 VAS in her right hand middle finger. Pain is worse with any use or range of motion and nothing makes the pain better. The patient denies any significant pain in the left wrist and is doing very well with that. Rates pain a 0/10 VAS and is doing much better. No numbness or tingling sensation. No fever or Chills. She is also here with complaints of left hip pain. She had a new fall on 7/6/2022 and presented to [facility] ER where she was x-rayed and had a CT scan which was consistent with an acute nondisplaced left anterior superior iliac spine fracture. She states her hip pain is also much better. She rates her pain a 1/10 VAS. Pain is worse with any walking or standing and better with rest.  She has been spending most of her time in the wheelchair. She is now at home and completed home health PT and OT. She came in today in a wheelchair.     Past Medical History:   Diagnosis Date    Acute on chronic diastolic (congestive) heart failure ([address] 8/19/2020    Aortic stenosis     Arthritis     Cancer (HCC)     skin basal    Chronic respiratory failure with hypoxia ([address] 9/7/2021    COPD exacerbation ([address] 9/7/2021    Hyperlipidemia     Hypertension Migraine     Paroxysmal atrial fibrillation (HCC)     Thyroid disease      Past Surgical History:   Procedure Laterality Date    AORTIC VALVE REPLACEMENT N/A 10/15/2019    TRANSCATHETER AORTIC VALVE REPLACEMENT FEMORAL APPROACH performed by Sisi Darnell MD at [address]  2019    COLONOSCOPY N/A 2019    COLON W/ANES. performed by Kevon Ledezma MD at [address] [facility] N/A 2020    CYSTOSCOPY performed by Gerda Cherry MD at [address]  2019    [address] performed by Kevon Ledezma MD at [address] [facility] Right 2020    FOREARM SURGERY Left 2022    OPEN REDUCTION INTERNAL FIXATION LEFT DISTAL RADIUS performed by Emery Schmid MD at [address] [facility] ARTHROSCOPY Left     Torn meniscus    OTHER SURGICAL HISTORY  10/09/2015    phacoemulsification of cataract with intraocular implant right eye    THYROIDECTOMY, PARTIAL      TONSILLECTOMY      TUBAL LIGATION      UPPER GASTROINTESTINAL ENDOSCOPY N/A 2019    EGD W/ANES.  (9:30) performed by Kevon Ledezma MD at [facility] [address]  2022    fractured left wrist     Family History   Problem Relation Age of Onset    Colon Cancer Mother     Heart Disease Father     Heart Disease Brother      Social History     Socioeconomic History    Marital status:      Spouse name: Not on file    Number of children: Not on file    Years of education: Not on file    Highest education level: Not on file   Occupational History    Not on file   Tobacco Use    Smoking status: Former     Packs/day: 1.00     Years: 20.00     Pack years: 20.00     Types: Cigarettes     Quit date: 1983     Years since quittin.8    Smokeless tobacco: Never   Vaping Use    Vaping Use: Never used   Substance and Sexual Activity    Alcohol use: No    Drug use: Never    Sexual activity: Not Currently Partners: Male   Other Topics Concern    Not on file   Social History Narrative    Not on file     Social Determinants of Health     Financial Resource Strain: Not on file   Food Insecurity: Not on file   Transportation Needs: Not on file   Physical Activity: Not on file   Stress: Not on file   Social Connections: Not on file   Intimate Partner Violence: Not on file   Housing Stability: Not on file     Current Outpatient Medications   Medication Sig Dispense Refill    aspirin EC 81 MG EC tablet Take 1 tablet by mouth daily 90 tablet 3    dilTIAZem (CARDIZEM CD) 120 MG extended release capsule TAKE ONE CAPSULE BY MOUTH DAILY 90 capsule 3    metoprolol succinate (TOPROL XL) 50 MG extended release tablet Take 1.5 tablets by mouth in the morning and at bedtime TAKE ONE TABLET BY MOUTH TWICE A DAY 90 tablet 3    torsemide (DEMADEX) 20 MG tablet TAKE ONE TABLET BY MOUTH TWICE A DAY 60 tablet 5    butalbital-acetaminophen-caffeine (FIORICET, ESGIC) -40 MG per tablet Take 1 tablet by mouth every 6 hours as needed for Headaches or Migraine       methocarbamol (ROBAXIN) 500 MG tablet Take 500 mg by mouth 4 times daily as needed (muscle spasms)       atorvastatin (LIPITOR) 40 MG tablet TAKE ONE TABLET BY MOUTH ONCE NIGHTLY 90 tablet 3    potassium chloride (KLOR-CON M) 10 MEQ extended release tablet Take 1 tablet by mouth daily 30 tablet 0    omeprazole (PRILOSEC) 20 MG delayed release capsule Take 20 mg by mouth daily      DULoxetine (CYMBALTA) 30 MG extended release capsule Take 30 mg by mouth daily      levothyroxine (SYNTHROID) 125 MCG tablet Take 125 mcg by mouth Daily      OXYGEN Inhale 3 L into the lungs continuous       Cyanocobalamin (B-12) 2500 MCG TABS Take 2,500 mcg by mouth once a week Weekly on Wednesdays. melatonin 5 MG TABS tablet Take 5 mg by mouth nightly        No current facility-administered medications for this visit.        Pertinent items are noted in HPI  Review of systems reviewed from Patient History Form and available in the patient's chart under the Media tab. No change noted. PHYSICAL EXAMINATION:  Ms. Saumya Choudhary is a very pleasant 80 y.o.  female who presents today in no acute distress, awake, alert, and oriented. She is well dressed, nourished and  groomed. Patient with normal affect. Height is   , weight is  , There is no height or weight on file to calculate BMI. Resting respiratory rate is 16. She ambulates today in a wheelchair. The incision is completely healed . No signs of any erythema or drainage. She has no pain with the active or passive range of motion of the left wrist, full ROM. She  has intact sensation, distally, and she  is neurovascularly intact. On evaluation of her left hip, she has decreased range of motion of the left hip due to pain. Strength left quad 4/5. She is tender to palpation over the left iliac crest compared to the other side. Skin is intact left hip. IMAGING:  Three views left wrist taken today in the office showed anatomic alignment of left distal radius, plate and screws in good position, no loosening. X-rays 3 views of the left hip taken today were reviewed and showed a left anterior superior iliac spine fracture. X-rays 3 views of the right hand dated 10/6/2022 taken at [facility] was consistent with significant arthritis in the middle finger DIP joint and most other joints of her fingers. There is soft tissue swelling with bony changes. CT scan of the left hip dated 7/6/2022 was reviewed and showed acute nondisplaced left anterior superior iliac spine fracture. Osteopenia. IMPRESSION:    1- 3 months out from left distal radius ORIF and doing very well. 2-Left hip pain/acute nondisplaced left anterior superior iliac spine fracture  3-Right hand middle finger pain and swelling/gouty arthritis with cellulitis      PLAN: For the wrist: I have told the patient to work on ROM, as well as strengthening exercises.   She can be weightbearing as tolerated. She can go back to normal activity with no restrictions. She will be seen PRN. I told the patient that it is not unusual to have some achy pain and swelling for up to a year after a fracture. For the hip: She can go back to normal activity with no restrictions. She will be seen PRN. I told the patient that it is not unusual to have some achy pain and swelling for up to a year after a fracture. For the right hand: I discussed with her the findings and reviewed the x-ray results. I discussed with her that findings of the arthritis and the concern for gout and infection/cellulitis or osteomyelitis. We will start her on Keflex for 10 days, Rx sent and instructed in care. We will start her on indomethacin for the gout Rx sent and instructed in care. Keep wound clean and dry. Follow-up in 2 weeks for a wound check or sooner if worsens. As this patient has demonstrated risk factors for osteoporosis, such as age greater than [de-identified] years and evidence of a fracture, I have referred the patient back to the primary care physician for evaluation for osteoporosis, including consideration for DEXA scanning, if this is felt to be clinically indicated. The patient is advised to contact the primary care physician to follow-up for further evaluation.        Stacey Bledsoe MD

## 2022-10-14 ENCOUNTER — TELEPHONE (OUTPATIENT)
Dept: ORTHOPEDIC SURGERY | Age: 82
End: 2022-10-14

## 2022-10-14 NOTE — TELEPHONE ENCOUNTER
Prescription Refill     Medication Name:  GOUT AND ANTIBIOTIC  Pharmacy: [facility] [address]  Patient Contact Number:  [phone]    THE PT'S CAREGIVER, YOSHI WASSERMAN, STATED THAT DR NAVA SAID YESTERDAY IN APPT, THAT HE WAS GOING TO PRESCRIBE SOMETHING FOR GOUT AND AN ANTIBIOTIC, BUT THE PT'S PHARMACY NEVER RECEIVED IT. PLEASE USE THE ABOVE PHONE #TO RETURN PT'S CALL.

## 2022-10-26 ENCOUNTER — TELEPHONE (OUTPATIENT)
Dept: ORTHOPEDIC SURGERY | Age: 82
End: 2022-10-26

## 2022-10-26 NOTE — TELEPHONE ENCOUNTER
Left message asking if Marisela Thakur can come earlier tomorrow to her appointment (provider over bookings later in the afternoon, trying to eliminate long patient wait times by moving appointments around). Upon return call, please reschedule her on Tez Mann's schedule at 2:15 if possible. Clinic understands if she can not move the appointment time.

## 2022-10-27 ENCOUNTER — OFFICE VISIT (OUTPATIENT)
Dept: ORTHOPEDIC SURGERY | Age: 82
End: 2022-10-27
Payer: MEDICARE

## 2022-10-27 VITALS — BODY MASS INDEX: 23.75 KG/M2 | HEIGHT: 60 IN | WEIGHT: 121 LBS

## 2022-10-27 DIAGNOSIS — L03.011 CELLULITIS OF RIGHT MIDDLE FINGER: Primary | ICD-10-CM

## 2022-10-27 PROCEDURE — 99214 OFFICE O/P EST MOD 30 MIN: CPT | Performed by: ORTHOPAEDIC SURGERY

## 2022-10-27 PROCEDURE — 1123F ACP DISCUSS/DSCN MKR DOCD: CPT | Performed by: ORTHOPAEDIC SURGERY

## 2022-10-27 RX ORDER — CLINDAMYCIN HYDROCHLORIDE 300 MG/1
300 CAPSULE ORAL 3 TIMES DAILY
Qty: 30 CAPSULE | Refills: 0 | Status: SHIPPED | OUTPATIENT
Start: 2022-10-27 | End: 2022-11-06

## 2022-10-27 NOTE — PROGRESS NOTES
DIAGNOSIS:    1-Left distal radius 2 parts intra articular comminuted fracture, status post ORIF. 2-Left hip pain/acute nondisplaced left anterior superior iliac spine fracture  3-Right hand middle finger pain and swelling/gouty arthritis with cellulitis    DATE OF SURGERY: 6/27/2022. HISTORY OF PRESENT ILLNESS:  Ms. Veto Donnelly 80 y.o.  female right handed returns today for follow up of right hand middle finger DIP pain and swelling that started on 10/6/2022. At last visit on 10/13/2022, she was started on cephalexin and reports some improvement. She was also started on indomethacin. She states that she felt like 1 of those medicines caused her to feel sick. HShe stated that a swelling and erythema started and then turned into a wound that has been flaking crystals. She denies any history of gout. She rates the pain a 5/10 VAS in her right hand middle finger. Pain is worse with any use or range of motion. The patient denies any significant pain in the left wrist and is doing very well with that. Rates pain a 0/10 VAS and is doing much better. No numbness or tingling sensation. No fever or Chills. She is also here with complaints of left hip pain. She had a new fall on 7/6/2022 and presented to [facility] ER where she was x-rayed and had a CT scan which was consistent with an acute nondisplaced left anterior superior iliac spine fracture. She states her hip pain is also much better. She rates her pain a 1/10 VAS. Pain is worse with any walking or standing and better with rest.  She has been spending most of her time in the wheelchair. She is now at home and completed home health PT and OT. She came in today in a wheelchair.     Past Medical History:   Diagnosis Date    Acute on chronic diastolic (congestive) heart failure ([address] 8/19/2020    Aortic stenosis     Arthritis     Cancer ([address]     skin basal    Chronic respiratory failure with hypoxia ([address] 9/7/2021    COPD exacerbation ([address] 9/7/2021 Hyperlipidemia     Hypertension     Migraine     Paroxysmal atrial fibrillation (HCC)     Thyroid disease      Past Surgical History:   Procedure Laterality Date    AORTIC VALVE REPLACEMENT N/A 10/15/2019    TRANSCATHETER AORTIC VALVE REPLACEMENT FEMORAL APPROACH performed by Zaire Chanel MD at [address] [facility] [facility] [address]  2019    COLONOSCOPY N/A 2019    COLON W/ANES. performed by Noemí Navarro MD at [facility] N/A 2020    CYSTOSCOPY performed by Rosy Nice MD at [address]  2019    [address] performed by Noemí Navarro MD at [address] Right 2020    FOREARM SURGERY Left 2022    OPEN REDUCTION INTERNAL FIXATION LEFT DISTAL RADIUS performed by Renu Hudson MD at [address] [facility] ARTHROSCOPY Left     Torn meniscus    OTHER SURGICAL HISTORY  10/09/2015    phacoemulsification of cataract with intraocular implant right eye    THYROIDECTOMY, PARTIAL      TONSILLECTOMY      TUBAL LIGATION      UPPER GASTROINTESTINAL ENDOSCOPY N/A 2019    EGD W/ANES.  (9:30) performed by Noemí Navarro MD at [address]  2022    fractured left wrist     Family History   Problem Relation Age of Onset    Colon Cancer Mother     Heart Disease Father     Heart Disease Brother      Social History     Socioeconomic History    Marital status:      Spouse name: Not on file    Number of children: Not on file    Years of education: Not on file    Highest education level: Not on file   Occupational History    Not on file   Tobacco Use    Smoking status: Former     Packs/day: 1.00     Years: 20.00     Pack years: 20.00     Types: Cigarettes     Quit date: 1983     Years since quittin.8    Smokeless tobacco: Never   Vaping Use    Vaping Use: Never used   Substance and Sexual Activity    Alcohol use: No    Drug use: Never Sexual activity: Not Currently     Partners: Male   Other Topics Concern    Not on file   Social History Narrative    Not on file     Social Determinants of Health     Financial Resource Strain: Not on file   Food Insecurity: Not on file   Transportation Needs: Not on file   Physical Activity: Not on file   Stress: Not on file   Social Connections: Not on file   Intimate Partner Violence: Not on file   Housing Stability: Not on file     Current Outpatient Medications   Medication Sig Dispense Refill    clindamycin (CLEOCIN) 300 MG capsule Take 1 capsule by mouth 3 times daily for 10 days 30 capsule 0    indomethacin (INDOCIN) 50 MG capsule Take 1 capsule by mouth 3 times daily 60 capsule 3    aspirin EC 81 MG EC tablet Take 1 tablet by mouth daily 90 tablet 3    dilTIAZem (CARDIZEM CD) 120 MG extended release capsule TAKE ONE CAPSULE BY MOUTH DAILY 90 capsule 3    metoprolol succinate (TOPROL XL) 50 MG extended release tablet Take 1.5 tablets by mouth in the morning and at bedtime TAKE ONE TABLET BY MOUTH TWICE A DAY 90 tablet 3    torsemide (DEMADEX) 20 MG tablet TAKE ONE TABLET BY MOUTH TWICE A DAY 60 tablet 5    butalbital-acetaminophen-caffeine (FIORICET, ESGIC) -40 MG per tablet Take 1 tablet by mouth every 6 hours as needed for Headaches or Migraine       methocarbamol (ROBAXIN) 500 MG tablet Take 500 mg by mouth 4 times daily as needed (muscle spasms)       atorvastatin (LIPITOR) 40 MG tablet TAKE ONE TABLET BY MOUTH ONCE NIGHTLY 90 tablet 3    potassium chloride (KLOR-CON M) 10 MEQ extended release tablet Take 1 tablet by mouth daily 30 tablet 0    omeprazole (PRILOSEC) 20 MG delayed release capsule Take 20 mg by mouth daily      DULoxetine (CYMBALTA) 30 MG extended release capsule Take 30 mg by mouth daily      levothyroxine (SYNTHROID) 125 MCG tablet Take 125 mcg by mouth Daily      OXYGEN Inhale 3 L into the lungs continuous       Cyanocobalamin (B-12) 2500 MCG TABS Take 2,500 mcg by mouth once a week Weekly on Wednesdays. melatonin 5 MG TABS tablet Take 5 mg by mouth nightly        No current facility-administered medications for this visit. Pertinent items are noted in HPI  Review of systems reviewed from Patient History Form and available in the patient's chart under the Media tab. No change noted. PHYSICAL EXAMINATION:  Ms. John Lee is a very pleasant 80 y.o.  female who presents today in no acute distress, awake, alert, and oriented. She is well dressed, nourished and  groomed. Patient with normal affect. Height is  5' (1.524 m), weight is 121 lb (54.9 kg), Body mass index is 23.63 kg/m². Resting respiratory rate is 16. She ambulates today in a wheelchair. The incision is completely healed . No signs of any erythema or drainage. She has no pain with the active or passive range of motion of the left wrist, full ROM. She  has intact sensation, distally, and she  is neurovascularly intact. On evaluation of her left hip, she has decreased range of motion of the left hip due to pain. Strength left quad 4/5. She is tender to palpation over the left iliac crest compared to the other side. Skin is intact left hip. IMAGING:  Three views left wrist taken 10/13/2022 in the office showed anatomic alignment of left distal radius, plate and screws in good position, no loosening. X-rays 3 views of the left hip taken 10/13/2022 were reviewed and showed a left anterior superior iliac spine fracture. X-rays 3 views of the right hand dated 10/6/2022 taken [facility] was consistent with significant arthritis in the middle finger DIP joint and most other joints of her fingers. There is soft tissue swelling with bony changes. CT scan of the left hip dated 7/6/2022 was reviewed and showed acute nondisplaced left anterior superior iliac spine fracture. Osteopenia. IMPRESSION:    1- 3 months out from left distal radius ORIF and doing very well.   2-Left hip pain/acute nondisplaced left anterior superior iliac spine fracture  3-Right hand middle finger pain and swelling/gouty arthritis with cellulitis      PLAN: For the wrist: I have told the patient to work on ROM, as well as strengthening exercises. She can be weightbearing as tolerated. She can go back to normal activity with no restrictions. She will be seen PRN. I told the patient that it is not unusual to have some achy pain and swelling for up to a year after a fracture. For the hip: She can go back to normal activity with no restrictions. She will be seen PRN. I told the patient that it is not unusual to have some achy pain and swelling for up to a year after a fracture. For the right hand: I discussed with her the findings and reviewed the x-ray results. I discussed with her that findings of the arthritis and the concern for gout and infection/cellulitis or osteomyelitis. We will start her on clindamycin for 10 days, Rx sent and instructed in care. We will start her on indomethacin for the gout Rx sent and instructed in care. Keep wound clean and dry. Follow-up in 2 weeks for a wound check or sooner if worsens. As this patient has demonstrated risk factors for osteoporosis, such as age greater than [de-identified] years and evidence of a fracture, I have referred the patient back to the primary care physician for evaluation for osteoporosis, including consideration for DEXA scanning, if this is felt to be clinically indicated. The patient is advised to contact the primary care physician to follow-up for further evaluation.        Praveen Bray MD

## 2022-11-27 ENCOUNTER — HOSPITAL ENCOUNTER (EMERGENCY)
Age: 82
Discharge: OTHER INSTITUTION | End: 2022-11-28
Attending: EMERGENCY MEDICINE
Payer: MEDICARE

## 2022-11-27 ENCOUNTER — APPOINTMENT (OUTPATIENT)
Dept: CT IMAGING | Age: 82
End: 2022-11-27
Payer: MEDICARE

## 2022-11-27 DIAGNOSIS — E87.20 LACTIC ACIDOSIS: ICD-10-CM

## 2022-11-27 DIAGNOSIS — I48.91 ATRIAL FIBRILLATION, UNSPECIFIED TYPE (HCC): Primary | ICD-10-CM

## 2022-11-27 DIAGNOSIS — A41.9 SEPSIS, DUE TO UNSPECIFIED ORGANISM, UNSPECIFIED WHETHER ACUTE ORGAN DYSFUNCTION PRESENT (HCC): ICD-10-CM

## 2022-11-27 DIAGNOSIS — N39.0 RECURRENT UTI: ICD-10-CM

## 2022-11-27 DIAGNOSIS — E87.5 HYPERKALEMIA: ICD-10-CM

## 2022-11-27 DIAGNOSIS — N17.9 AKI (ACUTE KIDNEY INJURY) (HCC): ICD-10-CM

## 2022-11-27 LAB
A/G RATIO: 1.1 (ref 1.1–2.2)
ALBUMIN SERPL-MCNC: 3.2 G/DL (ref 3.4–5)
ALP BLD-CCNC: 199 U/L (ref 40–129)
ALT SERPL-CCNC: 524 U/L (ref 10–40)
ANION GAP SERPL CALCULATED.3IONS-SCNC: 16 MMOL/L (ref 3–16)
AST SERPL-CCNC: 543 U/L (ref 15–37)
BACTERIA: ABNORMAL /HPF
BASE EXCESS VENOUS: -3.2 MMOL/L (ref -3–3)
BASE EXCESS VENOUS: -7.9 MMOL/L (ref -3–3)
BASOPHILS ABSOLUTE: 0.1 K/UL (ref 0–0.2)
BASOPHILS RELATIVE PERCENT: 0.7 %
BILIRUB SERPL-MCNC: 0.8 MG/DL (ref 0–1)
BILIRUBIN URINE: ABNORMAL
BLOOD, URINE: ABNORMAL
BUN BLDV-MCNC: 66 MG/DL (ref 7–20)
CALCIUM SERPL-MCNC: 8 MG/DL (ref 8.3–10.6)
CARBOXYHEMOGLOBIN: 2.4 % (ref 0–1.5)
CARBOXYHEMOGLOBIN: 2.8 % (ref 0–1.5)
CHLORIDE BLD-SCNC: 93 MMOL/L (ref 99–110)
CLARITY: ABNORMAL
CO2: 23 MMOL/L (ref 21–32)
COLOR: YELLOW
CREAT SERPL-MCNC: 4.7 MG/DL (ref 0.6–1.2)
EOSINOPHILS ABSOLUTE: 0.1 K/UL (ref 0–0.6)
EOSINOPHILS RELATIVE PERCENT: 1 %
EPITHELIAL CELLS, UA: ABNORMAL /HPF (ref 0–5)
GFR SERPL CREATININE-BSD FRML MDRD: 9 ML/MIN/{1.73_M2}
GLUCOSE BLD-MCNC: 115 MG/DL (ref 70–99)
GLUCOSE URINE: NEGATIVE MG/DL
HCO3 VENOUS: 21 MMOL/L (ref 23–29)
HCO3 VENOUS: 23.8 MMOL/L (ref 23–29)
HCT VFR BLD CALC: 36.1 % (ref 36–48)
HEMOGLOBIN: 10.9 G/DL (ref 12–16)
INFLUENZA A: NOT DETECTED
INFLUENZA B: NOT DETECTED
KETONES, URINE: NEGATIVE MG/DL
LACTIC ACID: 5.4 MMOL/L (ref 0.4–2)
LEUKOCYTE ESTERASE, URINE: ABNORMAL
LYMPHOCYTES ABSOLUTE: 0.6 K/UL (ref 1–5.1)
LYMPHOCYTES RELATIVE PERCENT: 6 %
MCH RBC QN AUTO: 28.7 PG (ref 26–34)
MCHC RBC AUTO-ENTMCNC: 30 G/DL (ref 31–36)
MCV RBC AUTO: 95.5 FL (ref 80–100)
METHEMOGLOBIN VENOUS: 0.3 %
METHEMOGLOBIN VENOUS: 0.3 %
MICROSCOPIC EXAMINATION: YES
MONOCYTES ABSOLUTE: 1 K/UL (ref 0–1.3)
MONOCYTES RELATIVE PERCENT: 9.9 %
NEUTROPHILS ABSOLUTE: 8 K/UL (ref 1.7–7.7)
NEUTROPHILS RELATIVE PERCENT: 82.4 %
NITRITE, URINE: NEGATIVE
O2 CONTENT, VEN: 15 VOL %
O2 CONTENT, VEN: 15 VOL %
O2 SAT, VEN: 88 %
O2 SAT, VEN: 90 %
O2 THERAPY: ABNORMAL
O2 THERAPY: ABNORMAL
PCO2, VEN: 51.3 MMHG (ref 40–50)
PCO2, VEN: 57.8 MMHG (ref 40–50)
PDW BLD-RTO: 16.7 % (ref 12.4–15.4)
PH UA: 5.5 (ref 5–8)
PH VENOUS: 7.18 (ref 7.35–7.45)
PH VENOUS: 7.29 (ref 7.35–7.45)
PLATELET # BLD: 121 K/UL (ref 135–450)
PMV BLD AUTO: 9.5 FL (ref 5–10.5)
PO2, VEN: 64.4 MMHG (ref 25–40)
PO2, VEN: 67.6 MMHG (ref 25–40)
POTASSIUM REFLEX MAGNESIUM: 5.7 MMOL/L (ref 3.5–5.1)
PROTEIN UA: 100 MG/DL
RBC # BLD: 3.78 M/UL (ref 4–5.2)
RBC UA: ABNORMAL /HPF (ref 0–4)
RENAL EPITHELIAL, UA: ABNORMAL /HPF (ref 0–1)
SARS-COV-2 RNA, RT PCR: NOT DETECTED
SODIUM BLD-SCNC: 132 MMOL/L (ref 136–145)
SPECIFIC GRAVITY UA: >=1.03 (ref 1–1.03)
TCO2 CALC VENOUS: 23 MMOL/L
TCO2 CALC VENOUS: 25 MMOL/L
TOTAL PROTEIN: 6.1 G/DL (ref 6.4–8.2)
URINE TYPE: ABNORMAL
UROBILINOGEN, URINE: 0.2 E.U./DL
WBC # BLD: 9.7 K/UL (ref 4–11)
WBC UA: ABNORMAL /HPF (ref 0–5)

## 2022-11-27 PROCEDURE — 2580000003 HC RX 258: Performed by: EMERGENCY MEDICINE

## 2022-11-27 PROCEDURE — 71250 CT THORAX DX C-: CPT

## 2022-11-27 PROCEDURE — 81001 URINALYSIS AUTO W/SCOPE: CPT

## 2022-11-27 PROCEDURE — 96361 HYDRATE IV INFUSION ADD-ON: CPT

## 2022-11-27 PROCEDURE — 80074 ACUTE HEPATITIS PANEL: CPT

## 2022-11-27 PROCEDURE — 6370000000 HC RX 637 (ALT 250 FOR IP): Performed by: EMERGENCY MEDICINE

## 2022-11-27 PROCEDURE — 93005 ELECTROCARDIOGRAM TRACING: CPT | Performed by: EMERGENCY MEDICINE

## 2022-11-27 PROCEDURE — 6360000002 HC RX W HCPCS: Performed by: EMERGENCY MEDICINE

## 2022-11-27 PROCEDURE — 96365 THER/PROPH/DIAG IV INF INIT: CPT

## 2022-11-27 PROCEDURE — 83605 ASSAY OF LACTIC ACID: CPT

## 2022-11-27 PROCEDURE — 82550 ASSAY OF CK (CPK): CPT

## 2022-11-27 PROCEDURE — 2500000003 HC RX 250 WO HCPCS: Performed by: EMERGENCY MEDICINE

## 2022-11-27 PROCEDURE — 87636 SARSCOV2 & INF A&B AMP PRB: CPT

## 2022-11-27 PROCEDURE — 85025 COMPLETE CBC W/AUTO DIFF WBC: CPT

## 2022-11-27 PROCEDURE — 80053 COMPREHEN METABOLIC PANEL: CPT

## 2022-11-27 PROCEDURE — 96375 TX/PRO/DX INJ NEW DRUG ADDON: CPT

## 2022-11-27 PROCEDURE — 36415 COLL VENOUS BLD VENIPUNCTURE: CPT

## 2022-11-27 PROCEDURE — 70450 CT HEAD/BRAIN W/O DYE: CPT

## 2022-11-27 PROCEDURE — 96366 THER/PROPH/DIAG IV INF ADDON: CPT

## 2022-11-27 PROCEDURE — 83690 ASSAY OF LIPASE: CPT

## 2022-11-27 PROCEDURE — 87040 BLOOD CULTURE FOR BACTERIA: CPT

## 2022-11-27 PROCEDURE — 80143 DRUG ASSAY ACETAMINOPHEN: CPT

## 2022-11-27 PROCEDURE — 99285 EMERGENCY DEPT VISIT HI MDM: CPT

## 2022-11-27 PROCEDURE — 82803 BLOOD GASES ANY COMBINATION: CPT

## 2022-11-27 RX ORDER — CALCIUM GLUCONATE 94 MG/ML
1000 INJECTION, SOLUTION INTRAVENOUS ONCE
Status: COMPLETED | OUTPATIENT
Start: 2022-11-27 | End: 2022-11-27

## 2022-11-27 RX ORDER — 0.9 % SODIUM CHLORIDE 0.9 %
1000 INTRAVENOUS SOLUTION INTRAVENOUS ONCE
Status: COMPLETED | OUTPATIENT
Start: 2022-11-27 | End: 2022-11-28

## 2022-11-27 RX ADMIN — CALCIUM GLUCONATE 1000 MG: 98 INJECTION, SOLUTION INTRAVENOUS at 23:48

## 2022-11-27 RX ADMIN — SODIUM BICARBONATE 50 MEQ: 84 INJECTION INTRAVENOUS at 22:16

## 2022-11-27 RX ADMIN — SODIUM CHLORIDE 1000 ML: 9 INJECTION, SOLUTION INTRAVENOUS at 21:53

## 2022-11-27 RX ADMIN — VANCOMYCIN HYDROCHLORIDE 1000 MG: 1 INJECTION, POWDER, LYOPHILIZED, FOR SOLUTION INTRAVENOUS at 23:53

## 2022-11-27 RX ADMIN — CEFEPIME 2000 MG: 2 INJECTION, POWDER, FOR SOLUTION INTRAVENOUS at 22:08

## 2022-11-27 ASSESSMENT — PAIN - FUNCTIONAL ASSESSMENT: PAIN_FUNCTIONAL_ASSESSMENT: NONE - DENIES PAIN

## 2022-11-28 ENCOUNTER — APPOINTMENT (OUTPATIENT)
Dept: ULTRASOUND IMAGING | Age: 82
End: 2022-11-28
Payer: MEDICARE

## 2022-11-28 VITALS
DIASTOLIC BLOOD PRESSURE: 57 MMHG | HEART RATE: 57 BPM | SYSTOLIC BLOOD PRESSURE: 91 MMHG | RESPIRATION RATE: 23 BRPM | TEMPERATURE: 98.6 F | WEIGHT: 120 LBS | BODY MASS INDEX: 23.44 KG/M2 | OXYGEN SATURATION: 86 %

## 2022-11-28 LAB
ACETAMINOPHEN LEVEL: 14 UG/ML (ref 10–30)
ANION GAP SERPL CALCULATED.3IONS-SCNC: 14 MMOL/L (ref 3–16)
ANION GAP SERPL CALCULATED.3IONS-SCNC: 9 MMOL/L (ref 3–16)
BUN BLDV-MCNC: 60 MG/DL (ref 7–20)
BUN BLDV-MCNC: 66 MG/DL (ref 7–20)
CALCIUM IONIZED: 1.05 MMOL/L (ref 1.12–1.32)
CALCIUM SERPL-MCNC: 7.8 MG/DL (ref 8.3–10.6)
CALCIUM SERPL-MCNC: 7.9 MG/DL (ref 8.3–10.6)
CHLORIDE BLD-SCNC: 103 MMOL/L (ref 99–110)
CHLORIDE BLD-SCNC: 96 MMOL/L (ref 99–110)
CHP ED QC CHECK: NORMAL
CO2: 22 MMOL/L (ref 21–32)
CO2: 23 MMOL/L (ref 21–32)
CREAT SERPL-MCNC: 4.2 MG/DL (ref 0.6–1.2)
CREAT SERPL-MCNC: 4.3 MG/DL (ref 0.6–1.2)
EKG ATRIAL RATE: 227 BPM
EKG ATRIAL RATE: 51 BPM
EKG DIAGNOSIS: NORMAL
EKG DIAGNOSIS: NORMAL
EKG Q-T INTERVAL: 462 MS
EKG Q-T INTERVAL: 484 MS
EKG QRS DURATION: 64 MS
EKG QRS DURATION: 72 MS
EKG QTC CALCULATION (BAZETT): 412 MS
EKG QTC CALCULATION (BAZETT): 454 MS
EKG R AXIS: -47 DEGREES
EKG R AXIS: 18 DEGREES
EKG T AXIS: 57 DEGREES
EKG T AXIS: 70 DEGREES
EKG VENTRICULAR RATE: 48 BPM
EKG VENTRICULAR RATE: 53 BPM
GFR SERPL CREATININE-BSD FRML MDRD: 10 ML/MIN/{1.73_M2}
GFR SERPL CREATININE-BSD FRML MDRD: 10 ML/MIN/{1.73_M2}
GLUCOSE BLD-MCNC: 118 MG/DL (ref 70–99)
GLUCOSE BLD-MCNC: 126 MG/DL (ref 70–99)
GLUCOSE BLD-MCNC: 227 MG/DL
GLUCOSE BLD-MCNC: 227 MG/DL (ref 70–99)
HAV IGM SER IA-ACNC: NORMAL
HEPATITIS B CORE IGM ANTIBODY: NORMAL
HEPATITIS B SURFACE ANTIGEN INTERPRETATION: NORMAL
HEPATITIS C ANTIBODY INTERPRETATION: NORMAL
LACTIC ACID: 2.3 MMOL/L (ref 0.4–2)
LACTIC ACID: 3.3 MMOL/L (ref 0.4–2)
LIPASE: 59 U/L (ref 13–60)
PERFORMED ON: ABNORMAL
PH VENOUS: 7.25 (ref 7.35–7.45)
POTASSIUM SERPL-SCNC: 5.1 MMOL/L (ref 3.5–5.1)
POTASSIUM SERPL-SCNC: 6.5 MMOL/L (ref 3.5–5.1)
SODIUM BLD-SCNC: 132 MMOL/L (ref 136–145)
SODIUM BLD-SCNC: 135 MMOL/L (ref 136–145)
TOTAL CK: 148 U/L (ref 26–192)

## 2022-11-28 PROCEDURE — 93010 ELECTROCARDIOGRAM REPORT: CPT | Performed by: INTERNAL MEDICINE

## 2022-11-28 PROCEDURE — 76705 ECHO EXAM OF ABDOMEN: CPT

## 2022-11-28 PROCEDURE — 6370000000 HC RX 637 (ALT 250 FOR IP): Performed by: STUDENT IN AN ORGANIZED HEALTH CARE EDUCATION/TRAINING PROGRAM

## 2022-11-28 PROCEDURE — 2500000003 HC RX 250 WO HCPCS: Performed by: STUDENT IN AN ORGANIZED HEALTH CARE EDUCATION/TRAINING PROGRAM

## 2022-11-28 PROCEDURE — 82330 ASSAY OF CALCIUM: CPT

## 2022-11-28 PROCEDURE — 83605 ASSAY OF LACTIC ACID: CPT

## 2022-11-28 PROCEDURE — 36415 COLL VENOUS BLD VENIPUNCTURE: CPT

## 2022-11-28 PROCEDURE — 2580000003 HC RX 258: Performed by: STUDENT IN AN ORGANIZED HEALTH CARE EDUCATION/TRAINING PROGRAM

## 2022-11-28 PROCEDURE — 93005 ELECTROCARDIOGRAM TRACING: CPT | Performed by: STUDENT IN AN ORGANIZED HEALTH CARE EDUCATION/TRAINING PROGRAM

## 2022-11-28 PROCEDURE — 80048 BASIC METABOLIC PNL TOTAL CA: CPT

## 2022-11-28 RX ORDER — METHOCARBAMOL 500 MG/1
500 TABLET, FILM COATED ORAL ONCE
Status: COMPLETED | OUTPATIENT
Start: 2022-11-28 | End: 2022-11-28

## 2022-11-28 RX ORDER — CALCIUM CHLORIDE 100 MG/ML
1000 INJECTION INTRAVENOUS; INTRAVENTRICULAR ONCE
Status: COMPLETED | OUTPATIENT
Start: 2022-11-28 | End: 2022-11-28

## 2022-11-28 RX ORDER — HYDROCODONE BITARTRATE AND ACETAMINOPHEN 5; 325 MG/1; MG/1
1 TABLET ORAL
Status: COMPLETED | OUTPATIENT
Start: 2022-11-28 | End: 2022-11-28

## 2022-11-28 RX ORDER — DEXTROSE MONOHYDRATE 25 G/50ML
25 INJECTION, SOLUTION INTRAVENOUS ONCE
Status: DISCONTINUED | OUTPATIENT
Start: 2022-11-28 | End: 2022-11-28 | Stop reason: ALTCHOICE

## 2022-11-28 RX ORDER — 0.9 % SODIUM CHLORIDE 0.9 %
1000 INTRAVENOUS SOLUTION INTRAVENOUS ONCE
Status: COMPLETED | OUTPATIENT
Start: 2022-11-28 | End: 2022-11-28

## 2022-11-28 RX ADMIN — METHOCARBAMOL 500 MG: 500 TABLET ORAL at 08:05

## 2022-11-28 RX ADMIN — INSULIN HUMAN 5 UNITS: 100 INJECTION, SOLUTION PARENTERAL at 03:37

## 2022-11-28 RX ADMIN — SODIUM CHLORIDE 1000 ML: 9 INJECTION, SOLUTION INTRAVENOUS at 04:50

## 2022-11-28 RX ADMIN — CALCIUM CHLORIDE 1000 MG: 100 INJECTION INTRAVENOUS; INTRAVENTRICULAR at 04:50

## 2022-11-28 RX ADMIN — HYDROCODONE BITARTRATE AND ACETAMINOPHEN 1 TABLET: 5; 325 TABLET ORAL at 08:05

## 2022-11-28 RX ADMIN — DEXTROSE MONOHYDRATE 250 ML: 100 INJECTION, SOLUTION INTRAVENOUS at 03:42

## 2022-11-28 ASSESSMENT — PAIN SCALES - GENERAL
PAINLEVEL_OUTOF10: 10
PAINLEVEL_OUTOF10: 0

## 2022-11-28 ASSESSMENT — PAIN - FUNCTIONAL ASSESSMENT
PAIN_FUNCTIONAL_ASSESSMENT: 0-10
PAIN_FUNCTIONAL_ASSESSMENT: ADULT NONVERBAL PAIN SCALE (NPVS)

## 2022-11-28 NOTE — ED NOTES
Family present at bedside. Reports decrease intake of food and fluids. Reports only urinating twice in the last week. Patient is cool to touch. Unable to obtain temp in triage.        Cleo Alonso RN  11/27/22 4002

## 2022-11-28 NOTE — ED NOTES
Report from [address] [facility] [address] [place] [address]  11/28/22 [id number]

## 2022-11-28 NOTE — ED PROVIDER NOTES
[address] ED      CHIEF COMPLAINT  Other (Per family she has only urinated in the last 3 days. Reports only urinating 2 times in the last week. Reports not drinking fluids.  did bring a urine sample, urine is very dark. Tongue is very dry. Patient repots not wanting to eat or drink. Could not get a temp in triage. )       HISTORY OF PRESENT ILLNESS  Misty White is a 80 y.o. female history of atrial fibrillation, hypertension, hyperlipidemia, COPD who presents to the emergency department for evaluation of multiple complaints. Per the family members at bedside, she has not been urinating much since Monday. Says she has history of recurrent urinary tract infections. Last UTI was treated with Bactrim. The daughter try to get her to come to the ER, but the patient did not want to come in the  says he initially did not want her to come. Family finally persuaded him to come to the ER today. Patient reports wearing 3 L nasal cannula O2 at home as needed for COPD. Reports urine has been very dark. She has not been eating or drinking much. Says patient has been confused. She has been confused in the past when she has UTIs. Family reports patient had a fall 2 days ago, but did not think that she hit her head. Denies any fevers at home. No vomiting. Patient reports having some abdominal pain. She was found to be in atrial fibrillation. Reports she is always in atrial fibrillation. They had discussed anticoagulation with PCP, but patient did not want to be on anticoagulation. No other complaints, modifying factors or associated symptoms. I have reviewed the following from the nursing documentation.     Past Medical History:   Diagnosis Date    Acute on chronic diastolic (congestive) heart failure ([address] 8/19/2020    Aortic stenosis     Arthritis     Cancer ([address]     skin basal    Chronic respiratory failure with hypoxia ([address] 9/7/2021    COPD exacerbation ([address] 2021    Hyperlipidemia     Hypertension     Migraine     Paroxysmal atrial fibrillation (HCC)     Thyroid disease      Past Surgical History:   Procedure Laterality Date    AORTIC VALVE REPLACEMENT N/A 10/15/2019    TRANSCATHETER AORTIC VALVE REPLACEMENT FEMORAL APPROACH performed by Valentina Solares MD at [address]  2019    COLONOSCOPY N/A 2019    COLON W/ANES. performed by Benedict Valdez MD at [address] [facility] [address] [facility] [address] N/A 2020    CYSTOSCOPY performed by Brianna Morales MD at [address]  2019    [address] performed by Benedict Valdez MD at [address] [facility] Right 2020    FOREARM SURGERY Left 2022    OPEN REDUCTION INTERNAL FIXATION LEFT DISTAL RADIUS performed by Daniela Bang MD at [address] [facility] ARTHROSCOPY Left     Torn meniscus    OTHER SURGICAL HISTORY  10/09/2015    phacoemulsification of cataract with intraocular implant right eye    THYROIDECTOMY, PARTIAL      TONSILLECTOMY      TUBAL LIGATION      UPPER GASTROINTESTINAL ENDOSCOPY N/A 2019    EGD W/ANES.  (9:30) performed by Benedict Valdez MD at [facility] [address]  2022    fractured left wrist     Family History   Problem Relation Age of Onset    Colon Cancer Mother     Heart Disease Father     Heart Disease Brother      Social History     Socioeconomic History    Marital status:      Spouse name: Not on file    Number of children: Not on file    Years of education: Not on file    Highest education level: Not on file   Occupational History    Not on file   Tobacco Use    Smoking status: Former     Packs/day: 1.00     Years: 20.00     Pack years: 20.00     Types: Cigarettes     Quit date: 1983     Years since quittin.9    Smokeless tobacco: Never   Vaping Use    Vaping Use: Never used   Substance and Sexual Activity    Alcohol use: No    Drug use: Never    Sexual activity: Not Currently     Partners: Male   Other Topics Concern    Not on file   Social History Narrative    Not on file     Social Determinants of Health     Financial Resource Strain: Not on file   Food Insecurity: Not on file   Transportation Needs: Not on file   Physical Activity: Not on file   Stress: Not on file   Social Connections: Not on file   Intimate Partner Violence: Not on file   Housing Stability: Not on file     Current Facility-Administered Medications   Medication Dose Route Frequency Provider Last Rate Last Admin    calcium gluconate 10 % injection 1,000 mg  1,000 mg IntraVENous Once Christine Weiss MD         Current Outpatient Medications   Medication Sig Dispense Refill    indomethacin (INDOCIN) 50 MG capsule Take 1 capsule by mouth 3 times daily 60 capsule 3    aspirin EC 81 MG EC tablet Take 1 tablet by mouth daily 90 tablet 3    dilTIAZem (CARDIZEM CD) 120 MG extended release capsule TAKE ONE CAPSULE BY MOUTH DAILY 90 capsule 3    metoprolol succinate (TOPROL XL) 50 MG extended release tablet Take 1.5 tablets by mouth in the morning and at bedtime TAKE ONE TABLET BY MOUTH TWICE A DAY 90 tablet 3    torsemide (DEMADEX) 20 MG tablet TAKE ONE TABLET BY MOUTH TWICE A DAY 60 tablet 5    butalbital-acetaminophen-caffeine (FIORICET, ESGIC) -40 MG per tablet Take 1 tablet by mouth every 6 hours as needed for Headaches or Migraine       methocarbamol (ROBAXIN) 500 MG tablet Take 500 mg by mouth 4 times daily as needed (muscle spasms)       atorvastatin (LIPITOR) 40 MG tablet TAKE ONE TABLET BY MOUTH ONCE NIGHTLY 90 tablet 3    potassium chloride (KLOR-CON M) 10 MEQ extended release tablet Take 1 tablet by mouth daily 30 tablet 0    omeprazole (PRILOSEC) 20 MG delayed release capsule Take 20 mg by mouth daily      DULoxetine (CYMBALTA) 30 MG extended release capsule Take 30 mg by mouth daily      levothyroxine (SYNTHROID) 125 MCG tablet Take 125 mcg by mouth Daily      OXYGEN Inhale 3 L into the lungs continuous       Cyanocobalamin (B-12) 2500 MCG TABS Take 2,500 mcg by mouth once a week Weekly on Wednesdays. melatonin 5 MG TABS tablet Take 5 mg by mouth nightly        Allergies   Allergen Reactions    Sulfa Antibiotics Hives and Swelling    Dronedarone      Fatigue and shortness of breath    Iodides Rash    Shellfish-Derived Products Rash       PMH, Surgical Hx, FH, Social Hx reviewed by myself. REVIEW OF SYSTEMS  10 systems reviewed, pertinent positives per HPI otherwise noted to be negative. PHYSICAL EXAM  BP 83/72   Pulse 54   Temp 96.9 °F (36.1 °C) (Rectal)   Resp 22   Wt 120 lb (54.4 kg)   SpO2 96%   BMI 23.44 kg/m²    GENERAL APPEARANCE: Ill-appearing. Confused. On 15 L nonrebreather. HENT: Normocephalic. Atraumatic. No periorbital ecchymoses. Negative thompson sign. Midface stable. EOMI. No facial droop. Dry oral mucosa. HEART/CHEST: RRR. Borderline hypotensive. LUNGS: Respirations unlabored. Speaking comfortably in full sentences. ABDOMEN: Soft, non-distended abdomen. Tenderness to palpation throughout all 4 quadrants. . No guarding. No rebound. EXTREMITIES: No gross deformities. Moving all extremities. SKIN: Warm and dry. No acute rashes. NEUROLOGICAL: Confused. No gross facial drooping. Moving all extremities. PSYCHIATRIC: Pleasant. Normal mood and affect.     LABS  Results for orders placed or performed during the hospital encounter of 11/27/22   COVID-19 & Influenza Combo    Specimen: Nasopharyngeal Swab   Result Value Ref Range    SARS-CoV-2 RNA, RT PCR NOT DETECTED NOT DETECTED    INFLUENZA A NOT DETECTED NOT DETECTED    INFLUENZA B NOT DETECTED NOT DETECTED   CBC with Auto Differential   Result Value Ref Range    WBC 9.7 4.0 - 11.0 K/uL    RBC 3.78 (L) 4.00 - 5.20 M/uL    Hemoglobin 10.9 (L) 12.0 - 16.0 g/dL    Hematocrit 36.1 36.0 - 48.0 %    MCV 95.5 80.0 - 100.0 fL    MCH 28.7 26.0 - 34.0 pg    MCHC 30.0 (L) 31.0 - 36.0 g/dL    RDW 16.7 (H) 12.4 - 15.4 %    Platelets 618 (L) 074 - 450 K/uL    MPV 9.5 5.0 - 10.5 fL    Neutrophils % 82.4 %    Lymphocytes % 6.0 %    Monocytes % 9.9 %    Eosinophils % 1.0 %    Basophils % 0.7 %    Neutrophils Absolute 8.0 (H) 1.7 - 7.7 K/uL    Lymphocytes Absolute 0.6 (L) 1.0 - 5.1 K/uL    Monocytes Absolute 1.0 0.0 - 1.3 K/uL    Eosinophils Absolute 0.1 0.0 - 0.6 K/uL    Basophils Absolute 0.1 0.0 - 0.2 K/uL   Comprehensive Metabolic Panel w/ Reflex to MG   Result Value Ref Range    Sodium 132 (L) 136 - 145 mmol/L    Potassium reflex Magnesium 5.7 (H) 3.5 - 5.1 mmol/L    Chloride 93 (L) 99 - 110 mmol/L    CO2 23 21 - 32 mmol/L    Anion Gap 16 3 - 16    Glucose 115 (H) 70 - 99 mg/dL    BUN 66 (H) 7 - 20 mg/dL    Creatinine 4.7 (H) 0.6 - 1.2 mg/dL    Est, Glom Filt Rate 9 (A) >60    Calcium 8.0 (L) 8.3 - 10.6 mg/dL    Total Protein 6.1 (L) 6.4 - 8.2 g/dL    Albumin 3.2 (L) 3.4 - 5.0 g/dL    Albumin/Globulin Ratio 1.1 1.1 - 2.2    Total Bilirubin 0.8 0.0 - 1.0 mg/dL    Alkaline Phosphatase 199 (H) 40 - 129 U/L     (H) 10 - 40 U/L     (H) 15 - 37 U/L   Lactic Acid   Result Value Ref Range    Lactic Acid 5.4 (HH) 0.4 - 2.0 mmol/L   Urinalysis   Result Value Ref Range    Color, UA Yellow Straw/Yellow    Clarity, UA SL CLOUDY (A) Clear    Glucose, Ur Negative Negative mg/dL    Bilirubin Urine SMALL (A) Negative    Ketones, Urine Negative Negative mg/dL    Specific Gravity, UA >=1.030 1.005 - 1.030    Blood, Urine LARGE (A) Negative    pH, UA 5.5 5.0 - 8.0    Protein,  (A) Negative mg/dL    Urobilinogen, Urine 0.2 <2.0 E.U./dL    Nitrite, Urine Negative Negative    Leukocyte Esterase, Urine MODERATE (A) Negative    Microscopic Examination YES     Urine Type NotGiven    Microscopic Urinalysis   Result Value Ref Range    WBC, UA 21-50 (A) 0 - 5 /HPF    RBC, UA 11-20 (A) 0 - 4 /HPF    Epithelial Cells, UA 6-10 (A) 0 - 5 /HPF    Renal Epithelial, UA 6-10 (A) 0 - 1 /HPF    Bacteria, UA 3+ (A) None Seen /HPF Blood Gas, Venous   Result Value Ref Range    pH, Nitesh 7.178 (LL) 7.350 - 7.450    pCO2, Nitesh 57.8 (H) 40.0 - 50.0 mmHg    pO2, Nitesh 67.6 (H) 25.0 - 40.0 mmHg    HCO3, Venous 21.0 (L) 23.0 - 29.0 mmol/L    Base Excess, Nitesh -7.9 (L) -3.0 - 3.0 mmol/L    O2 Sat, Nitesh 88 Not Established %    Carboxyhemoglobin 2.8 (H) 0.0 - 1.5 %    MetHgb, Nitesh 0.3 <1.5 %    TC02 (Calc), Nitesh 23 Not Established mmol/L    O2 Content, Nitesh 15 Not Established VOL %    O2 Therapy Unknown    EKG 12 Lead   Result Value Ref Range    Ventricular Rate 53 BPM    Atrial Rate 227 BPM    QRS Duration 72 ms    Q-T Interval 484 ms    QTc Calculation (Bazett) 454 ms    R Axis -47 degrees    T Axis 57 degrees    Diagnosis       Atrial fibrillation with slow ventricular responseLeft axis deviationSeptal infarct (cited on or before 06-JUL-2022)Abnormal ECGWhen compared with ECG of 06-JUL-2022 03:53,Vent. rate has decreased BY  57 BPMNon-specific change in ST segment in Inferior leadsNonspecific T wave abnormality, improved in Inferior leadsNonspecific T wave abnormality no longer evident in Lateral leads         I have reviewed all labs for this visit. ECG  The Ekg interpreted by me shows  Atrial fibrillation with slow ventricular response with a rate of 53  Axis is left  QTc is acceptable at 454   ST Segments: Nonspecific ST changes    RADIOLOGY  CT CHEST ABDOMEN PELVIS WO CONTRAST Additional Contrast? None    Result Date: 11/27/2022  EXAMINATION: CT OF THE CHEST, ABDOMEN, AND PELVIS WITHOUT CONTRAST 11/27/2022 10:56 pm TECHNIQUE: CT of the chest, abdomen and pelvis was performed without the administration of intravenous contrast. Multiplanar reformatted images are provided for review. Automated exposure control, iterative reconstruction, and/or weight based adjustment of the mA/kV was utilized to reduce the radiation dose to as low as reasonably achievable. COMPARISON: 09/19/2019 HISTORY: ORDERING SYSTEM PROVIDED HISTORY: falls. low temp. sirs. ams. congestion. hypoxia. decreased uop. transaminitis TECHNOLOGIST PROVIDED HISTORY: Reason for exam:->falls. low temp. sirs. ams. congestion. hypoxia. decreased uop. transaminitis Additional Contrast?->None Decision Support Exception - unselect if not a suspected or confirmed emergency medical condition->Emergency Medical Condition (MA) Reason for Exam: fall, low temp, congestion, hypoxia, abdo pain FINDINGS: Chest: Mediastinum: Status post aortic valve replacement. There are exuberant mitral valvular calcifications. Coronary artery calcifications are also present. The heart is mildly enlarged. The thoracic aorta is normal in caliber with moderate calcified atherosclerotic plaque. No evidence of mediastinal adenopathy. The central airways are patent. Lungs/pleura: Small right and trace left pleural effusions. Right lower lobe compressive atelectasis. No pneumothorax. Soft Tissues/Bones: The bones are demineralized. No acute bony or soft tissue abnormality. Abdomen/Pelvis: Organs: The solid organs are incompletely evaluated without intravenous contrast.  Evaluation is further mildly limited by respiratory motion. The unenhanced liver, spleen, pancreas and adrenal glands are without acute findings. Within the left kidney there is a heterogeneous hypoattenuating mass-like lesion in right lower pole which appears to extend into the right renal pelvis. Several calcifications within the renal pelvis were not present previously. Lack of intravenous contrast limits evaluation of this lesion. Approximate measurements are 5.9 x 3.5 x 3.9 cm. GI/Bowel: Large right-sided colonic stool burden. No mechanical bowel obstruction. Moderate hiatal hernia. Normal appendix. Pelvis: The urinary bladder is partially distended without contour abnormality. The uterus is present. No evidence of adnexal mass. No pelvic adenopathy by size criteria. Peritoneum/Retroperitoneum: Trace abdominopelvic ascites.   Severe atherosclerotic negative. On reassessment, blood pressure appears improved. Family reports patient has been more alert than she has been all week. CT scans ordered for the patient. CT shows small right and trace left pleural effusion. Hetergeneous mass with calcification concerning for carcinoma. Repeat lactic, vbg, and bmp ordered and pending at this time. Blood pressure improved to 831-012 systolic. Hospitalist consulted for admission for further evaluation and treatment. Admit. Is this patient to be included in the SEP-1 Core Measure due to severe sepsis or septic shock? Yes   SEP-1 CORE MEASURE DATA      Sepsis Criteria   Severe Sepsis Criteria   Septic Shock Criteria     Must be confirmed or suspected to move forward with diagnosis of sepsis. Must meet 2:    [] Temperature > 100.9 F (38.3 C)        or < 96.8 F (36 C)  [] HR > 90  [x] RR > 20  [] WBC > 12 or < 4 or 10% bands      AND:      [x] Infection Confirmed or        Suspected. Must meet 1:    [] Lactate > 2       or   [] Signs of Organ Dysfunction:    - SBP < 90 or MAP < 65  - Altered mental status  - Creatinine > 2 or increased from      baseline  - Urine Output < 0.5 ml/kg/hr  - Bilirubin > 2  - INR > 1.5 (not anticoagulated)  - Platelets < 349,562  - Acute Respiratory Failure as     evidenced by new need for NIPPV     or mechanical ventilation      [] No criteria met for Severe Sepsis. Must meet 1:    [x] Lactate > 4        or   [] SBP < 90 or MAP < 65 for at        least two readings in the first        hour after fluid bolus        administration      [] Vasopressors initiated (if hypotension persists after fluid resuscitation)        [] No criteria met for Septic Shock.    Patient Vitals for the past 6 hrs:   BP Temp Pulse Resp SpO2 Weight Weight Method Percent Weight Change   11/27/22 2024 (!) 90/50 -- -- 18 -- 120 lb (54.4 kg) Stated 0   11/27/22 2045 -- -- -- -- (!) 88 % -- -- --   11/27/22 2100 (!) 86/50 -- (!) 48 20 -- -- -- --   11/27/22 2116 111/64 96.9 °F (36.1 °C) 51 19 95 % -- -- --   11/27/22 2202 83/72 -- 54 22 96 % -- -- --      Recent Labs     11/27/22 2034 11/27/22 2104   WBC  --  9.7   LACTA 5.4*  --    CREATININE  --  4.7*   BILITOT  --  0.8   PLT  --  121*         Time Septic Shock Identified: 2130    Fluid Resuscitation Rational: at least 30mL/kg based on entered actual weight at time of triage      Repeat lactate level: ordered and pending at this time    Reassessment Exam:   I have reassessed tissue perfusion and hemodynamic status after fluid bolus at this time: 2230      I provided at least 30 minutes of critical care excluding separately billable procedures. Pt was seen during the [place] 19 pandemic. Appropriate PPE worn by ME during patient encounters. Patient was cared for during a time with constrained hospital bed capacity with nationwide stress on resources and staffing. During the patient's ED course, the patient was given:  Medications   calcium gluconate 10 % injection 1,000 mg (has no administration in time range)   0.9 % sodium chloride bolus (1,000 mLs IntraVENous New Bag 11/27/22 2153)   0.9 % sodium chloride bolus (1,000 mLs IntraVENous New Bag 11/27/22 2153)   cefepime (MAXIPIME) 2000 mg IVPB minibag (2,000 mg IntraVENous New Bag 11/27/22 2208)   sodium zirconium cyclosilicate (LOKELMA) oral suspension 10 g (10 g Oral Given 11/27/22 2218)   sodium bicarbonate 8.4 % injection 50 mEq (50 mEq IntraVENous Given 11/27/22 2216)        CLINICAL IMPRESSION  1. Atrial fibrillation, unspecified type ([address]    2. Recurrent UTI    3. FLORENCE (acute kidney injury) ([address]    4. Hyperkalemia    5. Sepsis, due to unspecified organism, unspecified whether acute organ dysfunction present ([address]    6. Lactic acidosis        Blood pressure 83/72, pulse 54, temperature 96.9 °F (36.1 °C), temperature source Rectal, resp. rate 22, weight 120 lb (54.4 kg), SpO2 96 %, not currently breastfeeding.     DISPOSITION  Danika Chua was admitted to the hospital.      Patient was given scripts for the following medications. I counseled patient how to take these medications. New Prescriptions    No medications on file       Follow-up with:  No follow-up provider specified. DISCLAIMER: This chart was created using Dragon dictation software. Efforts were made by me to ensure accuracy, however some errors may be present due to limitations of this technology and occasionally words are not transcribed correctly.         Courtney Mccullough MD  11/27/22 7348

## 2022-11-28 NOTE — ED NOTES
Call placed to nephrology at the request of Dr. Yenny Brito at [phone].      Brittany Freedman  11/28/22 1743

## 2022-11-28 NOTE — ED PROVIDER NOTES
Patient signed out to me at 18 by Dr. Nayely Sims. Please refer to her note regarding presentation evaluation up to this point. In brief she is a 69-year-old female presenting with dehydration, FLORENCE with hyperkalemia and UTI with sepsis. At the time of signout she has received 2 L of IV fluid with improvement in initial hypotension. She does have a hyperkalemia and was treated with calcium and Lokelma, however did not tolerate the Lokelma. Repeat lab work comes back and BMP shows a worsening of her hyperkalemia. Patient also at this time has lost all IV access. Considering her severe FLORENCE and her worsening hyperkalemia I am concerned this patient may require dialysis. Consulted with on-call nephrology, Dr. Feroz Car, who agrees that it would be in the patient's best interest to place a trialysis catheter for possible dialysis in the morning and for IV access. Discussed with the patient and her  the procedure and the risks and benefits. They are agreeable. Central Line    Date/Time: 11/28/2022 7:15 AM  Performed by: Mohini Sanford DO  Authorized by: Mohini Sanford DO     Consent:     Consent obtained:  Written    Consent given by:  Patient    Risks, benefits, and alternatives were discussed: yes      Risks discussed:  Arterial puncture, incorrect placement, infection and bleeding    Alternatives discussed:  No treatment and referral  Universal protocol:     Patient identity confirmed:  Arm band and verbally with patient  Pre-procedure details:     Indication(s): central venous access and insufficient peripheral access      Hand hygiene: Hand hygiene performed prior to insertion      Sterile barrier technique:  All elements of maximal sterile technique followed      Skin preparation:  Chlorhexidine    Skin preparation agent: Skin preparation agent completely dried prior to procedure    Anesthesia:     Anesthesia method:  Local infiltration    Local anesthetic:  Lidocaine 1% w/o epi  Procedure details: Location:  R femoral    Patient position:  Supine    Procedural supplies:  Triple lumen    Catheter size:  13 Fr    Ultrasound guidance: yes      Ultrasound guidance timing: real time      Sterile ultrasound techniques: Sterile gel and sterile probe covers were used      Number of attempts:  1    Successful placement: yes    Post-procedure details:     Post-procedure:  Dressing applied and line sutured    Assessment:  Blood return through all ports and free fluid flow    Procedure completion:  Tolerated well, no immediate complications    Repeat EKG: A. fib with slow ventricular response and a rate of 48 bpm.  Normal axis. No acute injury pattern. QRS 64, QTc 412. After trialysis catheter placement, patient was given another liter of IV fluid, insulin and glucose. Repeat potassium is improved to 5.1. Her ionized calcium was also noted to be low and she is given a gram of calcium chloride. I did have a prolonged discussion with the patient regarding CODE STATUS. At this time she chose to be a DNR CCA and the appropriate form was filled out and put in the chart. There are no beds available here at [facility]. Patient discussed with Dr. Mary Pérez, at [facility] who accepted the patient for transfer to their ICU. Critical care    Critical care time 32 minutes exclusive from separate billable procedures that were performed. The following was considered in the determination of critical care but not limited to the level of medical decision making, intensive cardiac and/or respiratory monitoring, frequent vital sign monitoring, evaluation of laboratory studies, evaluation of radiographic studies, oxygen monitoring, and constant monitoring and speaking to family at bedside. Impression: Acute renal failure, severe hyperkalemia, hypocalcemia, sepsis secondary to UTI.        Angelic Ruiz,   11/28/22 809 Health system,   11/28/22 3148

## 2022-11-28 NOTE — CONSULTS
Pharmacy Note  Vancomycin Consult    Pharmacy to dose Vanco x 1    Dx: sepsis  Consult requested from: Murtaza    Please give 1000 mg IVPB x1 now to provide Gram+ organism coverage to include MRSA. Thank you for this consult. Pharmacy will provide additional doses if consulted upon admission.      Raul Sandoval, PharmD, [facility], 11/27/2022 11:25 PM

## 2022-11-28 NOTE — ED NOTES
Transfer Center called at [phone] to give bed information     Bed# 9371  Report# [phone]       Sravanthi Eastmano  11/28/22 2392    Transfer center called at [phone] and gave transport information     Alayna Muñoz  11/28/22 4408

## 2022-11-28 NOTE — ED NOTES
Pt returned from CT; IV pulled out during transfer; Ultrasound IV obtained        Keeley Gill RN  11/28/22 5981

## 2022-11-28 NOTE — ED NOTES
Pt c/o pain to IV site TRICIA; swelling noted IV removed Co band applied; attempted ultrasound IV to LA unsuccessful provider notified      Joan Harris RN  11/28/22 2793

## 2022-11-28 NOTE — ED NOTES
[facility] with call. [facility] has ICU bed. Zi Patel MD with Dr. Lali Brunson at this time.      Rian Mark RN  11/28/22 8517

## 2022-12-02 LAB
BLOOD CULTURE, ROUTINE: NORMAL
CULTURE, BLOOD 2: NORMAL

## 2022-12-30 RX ORDER — ATORVASTATIN CALCIUM 40 MG/1
TABLET, FILM COATED ORAL
Qty: 90 TABLET | Refills: 3 | Status: SHIPPED | OUTPATIENT
Start: 2022-12-30

## 2023-07-05 NOTE — TELEPHONE ENCOUNTER
Pts caregiver, Louis Doran, called stating pt has hematuria. Has had this in the past on Eliquis and follows with urology. Per Dr. Vinod Munoz, hold Eliquis and contact urology ASAP.  Informed of the increased risk of stroke with holding [facility]. Maritza BLOUNT abdomen

## 2024-09-27 NOTE — H&P
[address]                 [address] [address]                              HISTORY AND PHYSICAL    PATIENT NAME: Raul Tran                  :        1940  MED REC NO:   [id number]                          ROOM:         ACCOUNT NO:   [de-identified]                           ADMIT DATE: 2020  PROVIDER:     Harley Nash MD    I obtained the history and performed physical exam on the patient in the  medical floor on 2020. CHIEF COMPLAINT:  Shortness of breath. HISTORY OF PRESENT ILLNESS:  An 80-year-old  female who is  status post TAVR done by Dr. Foster  _____ presented to the hospital with  chief complaint of subacute onset of gradually progressing increasing  exertional shortness of breath. The patient notes that a week ago, she  stopped taking her Lasix and stopped taking her rivaroxaban for an eye  procedure that she was supposed to have and for a week, she has been  having consequent subacute onset of gradually progressing increasing  shortness of breath and increasing leg swelling. She went to Dr. Valentin Basilio office who examined her and noted that she was in heart failure  and that she needed to be admitted to the hospital for diuresis. PAST MEDICAL HISTORY:  1. Aortic stenosis, status post TAVR. 2.  Paroxysmal atrial fibrillation. 3.  Chronic pain with chronic narcotic dependence. 4.  Dyslipidemia. 5.  Hypothyroidism. PAST SURGICAL HISTORY:  The patient had a TAVR. ALLERGIC HISTORY:  IODIDE_____    FAMILY HISTORY:  Reviewed by me and is currently noncontributory. SOCIAL HISTORY:  Lives at home. Nonsmoker. No illicit substance use. CURRENT MEDICATIONS:  Home medication list reviewed by me include  rivaroxaban, metoprolol, Cardizem, atorvastatin, Norco, Cymbalta,  Synthroid, aspirin, Protonix, Skelaxin.     REVIEW OF SYSTEMS:  The patient's review of systems is significant for  shortness of breath and per the history of present illness. All other  systems are reviewed and are negative except as per the history of  present illness. PHYSICAL EXAMINATION:  GENERAL:  The patient was examined by me on the medical floor. VITAL SIGNS:  Temperature 98.2, respiratory rate 20, pulse 61, blood  pressure 132/60, saturating 86%, improved to 97% on 3 L.  CNS:  Alert, awake, and oriented. PSYCHIATRIC:  Cooperative, answering questions appropriately. HEENT:  Eyes, pupils are equal, reactive to light. ENT, no oral mucosal  lesions. RESPIRATORY SYSTEM:  Bibasilar posterior rales. CVS:  S1, S2 are heard. Significant systolic ejection murmur heard over  the aortic area. This is around grade 3/5. ABDOMEN:  Soft. No guarding, rigidity, or rebound. MUSCULOSKELETAL EXAM:  No acute deformity. SKIN:  Without rashes. DIAGNOSTIC DATA:  Hemoglobin 10.3, white count 6.9, hematocrit 38.2,  platelets of 761. INR 0.97. BNP 1858. BUN 19, creatinine 1.2. Sodium  134. Venous blood gas showed a pH of 7.3. Portable chest x-ray showed cardiomegaly with chronic pulmonary vascular  changes. EKG shows normal sinus rhythm. LABS REQUESTED BY ME:  I have requested a basic metabolic profile. CONSULTATIONS REQUESTED:  I have requested a Cardiology consultation. REVIEW OF PREVIOUS MEDICAL RECORDS:  An echo from 11/2019 that shows  that the patient had LVEF 60% to 65% with mild concentric LVH and grade  2 diastolic dysfunction with a well-seated TAVR valve with trace  perivalvular valve regurgitation. ASSESSMENT:  1. Acute on chronic diastolic congestive heart failure exacerbation  secondary to discontinuation of Lasix for a week. 2.  Status post TAVR. 3.  Paroxysmal atrial fibrillation. 4.  Hypertension. 5.  Hypothyroidism. 6.  Chronic pain with chronic narcotic dependence. PLAN OF CARE:  1. The patient admitted to Internal Medicine Service. Diuresis has  been initiated. no

## 2025-02-18 NOTE — TELEPHONE ENCOUNTER
Received message from age again today regarding this patient. Unable to tolerate cardiac MRI. Discussed in collaboration with EP/watchman team, feel next best step to define intra-cardiac findings by JAYNE is cardiac CT. She continues on [facility]. Would schedule follow up with me in the coming 1 month and cardiac CT structure/function if she is agreeable. This should be better tolerated than MRI. May need additional HR control to obtain proper study/gating.      EARL no...

## (undated) DEVICE — SPLINT ORTH W3XL12IN LAYERED FBRGLS FOAM PD BRTH BK MOLD

## (undated) DEVICE — GLOVE SURG SZ 65 L12IN FNGR THK79MIL GRN LTX FREE

## (undated) DEVICE — Z DISCONTINUED USE 2516375 APPLICATOR MEDICATED 3 CC CLR STRL CHLORAPREP

## (undated) DEVICE — PRESSURE MONITORING SET: Brand: TRUWAVE

## (undated) DEVICE — DRILL, AO, SCALED: Brand: VARIAX

## (undated) DEVICE — UNTHREADED GUIDE WIRE: Brand: FIXOS

## (undated) DEVICE — SUTURE VCRL + SZ 3-0 L18IN ABSRB UD SH 1/2 CIR TAPERCUT NDL VCP864D

## (undated) DEVICE — GLOVE SURG SZ 8 CRM LTX FREE POLYISOPRENE POLYMER BEAD ANTI

## (undated) DEVICE — Z CONVERTED USE 2275871 SPONGE GZ W4XL4IN WHT 8 PLY CURITY

## (undated) DEVICE — KIRSCHNER WIRE
Type: IMPLANTABLE DEVICE | Site: WRIST | Status: NON-FUNCTIONAL
Brand: VARIAX
Removed: 2022-06-27

## (undated) DEVICE — HAND AND ELBOW: Brand: MEDLINE INDUSTRIES, INC.

## (undated) DEVICE — OPTIFOAM GENTLE LIQUITRAP, SACRUM, 7"X7": Brand: MEDLINE

## (undated) DEVICE — DRESSING WND SM W2.5XL4IN BRTH W/ CATH SECUREMENT AND

## (undated) DEVICE — CYSTO: Brand: MEDLINE INDUSTRIES, INC.

## (undated) DEVICE — GOWN SIRUS NONREIN LG W/TWL: Brand: MEDLINE INDUSTRIES, INC.

## (undated) DEVICE — GLOVE ORANGE PI 7 1/2   MSG9075

## (undated) DEVICE — NEEDLE HYPO 18GA L1.5IN THN WALL PIVOTING SHLD BVL ORIENTED

## (undated) DEVICE — CANISTER, RIGID, 1200CC: Brand: MEDLINE INDUSTRIES, INC.

## (undated) DEVICE — SOLUTION IRRIG 2000ML STRL H2O UROMATIC PLAS CONT USP

## (undated) DEVICE — GLOVE SURG SZ 6 L12IN FNGR THK79MIL GRN LTX FREE

## (undated) DEVICE — ELECTRODE PT RET AD L9FT HI MOIST COND ADH HYDRGEL CORDED

## (undated) DEVICE — ELECTRODE ES AD DISPER HYDRGEL THN FOAM ADH SCALLOPED EDGE

## (undated) DEVICE — PAD THRM M SPL TORSO

## (undated) DEVICE — BANDAGE COMPR W4INXL15FT BGE E SGL LAYERED CLP CLSR

## (undated) DEVICE — CONMED SCOPE SAVER BITE BLOCK, 20X27 MM: Brand: SCOPE SAVER

## (undated) DEVICE — BAG DRNGE COMB PK

## (undated) DEVICE — GLOVE SURG SZ 65 THK91MIL LTX FREE SYN POLYISOPRENE

## (undated) DEVICE — SYRINGE IRRIG 60ML SFT PLIABLE BLB EZ TO GRP 1 HND USE W/

## (undated) DEVICE — 3M™ IOBAN™ 2 ANTIMICROBIAL INCISE DRAPE 6650EZ: Brand: IOBAN™ 2

## (undated) DEVICE — 3M™ STERI-STRIP™ COMPOUND BENZOIN TINCTURE 40 BAGS/CARTON 4 CARTONS/CASE C1544: Brand: 3M™ STERI-STRIP™

## (undated) DEVICE — STERILE POLYISOPRENE POWDER-FREE SURGICAL GLOVES: Brand: PROTEXIS

## (undated) DEVICE — PENCIL ES L3M BTTN SWCH S STL HEX LOK BLDE ELECTRD HOLSTER

## (undated) DEVICE — ENDO CARRY-ON PROCEDURE KIT INCLUDES SUCTION TUBING, LUBRICANT, GAUZE, BIOHAZARD STICKER, TRANSPORT PAD AND INTERCEPT BEDSIDE KIT.: Brand: ENDO CARRY-ON PROCEDURE KIT

## (undated) DEVICE — SET CATH 20GA L1.75IN RAD ART POLYUR RADPQ W/ INTEGR

## (undated) DEVICE — DRESSING,GAUZE,XEROFORM,CURAD,1"X8",ST: Brand: CURAD

## (undated) DEVICE — STRIP,CLOSURE,WOUND,MEDI-STRIP,1/2X4: Brand: MEDLINE

## (undated) DEVICE — ELECTRODE ECG MONITR FOAM TEAR DROP ADLT RED

## (undated) DEVICE — SOLUTION IV IRRIG POUR BRL 0.9% SODIUM CHL 2F7124

## (undated) DEVICE — APPLICATOR PREP 26ML 0.7% IOD POVACRYLEX 74% ISO ALC ST

## (undated) DEVICE — CATHETER IV 18GA L1.25N GREEN FEP SFTY STRGHT HUB RDPQUE DSP

## (undated) DEVICE — SUPPORT WRST AD W3.5XL9IN DIA14.5IN ART SFT ADJ HK AND LOOP